# Patient Record
Sex: FEMALE | Race: WHITE | NOT HISPANIC OR LATINO | URBAN - METROPOLITAN AREA
[De-identification: names, ages, dates, MRNs, and addresses within clinical notes are randomized per-mention and may not be internally consistent; named-entity substitution may affect disease eponyms.]

---

## 2017-03-27 ENCOUNTER — OUTPATIENT (OUTPATIENT)
Dept: OUTPATIENT SERVICES | Facility: HOSPITAL | Age: 77
LOS: 1 days | End: 2017-03-27
Payer: MEDICARE

## 2017-03-27 VITALS
OXYGEN SATURATION: 98 % | HEART RATE: 76 BPM | RESPIRATION RATE: 16 BRPM | DIASTOLIC BLOOD PRESSURE: 90 MMHG | TEMPERATURE: 99 F | HEIGHT: 62 IN | SYSTOLIC BLOOD PRESSURE: 150 MMHG | WEIGHT: 113.98 LBS

## 2017-03-27 DIAGNOSIS — N13.30 UNSPECIFIED HYDRONEPHROSIS: ICD-10-CM

## 2017-03-27 DIAGNOSIS — R19.00 INTRA-ABDOMINAL AND PELVIC SWELLING, MASS AND LUMP, UNSPECIFIED SITE: Chronic | ICD-10-CM

## 2017-03-27 DIAGNOSIS — Z92.21 PERSONAL HISTORY OF ANTINEOPLASTIC CHEMOTHERAPY: Chronic | ICD-10-CM

## 2017-03-27 DIAGNOSIS — Z98.890 OTHER SPECIFIED POSTPROCEDURAL STATES: Chronic | ICD-10-CM

## 2017-03-27 LAB
ALBUMIN SERPL ELPH-MCNC: 4.1 G/DL — SIGNIFICANT CHANGE UP (ref 3.3–5)
ALP SERPL-CCNC: 94 U/L — SIGNIFICANT CHANGE UP (ref 40–120)
ALT FLD-CCNC: 18 U/L — SIGNIFICANT CHANGE UP (ref 4–33)
APPEARANCE UR: SIGNIFICANT CHANGE UP
AST SERPL-CCNC: 21 U/L — SIGNIFICANT CHANGE UP (ref 4–32)
BILIRUB SERPL-MCNC: 0.2 MG/DL — SIGNIFICANT CHANGE UP (ref 0.2–1.2)
BILIRUB UR-MCNC: SIGNIFICANT CHANGE UP
BLOOD UR QL VISUAL: NEGATIVE — SIGNIFICANT CHANGE UP
BUN SERPL-MCNC: 29 MG/DL — HIGH (ref 7–23)
CALCIUM SERPL-MCNC: 10.4 MG/DL — SIGNIFICANT CHANGE UP (ref 8.4–10.5)
CHLORIDE SERPL-SCNC: 102 MMOL/L — SIGNIFICANT CHANGE UP (ref 98–107)
CO2 SERPL-SCNC: 24 MMOL/L — SIGNIFICANT CHANGE UP (ref 22–31)
COLOR SPEC: YELLOW — SIGNIFICANT CHANGE UP
CREAT SERPL-MCNC: 1.36 MG/DL — HIGH (ref 0.5–1.3)
GLUCOSE SERPL-MCNC: 121 MG/DL — HIGH (ref 70–99)
GLUCOSE UR-MCNC: NEGATIVE — SIGNIFICANT CHANGE UP
GRAN CASTS # UR COMP ASSIST: SIGNIFICANT CHANGE UP
HCT VFR BLD CALC: 36.8 % — SIGNIFICANT CHANGE UP (ref 34.5–45)
HGB BLD-MCNC: 12.2 G/DL — SIGNIFICANT CHANGE UP (ref 11.5–15.5)
HYALINE CASTS # UR AUTO: SIGNIFICANT CHANGE UP (ref 0–?)
KETONES UR-MCNC: NEGATIVE — SIGNIFICANT CHANGE UP
LEUKOCYTE ESTERASE UR-ACNC: HIGH
MCHC RBC-ENTMCNC: 30.8 PG — SIGNIFICANT CHANGE UP (ref 27–34)
MCHC RBC-ENTMCNC: 33.2 % — SIGNIFICANT CHANGE UP (ref 32–36)
MCV RBC AUTO: 92.9 FL — SIGNIFICANT CHANGE UP (ref 80–100)
MUCOUS THREADS # UR AUTO: SIGNIFICANT CHANGE UP
NITRITE UR-MCNC: NEGATIVE — SIGNIFICANT CHANGE UP
NON-SQ EPI CELLS # UR AUTO: 1 — SIGNIFICANT CHANGE UP
PH UR: 6 — SIGNIFICANT CHANGE UP (ref 4.6–8)
PLATELET # BLD AUTO: 225 K/UL — SIGNIFICANT CHANGE UP (ref 150–400)
PMV BLD: 10.3 FL — SIGNIFICANT CHANGE UP (ref 7–13)
POTASSIUM SERPL-MCNC: 4.5 MMOL/L — SIGNIFICANT CHANGE UP (ref 3.5–5.3)
POTASSIUM SERPL-SCNC: 4.5 MMOL/L — SIGNIFICANT CHANGE UP (ref 3.5–5.3)
PROT SERPL-MCNC: 7.4 G/DL — SIGNIFICANT CHANGE UP (ref 6–8.3)
PROT UR-MCNC: 150 — HIGH
RBC # BLD: 3.96 M/UL — SIGNIFICANT CHANGE UP (ref 3.8–5.2)
RBC # FLD: 12.3 % — SIGNIFICANT CHANGE UP (ref 10.3–14.5)
RBC CASTS # UR COMP ASSIST: SIGNIFICANT CHANGE UP (ref 0–?)
SODIUM SERPL-SCNC: 142 MMOL/L — SIGNIFICANT CHANGE UP (ref 135–145)
SP GR SPEC: 1.03 — HIGH (ref 1–1.03)
SQUAMOUS # UR AUTO: SIGNIFICANT CHANGE UP
UROBILINOGEN FLD QL: NORMAL E.U. — SIGNIFICANT CHANGE UP (ref 0.1–0.2)
WBC # BLD: 9.25 K/UL — SIGNIFICANT CHANGE UP (ref 3.8–10.5)
WBC # FLD AUTO: 9.25 K/UL — SIGNIFICANT CHANGE UP (ref 3.8–10.5)
WBC UR QL: SIGNIFICANT CHANGE UP (ref 0–?)

## 2017-03-27 PROCEDURE — 93010 ELECTROCARDIOGRAM REPORT: CPT

## 2017-03-27 RX ORDER — SODIUM CHLORIDE 9 MG/ML
1000 INJECTION, SOLUTION INTRAVENOUS
Qty: 0 | Refills: 0 | Status: DISCONTINUED | OUTPATIENT
Start: 2017-03-31 | End: 2017-04-15

## 2017-03-27 RX ORDER — SODIUM CHLORIDE 9 MG/ML
3 INJECTION INTRAMUSCULAR; INTRAVENOUS; SUBCUTANEOUS EVERY 8 HOURS
Qty: 0 | Refills: 0 | Status: DISCONTINUED | OUTPATIENT
Start: 2017-03-31 | End: 2017-04-15

## 2017-03-27 NOTE — H&P PST ADULT - NEGATIVE ENMT SYMPTOMS
no sinus symptoms/no dry mouth/no vertigo/no ear pain/no nasal obstruction/no tinnitus/no hearing difficulty/no throat pain/no dysphagia/no abnormal taste sensation/no gum bleeding/no nose bleeds

## 2017-03-27 NOTE — H&P PST ADULT - NEGATIVE CARDIOVASCULAR SYMPTOMS
no peripheral edema/no chest pain/no orthopnea/no palpitations/no paroxysmal nocturnal dyspnea/no claudication/no dyspnea on exertion

## 2017-03-27 NOTE — H&P PST ADULT - RS GEN PE MLT RESP DETAILS PC
respirations non-labored/breath sounds equal/no wheezes/no intercostal retractions/no rales/no rhonchi/airway patent/clear to auscultation bilaterally/good air movement/no chest wall tenderness

## 2017-03-27 NOTE — H&P PST ADULT - NSANTHOSAYNRD_GEN_A_CORE
No. JOHNNY screening performed.  STOP BANG Legend: 0-2 = LOW Risk; 3-4 = INTERMEDIATE Risk; 5-8 = HIGH Risk

## 2017-03-27 NOTE — H&P PST ADULT - NEGATIVE OPHTHALMOLOGIC SYMPTOMS
no pain L/no discharge R/no scleral injection R/no photophobia/no discharge L/no irritation R/no loss of vision L/no lacrimation R/no pain R/no blurred vision L/no irritation L/no loss of vision R/no blurred vision R/no diplopia/no lacrimation L/no scleral injection L

## 2017-03-27 NOTE — H&P PST ADULT - PROBLEM SELECTOR PLAN 1
Scheduled for cystoscopy, right JJ stent change on 03/31/17. Pre op instructions, famotidine given and explained. Pt verbalized understanding.

## 2017-03-27 NOTE — H&P PST ADULT - PMH
Abdominal mass  2013 metastatic endometrial cancer, s/p surgery on chemotherapy at [facility]-infusa port right chest  Anxiety    Atrial fibrillation  one episode >30 years ago  Benign Hypertension    Endometrial carcinoma  2001 - s/p KATH - tx with radiation  metastatic finished treatment 3/2014  History of chemotherapy  ([id number]; 2015 - 09/2016)  Hydronephrosis determined by ultrasound  obstrucive leison right kidney  Hydronephrosis of right kidney    Hyperlipidemia    Lymphedema of leg  RLE  Mitral valve prolapse  partial  Post-Herpetic Trigeminal Neuralgia  left  Ulcerative colitis    Uterine cancer

## 2017-03-27 NOTE — H&P PST ADULT - NEGATIVE NEUROLOGICAL SYMPTOMS
no difficulty walking/no headache/no generalized seizures/no vertigo/no confusion/no tremors/no focal seizures/no syncope/no hemiparesis

## 2017-03-27 NOTE — H&P PST ADULT - HISTORY OF PRESENT ILLNESS
77 y/o  female with PMH: HTN, HLD, Anxiety, UC, endometrial CA - S/P KATH (2001) with radiation therapy. Pt developed tumor of the abdominal wall 2013, S/P abdominal surgery/tumor debulking, mesh 2013, with mets to lymph nodes with lymphedema of the right leg. go, hydronephrosis, S/P right ureteral stent placement a year and a half ago 75 y/o  female with PMH: HTN, HLD, Anxiety, UC, endometrial CA - S/P KATH (2001) with radiation therapy. Pt developed tumor of the abdominal wall 2013, S/P abdominal surgery/tumor debulking, mesh 2013, with mets to lymph nodes with lymphedema of the right leg. Pre op diagnosis unspecified hydronephrosis. H/o multiple right ureteral stent exchange. Now scheduled for cystoscopy, right JJ stent change on 03/31/17

## 2017-03-27 NOTE — H&P PST ADULT - PSH
Abdominal mass  s/p mass removed 2013,on chemo-right chest infusa port  Encounter for biopsy  2/14/13 - right iliac lymph node biopsy  H/O total hysterectomy  2001  History of chemotherapy  Infusaport insertion ; 2013  History of D&C  age 22  History of tonsillectomy Abdominal mass  s/p mass removed 2013,on chemo-right chest infusa port  Encounter for biopsy  2/14/13 - right iliac lymph node biopsy  H/O total hysterectomy  2001  History of chemotherapy  Infusaport insertion ; 2013  History of D&C  age 22  History of tonsillectomy    History of urethral stent  right

## 2017-03-28 LAB
BACTERIA UR CULT: SIGNIFICANT CHANGE UP
SPECIMEN SOURCE: SIGNIFICANT CHANGE UP

## 2017-03-30 NOTE — ASU PATIENT PROFILE, ADULT - PMH
Abdominal mass  2013 metastatic endometrial cancer, s/p surgery on chemotherapy at [facility]-infusa port right chest  Anxiety    Atrial fibrillation  one episode >30 years ago  Benign Hypertension    Endometrial carcinoma  2001 - s/p KATH - tx with radiation  metastatic finished treatment 3/2014  History of chemotherapy  ([id number]; 2015 - 09/2016)  Hydronephrosis determined by ultrasound  obstrucive leison right kidney  Hydronephrosis of right kidney    Hyperlipidemia    Lymphedema of leg  RLE  Mitral valve prolapse  partial  Post-Herpetic Trigeminal Neuralgia  left  Ulcerative colitis    Uterine cancer

## 2017-03-30 NOTE — ASU PATIENT PROFILE, ADULT - PSH
Abdominal mass  s/p mass removed 2013,on chemo-right chest infusa port  Encounter for biopsy  2/14/13 - right iliac lymph node biopsy  H/O total hysterectomy  2001  History of chemotherapy  Infusaport insertion ; 2013  History of D&C  age 22  History of tonsillectomy    History of urethral stent  right

## 2017-03-31 ENCOUNTER — OUTPATIENT (OUTPATIENT)
Dept: OUTPATIENT SERVICES | Facility: HOSPITAL | Age: 77
LOS: 1 days | Discharge: HOME | End: 2017-03-31
Payer: MEDICARE

## 2017-03-31 ENCOUNTER — APPOINTMENT (OUTPATIENT)
Dept: UROLOGY | Facility: AMBULATORY SURGERY CENTER | Age: 77
End: 2017-03-31

## 2017-03-31 VITALS
OXYGEN SATURATION: 100 % | DIASTOLIC BLOOD PRESSURE: 83 MMHG | RESPIRATION RATE: 16 BRPM | HEIGHT: 62 IN | TEMPERATURE: 98 F | SYSTOLIC BLOOD PRESSURE: 169 MMHG | HEART RATE: 80 BPM | WEIGHT: 113.98 LBS

## 2017-03-31 VITALS
RESPIRATION RATE: 16 BRPM | SYSTOLIC BLOOD PRESSURE: 127 MMHG | HEART RATE: 79 BPM | OXYGEN SATURATION: 97 % | DIASTOLIC BLOOD PRESSURE: 56 MMHG

## 2017-03-31 DIAGNOSIS — N13.30 UNSPECIFIED HYDRONEPHROSIS: ICD-10-CM

## 2017-03-31 DIAGNOSIS — R19.00 INTRA-ABDOMINAL AND PELVIC SWELLING, MASS AND LUMP, UNSPECIFIED SITE: Chronic | ICD-10-CM

## 2017-03-31 DIAGNOSIS — Z92.21 PERSONAL HISTORY OF ANTINEOPLASTIC CHEMOTHERAPY: Chronic | ICD-10-CM

## 2017-03-31 DIAGNOSIS — Z98.890 OTHER SPECIFIED POSTPROCEDURAL STATES: Chronic | ICD-10-CM

## 2017-03-31 PROCEDURE — 52332 CYSTOSCOPY AND TREATMENT: CPT | Mod: RT

## 2017-03-31 RX ORDER — CEPHALEXIN 500 MG
1 CAPSULE ORAL
Qty: 9 | Refills: 0 | OUTPATIENT
Start: 2017-03-31 | End: 2017-04-03

## 2017-03-31 NOTE — ASU PREOP CHECKLIST - COMMENTS
Took Pepcid with sip of water at this am. Took Pepcid , Amlodipine, Benicar and Oxcarbazepine with sip of water at this am.

## 2017-03-31 NOTE — ASU DISCHARGE PLAN (ADULT/PEDIATRIC). - NOTIFY
Bleeding that does not stop/Fever greater than 101 Fever greater than 101/Persistent Nausea and Vomiting/Unable to Urinate/Bleeding that does not stop/Pain not relieved by Medications

## 2017-03-31 NOTE — ASU DISCHARGE PLAN (ADULT/PEDIATRIC). - MEDICATION SUMMARY - MEDICATIONS TO TAKE
I will START or STAY ON the medications listed below when I get home from the hospital:    balsalazide 750 mg oral capsule  -- 3 cap(s) by mouth 3 times a day  -- Indication: For Home med    spironolactone  -- 75 milligram(s) by mouth once a day in am  -- Indication: For Home med    Tylenol 500 mg oral tablet  -- 2 tab(s) by mouth every 6 hours, As Needed  -- Indication: For Home med    Benicar 40 mg oral tablet  -- 1 tab(s) by mouth once a day in pm  -- Indication: For Home med    OXcarbazepine 150 mg oral tablet  -- 1 tab(s) by mouth once a day in am  -- Indication: For Home med    Zoloft 25 mg oral tablet  -- 1 tab(s) by mouth once a day in pm  -- Indication: For Home med    Claritin 10 mg oral tablet  -- 1 tab(s) by mouth once a day  -- Indication: For Home med    atorvastatin 10 mg oral tablet  --  by mouth once on Tuesday and Saturday  -- Indication: For Home med    amLODIPine 5 mg oral tablet  -- 1 tab(s) by mouth 2 times a day  -- Indication: For Home med    CoQ10  -- 100 milligram(s) by mouth once a day last day 3/27  -- Indication: For Home med    Systane ophthalmic solution  -- 1 -2 drop(s) to each affected eye once a day  -- Indication: For Home med    omeprazole 20 mg oral delayed release capsule  -- 1 cap(s) by mouth once a day in am  -- Indication: For Home med    Vitamin B6 100 mg oral tablet  -- 1 tab(s) by mouth once a day in pm  -- Indication: For Home med    Vitamin B-12 1000 mcg oral tablet  -- 1 tab(s) by mouth once a day in pm  -- Indication: For Home med

## 2017-04-01 ENCOUNTER — TRANSCRIPTION ENCOUNTER (OUTPATIENT)
Age: 77
End: 2017-04-01

## 2017-04-01 LAB
BACTERIA UR CULT: SIGNIFICANT CHANGE UP
SPECIMEN SOURCE: SIGNIFICANT CHANGE UP

## 2017-04-01 RX ORDER — AMOXICILLIN 250 MG/5ML
1 SUSPENSION, RECONSTITUTED, ORAL (ML) ORAL
Qty: 9 | Refills: 0 | OUTPATIENT
Start: 2017-04-01 | End: 2017-04-04

## 2017-06-02 ENCOUNTER — RESULT REVIEW (OUTPATIENT)
Age: 77
End: 2017-06-02

## 2017-06-15 ENCOUNTER — OUTPATIENT (OUTPATIENT)
Dept: OUTPATIENT SERVICES | Facility: HOSPITAL | Age: 77
LOS: 1 days | Discharge: HOME | End: 2017-06-15

## 2017-06-15 DIAGNOSIS — C54.1 MALIGNANT NEOPLASM OF ENDOMETRIUM: ICD-10-CM

## 2017-06-15 DIAGNOSIS — R19.00 INTRA-ABDOMINAL AND PELVIC SWELLING, MASS AND LUMP, UNSPECIFIED SITE: Chronic | ICD-10-CM

## 2017-06-15 DIAGNOSIS — Z92.21 PERSONAL HISTORY OF ANTINEOPLASTIC CHEMOTHERAPY: Chronic | ICD-10-CM

## 2017-06-15 DIAGNOSIS — Z98.890 OTHER SPECIFIED POSTPROCEDURAL STATES: Chronic | ICD-10-CM

## 2017-06-21 ENCOUNTER — APPOINTMENT (OUTPATIENT)
Dept: HEMATOLOGY ONCOLOGY | Facility: CLINIC | Age: 77
End: 2017-06-21

## 2017-06-21 VITALS — BODY MASS INDEX: 20.71 KG/M2 | HEIGHT: 62.2 IN

## 2017-06-21 VITALS
WEIGHT: 125.88 LBS | HEART RATE: 70 BPM | SYSTOLIC BLOOD PRESSURE: 181 MMHG | BODY MASS INDEX: 23.02 KG/M2 | DIASTOLIC BLOOD PRESSURE: 82 MMHG | RESPIRATION RATE: 14 BRPM | TEMPERATURE: 98.5 F | OXYGEN SATURATION: 98 %

## 2017-06-21 DIAGNOSIS — C55 MALIGNANT NEOPLASM OF UTERUS, PART UNSPECIFIED: ICD-10-CM

## 2017-06-21 DIAGNOSIS — K51.90 ULCERATIVE COLITIS, UNSPECIFIED, W/OUT COMPLICATIONS: ICD-10-CM

## 2017-06-23 PROBLEM — K51.90 ULCERATIVE COLITIS, CHRONIC: Status: RESOLVED | Noted: 2017-06-23 | Resolved: 2017-06-23

## 2017-06-26 DIAGNOSIS — C55 MALIGNANT NEOPLASM OF UTERUS, PART UNSPECIFIED: ICD-10-CM

## 2017-08-13 NOTE — ASU PATIENT PROFILE, ADULT - FALL HARM RISK CONCLUSION
home medication from [facility]: torsemide 10mg BID, pantoprazole 40mg BID, alfaclcidol 0.25 mcg every other day, domstal 10mg TID, Itoprode HCl 50mg TID. Universal Safety Interventions

## 2017-09-22 ENCOUNTER — OUTPATIENT (OUTPATIENT)
Dept: OUTPATIENT SERVICES | Facility: HOSPITAL | Age: 77
LOS: 1 days | End: 2017-09-22
Payer: MEDICARE

## 2017-09-22 VITALS
RESPIRATION RATE: 16 BRPM | DIASTOLIC BLOOD PRESSURE: 88 MMHG | TEMPERATURE: 99 F | SYSTOLIC BLOOD PRESSURE: 168 MMHG | WEIGHT: 115.08 LBS | HEIGHT: 62 IN | HEART RATE: 72 BPM

## 2017-09-22 DIAGNOSIS — N13.30 UNSPECIFIED HYDRONEPHROSIS: ICD-10-CM

## 2017-09-22 DIAGNOSIS — R19.00 INTRA-ABDOMINAL AND PELVIC SWELLING, MASS AND LUMP, UNSPECIFIED SITE: Chronic | ICD-10-CM

## 2017-09-22 DIAGNOSIS — I10 ESSENTIAL (PRIMARY) HYPERTENSION: ICD-10-CM

## 2017-09-22 DIAGNOSIS — Z87.448 PERSONAL HISTORY OF OTHER DISEASES OF URINARY SYSTEM: Chronic | ICD-10-CM

## 2017-09-22 DIAGNOSIS — Z92.21 PERSONAL HISTORY OF ANTINEOPLASTIC CHEMOTHERAPY: Chronic | ICD-10-CM

## 2017-09-22 DIAGNOSIS — Z98.890 OTHER SPECIFIED POSTPROCEDURAL STATES: Chronic | ICD-10-CM

## 2017-09-22 LAB
ALBUMIN SERPL ELPH-MCNC: 3.9 G/DL — SIGNIFICANT CHANGE UP (ref 3.3–5)
ALP SERPL-CCNC: 82 U/L — SIGNIFICANT CHANGE UP (ref 40–120)
ALT FLD-CCNC: 12 U/L — SIGNIFICANT CHANGE UP (ref 4–33)
APPEARANCE UR: CLEAR — SIGNIFICANT CHANGE UP
AST SERPL-CCNC: 19 U/L — SIGNIFICANT CHANGE UP (ref 4–32)
BILIRUB SERPL-MCNC: 0.2 MG/DL — SIGNIFICANT CHANGE UP (ref 0.2–1.2)
BILIRUB UR-MCNC: NEGATIVE — SIGNIFICANT CHANGE UP
BLOOD UR QL VISUAL: HIGH
BUN SERPL-MCNC: 24 MG/DL — HIGH (ref 7–23)
CALCIUM SERPL-MCNC: 9.2 MG/DL — SIGNIFICANT CHANGE UP (ref 8.4–10.5)
CHLORIDE SERPL-SCNC: 100 MMOL/L — SIGNIFICANT CHANGE UP (ref 98–107)
CO2 SERPL-SCNC: 27 MMOL/L — SIGNIFICANT CHANGE UP (ref 22–31)
COLOR SPEC: YELLOW — SIGNIFICANT CHANGE UP
CREAT SERPL-MCNC: 1.03 MG/DL — SIGNIFICANT CHANGE UP (ref 0.5–1.3)
GLUCOSE SERPL-MCNC: 173 MG/DL — HIGH (ref 70–99)
GLUCOSE UR-MCNC: 300 — SIGNIFICANT CHANGE UP
HCT VFR BLD CALC: 34.4 % — LOW (ref 34.5–45)
HGB BLD-MCNC: 11.1 G/DL — LOW (ref 11.5–15.5)
KETONES UR-MCNC: NEGATIVE — SIGNIFICANT CHANGE UP
LEUKOCYTE ESTERASE UR-ACNC: NEGATIVE — SIGNIFICANT CHANGE UP
MCHC RBC-ENTMCNC: 27.8 PG — SIGNIFICANT CHANGE UP (ref 27–34)
MCHC RBC-ENTMCNC: 32.3 % — SIGNIFICANT CHANGE UP (ref 32–36)
MCV RBC AUTO: 86 FL — SIGNIFICANT CHANGE UP (ref 80–100)
MUCOUS THREADS # UR AUTO: SIGNIFICANT CHANGE UP
NITRITE UR-MCNC: NEGATIVE — SIGNIFICANT CHANGE UP
NON-SQ EPI CELLS # UR AUTO: <1 — SIGNIFICANT CHANGE UP
NRBC # FLD: 0 — SIGNIFICANT CHANGE UP
PH UR: 6 — SIGNIFICANT CHANGE UP (ref 4.6–8)
PLATELET # BLD AUTO: 202 K/UL — SIGNIFICANT CHANGE UP (ref 150–400)
PMV BLD: 10.4 FL — SIGNIFICANT CHANGE UP (ref 7–13)
POTASSIUM SERPL-MCNC: 3.6 MMOL/L — SIGNIFICANT CHANGE UP (ref 3.5–5.3)
POTASSIUM SERPL-SCNC: 3.6 MMOL/L — SIGNIFICANT CHANGE UP (ref 3.5–5.3)
PROT SERPL-MCNC: 7 G/DL — SIGNIFICANT CHANGE UP (ref 6–8.3)
PROT UR-MCNC: 100 — HIGH
RBC # BLD: 4 M/UL — SIGNIFICANT CHANGE UP (ref 3.8–5.2)
RBC # FLD: 12.3 % — SIGNIFICANT CHANGE UP (ref 10.3–14.5)
RBC CASTS # UR COMP ASSIST: SIGNIFICANT CHANGE UP (ref 0–?)
SODIUM SERPL-SCNC: 143 MMOL/L — SIGNIFICANT CHANGE UP (ref 135–145)
SP GR SPEC: 1.03 — SIGNIFICANT CHANGE UP (ref 1–1.03)
SQUAMOUS # UR AUTO: SIGNIFICANT CHANGE UP
UROBILINOGEN FLD QL: NORMAL E.U. — SIGNIFICANT CHANGE UP (ref 0.1–0.2)
WBC # BLD: 5.71 K/UL — SIGNIFICANT CHANGE UP (ref 3.8–10.5)
WBC # FLD AUTO: 5.71 K/UL — SIGNIFICANT CHANGE UP (ref 3.8–10.5)
WBC UR QL: SIGNIFICANT CHANGE UP (ref 0–?)

## 2017-09-22 PROCEDURE — 93010 ELECTROCARDIOGRAM REPORT: CPT

## 2017-09-22 RX ORDER — ACETAMINOPHEN 500 MG
2 TABLET ORAL
Qty: 0 | Refills: 0 | COMMUNITY

## 2017-09-22 RX ORDER — OXCARBAZEPINE 300 MG/1
1 TABLET, FILM COATED ORAL
Qty: 0 | Refills: 0 | COMMUNITY

## 2017-09-22 RX ORDER — SPIRONOLACTONE 25 MG/1
75 TABLET, FILM COATED ORAL
Qty: 0 | Refills: 0 | COMMUNITY

## 2017-09-22 RX ORDER — LORATADINE 10 MG/1
1 TABLET ORAL
Qty: 0 | Refills: 0 | COMMUNITY

## 2017-09-22 RX ORDER — SODIUM CHLORIDE 9 MG/ML
3 INJECTION INTRAMUSCULAR; INTRAVENOUS; SUBCUTANEOUS EVERY 8 HOURS
Qty: 0 | Refills: 0 | Status: DISCONTINUED | OUTPATIENT
Start: 2017-10-06 | End: 2017-10-21

## 2017-09-22 RX ORDER — UBIDECARENONE 100 MG
100 CAPSULE ORAL
Qty: 0 | Refills: 0 | COMMUNITY

## 2017-09-22 RX ORDER — PYRIDOXINE HCL (VITAMIN B6) 100 MG
1 TABLET ORAL
Qty: 0 | Refills: 0 | COMMUNITY

## 2017-09-22 NOTE — H&P PST ADULT - PROBLEM SELECTOR PLAN 1
Cystoscopy, Right JJ Stent Change scheduled on 10/6/17.  Pre-op instructions provided. Pt verbalized understanding.   Pt to take own medication for GI ppx.

## 2017-09-22 NOTE — H&P PST ADULT - PMH
Abdominal mass  2013 metastatic endometrial cancer, s/p surgery, on chemotherapy at present-infusa port right chest  Anxiety    Atrial fibrillation  one episode >30 years ago  Benign Hypertension    Endometrial carcinoma  2001 - s/p KATH - tx with radiation  metastatic finished treatment 3/2014  History of chemotherapy  ([id number]; 2015 - 09/2016)  Hydronephrosis determined by ultrasound  obstrucive leison right kidney  Hyperlipidemia    Lymphedema of leg  RLE  Mitral valve prolapse  partial  Post-Herpetic Trigeminal Neuralgia  left  Ulcerative colitis Abdominal mass  2013 metastatic endometrial cancer, s/p surgery, on chemotherapy at present-infusa port right chest  Anxiety    Atrial fibrillation  one episode >30 years ago  Benign Hypertension    Endometrial carcinoma  2001 - s/p KATH - tx with radiation  metastatic finished treatment 3/2014  History of chemotherapy    Hydronephrosis determined by ultrasound  right kidney  Hyperlipidemia    Lymphedema of leg  right  Mitral valve prolapse  partial  Post-Herpetic Trigeminal Neuralgia  left  Ulcerative colitis

## 2017-09-22 NOTE — H&P PST ADULT - MUSCULOSKELETAL
details… detailed exam ROM intact/no calf tenderness/normal strength/no joint warmth/no joint erythema

## 2017-09-22 NOTE — H&P PST ADULT - PSH
Abdominal mass  s/p mass removed 2013,on chemo-right chest infusa port  Encounter for biopsy  2/14/13 - right iliac lymph node biopsy  H/O hydronephrosis  ureteral stent, multiple stent exchanges  H/O total hysterectomy  2001  History of chemotherapy  Infusaport insertion ; 2013  History of D&C  age 22  History of tonsillectomy

## 2017-09-22 NOTE — H&P PST ADULT - HISTORY OF PRESENT ILLNESS
75 y/o  female with PMH endometrial CA - S/P KATH (2001) with radiation therapy. Pt developed tumor of the abdominal wall 2013, S/P abdominal surgery/tumor debulking, mesh 2013, with mets to lymph nodes with lymphedema of the right leg. Pt currently on treatement with chemo.  Pt also with hydronephrosis with right ureteral stent placed and multiple stent exchanges, last in 3/31/17. Pt presents today for presurgical evaluation for ... 77 y/o  female with hx of endometrial CA - S/P KATH (2001) with radiation therapy. Pt developed tumor of the abdominal wall 2013, S/P tumor debulking, mesh 2013, with mets to lymph nodes causing lymphedema of the right leg and hydronephrosis of right kidney.  Pt currently on treatment with chemotherapy.  Pt has had multiple stent exchanges, last in 3/31/17. Pt presents today for presurgical evaluation for Cystoscopy, Right JJ Stent Change scheduled on 10/6/17.

## 2017-09-22 NOTE — H&P PST ADULT - PROBLEM SELECTOR PLAN 2
Pt instructed to take her Amlodipine the morning of surgery.  BP mildly elevated - going for medical clearance.

## 2017-09-22 NOTE — H&P PST ADULT - FAMILY HISTORY
Father  Still living? Unknown  Family history of early CAD, Age at diagnosis: Age Unknown  Hypertension, Age at diagnosis: Age Unknown     Mother  Still living? Unknown  Hypertension, Age at diagnosis: Age Unknown

## 2017-09-24 LAB
BACTERIA UR CULT: SIGNIFICANT CHANGE UP
SPECIMEN SOURCE: SIGNIFICANT CHANGE UP

## 2017-10-03 RX ORDER — CIPROFLOXACIN HYDROCHLORIDE 500 MG/1
500 TABLET, FILM COATED ORAL
Qty: 10 | Refills: 0 | Status: ACTIVE | COMMUNITY
Start: 2017-10-03 | End: 1900-01-01

## 2017-10-05 NOTE — ASU PATIENT PROFILE, ADULT - PMH
Abdominal mass  2013 metastatic endometrial cancer, s/p surgery, on chemotherapy at present-infusa port right chest  Anxiety    Atrial fibrillation  one episode >30 years ago  Benign Hypertension    Endometrial carcinoma  2001 - s/p KATH - tx with radiation  metastatic finished treatment 3/2014  History of chemotherapy    Hydronephrosis determined by ultrasound  right kidney  Hyperlipidemia    Lymphedema of leg  right  Mitral valve prolapse  partial  Post-Herpetic Trigeminal Neuralgia  left  Ulcerative colitis

## 2017-10-05 NOTE — ASU PATIENT PROFILE, ADULT - VISION (WITH CORRECTIVE LENSES IF THE PATIENT USUALLY WEARS THEM):
Pt uses reading glasses/Partially impaired: cannot see medication labels or newsprint, but can see obstacles in path, and the surrounding layout; can count fingers at arm's length

## 2017-10-06 ENCOUNTER — APPOINTMENT (OUTPATIENT)
Dept: UROLOGY | Facility: HOSPITAL | Age: 77
End: 2017-10-06

## 2017-10-06 ENCOUNTER — TRANSCRIPTION ENCOUNTER (OUTPATIENT)
Age: 77
End: 2017-10-06

## 2017-10-06 ENCOUNTER — OUTPATIENT (OUTPATIENT)
Dept: OUTPATIENT SERVICES | Facility: HOSPITAL | Age: 77
LOS: 1 days | Discharge: HOME | End: 2017-10-06
Payer: MEDICARE

## 2017-10-06 VITALS
WEIGHT: 115.08 LBS | RESPIRATION RATE: 17 BRPM | HEART RATE: 86 BPM | SYSTOLIC BLOOD PRESSURE: 163 MMHG | HEIGHT: 62 IN | TEMPERATURE: 99 F | OXYGEN SATURATION: 96 % | DIASTOLIC BLOOD PRESSURE: 77 MMHG

## 2017-10-06 VITALS
SYSTOLIC BLOOD PRESSURE: 133 MMHG | HEART RATE: 80 BPM | DIASTOLIC BLOOD PRESSURE: 69 MMHG | RESPIRATION RATE: 17 BRPM | OXYGEN SATURATION: 95 % | TEMPERATURE: 98 F

## 2017-10-06 DIAGNOSIS — Z87.448 PERSONAL HISTORY OF OTHER DISEASES OF URINARY SYSTEM: Chronic | ICD-10-CM

## 2017-10-06 DIAGNOSIS — R19.00 INTRA-ABDOMINAL AND PELVIC SWELLING, MASS AND LUMP, UNSPECIFIED SITE: Chronic | ICD-10-CM

## 2017-10-06 DIAGNOSIS — Z92.21 PERSONAL HISTORY OF ANTINEOPLASTIC CHEMOTHERAPY: Chronic | ICD-10-CM

## 2017-10-06 DIAGNOSIS — N13.30 UNSPECIFIED HYDRONEPHROSIS: ICD-10-CM

## 2017-10-06 PROCEDURE — 52332 CYSTOSCOPY AND TREATMENT: CPT | Mod: RT

## 2017-10-06 RX ORDER — CEPHALEXIN 500 MG
1 CAPSULE ORAL
Qty: 15 | Refills: 0 | OUTPATIENT
Start: 2017-10-06 | End: 2017-10-11

## 2017-10-06 RX ORDER — SODIUM CHLORIDE 9 MG/ML
1000 INJECTION, SOLUTION INTRAVENOUS
Qty: 0 | Refills: 0 | Status: DISCONTINUED | OUTPATIENT
Start: 2017-10-06 | End: 2017-10-21

## 2017-10-06 RX ORDER — SODIUM CHLORIDE 9 MG/ML
1000 INJECTION, SOLUTION INTRAVENOUS
Qty: 0 | Refills: 0 | Status: DISCONTINUED | OUTPATIENT
Start: 2017-10-06 | End: 2017-10-06

## 2017-10-06 RX ADMIN — SODIUM CHLORIDE 50 MILLILITER(S): 9 INJECTION, SOLUTION INTRAVENOUS at 09:00

## 2017-10-06 NOTE — ASU DISCHARGE PLAN (ADULT/PEDIATRIC). - NOTIFY
Unable to Urinate/Inability to Tolerate Liquids or Foods/Fever greater than 101/Bleeding that does not stop/Pain not relieved by Medications/Persistent Nausea and Vomiting Increased Irritability or Sluggishness/Pain not relieved by Medications/Fever greater than 101/Unable to Urinate/Bleeding that does not stop/Persistent Nausea and Vomiting/Inability to Tolerate Liquids or Foods/Swelling that continues

## 2017-10-06 NOTE — BRIEF OPERATIVE NOTE - PROCEDURE
<<-----Click on this checkbox to enter Procedure Cystoscopy with replacement of right ureteral stent  10/06/2017    Active  NAYANON

## 2017-10-06 NOTE — ASU DISCHARGE PLAN (ADULT/PEDIATRIC). - MEDICATION SUMMARY - MEDICATIONS TO TAKE
I will START or STAY ON the medications listed below when I get home from the hospital:    balsalazide 750 mg oral capsule  -- 3 cap(s) by mouth 3 times a day  -- Indication: For GI agent    Tylenol 500 mg oral tablet  -- 1 tab(s) by mouth , As Needed  -- Indication: For pain     Benicar 40 mg oral tablet  -- 1 tab(s) by mouth once a day in pm  -- Indication: For HTN    Zoloft 25 mg oral tablet  -- 1 tab(s) by mouth once a day in pm  -- Indication: For antidepressant    atorvastatin 10 mg oral tablet  --  by mouth once on Tuesday and Saturday  -- Indication: For Hyperlipidemia    amLODIPine 5 mg oral tablet  -- 1 tab(s) by mouth once a day in pm  -- Indication: For HTN    amLODIPine 2.5 mg oral tablet  -- 1 tab(s) by mouth once a day in am  -- Indication: For HTN    cephalexin 500 mg oral tablet  -- 1 tab(s) by mouth 3 times a day   -- Finish all this medication unless otherwise directed by prescriber.    -- Indication: For UTI prevention    Systane ophthalmic solution  -- 1 -2 drop(s) to each affected eye once a day  -- Indication: For topical agent    omeprazole 20 mg oral delayed release capsule  -- 1 cap(s) by mouth once a day in am  -- Indication: For GERD    Vitamin B-12 1000 mcg oral tablet  -- 1 tab(s) by mouth once a day in pm  -- Indication: For vitamin    Vitamin D3 5000 intl units oral tablet  -- 1 tab(s) by mouth once a day in pm  -- Indication: For vitamin

## 2017-10-06 NOTE — BRIEF OPERATIVE NOTE - DISPOSITION
PACU I have personally seen and examined this patient.  I have fully participated in the care of this patient. I have reviewed all pertinent clinical information, including history, physical exam, plan and the Resident’s note and agree except as noted.

## 2017-10-24 ENCOUNTER — APPOINTMENT (OUTPATIENT)
Dept: UROLOGY | Facility: CLINIC | Age: 77
End: 2017-10-24

## 2018-02-16 ENCOUNTER — INPATIENT (INPATIENT)
Facility: HOSPITAL | Age: 78
LOS: 4 days | Discharge: HOME | DRG: 872 | End: 2018-02-21
Attending: INTERNAL MEDICINE | Admitting: HOSPITALIST
Payer: MEDICARE

## 2018-02-16 VITALS
DIASTOLIC BLOOD PRESSURE: 58 MMHG | TEMPERATURE: 99 F | RESPIRATION RATE: 20 BRPM | OXYGEN SATURATION: 94 % | SYSTOLIC BLOOD PRESSURE: 128 MMHG | WEIGHT: 123.9 LBS | HEART RATE: 86 BPM | HEIGHT: 62 IN

## 2018-02-16 DIAGNOSIS — I10 ESSENTIAL (PRIMARY) HYPERTENSION: ICD-10-CM

## 2018-02-16 DIAGNOSIS — F41.9 ANXIETY DISORDER, UNSPECIFIED: ICD-10-CM

## 2018-02-16 DIAGNOSIS — I48.91 UNSPECIFIED ATRIAL FIBRILLATION: ICD-10-CM

## 2018-02-16 DIAGNOSIS — E78.5 HYPERLIPIDEMIA, UNSPECIFIED: ICD-10-CM

## 2018-02-16 DIAGNOSIS — C54.1 MALIGNANT NEOPLASM OF ENDOMETRIUM: ICD-10-CM

## 2018-02-16 DIAGNOSIS — K51.90 ULCERATIVE COLITIS, UNSPECIFIED, WITHOUT COMPLICATIONS: ICD-10-CM

## 2018-02-16 DIAGNOSIS — Z87.448 PERSONAL HISTORY OF OTHER DISEASES OF URINARY SYSTEM: Chronic | ICD-10-CM

## 2018-02-16 DIAGNOSIS — R50.9 FEVER, UNSPECIFIED: ICD-10-CM

## 2018-02-16 DIAGNOSIS — R19.00 INTRA-ABDOMINAL AND PELVIC SWELLING, MASS AND LUMP, UNSPECIFIED SITE: Chronic | ICD-10-CM

## 2018-02-16 DIAGNOSIS — L03.90 CELLULITIS, UNSPECIFIED: ICD-10-CM

## 2018-02-16 DIAGNOSIS — Z29.9 ENCOUNTER FOR PROPHYLACTIC MEASURES, UNSPECIFIED: ICD-10-CM

## 2018-02-16 DIAGNOSIS — Z92.21 PERSONAL HISTORY OF ANTINEOPLASTIC CHEMOTHERAPY: Chronic | ICD-10-CM

## 2018-02-16 LAB
ALBUMIN SERPL ELPH-MCNC: 2.6 G/DL — LOW (ref 3.3–5)
ALP SERPL-CCNC: 124 U/L — HIGH (ref 40–120)
ALT FLD-CCNC: 12 U/L — SIGNIFICANT CHANGE UP (ref 12–78)
ANION GAP SERPL CALC-SCNC: 8 MMOL/L — SIGNIFICANT CHANGE UP (ref 5–17)
ANION GAP SERPL CALC-SCNC: 8 MMOL/L — SIGNIFICANT CHANGE UP (ref 5–17)
APPEARANCE UR: CLEAR — SIGNIFICANT CHANGE UP
AST SERPL-CCNC: 28 U/L — SIGNIFICANT CHANGE UP (ref 15–37)
BACTERIA # UR AUTO: ABNORMAL
BASOPHILS # BLD AUTO: 0.1 K/UL — SIGNIFICANT CHANGE UP (ref 0–0.2)
BASOPHILS NFR BLD AUTO: 0.6 % — SIGNIFICANT CHANGE UP (ref 0–2)
BILIRUB SERPL-MCNC: 0.4 MG/DL — SIGNIFICANT CHANGE UP (ref 0.2–1.2)
BILIRUB UR-MCNC: NEGATIVE — SIGNIFICANT CHANGE UP
BUN SERPL-MCNC: 20 MG/DL — SIGNIFICANT CHANGE UP (ref 7–23)
BUN SERPL-MCNC: 24 MG/DL — HIGH (ref 7–23)
CALCIUM SERPL-MCNC: 8.5 MG/DL — SIGNIFICANT CHANGE UP (ref 8.5–10.1)
CALCIUM SERPL-MCNC: 8.7 MG/DL — SIGNIFICANT CHANGE UP (ref 8.5–10.1)
CHLORIDE SERPL-SCNC: 102 MMOL/L — SIGNIFICANT CHANGE UP (ref 96–108)
CHLORIDE SERPL-SCNC: 102 MMOL/L — SIGNIFICANT CHANGE UP (ref 96–108)
CO2 SERPL-SCNC: 26 MMOL/L — SIGNIFICANT CHANGE UP (ref 22–31)
CO2 SERPL-SCNC: 27 MMOL/L — SIGNIFICANT CHANGE UP (ref 22–31)
COLOR SPEC: YELLOW — SIGNIFICANT CHANGE UP
CREAT SERPL-MCNC: 1.1 MG/DL — SIGNIFICANT CHANGE UP (ref 0.5–1.3)
CREAT SERPL-MCNC: 1.2 MG/DL — SIGNIFICANT CHANGE UP (ref 0.5–1.3)
DIFF PNL FLD: ABNORMAL
EOSINOPHIL # BLD AUTO: 0 K/UL — SIGNIFICANT CHANGE UP (ref 0–0.5)
EOSINOPHIL NFR BLD AUTO: 0.2 % — SIGNIFICANT CHANGE UP (ref 0–6)
EPI CELLS # UR: SIGNIFICANT CHANGE UP
GLUCOSE SERPL-MCNC: 112 MG/DL — HIGH (ref 70–99)
GLUCOSE SERPL-MCNC: 114 MG/DL — HIGH (ref 70–99)
GLUCOSE UR QL: NEGATIVE — SIGNIFICANT CHANGE UP
HCT VFR BLD CALC: 26.8 % — LOW (ref 34.5–45)
HCT VFR BLD CALC: 29 % — LOW (ref 34.5–45)
HGB BLD-MCNC: 8.7 G/DL — LOW (ref 11.5–15.5)
HGB BLD-MCNC: 9.1 G/DL — LOW (ref 11.5–15.5)
KETONES UR-MCNC: ABNORMAL
LACTATE SERPL-SCNC: 0.5 MMOL/L — LOW (ref 0.7–2)
LEUKOCYTE ESTERASE UR-ACNC: NEGATIVE — SIGNIFICANT CHANGE UP
LYMPHOCYTES # BLD AUTO: 0.9 K/UL — LOW (ref 1–3.3)
LYMPHOCYTES # BLD AUTO: 6.9 % — LOW (ref 13–44)
MCHC RBC-ENTMCNC: 23.5 PG — LOW (ref 27–34)
MCHC RBC-ENTMCNC: 24.1 PG — LOW (ref 27–34)
MCHC RBC-ENTMCNC: 31.5 GM/DL — LOW (ref 32–36)
MCHC RBC-ENTMCNC: 32.6 GM/DL — SIGNIFICANT CHANGE UP (ref 32–36)
MCV RBC AUTO: 74 FL — LOW (ref 80–100)
MCV RBC AUTO: 74.4 FL — LOW (ref 80–100)
MONOCYTES # BLD AUTO: 0.9 K/UL — SIGNIFICANT CHANGE UP (ref 0–0.9)
MONOCYTES NFR BLD AUTO: 6.6 % — SIGNIFICANT CHANGE UP (ref 1–9)
NEUTROPHILS # BLD AUTO: 11.2 K/UL — HIGH (ref 1.8–7.4)
NEUTROPHILS NFR BLD AUTO: 85.7 % — HIGH (ref 43–77)
NITRITE UR-MCNC: NEGATIVE — SIGNIFICANT CHANGE UP
PH UR: 7 — SIGNIFICANT CHANGE UP (ref 5–8)
PLATELET # BLD AUTO: 180 K/UL — SIGNIFICANT CHANGE UP (ref 150–400)
PLATELET # BLD AUTO: 182 K/UL — SIGNIFICANT CHANGE UP (ref 150–400)
POTASSIUM SERPL-MCNC: 3.5 MMOL/L — SIGNIFICANT CHANGE UP (ref 3.5–5.3)
POTASSIUM SERPL-MCNC: 3.6 MMOL/L — SIGNIFICANT CHANGE UP (ref 3.5–5.3)
POTASSIUM SERPL-SCNC: 3.5 MMOL/L — SIGNIFICANT CHANGE UP (ref 3.5–5.3)
POTASSIUM SERPL-SCNC: 3.6 MMOL/L — SIGNIFICANT CHANGE UP (ref 3.5–5.3)
PROT SERPL-MCNC: 6.4 G/DL — SIGNIFICANT CHANGE UP (ref 6–8.3)
PROT UR-MCNC: NEGATIVE — SIGNIFICANT CHANGE UP
RAPID RVP RESULT: SIGNIFICANT CHANGE UP
RBC # BLD: 3.62 M/UL — LOW (ref 3.8–5.2)
RBC # BLD: 3.9 M/UL — SIGNIFICANT CHANGE UP (ref 3.8–5.2)
RBC # FLD: 15.4 % — HIGH (ref 10.3–14.5)
RBC # FLD: 15.8 % — HIGH (ref 10.3–14.5)
RBC CASTS # UR COMP ASSIST: ABNORMAL /HPF (ref 0–4)
SODIUM SERPL-SCNC: 136 MMOL/L — SIGNIFICANT CHANGE UP (ref 135–145)
SODIUM SERPL-SCNC: 137 MMOL/L — SIGNIFICANT CHANGE UP (ref 135–145)
SP GR SPEC: 1 — LOW (ref 1.01–1.02)
UROBILINOGEN FLD QL: NEGATIVE — SIGNIFICANT CHANGE UP
VANCOMYCIN FLD-MCNC: 18.4 UG/ML — SIGNIFICANT CHANGE UP
WBC # BLD: 10.7 K/UL — HIGH (ref 3.8–10.5)
WBC # BLD: 13 K/UL — HIGH (ref 3.8–10.5)
WBC # FLD AUTO: 10.7 K/UL — HIGH (ref 3.8–10.5)
WBC # FLD AUTO: 13 K/UL — HIGH (ref 3.8–10.5)
WBC UR QL: SIGNIFICANT CHANGE UP

## 2018-02-16 PROCEDURE — 99285 EMERGENCY DEPT VISIT HI MDM: CPT

## 2018-02-16 PROCEDURE — 93010 ELECTROCARDIOGRAM REPORT: CPT

## 2018-02-16 PROCEDURE — 99223 1ST HOSP IP/OBS HIGH 75: CPT | Mod: AI,GC

## 2018-02-16 PROCEDURE — 71045 X-RAY EXAM CHEST 1 VIEW: CPT | Mod: 26

## 2018-02-16 PROCEDURE — 12345: CPT | Mod: NC

## 2018-02-16 PROCEDURE — 93971 EXTREMITY STUDY: CPT | Mod: 26,RT

## 2018-02-16 RX ORDER — OLMESARTAN MEDOXOMIL 5 MG/1
1 TABLET, FILM COATED ORAL
Qty: 0 | Refills: 0 | COMMUNITY

## 2018-02-16 RX ORDER — ENOXAPARIN SODIUM 100 MG/ML
40 INJECTION SUBCUTANEOUS EVERY 24 HOURS
Qty: 0 | Refills: 0 | Status: DISCONTINUED | OUTPATIENT
Start: 2018-02-16 | End: 2018-02-21

## 2018-02-16 RX ORDER — ATORVASTATIN CALCIUM 80 MG/1
10 TABLET, FILM COATED ORAL AT BEDTIME
Qty: 0 | Refills: 0 | Status: DISCONTINUED | OUTPATIENT
Start: 2018-02-16 | End: 2018-02-21

## 2018-02-16 RX ORDER — AMLODIPINE BESYLATE 2.5 MG/1
1 TABLET ORAL
Qty: 0 | Refills: 0 | COMMUNITY

## 2018-02-16 RX ORDER — LOSARTAN POTASSIUM 100 MG/1
100 TABLET, FILM COATED ORAL DAILY
Qty: 0 | Refills: 0 | Status: DISCONTINUED | OUTPATIENT
Start: 2018-02-16 | End: 2018-02-21

## 2018-02-16 RX ORDER — SERTRALINE 25 MG/1
25 TABLET, FILM COATED ORAL
Qty: 0 | Refills: 0 | Status: DISCONTINUED | OUTPATIENT
Start: 2018-02-16 | End: 2018-02-21

## 2018-02-16 RX ORDER — GABAPENTIN 400 MG/1
1 CAPSULE ORAL
Qty: 0 | Refills: 0 | COMMUNITY

## 2018-02-16 RX ORDER — ACETAMINOPHEN 500 MG
650 TABLET ORAL ONCE
Qty: 0 | Refills: 0 | Status: COMPLETED | OUTPATIENT
Start: 2018-02-16 | End: 2018-02-16

## 2018-02-16 RX ORDER — SPIRONOLACTONE 25 MG/1
1 TABLET, FILM COATED ORAL
Qty: 0 | Refills: 0 | COMMUNITY

## 2018-02-16 RX ORDER — VANCOMYCIN HCL 1 G
1000 VIAL (EA) INTRAVENOUS ONCE
Qty: 0 | Refills: 0 | Status: COMPLETED | OUTPATIENT
Start: 2018-02-16 | End: 2018-02-16

## 2018-02-16 RX ORDER — SODIUM CHLORIDE 9 MG/ML
1000 INJECTION INTRAMUSCULAR; INTRAVENOUS; SUBCUTANEOUS ONCE
Qty: 0 | Refills: 0 | Status: COMPLETED | OUTPATIENT
Start: 2018-02-16 | End: 2018-02-16

## 2018-02-16 RX ORDER — ACETAMINOPHEN 500 MG
1 TABLET ORAL
Qty: 0 | Refills: 0 | COMMUNITY

## 2018-02-16 RX ORDER — VANCOMYCIN HCL 1 G
750 VIAL (EA) INTRAVENOUS EVERY 24 HOURS
Qty: 0 | Refills: 0 | Status: DISCONTINUED | OUTPATIENT
Start: 2018-02-17 | End: 2018-02-18

## 2018-02-16 RX ORDER — PIPERACILLIN AND TAZOBACTAM 4; .5 G/20ML; G/20ML
3.38 INJECTION, POWDER, LYOPHILIZED, FOR SOLUTION INTRAVENOUS EVERY 8 HOURS
Qty: 0 | Refills: 0 | Status: DISCONTINUED | OUTPATIENT
Start: 2018-02-16 | End: 2018-02-20

## 2018-02-16 RX ORDER — PIPERACILLIN AND TAZOBACTAM 4; .5 G/20ML; G/20ML
3.38 INJECTION, POWDER, LYOPHILIZED, FOR SOLUTION INTRAVENOUS ONCE
Qty: 0 | Refills: 0 | Status: COMPLETED | OUTPATIENT
Start: 2018-02-16 | End: 2018-02-16

## 2018-02-16 RX ORDER — TRAMADOL HYDROCHLORIDE 50 MG/1
50 TABLET ORAL
Qty: 0 | Refills: 0 | Status: DISCONTINUED | OUTPATIENT
Start: 2018-02-16 | End: 2018-02-17

## 2018-02-16 RX ORDER — ACETAMINOPHEN 500 MG
650 TABLET ORAL EVERY 6 HOURS
Qty: 0 | Refills: 0 | Status: DISCONTINUED | OUTPATIENT
Start: 2018-02-16 | End: 2018-02-21

## 2018-02-16 RX ORDER — IBUPROFEN 200 MG
400 TABLET ORAL ONCE
Qty: 0 | Refills: 0 | Status: COMPLETED | OUTPATIENT
Start: 2018-02-16 | End: 2018-02-16

## 2018-02-16 RX ORDER — AMLODIPINE BESYLATE 2.5 MG/1
5 TABLET ORAL
Qty: 0 | Refills: 0 | Status: DISCONTINUED | OUTPATIENT
Start: 2018-02-16 | End: 2018-02-21

## 2018-02-16 RX ORDER — MESALAMINE 400 MG
500 TABLET, DELAYED RELEASE (ENTERIC COATED) ORAL
Qty: 0 | Refills: 0 | Status: DISCONTINUED | OUTPATIENT
Start: 2018-02-16 | End: 2018-02-17

## 2018-02-16 RX ORDER — SODIUM CHLORIDE 9 MG/ML
1000 INJECTION INTRAMUSCULAR; INTRAVENOUS; SUBCUTANEOUS
Qty: 0 | Refills: 0 | Status: DISCONTINUED | OUTPATIENT
Start: 2018-02-16 | End: 2018-02-18

## 2018-02-16 RX ORDER — PREGABALIN 225 MG/1
1000 CAPSULE ORAL DAILY
Qty: 0 | Refills: 0 | Status: DISCONTINUED | OUTPATIENT
Start: 2018-02-16 | End: 2018-02-21

## 2018-02-16 RX ORDER — SERTRALINE 25 MG/1
1 TABLET, FILM COATED ORAL
Qty: 0 | Refills: 0 | COMMUNITY

## 2018-02-16 RX ORDER — ATORVASTATIN CALCIUM 80 MG/1
0 TABLET, FILM COATED ORAL
Qty: 0 | Refills: 0 | COMMUNITY

## 2018-02-16 RX ORDER — PANTOPRAZOLE SODIUM 20 MG/1
40 TABLET, DELAYED RELEASE ORAL
Qty: 0 | Refills: 0 | Status: DISCONTINUED | OUTPATIENT
Start: 2018-02-16 | End: 2018-02-21

## 2018-02-16 RX ADMIN — TRAMADOL HYDROCHLORIDE 50 MILLIGRAM(S): 50 TABLET ORAL at 08:00

## 2018-02-16 RX ADMIN — ENOXAPARIN SODIUM 40 MILLIGRAM(S): 100 INJECTION SUBCUTANEOUS at 13:19

## 2018-02-16 RX ADMIN — PIPERACILLIN AND TAZOBACTAM 25 GRAM(S): 4; .5 INJECTION, POWDER, LYOPHILIZED, FOR SOLUTION INTRAVENOUS at 13:20

## 2018-02-16 RX ADMIN — LOSARTAN POTASSIUM 100 MILLIGRAM(S): 100 TABLET, FILM COATED ORAL at 07:06

## 2018-02-16 RX ADMIN — AMLODIPINE BESYLATE 5 MILLIGRAM(S): 2.5 TABLET ORAL at 07:06

## 2018-02-16 RX ADMIN — TRAMADOL HYDROCHLORIDE 50 MILLIGRAM(S): 50 TABLET ORAL at 07:06

## 2018-02-16 RX ADMIN — SODIUM CHLORIDE 1000 MILLILITER(S): 9 INJECTION INTRAMUSCULAR; INTRAVENOUS; SUBCUTANEOUS at 02:39

## 2018-02-16 RX ADMIN — SERTRALINE 25 MILLIGRAM(S): 25 TABLET, FILM COATED ORAL at 18:03

## 2018-02-16 RX ADMIN — Medication 250 MILLIGRAM(S): at 05:29

## 2018-02-16 RX ADMIN — Medication 400 MILLIGRAM(S): at 08:30

## 2018-02-16 RX ADMIN — PIPERACILLIN AND TAZOBACTAM 25 GRAM(S): 4; .5 INJECTION, POWDER, LYOPHILIZED, FOR SOLUTION INTRAVENOUS at 20:09

## 2018-02-16 RX ADMIN — PREGABALIN 1000 MICROGRAM(S): 225 CAPSULE ORAL at 13:19

## 2018-02-16 RX ADMIN — PIPERACILLIN AND TAZOBACTAM 200 GRAM(S): 4; .5 INJECTION, POWDER, LYOPHILIZED, FOR SOLUTION INTRAVENOUS at 03:47

## 2018-02-16 RX ADMIN — Medication 400 MILLIGRAM(S): at 09:30

## 2018-02-16 RX ADMIN — SERTRALINE 25 MILLIGRAM(S): 25 TABLET, FILM COATED ORAL at 07:06

## 2018-02-16 RX ADMIN — TRAMADOL HYDROCHLORIDE 50 MILLIGRAM(S): 50 TABLET ORAL at 18:03

## 2018-02-16 RX ADMIN — SODIUM CHLORIDE 75 MILLILITER(S): 9 INJECTION INTRAMUSCULAR; INTRAVENOUS; SUBCUTANEOUS at 19:35

## 2018-02-16 RX ADMIN — ATORVASTATIN CALCIUM 10 MILLIGRAM(S): 80 TABLET, FILM COATED ORAL at 21:14

## 2018-02-16 RX ADMIN — Medication 650 MILLIGRAM(S): at 07:05

## 2018-02-16 NOTE — H&P ADULT - PROBLEM SELECTOR PLAN 4
-Chronic, stable  -continue home statin -Chronic, stable  -continue home -Chronic, stable  -continue home amlodipine, losartan with hold parameters -remote history  -EKG NSR at 69,  -not on AC -remote history  -EKG NSR at 69,  -not on AC?  Lovenox, prophlyaxis while in hosp

## 2018-02-16 NOTE — CONSULT NOTE ADULT - ASSESSMENT
Acute febrile illness  No concern of primary CNS priocess, possibly some delirium at home  No concern of pneumonia  Addominal exam benign  RLE with mild erythema and increased calor, with mild tenderness, in the setting of underlying lymphedema, mild tenderness in groin.  If there was cellulitis here appears to be improving, albeit on very broad spectrcom coverage  Minimal increase WBC compared with admission  Cx pending.    Suggestions--  Cont Abx  Serial exams  F/U Cx  F/U CBC  F/U vanco trough    Thank you for the courtesy of this referral.    Call if ID input needed over the weekend, Dr. Cyndee Gonzalez is on call.    Bryan Baires MD  [phone]

## 2018-02-16 NOTE — H&P ADULT - NSHPOUTPATIENTPROVIDERS_GEN_ALL_CORE
PMD: Stefan Cadena   Heme Onc: Will Dumont ([facility]) PMD: Stefan Cadena   [facility]

## 2018-02-16 NOTE — H&P ADULT - NSHPPHYSICALEXAM_GEN_ALL_CORE
General: Well developed, well nourished, NAD  HEENT: NCAT, PERRL, EOMI bl, moist mucous membranes   Neck: Supple, nontender, no mass  Neurology: A&Ox3, nonfocal, CN II-XII grossly intact, sensation intact, no gait abnormalities   Respiratory: CTA B/L, No W/R/R  CV: RRR, +S1/S2, no murmurs, rubs or gallops  Abdominal: Soft, NT, ND +BS  Extremities: RLE swollen, mild tenderness, No C/C/E, + peripheral pulses  MSK: Normal ROM,   Skin: warm, dry, General: Well developed, well nourished, NAD  HEENT: NCAT, PERRL, EOMI bl, moist mucous membranes   Neck: Supple, nontender, no mass  Neurology: A&Ox3, nonfocal, CN II-XII grossly intact, sensation intact, no gait abnormalities   Respiratory: CTA B/L, No W/R/R  CV: RRR, +S1/S2, no murmurs, rubs or gallops  Abdominal: Soft, tender at lower quadrants bilat and suprapubic area, ND +BS  Extremities: RLE swollen, mild tenderness, + peripheral pulses, 1+ pitting edema LE bilat L>R Clau neg  MSK: Normal ROM,   Skin: warm, dry, RLE with mild erethema to ant tib region

## 2018-02-16 NOTE — H&P ADULT - ATTENDING COMMENTS
Seen and examined by attending MD, agree with above and edited to reflect my findings. Case discussed with patient ( and son at bedside) who is alert and oriented , and voiced understanding and agreement to aforementioned plan.

## 2018-02-16 NOTE — H&P ADULT - PROBLEM SELECTOR PLAN 5
-Chronic, stable  -continue home statin -remote history  -EKG NSR at 69,  -not on AC -Chronic, stable  -continue home atorvastatin

## 2018-02-16 NOTE — H&P ADULT - PROBLEM SELECTOR PLAN 2
-chronic lymphoedema in RLE, swollen, tender,  -continue   - -chronic lymphoedema in RLE, swollen, tender,  -continue antibiotics -endometrial ca in 2001, with re-occurance 2015,   -currently on chemotherapy weekly (Cisplatin, Doxorubin, Adriamycin)   -Oncologist Dr. SCOT Dumont at [facility]  -Heme-Onc

## 2018-02-16 NOTE — H&P ADULT - PROBLEM SELECTOR PLAN 9
DVT prophylaxis:    IMPROVE VTE Individual Risk Assessment          RISK                                                          Points    [  ] Previous VTE                                                3  [  ] Thrombophilia                                             2  [  ] Lower limb paralysis                                   2        (unable to hold up >15 seconds)    [  x] Current Cancer                                            2         (within 6 months)  [  ] Immobilization > 24 hrs                              1  [  ] ICU/CCU stay > 24 hours                            1  [  x] Age > 60                                                    1    IMPROVE VTE Score ____3____

## 2018-02-16 NOTE — H&P ADULT - ASSESSMENT
76 yo F with PMH of recurrent Uterine CA (2001) s/p KATH and radiation, abdominal mass 2013 metastatic endometrial cancer, recurrence 2015?, now on IV chemo weekly, anxiety, atrial fibrillation (one episode >30 years ago, no AC), hypertension, hyperlipidemia, Chronic Lymphedema of RLE, Hydronephrosis due to enlarged lymph nodes s/p right uretal stent (10/6/17), Mitral valve prolapse, Trigeminal Neuralgia, and Ulcerative colitis who presents to the ER from home with fever 102 for one day 76 yo F with PMH of recurrent Uterine CA (2001) s/p KATH and radiation, abdominal mass 2013 metastatic endometrial cancer, recurrence 2015?, now on IV chemo weekly, anxiety, atrial fibrillation (one episode >30 years ago, no AC), hypertension, hyperlipidemia, Chronic Lymphedema of RLE, Hydronephrosis due to enlarged lymph nodes s/p right uretal stent (10/6/17), Mitral valve prolapse, Trigeminal Neuralgia, and Ulcerative colitis who presents to the ER from home with fever 102 for one day admitted with cellulitis

## 2018-02-16 NOTE — PROGRESS NOTE ADULT - SUBJECTIVE AND OBJECTIVE BOX
HPI:  76 yo F with PMH of recurrent Uterine CA (2001) s/p KATH and radiation, abdominal mass 2013 metastatic endometrial cancer, recurrence 2015?, now on IV chemo weekly, anxiety, atrial fibrillation (one episode >30 years ago, no AC), hypertension, hyperlipidemia, Chronic Lymphedema of RLE, Hydronephrosis due to enlarged lymph nodes s/p right uretal stent (10/6/17), Mitral valve prolapse, Trigeminal Neuralgia, and Ulcerative colitis who presents to the ER from home with fever 102.     INTERVAL EVENTS:   No overnight events. Patient reports feeling tired, but no other complaints. Denies cough, SOB, abdominal pain, nausea, diarrhea.   REVIEW OF SYSTEMS:    CONSTITUTIONAL: +weakness, +fevers  EYES/ENT: No visual changes, no throat pain   RESPIRATORY: No cough, wheezing, hemoptysis; No shortness of breath  CARDIOVASCULAR: No chest pain or palpitations  GASTROINTESTINAL: No abdominal pain, nausea, vomiting; No diarrhea or constipation. No melena or hematochezia.  GENITOURINARY: No dysuria, frequency or hematuria  NEUROLOGICAL: No dizziness, numbness, or weakness  SKIN: No itching, burning, rashes, or lesions   MKS: +chronic back pain.  All other review of systems is negative unless indicated above.    VITAL SIGNS:  Vital Signs Last 24 Hrs  T(C): 37.4 (02-16-18 @ 09:31), Max: 38.8 (02-16-18 @ 08:15)  T(F): 99.4 (02-16-18 @ 09:31), Max: 101.8 (02-16-18 @ 08:15)  HR: 85 (02-16-18 @ 05:27) (85 - 87)  BP: 145/79 (02-16-18 @ 05:27) (128/58 - 145/79)  BP(mean): --  RR: 15 (02-16-18 @ 05:27) (15 - 20)  SpO2: 95% (02-16-18 @ 05:27) (94% - 95%)      PHYSICAL EXAM:     GENERAL: no acute distress  HEENT: NC/AT, EOMI, neck supple, MMM  RESPIRATORY: LCTAB/L, no rhonchi, rales, or wheezing  CARDIOVASCULAR: RRR, no murmurs, gallops, rubs  ABDOMINAL: soft, non-tender, non-distended, positive bowel sounds   EXTREMITIES: RLE chronic lymphedema. No erythema noted and non-tender.  NEUROLOGICAL: alert and oriented x 3, non-focal  SKIN: no rashes or lesions   MUSCULOSKELETAL: no gross joint deformity                          9.1    13.0  )-----------( 182      ( 16 Feb 2018 08:15 )             29.0     02-16    136  |  102  |  20  ----------------------------<  112<H>  3.6   |  26  |  1.10    Ca    8.7      16 Feb 2018 08:15    TPro  6.4  /  Alb  2.6<L>  /  TBili  0.4  /  DBili  x   /  AST  28  /  ALT  12  /  AlkPhos  124<H>  02-16      MEDICATIONS  (STANDING):  amLODIPine   Tablet 5 milliGRAM(s) Oral two times a day  atorvastatin 10 milliGRAM(s) Oral at bedtime  cyanocobalamin 1000 MICROGram(s) Oral daily  enoxaparin Injectable 40 milliGRAM(s) SubCutaneous every 24 hours  losartan 100 milliGRAM(s) Oral daily  mesalamine ER Capsule 500 milliGRAM(s) Oral four times a day  pantoprazole    Tablet 40 milliGRAM(s) Oral before breakfast  piperacillin/tazobactam IVPB. 3.375 Gram(s) IV Intermittent every 8 hours  sertraline 25 milliGRAM(s) Oral two times a day  traMADol 50 milliGRAM(s) Oral two times a day  vancomycin  IVPB 750 milliGRAM(s) IV Intermittent every 24 hours    MEDICATIONS  (PRN):  acetaminophen   Tablet 650 milliGRAM(s) Oral every 6 hours PRN For Temp greater than 38 C (100.4 F)

## 2018-02-16 NOTE — ED ADULT NURSE NOTE - OBJECTIVE STATEMENT
77 year old female presents to the ED complaining of fever. As per patient and son in law who is bedside, patient developed a fever Tmax 102 this evening. As per patient, yesterday she was feeling her normal self. As per son in law, yesterday patient seemed slightly confused for a very short time during the day, but returned to baseline right away. Patient took 2x extra strength tylenol with fever. On arrival to ED temperature 99.2. Patient with history of endometrial cancer, actively receiving chemo weekly. Last chemo treatment on Monday. Denies sick contacts. Denies body aches or chills. Denies nausea/vomiting diarrhea. Patients only complaint is left lower leg swelling, acute on chronic. Patient states she typically has lymphedmea and wears compression stockings. Today she notices that the swelling is more than normal. Complains of soreness but denies pain. Denies numbness/tingling.

## 2018-02-16 NOTE — ED ADULT NURSE REASSESSMENT NOTE - NS ED NURSE REASSESS COMMENT FT1
Plan for medicine admission. Patient made aware. Awaiting admission assessment and orders. Awaiting bed assignment. Safety maintained.
Pt's son is requesting for MD to contact pt's oncologist, Dr. Darrin Anderson, [phone]. Call being placed to Dr. Saucedo to make MD aware
Ready to Move timer clicked at this time. Awaiting bed assignment. Admission orders in place. Safety maintained.
Report received from Marie ONEILL. Pt A&Ox4, son at bedside. Pt reports chills persist, denies pain or any additional complaints at this time. Repeat temp 1 hour after Tylenol was 101.8F. Dr. Saucedo aware, stat order for ibuprofen obtained. Labs drawn by RN from \[facility]\"". Will medicate pt for fever. Will CTM.

## 2018-02-16 NOTE — ED ADULT NURSE NOTE - CHPI ED SYMPTOMS NEG
no nausea/no numbness/no vomiting/no weakness/no chills/no tingling/no decreased eating/drinking/no dizziness/no pain

## 2018-02-16 NOTE — ED PROVIDER NOTE - OBJECTIVE STATEMENT
76 y/o w/f h/o Endometrial carcinoma  2001 - s/p KATH - tx with radiation reoccurrence 2015 IV Chemo rx last treatment was Monday, h/o  Hyperlipidemia ,  Lymphedema of leg  right, Ulcerative colitis. C/C fever and right leg swelling. No CP, SOB, abdominal pain, No N/V/D, no  symptoms, no focal neurologic changes. She had recent PET scan two weeks ago and is refusing CT SCAN 76 y/o w/f h/o Endometrial carcinoma  2001 - s/p KATH - tx with radiation reoccurrence 2015 IV Chemo rx last treatment was Monday, h/o  Hyperlipidemia ,  Lymphedema of leg  right, Ulcerative colitis. C/C fever to 102F  and right leg swelling. No CP, SOB, abdominal pain, No N/V/D, no  symptoms, no focal neurologic changes. She had recent PET scan two weeks ago and is refusing CT SCAN

## 2018-02-16 NOTE — H&P ADULT - PROBLEM SELECTOR PLAN 8
DVT prophylaxis:    IMPROVE VTE Individual Risk Assessment          RISK                                                          Points    [  ] Previous VTE                                                3  [  ] Thrombophilia                                             2  [  ] Lower limb paralysis                                   2        (unable to hold up >15 seconds)    [  x] Current Cancer                                            2         (within 6 months)  [  ] Immobilization > 24 hrs                              1  [  ] ICU/CCU stay > 24 hours                            1  [  x] Age > 60                                                    1    IMPROVE VTE Score ____3____ -Chronic, stable  -continue DVT prophylaxis: lovenox  GI Ppx: protonix    IMPROVE VTE Individual Risk Assessment          RISK                                                          Points    [  ] Previous VTE                                                3  [  ] Thrombophilia                                             2  [  ] Lower limb paralysis                                   2        (unable to hold up >15 seconds)    [  x] Current Cancer                                            2         (within 6 months)  [  ] Immobilization > 24 hrs                              1  [  ] ICU/CCU stay > 24 hours                            1  [  x] Age > 60                                                    1    IMPROVE VTE Score ____3____

## 2018-02-16 NOTE — H&P ADULT - PROBLEM SELECTOR PLAN 7
-Chronic, stable  -continue home -Chronic, stable  -continue home statin -Chronic, stable  -continue mesalamine for therapeutic interchange with balsalazide -Chronic, stable  -continue sertraline home med

## 2018-02-16 NOTE — H&P ADULT - PROBLEM SELECTOR PLAN 1
-unknown etiology, may due to cellulitis or UTI  -F/U UA, Ucx, blood cultures  -Tylenol PRN, -unknown etiology, may due to cellulitis or UTI  -F/U UA, Ucx, blood cultures  -Tylenol PRN,  -continue vanco, zosyn -unknown etiology, may due to cellulitis  -chronic lymphoedema in RLE, now warm, swollen, tender,  -continue vanco, zosyn  -F/U UA, Ucx, blood cultures, tramadol home med for pain, -unknown etiology; Neutropenic fever??  DDx to include  cellulitis RLE, UTI (although UA not strongly positive)  -chronic lymphoedema in RLE, now warm, swollen, tender,  -continue vanco, zosyn  -F/U UA, Ucx, blood cultures, tramadol home med for pain, -unknown etiology; Neutropenic fever?? No differential on CBC (added this morning)  DDx to include  cellulitis RLE, UTI (although UA not strongly positive)  -chronic lymphoedema in RLE, now warm, swollen, tender,  -continue vanco, zosyn  -F/U UA, Ucx, blood cultures, tramadol home med for pain,

## 2018-02-16 NOTE — ED ADULT TRIAGE NOTE - CHIEF COMPLAINT QUOTE
"I have a fever this afternoon of 102.0 and took 2 Tylenols and went down to 100.8" I'm on Iv chemo"

## 2018-02-16 NOTE — H&P ADULT - HISTORY OF PRESENT ILLNESS
78 yo F with PMH of recurrent Uterine CA (2001) s/p KATH and radiation, abdominal mass 2013 metastatic endometrial cancer, recurrence 2015?, now on IV chemo weekly, anxiety, atrial fibrillation (one episode >30 years ago, no AC), hypertension, hyperlipidemia, Chronic Lymphedema of RLE, Hydronephrosis due to enlarged lymph nodes s/p right uretal stent (10/6/17), Mitral valve prolapse, Trigeminal Neuralgia, and Ulcerative colitis who presents to the ER from home with fever 102 since last night 11 PM. Pt family reports that pt was speaking incoherently around 7 PM yesterday and back to baseline soon after. History was obtained from son-in-law at bedside. Pt currently on chemotherapy regimen once a week on Monday's.   ROS: Pt endorses increase urinary frequency, denies chills, chest pain, palpitations, SOB, cough, abdominal pain, nausea, vomiting, diarrhea, constipation, urgency, or dysuria, headaches, changes in vision, dizziness, numbness, tingling, recent travel, recent antibiotic use, or sick contacts.    In ED: 128 /58, HR 86 RR 20 T99.2 Sat 94 %RA  CBC: Leukocytosis 10.7 Hgb/Hct 8.7 / 26.8, Albumin 2.6, GFR 44  RVP negative,   US Doppler RLE No evidence of right common femoral, popliteal or calf vein thrombosis. Moderate calf edema.  EKG: NSR at 69 (prelim)

## 2018-02-16 NOTE — H&P ADULT - PROBLEM SELECTOR PLAN 6
-Chronic, stable  -continue -Chronic, stable  -continue home statin -Chronic, stable  -continue home atorvastatin -Chronic, stable  -continue mesalamine for therapeutic exchange with balsalazide

## 2018-02-16 NOTE — H&P ADULT - PMH
Abdominal mass  2013 metastatic endometrial cancer, s/p surgery, on chemotherapy at present-infusa port right chest  Anxiety    Atrial fibrillation  one episode >30 years ago  Benign Hypertension    Endometrial carcinoma  2001 - s/p KATH - tx with radiation  metastatic finished treatment 3/2014  History of chemotherapy    Hydronephrosis determined by ultrasound  right kidney  Hyperlipidemia    Lymphedema of leg  right  Mitral valve prolapse  partial  Post-Herpetic Trigeminal Neuralgia  left  Ulcerative colitis Abdominal mass  2013 metastatic endometrial cancer, s/p surgery, on chemotherapy at presenting port right chest  Anxiety    Atrial fibrillation  one episode >30 years ago  Benign Hypertension    Endometrial carcinoma  2001 - s/p KATH - tx with radiation  metastatic finished treatment 3/2014  History of chemotherapy    Hydronephrosis determined by ultrasound  right kidney  Hyperlipidemia    Lymphedema of leg  right  Mitral valve prolapse  partial  Post-Herpetic Trigeminal Neuralgia  left  Ulcerative colitis

## 2018-02-16 NOTE — H&P ADULT - NSHPREVIEWOFSYSTEMS_GEN_ALL_CORE
Constitutional: +fever denies  chills, general malaise, weight loss, weight gain, diaphoresis   HEENT: denies dry mouth, sore throat, runny nose, photophobia, blurry vision, double vision, discharge, eye pain, difficulty hearing,  Respiratory: denies SOB, MENJIVAR, cough, sputum production, wheezing, hemoptysis  Cardiovascular: denies CP, palpitations, edema  Gastrointestinal: denies nausea, vomiting, diarrhea, constipation, abdominal pain, melena, hematochezia   Genitourinary: +frequency denies dysuria, urgency, incontinence, hematuria   Musculoskeletal: +RLE swelling, denies myalgias, arthritis, muscle weakness  Neurologic: denies syncope, LOC, headache, weakness, dizziness, paresthesias, numbness, seizures, confusion, dementia   Endocrine: denies increased fingerstick glucoses, cold or heat intolerance, polydipsia, polyuria, polyphagia   Hematology/Oncology: denies bruising, tender or enlarged lymph nodes   ROS negative except as noted above

## 2018-02-17 LAB
ANION GAP SERPL CALC-SCNC: 12 MMOL/L — SIGNIFICANT CHANGE UP (ref 5–17)
BUN SERPL-MCNC: 19 MG/DL — SIGNIFICANT CHANGE UP (ref 7–23)
CALCIUM SERPL-MCNC: 8.4 MG/DL — LOW (ref 8.5–10.1)
CHLORIDE SERPL-SCNC: 105 MMOL/L — SIGNIFICANT CHANGE UP (ref 96–108)
CO2 SERPL-SCNC: 24 MMOL/L — SIGNIFICANT CHANGE UP (ref 22–31)
CREAT SERPL-MCNC: 1.1 MG/DL — SIGNIFICANT CHANGE UP (ref 0.5–1.3)
CULTURE RESULTS: SIGNIFICANT CHANGE UP
GLUCOSE SERPL-MCNC: 92 MG/DL — SIGNIFICANT CHANGE UP (ref 70–99)
HCT VFR BLD CALC: 29.2 % — LOW (ref 34.5–45)
HGB BLD-MCNC: 9.4 G/DL — LOW (ref 11.5–15.5)
MCHC RBC-ENTMCNC: 24.1 PG — LOW (ref 27–34)
MCHC RBC-ENTMCNC: 32 GM/DL — SIGNIFICANT CHANGE UP (ref 32–36)
MCV RBC AUTO: 75.3 FL — LOW (ref 80–100)
PLATELET # BLD AUTO: 205 K/UL — SIGNIFICANT CHANGE UP (ref 150–400)
POTASSIUM SERPL-MCNC: 3.7 MMOL/L — SIGNIFICANT CHANGE UP (ref 3.5–5.3)
POTASSIUM SERPL-SCNC: 3.7 MMOL/L — SIGNIFICANT CHANGE UP (ref 3.5–5.3)
RBC # BLD: 3.88 M/UL — SIGNIFICANT CHANGE UP (ref 3.8–5.2)
RBC # FLD: 15.8 % — HIGH (ref 10.3–14.5)
SODIUM SERPL-SCNC: 141 MMOL/L — SIGNIFICANT CHANGE UP (ref 135–145)
SPECIMEN SOURCE: SIGNIFICANT CHANGE UP
WBC # BLD: 12 K/UL — HIGH (ref 3.8–10.5)
WBC # FLD AUTO: 12 K/UL — HIGH (ref 3.8–10.5)

## 2018-02-17 PROCEDURE — 99233 SBSQ HOSP IP/OBS HIGH 50: CPT

## 2018-02-17 PROCEDURE — 74018 RADEX ABDOMEN 1 VIEW: CPT | Mod: 26

## 2018-02-17 RX ORDER — TRAMADOL HYDROCHLORIDE 50 MG/1
50 TABLET ORAL
Qty: 0 | Refills: 0 | Status: DISCONTINUED | OUTPATIENT
Start: 2018-02-17 | End: 2018-02-21

## 2018-02-17 RX ORDER — BALSALAZIDE DISODIUM 750 MG/1
2250 CAPSULE ORAL THREE TIMES A DAY
Qty: 0 | Refills: 0 | Status: DISCONTINUED | OUTPATIENT
Start: 2018-02-17 | End: 2018-02-21

## 2018-02-17 RX ADMIN — Medication 150 MILLIGRAM(S): at 07:42

## 2018-02-17 RX ADMIN — TRAMADOL HYDROCHLORIDE 50 MILLIGRAM(S): 50 TABLET ORAL at 05:02

## 2018-02-17 RX ADMIN — PIPERACILLIN AND TAZOBACTAM 25 GRAM(S): 4; .5 INJECTION, POWDER, LYOPHILIZED, FOR SOLUTION INTRAVENOUS at 11:04

## 2018-02-17 RX ADMIN — BALSALAZIDE DISODIUM 2250 MILLIGRAM(S): 750 CAPSULE ORAL at 13:02

## 2018-02-17 RX ADMIN — PIPERACILLIN AND TAZOBACTAM 25 GRAM(S): 4; .5 INJECTION, POWDER, LYOPHILIZED, FOR SOLUTION INTRAVENOUS at 04:15

## 2018-02-17 RX ADMIN — PIPERACILLIN AND TAZOBACTAM 25 GRAM(S): 4; .5 INJECTION, POWDER, LYOPHILIZED, FOR SOLUTION INTRAVENOUS at 21:22

## 2018-02-17 RX ADMIN — AMLODIPINE BESYLATE 5 MILLIGRAM(S): 2.5 TABLET ORAL at 05:03

## 2018-02-17 RX ADMIN — TRAMADOL HYDROCHLORIDE 50 MILLIGRAM(S): 50 TABLET ORAL at 22:57

## 2018-02-17 RX ADMIN — ENOXAPARIN SODIUM 40 MILLIGRAM(S): 100 INJECTION SUBCUTANEOUS at 12:49

## 2018-02-17 RX ADMIN — SERTRALINE 25 MILLIGRAM(S): 25 TABLET, FILM COATED ORAL at 17:02

## 2018-02-17 RX ADMIN — PANTOPRAZOLE SODIUM 40 MILLIGRAM(S): 20 TABLET, DELAYED RELEASE ORAL at 05:02

## 2018-02-17 RX ADMIN — AMLODIPINE BESYLATE 5 MILLIGRAM(S): 2.5 TABLET ORAL at 17:01

## 2018-02-17 RX ADMIN — LOSARTAN POTASSIUM 100 MILLIGRAM(S): 100 TABLET, FILM COATED ORAL at 05:02

## 2018-02-17 RX ADMIN — PREGABALIN 1000 MICROGRAM(S): 225 CAPSULE ORAL at 11:04

## 2018-02-17 RX ADMIN — BALSALAZIDE DISODIUM 2250 MILLIGRAM(S): 750 CAPSULE ORAL at 21:22

## 2018-02-17 RX ADMIN — SERTRALINE 25 MILLIGRAM(S): 25 TABLET, FILM COATED ORAL at 05:02

## 2018-02-17 RX ADMIN — ATORVASTATIN CALCIUM 10 MILLIGRAM(S): 80 TABLET, FILM COATED ORAL at 21:22

## 2018-02-17 NOTE — PROGRESS NOTE ADULT - SUBJECTIVE AND OBJECTIVE BOX
Patient is a 77y old  Female who presents with a chief complaint of lymphedema, fever (16 Feb 2018 17:54)      INTERVAL HPI/OVERNIGHT EVENTS:     MEDICATIONS  (STANDING):  amLODIPine   Tablet 5 milliGRAM(s) Oral two times a day  atorvastatin 10 milliGRAM(s) Oral at bedtime  balsalazide 2250 milliGRAM(s) Oral three times a day  cyanocobalamin 1000 MICROGram(s) Oral daily  enoxaparin Injectable 40 milliGRAM(s) SubCutaneous every 24 hours  losartan 100 milliGRAM(s) Oral daily  pantoprazole    Tablet 40 milliGRAM(s) Oral before breakfast  piperacillin/tazobactam IVPB. 3.375 Gram(s) IV Intermittent every 8 hours  sertraline 25 milliGRAM(s) Oral two times a day  sodium chloride 0.9%. 1000 milliLiter(s) (75 mL/Hr) IV Continuous <Continuous>  vancomycin  IVPB 750 milliGRAM(s) IV Intermittent every 24 hours    MEDICATIONS  (PRN):  acetaminophen   Tablet 650 milliGRAM(s) Oral every 6 hours PRN For Temp greater than 38 C (100.4 F)  traMADol 50 milliGRAM(s) Oral two times a day PRN Severe Pain (7 - 10)      Allergies    erythromycin (Unknown)  macrolide antibiotics (Unknown)  morphine (Diarrhea)  sulfa drugs (Unknown)    Intolerances        REVIEW OF SYSTEMS:  CONSTITUTIONAL: No fever or chills  HEENT:  No headache, no sore throat  RESPIRATORY: No cough, wheezing, or shortness of breath  CARDIOVASCULAR: No chest pain, palpitations, or leg swelling  GASTROINTESTINAL: No abd pain, nausea, vomiting, or diarrhea  GENITOURINARY: No dysuria, frequency, or hematuria  NEUROLOGICAL: no focal weakness or dizziness  MUSCULOSKELETAL: no myalgias     Vital Signs Last 24 Hrs  T(C): 37.6 Max: 37.8 (17 Feb 2018 04:55)  T(F): 99.6  Max: 100.0 (17 Feb 2018 04:55)  HR: 82 (79 - 83)  BP: 109/77 (109/77 - 118/64)  BP(mean): --  RR: 16 (16 - 18)  SpO2: 95% (93% - 95%)    PHYSICAL EXAM:  GENERAL: NAD  HEENT:  EOMI, moist mucous membranes  CHEST/LUNG:  CTA b/l, no rales, wheezes, or rhonchi  HEART:  RRR, S1, S2  ABDOMEN:  BS+, soft, nontender, nondistended  EXTREMITIES: no edema, cyanosis, or calf tenderness  NERVOUS SYSTEM: AA&Ox3    LABS:                        9.4    12.0  )-----------( 205      ( 17 Feb 2018 06:56 )             29.2     CBC Full  -  ( 17 Feb 2018 06:56 )  WBC Count : 12.0 K/uL  Hemoglobin : 9.4 g/dL  Hematocrit : 29.2 %  Platelet Count - Automated : 205 K/uL  Mean Cell Volume : 75.3 fl  Mean Cell Hemoglobin : 24.1 pg  Mean Cell Hemoglobin Concentration : 32.0 gm/dL  Auto Neutrophil # : x  Auto Lymphocyte # : x  Auto Monocyte # : x  Auto Eosinophil # : x  Auto Basophil # : x  Auto Neutrophil % : x  Auto Lymphocyte % : x  Auto Monocyte % : x  Auto Eosinophil % : x  Auto Basophil % : x    17 Feb 2018 06:56    141    |  105    |  19     ----------------------------<  92     3.7     |  24     |  1.10     Ca    8.4        17 Feb 2018 06:56          CAPILLARY BLOOD GLUCOSE            Culture - Blood (collected 02-16-18 @ 08:53)  Source: .Blood Blood  Preliminary Report (02-17-18 @ 09:01):    No growth to date.    Culture - Blood (collected 02-16-18 @ 08:49)  Source: .Blood Blood  Preliminary Report (02-17-18 @ 09:01):    No growth to date.    Culture - Urine (collected 02-16-18 @ 08:18)  Source: .Urine Clean Catch (Midstream)  Final Report (02-17-18 @ 15:04):    <10,000 CFU/ml Normal Urogenital long present        RADIOLOGY & ADDITIONAL TESTS:    Personally reviewed.     Consultant(s) Notes Reviewed:  [x] YES  [ ] NO    Care Discussed with [x] Consultants  [x] Patient  [ ] Family  [ ]      [ ] Other; RN  DVT ppx Patient is a 77y old  Female who presents with a chief complaint of lymphedema, fever (16 Feb 2018 17:54)    INTERVAL HPI/OVERNIGHT EVENTS:     MEDICATIONS  (STANDING):  amLODIPine   Tablet 5 milliGRAM(s) Oral two times a day  atorvastatin 10 milliGRAM(s) Oral at bedtime  balsalazide 2250 milliGRAM(s) Oral three times a day  cyanocobalamin 1000 MICROGram(s) Oral daily  enoxaparin Injectable 40 milliGRAM(s) SubCutaneous every 24 hours  losartan 100 milliGRAM(s) Oral daily  pantoprazole    Tablet 40 milliGRAM(s) Oral before breakfast  piperacillin/tazobactam IVPB. 3.375 Gram(s) IV Intermittent every 8 hours  sertraline 25 milliGRAM(s) Oral two times a day  sodium chloride 0.9%. 1000 milliLiter(s) (75 mL/Hr) IV Continuous <Continuous>  vancomycin  IVPB 750 milliGRAM(s) IV Intermittent every 24 hours    MEDICATIONS  (PRN):  acetaminophen   Tablet 650 milliGRAM(s) Oral every 6 hours PRN For Temp greater than 38 C (100.4 F)  traMADol 50 milliGRAM(s) Oral two times a day PRN Severe Pain (7 - 10)      Allergies    erythromycin (Unknown)  macrolide antibiotics (Unknown)  morphine (Diarrhea)  sulfa drugs (Unknown)    Intolerances        REVIEW OF SYSTEMS:  CONSTITUTIONAL: No fever or chills  HEENT:  No headache, no sore throat  RESPIRATORY: No cough, wheezing, or shortness of breath  CARDIOVASCULAR: No chest pain, palpitations, or leg swelling  GASTROINTESTINAL: No abd pain, nausea, vomiting, or diarrhea  GENITOURINARY: No dysuria, frequency, or hematuria  NEUROLOGICAL: no focal weakness or dizziness  MUSCULOSKELETAL: no myalgias     Vital Signs Last 24 Hrs  T(C): 37.6 Max: 37.8 (17 Feb 2018 04:55)  T(F): 99.6  Max: 100.0 (17 Feb 2018 04:55)  HR: 82 (79 - 83)  BP: 109/77 (109/77 - 118/64)  BP(mean): --  RR: 16 (16 - 18)  SpO2: 95% (93% - 95%)    PHYSICAL EXAM:  GENERAL: NAD  HEENT:  EOMI, moist mucous membranes  CHEST/LUNG:  CTA b/l, no rales, wheezes, or rhonchi  HEART:  RRR, S1, S2  ABDOMEN:  BS+, soft, nontender, nondistended  EXTREMITIES: no edema, cyanosis, or calf tenderness  NERVOUS SYSTEM: AA&Ox3    LABS:                        9.4    12.0  )-----------( 205      ( 17 Feb 2018 06:56 )             29.2     CBC Full  -  ( 17 Feb 2018 06:56 )  WBC Count : 12.0 K/uL  Hemoglobin : 9.4 g/dL  Hematocrit : 29.2 %  Platelet Count - Automated : 205 K/uL  Mean Cell Volume : 75.3 fl  Mean Cell Hemoglobin : 24.1 pg  Mean Cell Hemoglobin Concentration : 32.0 gm/dL  Auto Neutrophil # : x  Auto Lymphocyte # : x  Auto Monocyte # : x  Auto Eosinophil # : x  Auto Basophil # : x  Auto Neutrophil % : x  Auto Lymphocyte % : x  Auto Monocyte % : x  Auto Eosinophil % : x  Auto Basophil % : x    17 Feb 2018 06:56    141    |  105    |  19     ----------------------------<  92     3.7     |  24     |  1.10     Ca    8.4        17 Feb 2018 06:56          CAPILLARY BLOOD GLUCOSE            Culture - Blood (collected 02-16-18 @ 08:53)  Source: .Blood Blood  Preliminary Report (02-17-18 @ 09:01):    No growth to date.    Culture - Blood (collected 02-16-18 @ 08:49)  Source: .Blood Blood  Preliminary Report (02-17-18 @ 09:01):    No growth to date.    Culture - Urine (collected 02-16-18 @ 08:18)  Source: .Urine Clean Catch (Midstream)  Final Report (02-17-18 @ 15:04):    <10,000 CFU/ml Normal Urogenital long present        RADIOLOGY & ADDITIONAL TESTS:    Personally reviewed.     Consultant(s) Notes Reviewed:  [x] YES  [ ] NO    Care Discussed with [x] Consultants  [x] Patient  [ ] Family  [ ]      [ ] Other; RN  DVT ppx Patient is a 77y old  Female who presents with a chief complaint of lymphedema, fever (16 Feb 2018 17:54)    INTERVAL HPI/OVERNIGHT EVENTS: Pt states she feels overall better. Fevers improved. R leg erythema improved.     MEDICATIONS  (STANDING):  amLODIPine   Tablet 5 milliGRAM(s) Oral two times a day  atorvastatin 10 milliGRAM(s) Oral at bedtime  balsalazide 2250 milliGRAM(s) Oral three times a day  cyanocobalamin 1000 MICROGram(s) Oral daily  enoxaparin Injectable 40 milliGRAM(s) SubCutaneous every 24 hours  losartan 100 milliGRAM(s) Oral daily  pantoprazole    Tablet 40 milliGRAM(s) Oral before breakfast  piperacillin/tazobactam IVPB. 3.375 Gram(s) IV Intermittent every 8 hours  sertraline 25 milliGRAM(s) Oral two times a day  sodium chloride 0.9%. 1000 milliLiter(s) (75 mL/Hr) IV Continuous <Continuous>  vancomycin  IVPB 750 milliGRAM(s) IV Intermittent every 24 hours    MEDICATIONS  (PRN):  acetaminophen   Tablet 650 milliGRAM(s) Oral every 6 hours PRN For Temp greater than 38 C (100.4 F)  traMADol 50 milliGRAM(s) Oral two times a day PRN Severe Pain (7 - 10)      Allergies    erythromycin (Unknown)  macrolide antibiotics (Unknown)  morphine (Diarrhea)  sulfa drugs (Unknown)    Intolerances        REVIEW OF SYSTEMS:  CONSTITUTIONAL: No fever or chills  HEENT:  No headache, no sore throat  RESPIRATORY: No cough, wheezing, or shortness of breath  CARDIOVASCULAR: No chest pain, palpitations  GASTROINTESTINAL: No abd pain, nausea, vomiting, or diarrhea  GENITOURINARY: No dysuria, frequency, or hematuria  NEUROLOGICAL: no focal weakness or dizziness  MUSCULOSKELETAL: right leg redness improved; +chronic lymphedema in legs     Vital Signs Last 24 Hrs  T(C): 37.6 Max: 37.8 (17 Feb 2018 04:55)  T(F): 99.6  Max: 100.0 (17 Feb 2018 04:55)  HR: 82 (79 - 83)  BP: 109/77 (109/77 - 118/64)  BP(mean): --  RR: 16 (16 - 18)  SpO2: 95% (93% - 95%)    PHYSICAL EXAM:  GENERAL: NAD  HEENT:  EOMI, moist mucous membranes  CHEST/LUNG:  CTA b/l, no rales, wheezes, or rhonchi  HEART:  RRR, S1, S2  ABDOMEN:  BS+, soft, nontender, nondistended  EXTREMITIES: +lymphedema in LEs, in compression stockings up to upper thighs; No, cyanosis, or calf tenderness  SKIN: no erythema or warmth on the right thigh  NERVOUS SYSTEM: AA&Ox3    LABS:                        9.4    12.0  )-----------( 205      ( 17 Feb 2018 06:56 )             29.2     CBC Full  -  ( 17 Feb 2018 06:56 )  WBC Count : 12.0 K/uL  Hemoglobin : 9.4 g/dL  Hematocrit : 29.2 %  Platelet Count - Automated : 205 K/uL  Mean Cell Volume : 75.3 fl  Mean Cell Hemoglobin : 24.1 pg  Mean Cell Hemoglobin Concentration : 32.0 gm/dL  Auto Neutrophil # : x  Auto Lymphocyte # : x  Auto Monocyte # : x  Auto Eosinophil # : x  Auto Basophil # : x  Auto Neutrophil % : x  Auto Lymphocyte % : x  Auto Monocyte % : x  Auto Eosinophil % : x  Auto Basophil % : x    17 Feb 2018 06:56    141    |  105    |  19     ----------------------------<  92     3.7     |  24     |  1.10     Ca    8.4        17 Feb 2018 06:56          CAPILLARY BLOOD GLUCOSE            Culture - Blood (collected 02-16-18 @ 08:53)  Source: .Blood Blood  Preliminary Report (02-17-18 @ 09:01):    No growth to date.    Culture - Blood (collected 02-16-18 @ 08:49)  Source: .Blood Blood  Preliminary Report (02-17-18 @ 09:01):    No growth to date.    Culture - Urine (collected 02-16-18 @ 08:18)  Source: .Urine Clean Catch (Midstream)  Final Report (02-17-18 @ 15:04):    <10,000 CFU/ml Normal Urogenital long present        RADIOLOGY & ADDITIONAL TESTS:  CXR: Clear  lungs.  No acute airspace disease suggested.    Personally reviewed.     Consultant(s) Notes Reviewed:  [x] YES  [ ] NO    Care Discussed with [x] Consultants  [x] Patient  [x] Family  [ ]      [ ] Other; RN  DVT ppx: lovenox

## 2018-02-18 DIAGNOSIS — N13.30 UNSPECIFIED HYDRONEPHROSIS: ICD-10-CM

## 2018-02-18 DIAGNOSIS — R78.81 BACTEREMIA: ICD-10-CM

## 2018-02-18 DIAGNOSIS — A41.9 SEPSIS, UNSPECIFIED ORGANISM: ICD-10-CM

## 2018-02-18 LAB
-  CANDIDA ALBICANS: SIGNIFICANT CHANGE UP
-  CANDIDA GLABRATA: SIGNIFICANT CHANGE UP
-  CANDIDA KRUSEI: SIGNIFICANT CHANGE UP
-  CANDIDA PARAPSILOSIS: SIGNIFICANT CHANGE UP
-  CANDIDA TROPICALIS: SIGNIFICANT CHANGE UP
-  COAGULASE NEGATIVE STAPHYLOCOCCUS: SIGNIFICANT CHANGE UP
-  K. PNEUMONIAE GROUP: SIGNIFICANT CHANGE UP
-  KPC RESISTANCE GENE: SIGNIFICANT CHANGE UP
-  STREPTOCOCCUS SP. (NOT GRP A, B OR S PNEUMONIAE): SIGNIFICANT CHANGE UP
A BAUMANNII DNA SPEC QL NAA+PROBE: SIGNIFICANT CHANGE UP
ANION GAP SERPL CALC-SCNC: 11 MMOL/L — SIGNIFICANT CHANGE UP (ref 5–17)
BUN SERPL-MCNC: 17 MG/DL — SIGNIFICANT CHANGE UP (ref 7–23)
CALCIUM SERPL-MCNC: 8.8 MG/DL — SIGNIFICANT CHANGE UP (ref 8.5–10.1)
CHLORIDE SERPL-SCNC: 103 MMOL/L — SIGNIFICANT CHANGE UP (ref 96–108)
CO2 SERPL-SCNC: 25 MMOL/L — SIGNIFICANT CHANGE UP (ref 22–31)
CREAT SERPL-MCNC: 1 MG/DL — SIGNIFICANT CHANGE UP (ref 0.5–1.3)
E CLOAC COMP DNA BLD POS QL NAA+PROBE: SIGNIFICANT CHANGE UP
E COLI DNA BLD POS QL NAA+NON-PROBE: SIGNIFICANT CHANGE UP
ENTEROCOC DNA BLD POS QL NAA+NON-PROBE: SIGNIFICANT CHANGE UP
ENTEROCOC DNA BLD POS QL NAA+NON-PROBE: SIGNIFICANT CHANGE UP
FERRITIN SERPL-MCNC: 165 NG/ML — HIGH (ref 15–150)
FOLATE SERPL-MCNC: 15.2 NG/ML — SIGNIFICANT CHANGE UP (ref 4.8–24.2)
GLUCOSE SERPL-MCNC: 93 MG/DL — SIGNIFICANT CHANGE UP (ref 70–99)
GP B STREP DNA BLD POS QL NAA+NON-PROBE: SIGNIFICANT CHANGE UP
GRAM STN FLD: SIGNIFICANT CHANGE UP
HAEM INFLU DNA BLD POS QL NAA+NON-PROBE: SIGNIFICANT CHANGE UP
HCT VFR BLD CALC: 27.5 % — LOW (ref 34.5–45)
HGB BLD-MCNC: 8.9 G/DL — LOW (ref 11.5–15.5)
IRON SATN MFR SERPL: 5 % — LOW (ref 14–50)
IRON SATN MFR SERPL: 9 UG/DL — LOW (ref 30–160)
K OXYTOCA DNA BLD POS QL NAA+NON-PROBE: SIGNIFICANT CHANGE UP
L MONOCYTOG DNA BLD POS QL NAA+NON-PROBE: SIGNIFICANT CHANGE UP
MCHC RBC-ENTMCNC: 24 PG — LOW (ref 27–34)
MCHC RBC-ENTMCNC: 32.2 GM/DL — SIGNIFICANT CHANGE UP (ref 32–36)
MCV RBC AUTO: 74.3 FL — LOW (ref 80–100)
METHOD TYPE: SIGNIFICANT CHANGE UP
MRSA SPEC QL CULT: SIGNIFICANT CHANGE UP
MSSA DNA SPEC QL NAA+PROBE: SIGNIFICANT CHANGE UP
N MEN ISLT CULT: SIGNIFICANT CHANGE UP
P AERUGINOSA DNA BLD POS NAA+NON-PROBE: SIGNIFICANT CHANGE UP
PLATELET # BLD AUTO: 166 K/UL — SIGNIFICANT CHANGE UP (ref 150–400)
POTASSIUM SERPL-MCNC: 3.4 MMOL/L — LOW (ref 3.5–5.3)
POTASSIUM SERPL-SCNC: 3.4 MMOL/L — LOW (ref 3.5–5.3)
RBC # BLD: 3.7 M/UL — LOW (ref 3.8–5.2)
RBC # FLD: 15.7 % — HIGH (ref 10.3–14.5)
S MARCESCENS DNA BLD POS NAA+NON-PROBE: SIGNIFICANT CHANGE UP
S PNEUM DNA BLD POS QL NAA+NON-PROBE: SIGNIFICANT CHANGE UP
S PYO DNA BLD POS QL NAA+NON-PROBE: SIGNIFICANT CHANGE UP
SODIUM SERPL-SCNC: 139 MMOL/L — SIGNIFICANT CHANGE UP (ref 135–145)
TIBC SERPL-MCNC: 181 UG/DL — LOW (ref 220–430)
UIBC SERPL-MCNC: 172 UG/DL — SIGNIFICANT CHANGE UP (ref 110–370)
VIT B12 SERPL-MCNC: >2000 PG/ML — HIGH (ref 232–1245)
WBC # BLD: 8.2 K/UL — SIGNIFICANT CHANGE UP (ref 3.8–10.5)
WBC # FLD AUTO: 8.2 K/UL — SIGNIFICANT CHANGE UP (ref 3.8–10.5)

## 2018-02-18 PROCEDURE — 99233 SBSQ HOSP IP/OBS HIGH 50: CPT

## 2018-02-18 PROCEDURE — 74177 CT ABD & PELVIS W/CONTRAST: CPT | Mod: 26

## 2018-02-18 PROCEDURE — 76775 US EXAM ABDO BACK WALL LIM: CPT | Mod: 26

## 2018-02-18 RX ORDER — IOHEXOL 300 MG/ML
500 INJECTION, SOLUTION INTRAVENOUS
Qty: 0 | Refills: 0 | Status: COMPLETED | OUTPATIENT
Start: 2018-02-18 | End: 2018-02-18

## 2018-02-18 RX ORDER — ACETAMINOPHEN 500 MG
650 TABLET ORAL EVERY 6 HOURS
Qty: 0 | Refills: 0 | Status: DISCONTINUED | OUTPATIENT
Start: 2018-02-18 | End: 2018-02-21

## 2018-02-18 RX ORDER — POTASSIUM CHLORIDE 20 MEQ
40 PACKET (EA) ORAL ONCE
Qty: 0 | Refills: 0 | Status: COMPLETED | OUTPATIENT
Start: 2018-02-18 | End: 2018-02-18

## 2018-02-18 RX ADMIN — BALSALAZIDE DISODIUM 2250 MILLIGRAM(S): 750 CAPSULE ORAL at 21:26

## 2018-02-18 RX ADMIN — Medication 40 MILLIEQUIVALENT(S): at 13:36

## 2018-02-18 RX ADMIN — TRAMADOL HYDROCHLORIDE 50 MILLIGRAM(S): 50 TABLET ORAL at 00:00

## 2018-02-18 RX ADMIN — PANTOPRAZOLE SODIUM 40 MILLIGRAM(S): 20 TABLET, DELAYED RELEASE ORAL at 05:25

## 2018-02-18 RX ADMIN — BALSALAZIDE DISODIUM 2250 MILLIGRAM(S): 750 CAPSULE ORAL at 13:37

## 2018-02-18 RX ADMIN — BALSALAZIDE DISODIUM 2250 MILLIGRAM(S): 750 CAPSULE ORAL at 05:25

## 2018-02-18 RX ADMIN — Medication 650 MILLIGRAM(S): at 18:38

## 2018-02-18 RX ADMIN — LOSARTAN POTASSIUM 100 MILLIGRAM(S): 100 TABLET, FILM COATED ORAL at 05:26

## 2018-02-18 RX ADMIN — IOHEXOL 500 MILLILITER(S): 300 INJECTION, SOLUTION INTRAVENOUS at 15:53

## 2018-02-18 RX ADMIN — PIPERACILLIN AND TAZOBACTAM 25 GRAM(S): 4; .5 INJECTION, POWDER, LYOPHILIZED, FOR SOLUTION INTRAVENOUS at 21:26

## 2018-02-18 RX ADMIN — SERTRALINE 25 MILLIGRAM(S): 25 TABLET, FILM COATED ORAL at 05:26

## 2018-02-18 RX ADMIN — AMLODIPINE BESYLATE 5 MILLIGRAM(S): 2.5 TABLET ORAL at 17:47

## 2018-02-18 RX ADMIN — SERTRALINE 25 MILLIGRAM(S): 25 TABLET, FILM COATED ORAL at 17:47

## 2018-02-18 RX ADMIN — IOHEXOL 500 MILLILITER(S): 300 INJECTION, SOLUTION INTRAVENOUS at 16:30

## 2018-02-18 RX ADMIN — AMLODIPINE BESYLATE 5 MILLIGRAM(S): 2.5 TABLET ORAL at 05:26

## 2018-02-18 RX ADMIN — ATORVASTATIN CALCIUM 10 MILLIGRAM(S): 80 TABLET, FILM COATED ORAL at 21:25

## 2018-02-18 RX ADMIN — PIPERACILLIN AND TAZOBACTAM 25 GRAM(S): 4; .5 INJECTION, POWDER, LYOPHILIZED, FOR SOLUTION INTRAVENOUS at 04:17

## 2018-02-18 RX ADMIN — Medication 650 MILLIGRAM(S): at 18:08

## 2018-02-18 RX ADMIN — PIPERACILLIN AND TAZOBACTAM 25 GRAM(S): 4; .5 INJECTION, POWDER, LYOPHILIZED, FOR SOLUTION INTRAVENOUS at 13:36

## 2018-02-18 NOTE — CONSULT NOTE ADULT - PROBLEM SELECTOR RECOMMENDATION 3
With nml u/a and C&s, its unlikely that there is a urinary etiology of the bactermia. Will f/u. Pt is clearly improving on zosyn

## 2018-02-18 NOTE — PROGRESS NOTE ADULT - SUBJECTIVE AND OBJECTIVE BOX
[facility], Division of Infectious Diseases  RAYRAY Kelly A. Lee  [phone]  Name: FRIDA ROMANO  Age: 77y  Gender: Female  MRN: [id number]    Interval History--  Notes reviewed  feels well   no events and no complaints    Past Medical History--  History of chemotherapy  Hydronephrosis of right kidney  Lymphedema of leg  Uterine cancer  Hydronephrosis determined by ultrasound  Abdominal mass  Atrial fibrillation  Mitral valve prolapse  Endometrial carcinoma  Hyperlipidemia  Ulcerative colitis  Anxiety  Post-Herpetic Trigeminal Neuralgia  Benign Neoplasm of Breast  Benign Hypertension  H/O hydronephrosis  History of urethral stent  History of chemotherapy  Abdominal mass  Encounter for biopsy  History of tonsillectomy  H/O total hysterectomy  History of D&C      For details regarding the patient's social history, family history, and other miscellaneous elements, please refer the initial infectious diseases consultation and/or the admitting history and physical examination for this admission.    Allergies    erythromycin (Unknown)  macrolide antibiotics (Unknown)  morphine (Diarrhea)  sulfa drugs (Unknown)    Intolerances        Medications--  Antibiotics:  piperacillin/tazobactam IVPB. 3.375 Gram(s) IV Intermittent every 8 hours  vancomycin  IVPB 750 milliGRAM(s) IV Intermittent every 24 hours    Immunologic:    Other:  acetaminophen   Tablet PRN  amLODIPine   Tablet  atorvastatin  balsalazide  cyanocobalamin  enoxaparin Injectable  iohexol 300 mG (iodine)/mL Oral Solution  losartan  pantoprazole    Tablet  potassium chloride    Tablet ER  sertraline  traMADol PRN      Review of Systems--  A 10-point review of systems was obtained.     Pertinent positives and negatives--  Constitutional: No fevers. No Chills. No Rigors.   Cardiovascular: No chest pain. No palpitations.  Respiratory: No shortness of breath. No cough.  Gastrointestinal: No nausea or vomiting. No diarrhea or constipation.   Psychiatric:no anxiety no depression    Review of systems otherwise negative except as previously noted.    Physical Examination--  Vital Signs: T(F): 99.8 (02-18-18 @ 04:27), Max: 99.8 (02-18-18 @ 04:27)  HR: 83 (02-18-18 @ 04:27)  BP: 118/64 (02-18-18 @ 04:27)  RR: 16 (02-18-18 @ 04:27)  SpO2: 93% (02-18-18 @ 04:27)  Wt(kg): --  General: Nontoxic-appearing Female in no acute distress.  HEENT: AT/NC.. Anicteric. Conjunctiva pink and moist. Oropharynx clear. Dentition fair.  Neck: Not rigid. No sense of mass.  Nodes: None palpable.  Lungs: Clear bilaterally without rales, wheezing or rhonchi  Heart: Regular rate and rhythm. No Murmur. No rub. No gallop. No palpable thrill. +port site clean, non tender, no erythema  Abdomen: Bowel sounds present and normoactive. Soft. Nondistended. Nontender.  Back: No spinal tenderness. No costovertebral angle tenderness.   Extremities: No cyanosis or clubbing. +++ edema RLE  Skin: Warm. Dry. Good turgor. No rash. No vasculitic stigmata.  Psychiatric: Appropriate affect and mood for situation.         Laboratory Studies--  CBC                        8.9    8.2   )-----------( 166      ( 18 Feb 2018 08:36 )             27.5       Chemistries  02-18    139  |  103  |  17  ----------------------------<  93  3.4<L>   |  25  |  1.00    Ca    8.8      18 Feb 2018 08:36  Phos  2.4     02-18  Mg     1.8     02-18    TPro  6.4  /  Alb  2.2<L>  /  TBili  0.7  /  DBili  .30<H>  /  AST  35  /  ALT  15  /  AlkPhos  193<H>  02-18      Culture Data    Culture - Blood (collected 16 Feb 2018 08:53)  Source: .Blood Blood  Preliminary Report (17 Feb 2018 09:01):    No growth to date.    Culture - Blood (collected 16 Feb 2018 08:49)  Source: .Blood Blood  Gram Stain (18 Feb 2018 08:16):    Growth in anaerobic bottle: Gram Negative Rods  Preliminary Report (18 Feb 2018 08:15):    Growth in anaerobic bottle: Gram Negative Rods    "Due to technical problems, Proteus sp. will Not be reported as part of    the BCID panel until further notice"    ***Blood Panel PCR results on this specimen are available    approximately 3 hours after the Gram stain result.***    Gram stain, PCR, and/or culture results may not always    correspond due to difference in methodologies.    ************************************************************    This PCR assay was performed using [employer].    The following targets are tested for: Enterococcus,    vancomycin resistant enterococci, Listeria monocytogenes,    coagulase negative staphylococci, S. aureus,    methicillin resistant S. aureus, Streptococcus agalactiae    (Group B), S. pneumoniae, S. pyogenes (Group A),    Acinetobacter baumannii, Enterobacter cloacae, E. coli,    Klebsiella oxytoca, K. pneumoniae, Proteus sp.,    Serratia marcescens, Haemophilus influenzae,    Neisseria meningitidis, Pseudomonas aeruginosa, Candida    albicans, C. glabrata, C krusei, C parapsilosis,    C. tropicalis and the KPC resistance gene.  Organism: Blood Culture PCR (18 Feb 2018 10:18)  Organism: Blood Culture PCR (18 Feb 2018 10:18)    Culture - Urine (collected 16 Feb 2018 08:18)  Source: .Urine Clean Catch (Midstream)  Final Report (17 Feb 2018 15:04):    <10,000 CFU/ml Normal Urogenital long present        < from: Xray Abdomen 1 View PORTABLE -Routine (02.17.18 @ 21:36) >    EXAM:  XR ABDOMEN PORTABLE ROUTINE 1V                            PROCEDURE DATE:  02/17/2018          INTERPRETATION:      Abdomen radiograph         CLINICAL INFORMATION:       Fever, history of endometrial cancer,   constipated    TECHNIQUE:  Supine view of the abdomen was obtained.    FINDINGS:   No prior exams are available for review.    The bowel gas pattern is unremarkable. Moderate stool retention noted. No   pathologic calcifications are seen.  The osseous structures demonstrate   degenerative changes. Multiple surgical clips are seen within the pelvis.   Double-J RIGHT sided stent is noted..           IMPRESSION:  Moderate stool retention. Multiple surgical clips are seen   within the pelvis. Double-J RIGHT sided stent is noted.           < end of copied text >      < from: Xray Chest 1 View-PORTABLE IMMEDIATE (02.16.18 @ 01:59) >  M:  XR CHEST PORTABLE IMMED 1V                            PROCEDURE DATE:  02/16/2018          INTERPRETATION:  cough    A frontal chest film  demonstrates grossly clear lungs.  No acute   infiltrate or consolidation is  noted. The costophrenic angles are   grossly clear.    The heart size and vascular markings are within normal limits for   technique.      [id number] in situ .     The osseous structures appear intact intact.     IMPRESSION:  Clear  lungs.  No acute airspace disease suggested.    < end of copied text >      Culture - Urine (02.16.18 @ 08:18)    Specimen Source: .Urine Clean Catch (Midstream)    Culture Results:   <10,000 CFU/ml Normal Urogenital long present

## 2018-02-18 NOTE — CONSULT NOTE ADULT - SUBJECTIVE AND OBJECTIVE BOX
[facility], Division of Infectious Diseases  RAYRAY Kelly A. Lee    JUAN ANTONIO, FRIDA  77y, Female  [phone]    HPI--  77F with history of recurrent metastatic uterine cancer, s/p chemotherapy Monday states came to the hospital with a 1-day history of fever and being emotionally upset. Patient denies any HA or visual changes.  No neck pain. + back pain but had recently injured herself reaching for something in her closer (& placed on neurontin?). Denies CP or SOB. No cough. Did have abdominal pain, diffuse, now resolved. No N/V. Patient has a history of ulcerative colitis for most of her adult life but has had no diarrhea. Denies constipation. No dysuria, urinary frequency, or urinary urgency. Patient with chronic lymphedema for years, but had been better of late as patient had been going to a specialized center and been using wraps followed by compression stockings. The patient did note some increased redness and pain. Swelling about the same. Patient did have chills with her fever, no rigors.    Patient brought to hospital, where in ER she initially afebrile but had temperature to 101.8. She has been given broad spectrum antibiotics. Culture data pending.      PMH/PSH--  History of chemotherapy  Hydronephrosis of right kidney  Lymphedema of leg  Uterine cancer  Hydronephrosis determined by ultrasound  Abdominal mass  Atrial fibrillation  Mitral valve prolapse  Endometrial carcinoma  Hyperlipidemia  Ulcerative colitis  Anxiety  Post-Herpetic Trigeminal Neuralgia  Benign Neoplasm of Breast  Benign Hypertension  H/O hydronephrosis  History of urethral stent  History of chemotherapy  Abdominal mass  Encounter for biopsy  History of tonsillectomy  H/O total hysterectomy  History of D&C      Allergies--  Possible sulfa allergy -> rash  Intolerant of Erythomycin and Morphine -> N/V      Medications--  Antibiotics: piperacillin/tazobactam IVPB. 3.375 Gram(s) IV Intermittent every 8 hours  vancomycin  IVPB 750 milliGRAM(s) IV Intermittent every 24 hours    Immunologic:   Other: acetaminophen   Tablet PRN  amLODIPine   Tablet  atorvastatin  cyanocobalamin  enoxaparin Injectable  losartan  mesalamine ER Capsule  pantoprazole    Tablet  sertraline  traMADol      Social History--  EtOH: denies  Tobacco: denies   Drug Use: denies    Family/Marital History--  Hypertension  Family history of early CAD  Remainder not relevant to clinical concern.    Travel/Environmental/Occupational History:  Lives with her boyfriend    Review of Systems:  A >=10-point review of systems was obtained.     Pertinent positives and negatives--  Constitutional: +fevers. +Chills. No Rigors.   Eyes: denies.   ENMT: denies.   Cardiovascular: No chest pain. No palpitations.  Respiratory: No shortness of breath. No cough.  Gastrointestinal: No nausea or vomiting. No diarrhea or constipation.   Genitourinary: as above  Musculoskeletal: as above  Neurologic: as above. Sounds as if was confused earlier.   Psychiatric: Pleasant. Appropriate affect.  Endocrine: denies.   Heme/Lymphatic: as above  Allergy/Immunologic: denies.     Review of systems otherwise negative except as previously noted.    Physical Exam--  Vital Signs: T(F): 98.8 (02-16-18 @ 11:52), Max: 101.8 (02-16-18 @ 08:15)  HR: 67 (02-16-18 @ 11:52)  BP: 110/66 (02-16-18 @ 11:52)  RR: 16 (02-16-18 @ 11:52)  SpO2: 96% (02-16-18 @ 11:52)  Wt(kg): --  General: Nontoxic-appearing Female in no acute distress.  HEENT: AT/NC. PERRL. EOMI. Anicteric. Conjunctiva pink and moist. Oropharynx clear.  Neck: Not rigid. No sense of mass.  Nodes: None palpable.  Lungs: Clear bilaterally without rales, wheezing or rhonchi  Chest: Port R ACW accessed C/D/I no evidence of a tunnel infection  Heart: Regular rate and rhythm. No Murmur. No rub. No gallop. No palpable thrill.  Abdomen: Bowel sounds present and normoactive. Soft. Nondistended. Nontender. No sense of mass. No organomegaly.  Back: No spinal tenderness. No costovertebral angle tenderness.   Extremities: No cyanosis or clubbing. No edema. +Lymphedema RLE proximal > distal, due to compression stocking use (wears full length at night, below knee in day).  Mild increased calor and erythema compared to left. Tender R inguinal area without discrete palpable LAD. Some fissures/dry flaky skin on foot. Mild onychomycosis. Some purpuric changes.   Skin: Warm. Dry. Good turgor. No rash. No vasculitic stigmata.  Psychiatric: Appropriate affect and mood for situation.      Laboratory & Imaging Data--  CBC                        9.1    13.0  )-----------( 182      ( 16 Feb 2018 08:15 )             29.0       Chemistries  02-16    136  |  102  |  20  ----------------------------<  112<H>  3.6   |  26  |  1.10    Ca    8.7      16 Feb 2018 08:15    TPro  6.4  /  Alb  2.6<L>  /  TBili  0.4  /  DBili  x   /  AST  28  /  ALT  12  /  AlkPhos  124<H>  02-16    Urinalysis (02.16.18 @ 04:46)    pH Urine: 7.0    Glucose Qualitative, Urine: Negative    Blood, Urine: Small    Color: Yellow    Urine Appearance: Clear    Bilirubin: Negative    Ketone - Urine: Trace    Specific Gravity: 1.005    Protein, Urine: Negative    Urobilinogen: Negative    Nitrite: Negative    Leukocyte Esterase Concentration: Negative  Urine Microscopic-Add On (NC) (02.16.18 @ 04:46)    Bacteria: Occasional    Epithelial Cells: Few    Red Blood Cell - Urine: 3-5 /HPF    White Blood Cell - Urine: 0-2    Rapid Respiratory Viral Panel (02.16.18 @ 01:15)    Rapid RVP Result: NotDetec: The [employer] RVP Rapid uses polymerase chain reaction (PCR) and melt  curve analysis to screen for adenovirus; coronavirus HKU1, NL63, 229E,  OC43; human metapneumovirus (hMPV); human enterovirus/rhinovirus  (Entero/RV); influenza A; influenza A/H1;influenza A/H3; influenza  A/H1-2009; influenza B; parainfluenza viruses 1, 2, 3, 4; respiratory  syncytial virus; Bordetella pertussis; Mycoplasma pneumoniae; and  Chlamydophila pneumoniae.      < from: Xray Chest 1 View-PORTABLE IMMEDIATE (02.16.18 @ 01:59) >  EXAM:  XR CHEST PORTABLE IMMED 1V                        PROCEDURE DATE:  02/16/2018    INTERPRETATION:  cough  A frontal chest film  demonstrates grossly clear lungs.  No acute   infiltrate or consolidation is  noted. The costophrenic angles are   grossly clear.    The heart size and vascular markings are within normal limits for   technique.      [id number] in situ .     The osseous structures appear intact intact.     IMPRESSION:  Clear  lungs.  No acute airspace disease suggested.  ANA MARÍA HANDY M.D., ATTENDING RADIOLOGIST  This document has been electronically signed. Feb 16 2018 10:44AM  < end of copied text >    < from: US Duplex Venous Lower Ext Ltd, Right (02.16.18 @ 03:19) >  EXAM:  US DPLX LWR EXT VEINS LTD RT                        PROCEDURE DATE:  02/16/2018    INTERPRETATION:  CLINICAL INFORMATION: Right leg swelling.  COMPARISON: None available.    TECHNIQUE: Duplex sonography of the RIGHT LOWER extremity with color and   spectral Doppler, with and without compression.      FINDINGS:  There is normal compressibility of the right common femoral, and   popliteal veins. No calf vein thrombosis is detected.    There is limited assessment of the femoral vein, particularly on the   compression views.  Doppler examination shows normal spontaneous and   phasic flow.    There is nonspecific moderate calf edema.    IMPRESSION: Limited study, particularly of the proximal to distal femoral   vein.    No evidence of right common femoral, popliteal or calf vein thrombosis.   Moderate calf edema.  CHRIS NOE M.D., RADIOLOGIST  This document has been electronically signed. Feb 16 2018  3:43AM  < end of copied text >      Culture Data  None as of yet
CHIEF COMPLAINT: rt leg pain    HISTORY OF PRESENT ILLNESS: 78 yo F with PMH of recurrent Uterine CA (2001) s/p KATH and radiation, abdominal mass 2013 metastatic endometrial cancer, recurrence 2015?, now on IV chemo weekly, anxiety, atrial fibrillation (one episode >30 years ago, no AC), hypertension, hyperlipidemia, Chronic Lymphedema of RLE, Hydronephrosis due to enlarged lymph nodes s/p right uretal stent last changed 10/17, Mitral valve prolapse, Trigeminal Neuralgia, and Ulcerative colitis who presented to the ER from home with fever 102 on 2/16/18.  She reports some increased urinary frequency, but denies dysuria or hematuria. A renal sono shows mil-moderate rt hydro, KUB show stent in good position. Stent is changed Q 6-9 months usually. Urine c&s and u/a are essentially nml. Bld C&s shows GNR. Creat is 1.0, stable. Yesterday pt was confused and disoriented, she is markedly improved today.     PAST MEDICAL & SURGICAL HISTORY:  History of chemotherapy  Lymphedema of leg: right  Hydronephrosis determined by ultrasound: right kidney  Abdominal mass: 2013 metastatic endometrial cancer, s/p surgery, on chemotherapy at present-infusa port right chest  Atrial fibrillation: one episode &gt;30 years ago  Mitral valve prolapse: partial  Endometrial carcinoma: 2001 - s/p KATH - tx with radiation  metastatic finished treatment 3/2014  Hyperlipidemia  Ulcerative colitis  Anxiety  Post-Herpetic Trigeminal Neuralgia: left  Benign Hypertension  H/O hydronephrosis: ureteral stent, multiple stent exchanges  History of chemotherapy: Infusaport insertion ; 2013  Abdominal mass: s/p mass removed 2013,on chemo-right chest infusa port  Encounter for biopsy: 2/14/13 - right iliac lymph node biopsy  History of tonsillectomy  H/O total hysterectomy: 2001  History of D&C: age 22      REVIEW OF SYSTEMS:    CONSTITUTIONAL: + weakness, fevers  EYES/ENT: No visual changes;  No vertigo or throat pain   NECK: No pain or stiffness  RESPIRATORY: No cough, wheezing, hemoptysis; No shortness of breath  CARDIOVASCULAR: No chest pain or palpitations  GASTROINTESTINAL: No abdominal or epigastric pain. No nausea, vomiting, or hematemesis; No diarrhea or constipation. No melena or hematochezia.  GENITOURINARY: No dysuria,  +frequency no hematuria  NEUROLOGICAL: No numbness or weakness  SKIN: No itching, burning, rashes, or lesions   All other review of systems is negative unless indicated above.    MEDICATIONS  (STANDING):  amLODIPine   Tablet 5 milliGRAM(s) Oral two times a day  atorvastatin 10 milliGRAM(s) Oral at bedtime  balsalazide 2250 milliGRAM(s) Oral three times a day  cyanocobalamin 1000 MICROGram(s) Oral daily  enoxaparin Injectable 40 milliGRAM(s) SubCutaneous every 24 hours  iohexol 300 mG (iodine)/mL Oral Solution 500 milliLiter(s) Oral every 1 hour  losartan 100 milliGRAM(s) Oral daily  pantoprazole    Tablet 40 milliGRAM(s) Oral before breakfast  piperacillin/tazobactam IVPB. 3.375 Gram(s) IV Intermittent every 8 hours  sertraline 25 milliGRAM(s) Oral two times a day    MEDICATIONS  (PRN):  acetaminophen   Tablet 650 milliGRAM(s) Oral every 6 hours PRN For Temp greater than 38 C (100.4 F)  traMADol 50 milliGRAM(s) Oral two times a day PRN Severe Pain (7 - 10)      Allergies    erythromycin (Unknown)  macrolide antibiotics (Unknown)  morphine (Diarrhea)  sulfa drugs (Unknown)    Intolerances        SOCIAL HISTORY:    FAMILY HISTORY:  Hypertension  Family history of early CAD      Vital Signs Last 24 Hrs  T(C): 37.7 (18 Feb 2018 14:27), Max: 37.7 (18 Feb 2018 04:27)  T(F): 99.9 (18 Feb 2018 14:27), Max: 99.9 (18 Feb 2018 14:27)  HR: 97 (18 Feb 2018 14:27) (82 - 97)  BP: 132/76 (18 Feb 2018 14:27) (109/77 - 132/76)  BP(mean): --  RR: 16 (18 Feb 2018 14:27) (16 - 18)  SpO2: 92% (18 Feb 2018 14:27) (92% - 95%)    PHYSICAL EXAM:    Constitutional: NAD, well-developed  HEENT: FARZANA, EOMI, Normal Hearing, MMM  Neck: No LAD, No JVD  Back: Normal spine flexure, No CVA tenderness  Respiratory: not using accessory muscles  Cardiovascular: RRR  Abd: BS+, soft, NT/ND, No CVAT  Extremities: + bilat LE edema  Neurological: A/O x 3, no focal deficits  Psychiatric: Normal mood, normal affect  Skin: No rashes    LABS:                        8.9    8.2   )-----------( 166      ( 18 Feb 2018 08:36 )             27.5     02-18    139  |  103  |  17  ----------------------------<  93  3.4<L>   |  25  |  1.00    Ca    8.8      18 Feb 2018 08:36  Phos  2.4     02-18  Mg     1.8     02-18    TPro  6.4  /  Alb  2.2<L>  /  TBili  0.7  /  DBili  .30<H>  /  AST  35  /  ALT  15  /  AlkPhos  193<H>  02-18        Urine Culture: Culture - Urine (02.16.18 @ 08:18)    Specimen Source: .Urine Clean Catch (Midstream)    Culture Results:   <10,000 CFU/ml Normal Urogenital long present    Urine Microscopic-Add On (NC) (02.16.18 @ 04:46)    Bacteria: Occasional    Epithelial Cells: Few    Red Blood Cell - Urine: 3-5 /HPF    White Blood Cell - Urine: 0-2        RADIOLOGY & ADDITIONAL STUDIES:  EXAM:  US KIDNEY(S)                            PROCEDURE DATE:  02/18/2018          INTERPRETATION:  CLINICAL INFORMATION: 77F w/ endometrial ca on chemo, hx   of R hydronephr s/p stent now GNR bacteremia    COMPARISON: PET/CT 2016    TECHNIQUE: Sonography of the kidneys and bladder.     FINDINGS:    Right kidney:  9.7 cm. Moderate hydronephrosis. Stent is visualized in   the renal pelvis.    Left kidney:  9.7 cm. No hydronephrosis. 3.2 cm cyst.    Urinary bladder: Nondistended, limited visualization.    IMPRESSION:     Moderate right hydronephrosis as described. Obstructive uropathy should   be considered.                    TIMOTHY BECKMAN M.D., ATTENDING RADIOLOGIST  This document has been electronically signed. Feb 18 2018  1:01PM    EXAM:  XR ABDOMEN PORTABLE ROUTINE 1V                            PROCEDURE DATE:  02/17/2018          INTERPRETATION:      Abdomen radiograph         CLINICAL INFORMATION:       Fever, history of endometrial cancer,   constipated    TECHNIQUE:  Supine view of the abdomen was obtained.    FINDINGS:   No prior exams are available for review.    The bowel gas pattern is unremarkable. Moderate stool retention noted. No   pathologic calcifications are seen.  The osseous structures demonstrate   degenerative changes. Multiple surgical clips are seen within the pelvis.   Double-J RIGHT sided stent is noted..           IMPRESSION:  Moderate stool retention. Multiple surgical clips are seen   within the pelvis. Double-J RIGHT sided stent is noted.                     ISIDORO ROE M.D., ATTENDING RADIOLOGIST  This document has been electronically signed. Feb 18 2018  9:29AM

## 2018-02-18 NOTE — PROGRESS NOTE ADULT - SUBJECTIVE AND OBJECTIVE BOX
Patient is a 77y old  Female who presents with a chief complaint of lymphedema, fever (16 Feb 2018 17:54)      INTERVAL HPI/OVERNIGHT EVENTS: Pt denies abd pain, flank pain, dysuria, fever, chills, cough, headache. Family reports yesterday pt was having periods of confusion.     MEDICATIONS  (STANDING):  amLODIPine   Tablet 5 milliGRAM(s) Oral two times a day  atorvastatin 10 milliGRAM(s) Oral at bedtime  balsalazide 2250 milliGRAM(s) Oral three times a day  cyanocobalamin 1000 MICROGram(s) Oral daily  enoxaparin Injectable 40 milliGRAM(s) SubCutaneous every 24 hours  losartan 100 milliGRAM(s) Oral daily  pantoprazole    Tablet 40 milliGRAM(s) Oral before breakfast  piperacillin/tazobactam IVPB. 3.375 Gram(s) IV Intermittent every 8 hours  sertraline 25 milliGRAM(s) Oral two times a day    MEDICATIONS  (PRN):  acetaminophen   Tablet 650 milliGRAM(s) Oral every 6 hours PRN For Temp greater than 38 C (100.4 F)  acetaminophen   Tablet. 650 milliGRAM(s) Oral every 6 hours PRN Mild Pain (1 - 3)  traMADol 50 milliGRAM(s) Oral two times a day PRN Severe Pain (7 - 10)      Allergies    erythromycin (Unknown)  macrolide antibiotics (Unknown)  morphine (Diarrhea)  sulfa drugs (Unknown)    Intolerances        REVIEW OF SYSTEMS:  CONSTITUTIONAL: No fever or chills  HEENT:  No headache, no sore throat  RESPIRATORY: No cough, wheezing, or shortness of breath  CARDIOVASCULAR: No chest pain, palpitations, or leg swelling  GASTROINTESTINAL: No abd pain, nausea, vomiting, or diarrhea  GENITOURINARY: No dysuria, frequency, or hematuria  NEUROLOGICAL: no focal weakness or dizziness  MUSCULOSKELETAL: no myalgias, +chronic lymphedema in legs    Vital Signs Last 24 Hrs  T(C): 37.7 (18 Feb 2018 14:27), Max: 37.7 (18 Feb 2018 04:27)  T(F): 99.9 (18 Feb 2018 14:27), Max: 99.9 (18 Feb 2018 14:27)  HR: 97 (18 Feb 2018 14:27) (82 - 97)  BP: 132/76 (18 Feb 2018 14:27) (109/77 - 132/76)  BP(mean): --  RR: 16 (18 Feb 2018 14:27) (16 - 18)  SpO2: 92% (18 Feb 2018 14:27) (92% - 95%)    PHYSICAL EXAM:  GENERAL: NAD  HEENT:  EOMI, moist mucous membranes  CHEST/LUNG:  CTA b/l, no rales, wheezes, or rhonchi  HEART:  RRR, S1, S2  ABDOMEN:  BS+, soft, nontender, nondistended  EXTREMITIES: +lymphedema in LEs, in compression stockings up to upper thighs; No, cyanosis, or calf tenderness  SKIN: no erythema or warmth on the right thighNERVOUS SYSTEM: answering questions and following commands appropriately    LABS:                        8.9    8.2   )-----------( 166      ( 18 Feb 2018 08:36 )             27.5     CBC Full  -  ( 18 Feb 2018 08:36 )  WBC Count : 8.2 K/uL  Hemoglobin : 8.9 g/dL  Hematocrit : 27.5 %  Platelet Count - Automated : 166 K/uL  Mean Cell Volume : 74.3 fl  Mean Cell Hemoglobin : 24.0 pg  Mean Cell Hemoglobin Concentration : 32.2 gm/dL  Auto Neutrophil # : 6.2 K/uL  Auto Lymphocyte # : 1.1 K/uL  Auto Monocyte # : 0.6 K/uL  Auto Eosinophil # : 0.2 K/uL  Auto Basophil # : 0.1 K/uL  Auto Neutrophil % : 76.5 %  Auto Lymphocyte % : 13.5 %  Auto Monocyte % : 6.9 %  Auto Eosinophil % : 2.5 %  Auto Basophil % : 0.7 %    18 Feb 2018 08:36    139    |  103    |  17     ----------------------------<  93     3.4     |  25     |  1.00     Ca    8.8        18 Feb 2018 08:36  Phos  2.4       18 Feb 2018 08:36  Mg     1.8       18 Feb 2018 08:36    TPro  6.4    /  Alb  2.2    /  TBili  0.7    /  DBili  .30    /  AST  35     /  ALT  15     /  AlkPhos  193    18 Feb 2018 08:36        CAPILLARY BLOOD GLUCOSE            Culture - Blood (collected 02-16-18 @ 08:53)  Source: .Blood Blood  Preliminary Report (02-17-18 @ 09:01):    No growth to date.    Culture - Blood (collected 02-16-18 @ 08:49)  Source: .Blood Blood  Gram Stain (02-18-18 @ 08:16):    Growth in anaerobic bottle: Gram Negative Rods  Preliminary Report (02-18-18 @ 08:15):    Growth in anaerobic bottle: Gram Negative Rods    "Due to technical problems, Proteus sp. will Not be reported as part of    the BCID panel until further notice"    ***Blood Panel PCR results on this specimen are available    approximately 3 hours after the Gram stain result.***    Gram stain, PCR, and/or culture results may not always    correspond due to difference in methodologies.    ************************************************************    This PCR assay was performed using [employer].    The following targets are tested for: Enterococcus,    vancomycin resistant enterococci, Listeria monocytogenes,    coagulase negative staphylococci, S. aureus,    methicillin resistant S. aureus, Streptococcus agalactiae    (Group B), S. pneumoniae, S. pyogenes (Group A),    Acinetobacter baumannii, Enterobacter cloacae, E. coli,    Klebsiella oxytoca, K. pneumoniae, Proteus sp.,    Serratia marcescens, Haemophilus influenzae,    Neisseria meningitidis, Pseudomonas aeruginosa, Candida    albicans, C. glabrata, C krusei, C parapsilosis,    C. tropicalis and the KPC resistance gene.  Organism: Blood Culture PCR (02-18-18 @ 10:18)  Organism: Blood Culture PCR (02-18-18 @ 10:18)      -  Acinetobacter baumanii: Nondet      -  Candida albicans: Nondet      -  Candida glabrata: Nondet      -  Candida krusei: Nondet      -  Candida parapsilosis: Nondet      -  Candida tropicalis: Nondet      -  Coagulase negative Staphylococcus: Nondet      -  Enterobacter cloacae complex: Nondet      -  Enterococcus species: Nondet      -  Escherichia coli: Nondet      -  Haemophilus influenzae: Nondet      -  Klebsiella oxytoca: Nondet      -  Klebsiella pneumoniae: Nondet      -  Listeria monocytogenes: Nondet      -  Methicillin resistant Staphylococcus aureus (MRSA): Nondet      -  Multidrug (KPC pos) resistant organism: Nondet      -  Neisseria meningitidis: Nondet      -  Pseudomonas aeruginosa: Nondet      -  Serratia marcescens: Nondet      -  Staphylococcus aureus: Nondet      -  Streptococcus agalactiae (Group B): Nondet      -  Streptococcus pneumoniae: Nondet      -  Streptococcus pyogenes (Group A): Nondet      -  Streptococcus sp. (Not Grp A, B or S pneumoniae): Nondet      -  Vancomycin resistant Enterococcus sp.: Nondet      Method Type: PCR    Culture - Urine (collected 02-16-18 @ 08:18)  Source: .Urine Clean Catch (Midstream)  Final Report (02-17-18 @ 15:04):    <10,000 CFU/ml Normal Urogenital long present        RADIOLOGY & ADDITIONAL TESTS:  Renal US: Moderate right hydronephrosis    Personally reviewed.     Consultant(s) Notes Reviewed:  [x] YES  [ ] NO Patient is a 77y old  Female who presents with a chief complaint of lymphedema, fever (16 Feb 2018 17:54)    INTERVAL HPI/OVERNIGHT EVENTS: Pt denies abd pain, flank pain, dysuria, fever, chills, cough, headache. Family reports yesterday pt was having periods of confusion.     MEDICATIONS  (STANDING):  amLODIPine   Tablet 5 milliGRAM(s) Oral two times a day  atorvastatin 10 milliGRAM(s) Oral at bedtime  balsalazide 2250 milliGRAM(s) Oral three times a day  cyanocobalamin 1000 MICROGram(s) Oral daily  enoxaparin Injectable 40 milliGRAM(s) SubCutaneous every 24 hours  losartan 100 milliGRAM(s) Oral daily  pantoprazole    Tablet 40 milliGRAM(s) Oral before breakfast  piperacillin/tazobactam IVPB. 3.375 Gram(s) IV Intermittent every 8 hours  sertraline 25 milliGRAM(s) Oral two times a day    MEDICATIONS  (PRN):  acetaminophen   Tablet 650 milliGRAM(s) Oral every 6 hours PRN For Temp greater than 38 C (100.4 F)  acetaminophen   Tablet. 650 milliGRAM(s) Oral every 6 hours PRN Mild Pain (1 - 3)  traMADol 50 milliGRAM(s) Oral two times a day PRN Severe Pain (7 - 10)      Allergies    erythromycin (Unknown)  macrolide antibiotics (Unknown)  morphine (Diarrhea)  sulfa drugs (Unknown)    Intolerances        REVIEW OF SYSTEMS:  CONSTITUTIONAL: No fever or chills  HEENT:  No headache, no sore throat  RESPIRATORY: No cough, wheezing, or shortness of breath  CARDIOVASCULAR: No chest pain, palpitations, or leg swelling  GASTROINTESTINAL: No abd pain, nausea, vomiting, or diarrhea  GENITOURINARY: No dysuria, frequency, or hematuria  NEUROLOGICAL: no focal weakness or dizziness  MUSCULOSKELETAL: no myalgias, +chronic lymphedema in legs    Vital Signs Last 24 Hrs  T(C): 37.7 (18 Feb 2018 14:27), Max: 37.7 (18 Feb 2018 04:27)  T(F): 99.9 (18 Feb 2018 14:27), Max: 99.9 (18 Feb 2018 14:27)  HR: 97 (18 Feb 2018 14:27) (82 - 97)  BP: 132/76 (18 Feb 2018 14:27) (109/77 - 132/76)  BP(mean): --  RR: 16 (18 Feb 2018 14:27) (16 - 18)  SpO2: 92% (18 Feb 2018 14:27) (92% - 95%)    PHYSICAL EXAM:  GENERAL: NAD  HEENT:  EOMI, moist mucous membranes  CHEST/LUNG:  CTA b/l, no rales, wheezes, or rhonchi  HEART:  RRR, S1, S2  ABDOMEN:  BS+, soft, nontender, nondistended  EXTREMITIES: +lymphedema in LEs, in compression stockings up to upper thighs; No, cyanosis, or calf tenderness  SKIN: no erythema or warmth on the right thighNERVOUS SYSTEM: answering questions and following commands appropriately    LABS:                        8.9    8.2   )-----------( 166      ( 18 Feb 2018 08:36 )             27.5     CBC Full  -  ( 18 Feb 2018 08:36 )  WBC Count : 8.2 K/uL  Hemoglobin : 8.9 g/dL  Hematocrit : 27.5 %  Platelet Count - Automated : 166 K/uL  Mean Cell Volume : 74.3 fl  Mean Cell Hemoglobin : 24.0 pg  Mean Cell Hemoglobin Concentration : 32.2 gm/dL  Auto Neutrophil # : 6.2 K/uL  Auto Lymphocyte # : 1.1 K/uL  Auto Monocyte # : 0.6 K/uL  Auto Eosinophil # : 0.2 K/uL  Auto Basophil # : 0.1 K/uL  Auto Neutrophil % : 76.5 %  Auto Lymphocyte % : 13.5 %  Auto Monocyte % : 6.9 %  Auto Eosinophil % : 2.5 %  Auto Basophil % : 0.7 %    18 Feb 2018 08:36    139    |  103    |  17     ----------------------------<  93     3.4     |  25     |  1.00     Ca    8.8        18 Feb 2018 08:36  Phos  2.4       18 Feb 2018 08:36  Mg     1.8       18 Feb 2018 08:36    TPro  6.4    /  Alb  2.2    /  TBili  0.7    /  DBili  .30    /  AST  35     /  ALT  15     /  AlkPhos  193    18 Feb 2018 08:36        CAPILLARY BLOOD GLUCOSE            Culture - Blood (collected 02-16-18 @ 08:53)  Source: .Blood Blood  Preliminary Report (02-17-18 @ 09:01):    No growth to date.    Culture - Blood (collected 02-16-18 @ 08:49)  Source: .Blood Blood  Gram Stain (02-18-18 @ 08:16):    Growth in anaerobic bottle: Gram Negative Rods  Preliminary Report (02-18-18 @ 08:15):    Growth in anaerobic bottle: Gram Negative Rods    "Due to technical problems, Proteus sp. will Not be reported as part of    the BCID panel until further notice"    ***Blood Panel PCR results on this specimen are available    approximately 3 hours after the Gram stain result.***    Gram stain, PCR, and/or culture results may not always    correspond due to difference in methodologies.    ************************************************************    This PCR assay was performed using [employer].    The following targets are tested for: Enterococcus,    vancomycin resistant enterococci, Listeria monocytogenes,    coagulase negative staphylococci, S. aureus,    methicillin resistant S. aureus, Streptococcus agalactiae    (Group B), S. pneumoniae, S. pyogenes (Group A),    Acinetobacter baumannii, Enterobacter cloacae, E. coli,    Klebsiella oxytoca, K. pneumoniae, Proteus sp.,    Serratia marcescens, Haemophilus influenzae,    Neisseria meningitidis, Pseudomonas aeruginosa, Candida    albicans, C. glabrata, C krusei, C parapsilosis,    C. tropicalis and the KPC resistance gene.  Organism: Blood Culture PCR (02-18-18 @ 10:18)  Organism: Blood Culture PCR (02-18-18 @ 10:18)      -  Acinetobacter baumanii: Nondet      -  Candida albicans: Nondet      -  Candida glabrata: Nondet      -  Candida krusei: Nondet      -  Candida parapsilosis: Nondet      -  Candida tropicalis: Nondet      -  Coagulase negative Staphylococcus: Nondet      -  Enterobacter cloacae complex: Nondet      -  Enterococcus species: Nondet      -  Escherichia coli: Nondet      -  Haemophilus influenzae: Nondet      -  Klebsiella oxytoca: Nondet      -  Klebsiella pneumoniae: Nondet      -  Listeria monocytogenes: Nondet      -  Methicillin resistant Staphylococcus aureus (MRSA): Nondet      -  Multidrug (KPC pos) resistant organism: Nondet      -  Neisseria meningitidis: Nondet      -  Pseudomonas aeruginosa: Nondet      -  Serratia marcescens: Nondet      -  Staphylococcus aureus: Nondet      -  Streptococcus agalactiae (Group B): Nondet      -  Streptococcus pneumoniae: Nondet      -  Streptococcus pyogenes (Group A): Nondet      -  Streptococcus sp. (Not Grp A, B or S pneumoniae): Nondet      -  Vancomycin resistant Enterococcus sp.: Nondet      Method Type: PCR    Culture - Urine (collected 02-16-18 @ 08:18)  Source: .Urine Clean Catch (Midstream)  Final Report (02-17-18 @ 15:04):    <10,000 CFU/ml Normal Urogenital long present        RADIOLOGY & ADDITIONAL TESTS:  Renal US: Moderate right hydronephrosis    Personally reviewed.     Consultant(s) Notes Reviewed:  [x] YES  [ ] NO

## 2018-02-19 LAB
ALBUMIN SERPL ELPH-MCNC: 2.2 G/DL — LOW (ref 3.3–5)
ALP SERPL-CCNC: 243 U/L — HIGH (ref 40–120)
ALT FLD-CCNC: 14 U/L — SIGNIFICANT CHANGE UP (ref 12–78)
ANION GAP SERPL CALC-SCNC: 6 MMOL/L — SIGNIFICANT CHANGE UP (ref 5–17)
AST SERPL-CCNC: 36 U/L — SIGNIFICANT CHANGE UP (ref 15–37)
BILIRUB SERPL-MCNC: 0.7 MG/DL — SIGNIFICANT CHANGE UP (ref 0.2–1.2)
BUN SERPL-MCNC: 11 MG/DL — SIGNIFICANT CHANGE UP (ref 7–23)
CALCIUM SERPL-MCNC: 9.4 MG/DL — SIGNIFICANT CHANGE UP (ref 8.5–10.1)
CHLORIDE SERPL-SCNC: 105 MMOL/L — SIGNIFICANT CHANGE UP (ref 96–108)
CO2 SERPL-SCNC: 29 MMOL/L — SIGNIFICANT CHANGE UP (ref 22–31)
CREAT SERPL-MCNC: 1.1 MG/DL — SIGNIFICANT CHANGE UP (ref 0.5–1.3)
CULTURE RESULTS: SIGNIFICANT CHANGE UP
GLUCOSE SERPL-MCNC: 104 MG/DL — HIGH (ref 70–99)
HCT VFR BLD CALC: 28.6 % — LOW (ref 34.5–45)
HGB BLD-MCNC: 9.1 G/DL — LOW (ref 11.5–15.5)
MAGNESIUM SERPL-MCNC: 1.8 MG/DL — SIGNIFICANT CHANGE UP (ref 1.6–2.6)
MCHC RBC-ENTMCNC: 23.6 PG — LOW (ref 27–34)
MCHC RBC-ENTMCNC: 31.7 GM/DL — LOW (ref 32–36)
MCV RBC AUTO: 74.4 FL — LOW (ref 80–100)
ORGANISM # SPEC MICROSCOPIC CNT: SIGNIFICANT CHANGE UP
ORGANISM # SPEC MICROSCOPIC CNT: SIGNIFICANT CHANGE UP
PHOSPHATE SERPL-MCNC: 2.8 MG/DL — SIGNIFICANT CHANGE UP (ref 2.5–4.5)
PLATELET # BLD AUTO: 185 K/UL — SIGNIFICANT CHANGE UP (ref 150–400)
POTASSIUM SERPL-MCNC: 3.9 MMOL/L — SIGNIFICANT CHANGE UP (ref 3.5–5.3)
POTASSIUM SERPL-SCNC: 3.9 MMOL/L — SIGNIFICANT CHANGE UP (ref 3.5–5.3)
PROT SERPL-MCNC: 6.6 G/DL — SIGNIFICANT CHANGE UP (ref 6–8.3)
RBC # BLD: 3.84 M/UL — SIGNIFICANT CHANGE UP (ref 3.8–5.2)
RBC # FLD: 15.4 % — HIGH (ref 10.3–14.5)
SODIUM SERPL-SCNC: 140 MMOL/L — SIGNIFICANT CHANGE UP (ref 135–145)
SPECIMEN SOURCE: SIGNIFICANT CHANGE UP
WBC # BLD: 5.9 K/UL — SIGNIFICANT CHANGE UP (ref 3.8–10.5)
WBC # FLD AUTO: 5.9 K/UL — SIGNIFICANT CHANGE UP (ref 3.8–10.5)

## 2018-02-19 PROCEDURE — 99233 SBSQ HOSP IP/OBS HIGH 50: CPT

## 2018-02-19 RX ADMIN — Medication 650 MILLIGRAM(S): at 17:37

## 2018-02-19 RX ADMIN — BALSALAZIDE DISODIUM 2250 MILLIGRAM(S): 750 CAPSULE ORAL at 13:02

## 2018-02-19 RX ADMIN — AMLODIPINE BESYLATE 5 MILLIGRAM(S): 2.5 TABLET ORAL at 05:20

## 2018-02-19 RX ADMIN — SERTRALINE 25 MILLIGRAM(S): 25 TABLET, FILM COATED ORAL at 17:35

## 2018-02-19 RX ADMIN — AMLODIPINE BESYLATE 5 MILLIGRAM(S): 2.5 TABLET ORAL at 17:35

## 2018-02-19 RX ADMIN — PIPERACILLIN AND TAZOBACTAM 25 GRAM(S): 4; .5 INJECTION, POWDER, LYOPHILIZED, FOR SOLUTION INTRAVENOUS at 11:49

## 2018-02-19 RX ADMIN — TRAMADOL HYDROCHLORIDE 50 MILLIGRAM(S): 50 TABLET ORAL at 05:20

## 2018-02-19 RX ADMIN — ATORVASTATIN CALCIUM 10 MILLIGRAM(S): 80 TABLET, FILM COATED ORAL at 22:00

## 2018-02-19 RX ADMIN — PANTOPRAZOLE SODIUM 40 MILLIGRAM(S): 20 TABLET, DELAYED RELEASE ORAL at 05:21

## 2018-02-19 RX ADMIN — SERTRALINE 25 MILLIGRAM(S): 25 TABLET, FILM COATED ORAL at 05:20

## 2018-02-19 RX ADMIN — LOSARTAN POTASSIUM 100 MILLIGRAM(S): 100 TABLET, FILM COATED ORAL at 05:21

## 2018-02-19 RX ADMIN — PREGABALIN 1000 MICROGRAM(S): 225 CAPSULE ORAL at 11:49

## 2018-02-19 RX ADMIN — PIPERACILLIN AND TAZOBACTAM 25 GRAM(S): 4; .5 INJECTION, POWDER, LYOPHILIZED, FOR SOLUTION INTRAVENOUS at 22:14

## 2018-02-19 RX ADMIN — PIPERACILLIN AND TAZOBACTAM 25 GRAM(S): 4; .5 INJECTION, POWDER, LYOPHILIZED, FOR SOLUTION INTRAVENOUS at 04:22

## 2018-02-19 RX ADMIN — BALSALAZIDE DISODIUM 2250 MILLIGRAM(S): 750 CAPSULE ORAL at 05:20

## 2018-02-19 RX ADMIN — ENOXAPARIN SODIUM 40 MILLIGRAM(S): 100 INJECTION SUBCUTANEOUS at 11:49

## 2018-02-19 RX ADMIN — BALSALAZIDE DISODIUM 2250 MILLIGRAM(S): 750 CAPSULE ORAL at 22:00

## 2018-02-19 RX ADMIN — TRAMADOL HYDROCHLORIDE 50 MILLIGRAM(S): 50 TABLET ORAL at 06:48

## 2018-02-19 RX ADMIN — Medication 650 MILLIGRAM(S): at 16:25

## 2018-02-19 NOTE — PROGRESS NOTE ADULT - SUBJECTIVE AND OBJECTIVE BOX
[facility], Division of Infectious Diseases  RAYRAY Kelly A. Lee  [phone]  Name: FRIDA ROMANO  Age: 77y  Gender: Female  MRN: [id number]    Interval History--  Notes reviewed  pleasant no complaints  no events  sitting in chair    Past Medical History--  History of chemotherapy  Hydronephrosis of right kidney  Lymphedema of leg  Uterine cancer  Hydronephrosis determined by ultrasound  Abdominal mass  Atrial fibrillation  Mitral valve prolapse  Endometrial carcinoma  Hyperlipidemia  Ulcerative colitis  Anxiety  Post-Herpetic Trigeminal Neuralgia  Benign Neoplasm of Breast  Benign Hypertension  H/O hydronephrosis  History of urethral stent  History of chemotherapy  Abdominal mass  Encounter for biopsy  History of tonsillectomy  H/O total hysterectomy  History of D&C      For details regarding the patient's social history, family history, and other miscellaneous elements, please refer the initial infectious diseases consultation and/or the admitting history and physical examination for this admission.    Allergies    erythromycin (Unknown)  macrolide antibiotics (Unknown)  morphine (Diarrhea)  sulfa drugs (Unknown)    Intolerances        Medications--  Antibiotics:  piperacillin/tazobactam IVPB. 3.375 Gram(s) IV Intermittent every 8 hours    Immunologic:    Other:  acetaminophen   Tablet PRN  acetaminophen   Tablet. PRN  amLODIPine   Tablet  atorvastatin  balsalazide  cyanocobalamin  enoxaparin Injectable  losartan  pantoprazole    Tablet  sertraline  traMADol PRN      Review of Systems--  A 10-point review of systems was obtained.     Pertinent positives and negatives--  Constitutional: No fevers. No Chills. No Rigors.   Cardiovascular: No chest pain. No palpitations.  Respiratory: No shortness of breath. No cough.  Gastrointestinal: No nausea or vomiting. No diarrhea or constipation.   Psychiatric: Pleasant. Appropriate affect.    Review of systems otherwise negative except as previously noted.    Physical Examination--  Vital Signs: T(F): 99.3 (02-19-18 @ 04:43), Max: 99.9 (02-18-18 @ 14:27)  HR: 74 (02-19-18 @ 04:43)  BP: 122/68 (02-19-18 @ 04:43)  RR: 17 (02-19-18 @ 04:43)  SpO2: 95% (02-19-18 @ 04:43)  Wt(kg): --  General: Nontoxic-appearing Female in no acute distress.  HEENT: AT/NC. PAnicteric. Conjunctiva pink and moist. Oropharynx clear. Dentition fair.  Neck: Not rigid. No sense of mass.  Nodes: None palpable.  Lungs: Clear bilaterally without rales, wheezing or rhonchi  Heart: Regular rate and rhythm. No Murmur. No rub. No gallop. No palpable thrill.  Abdomen: Bowel sounds present and normoactive. Soft. Nondistended. Nontender.   Extremities: No cyanosis or clubbing. No edema.   Skin: Warm. Dry. Good turgor. No rash. No vasculitic stigmata.  Psychiatric: Appropriate affect and mood for situation.         Laboratory Studies--  CBC                        9.1    5.9   )-----------( 185      ( 19 Feb 2018 07:56 )             28.6       Chemistries  02-19    140  |  105  |  11  ----------------------------<  104<H>  3.9   |  29  |  1.10    Ca    9.4      19 Feb 2018 07:56  Phos  2.8     02-19  Mg     1.8     02-19    TPro  6.6  /  Alb  2.2<L>  /  TBili  0.7  /  DBili  x   /  AST  36  /  ALT  14  /  AlkPhos  243<H>  02-19      Culture Data    Culture - Blood (collected 16 Feb 2018 08:53)  Source: .Blood Blood  Preliminary Report (17 Feb 2018 09:01):    No growth to date.    Culture - Blood (collected 16 Feb 2018 08:49)  Source: .Blood Blood  Gram Stain (18 Feb 2018 08:16):    Growth in anaerobic bottle: Gram Negative Rods  Final Report (19 Feb 2018 11:35):    Growth in anaerobic bottle: Bacteroides fragilis    "Susceptibilities not performed"    "Due to technical problems, Proteus sp. will Not be reported as part of    the BCID panel until further notice"    ***Blood Panel PCR results on this specimen are available    approximately 3 hours after the Gram stain result.***    Gram stain, PCR, and/or culture results may not always    correspond due to difference in methodologies.    ************************************************************    This PCR assay was performed using [employer].    The following targets are tested for: Enterococcus,    vancomycin resistant enterococci, Listeria monocytogenes,    coagulase negative staphylococci, S. aureus,    methicillin resistant S. aureus, Streptococcus agalactiae    (Group B), S. pneumoniae, S. pyogenes (Group A),    Acinetobacter baumannii, Enterobacter cloacae, E. coli,    Klebsiella oxytoca, K. pneumoniae, Proteus sp.,    Serratia marcescens, Haemophilus influenzae,    Neisseria meningitidis, Pseudomonas aeruginosa, Candida    albicans, C. glabrata, C krusei, C parapsilosis,    C. tropicalis and the KPC resistance gene.  Organism: Blood Culture PCR (19 Feb 2018 11:35)  Organism: Blood Culture PCR (19 Feb 2018 11:35)    Culture - Urine (collected 16 Feb 2018 08:18)  Source: .Urine Clean Catch (Midstream)  Final Report (17 Feb 2018 15:04):    <10,000 CFU/ml Normal Urogenital logn present          < from: CT Abdomen and Pelvis w/ Oral Cont and w/ IV Cont (02.18.18 @ 19:59) >    EXAM:  CT ABDOMEN AND PELVIS OC IC                            PROCEDURE DATE:  02/18/2018          INTERPRETATION:  CLINICAL HISTORY: History of endometrial cancer on   chemotherapy. History of right hydronephrosis status post stent. Moderate   hydronephrosis on ultrasound. Gram-negative noble bacteremia. Evaluate   source of infection.    TECHNIQUE:  CT scan of the abdomen and pelvis with IV contrast.  Transaxial images were acquired from the domes of the diaphragm to the   symphysis pubis with intravenous contrast. Oral contrast was   administered. Coronal and sagittal images were also provided from the   transaxial source data. 95mLs of Omnipaque 350 was administered   intravenously without complication and 5 mLs was discarded.    COMPARISON: Renal sonogram from 2/18/2018 PET scan from 11/28/2016. CT of   the abdomen and pelvis from 7/8/2015    FINDINGS:   There are small bilateral pleural effusions with associated compressive   atelectasis. The heart is not enlarged. There is no pericardial effusion.    There is an irregular enhancing right pelvic sidewall mass again seen   which is slightly larger from the study of 7/8/2015, appear to markedly   decreased in size on the study from 11/28/2016, and now appears to have   enlarged again. It encompasses the distal right ureter which has a stent   within it. There is no distinct fat plane seen between this irregular   mass and the adjacent sigmoid colon. The adjacent sigmoid colon appears   thickened. There is no air within the mass. The right external iliac   artery courses through the mass without significant narrowing. The right   iliac vein appears to be obliterated by the mass but reconstituted via   collaterals in the pelvis to form the femoral vein.    There is moderate to severe right hydronephrosis despite the presence of   a renal stent. There is diffuse right-sided urothelial enhancement. There   is symmetric parenchymal enhancement.    There is a moderate-sized hiatal hernia. The large and small bowel are   normal in caliber without obstruction. There is no free intraperitoneal   air or abdominal abscess.    The liver, gallbladder, pancreas, spleen and right adrenal gland are   unremarkable. Stable 1.2 cm left adrenal nodule.    The abdominal aorta is normalin caliber. There is no retroperitoneal,   pelvic or inguinal adenopathy. There is no ascites.    There is circumferential urinary bladder wall thickening and   perivesicular fat inflammatory change. There is presacral edema. Status   post hysterectomy and nephrectomy.    The visualized osseous structures demonstrate no acute abnormality. There   is generalized anasarca, more moderate on the right. There are   degenerative changes in the spine without suspicious osteoblastic or   lytic lesion.    IMPRESSION:   Right pelvic sidewall malignant implant, increased in size, which   encompasses the distal right ureter and is inseparable from the adjacent   thickened sigmoid colon. Moderate to severe right hydronephrosis despite   ureteral stent. Diffuse urothelial enhancement, bladder wall thickening   and perivesicular inflammatory change are likely on an infectious basis.   Correlation with urinalysis is recommended.    < end of copied text >

## 2018-02-19 NOTE — PROGRESS NOTE ADULT - SUBJECTIVE AND OBJECTIVE BOX
Patient is a 77y old  Female who presents with a chief complaint of lymphedema, fever (16 Feb 2018 17:54)      INTERVAL HPI/OVERNIGHT EVENTS: Pt denies abd pain, flank pain, dysuria, fever, chills, cough, headache. Family reports no further episodes of confusion.     MEDICATIONS  (STANDING):  amLODIPine   Tablet 5 milliGRAM(s) Oral two times a day  atorvastatin 10 milliGRAM(s) Oral at bedtime  balsalazide 2250 milliGRAM(s) Oral three times a day  cyanocobalamin 1000 MICROGram(s) Oral daily  enoxaparin Injectable 40 milliGRAM(s) SubCutaneous every 24 hours  losartan 100 milliGRAM(s) Oral daily  pantoprazole    Tablet 40 milliGRAM(s) Oral before breakfast  piperacillin/tazobactam IVPB. 3.375 Gram(s) IV Intermittent every 8 hours  sertraline 25 milliGRAM(s) Oral two times a day    MEDICATIONS  (PRN):  acetaminophen   Tablet 650 milliGRAM(s) Oral every 6 hours PRN For Temp greater than 38 C (100.4 F)  acetaminophen   Tablet. 650 milliGRAM(s) Oral every 6 hours PRN Mild Pain (1 - 3)  traMADol 50 milliGRAM(s) Oral two times a day PRN Severe Pain (7 - 10)      Allergies    erythromycin (Unknown)  macrolide antibiotics (Unknown)  morphine (Diarrhea)  sulfa drugs (Unknown)    Intolerances        REVIEW OF SYSTEMS:  CONSTITUTIONAL: No fever or chills  HEENT:  No headache, no sore throat  RESPIRATORY: No cough, wheezing, or shortness of breath  CARDIOVASCULAR: No chest pain, palpitations, or leg swelling  GASTROINTESTINAL: No abd pain, nausea, vomiting, or diarrhea  GENITOURINARY: No dysuria, frequency, or hematuria  NEUROLOGICAL: no focal weakness or dizziness  MUSCULOSKELETAL: no myalgias, +chronic lymphedema in legs R>>L    Vital Signs Last 24 Hrs  T(C): 36.6 (19 Feb 2018 21:24), Max: 37.4 (19 Feb 2018 04:43)  T(F): 97.9 (19 Feb 2018 21:24), Max: 99.3 (19 Feb 2018 04:43)  HR: 99 (19 Feb 2018 21:24) (66 - 99)  BP: 109/73 (19 Feb 2018 21:24) (102/63 - 122/68)  BP(mean): --  RR: 18 (19 Feb 2018 21:24) (15 - 18)  SpO2: 96% (19 Feb 2018 21:24) (95% - 96%)    PHYSICAL EXAM:  GENERAL: NAD  HEENT:  EOMI, moist mucous membranes  CHEST/LUNG:  CTA b/l, no rales, wheezes, or rhonchi  HEART:  RRR, S1, S2  ABDOMEN:  BS+, soft, nontender, nondistended  BACK: no CVA tenderness b/l  EXTREMITIES: +lymphedema in R LE, both LEs in compression stockings up to upper thighs; No, cyanosis, or calf tenderness  SKIN: no erythema or warmth on the right thigh  NERVOUS SYSTEM: answering questions and following commands appropriately    LABS:                        9.1    5.9   )-----------( 185      ( 19 Feb 2018 07:56 )             28.6     CBC Full  -  ( 19 Feb 2018 07:56 )  WBC Count : 5.9 K/uL  Hemoglobin : 9.1 g/dL  Hematocrit : 28.6 %  Platelet Count - Automated : 185 K/uL  Mean Cell Volume : 74.4 fl  Mean Cell Hemoglobin : 23.6 pg  Mean Cell Hemoglobin Concentration : 31.7 gm/dL  Auto Neutrophil # : x  Auto Lymphocyte # : x  Auto Monocyte # : x  Auto Eosinophil # : x  Auto Basophil # : x  Auto Neutrophil % : x  Auto Lymphocyte % : x  Auto Monocyte % : x  Auto Eosinophil % : x  Auto Basophil % : x    19 Feb 2018 07:56    140    |  105    |  11     ----------------------------<  104    3.9     |  29     |  1.10     Ca    9.4        19 Feb 2018 07:56  Phos  2.8       19 Feb 2018 07:56  Mg     1.8       19 Feb 2018 07:56    TPro  6.6    /  Alb  2.2    /  TBili  0.7    /  DBili  x      /  AST  36     /  ALT  14     /  AlkPhos  243    19 Feb 2018 07:56        CAPILLARY BLOOD GLUCOSE            Culture - Blood (collected 02-18-18 @ 19:04)  Source: .Blood Blood  Preliminary Report (02-19-18 @ 20:01):    No growth to date.    Culture - Blood (collected 02-18-18 @ 19:04)  Source: .Blood Blood-Peripheral  Preliminary Report (02-19-18 @ 20:01):    No growth to date.    Culture - Blood (collected 02-16-18 @ 08:53)  Source: .Blood Blood  Preliminary Report (02-17-18 @ 09:01):    No growth to date.    Culture - Blood (collected 02-16-18 @ 08:49)  Source: .Blood Blood  Gram Stain (02-18-18 @ 08:16):    Growth in anaerobic bottle: Gram Negative Rods  Final Report (02-19-18 @ 11:35):    Growth in anaerobic bottle: Bacteroides fragilis    "Susceptibilities not performed"    "Due to technical problems, Proteus sp. will Not be reported as part of    the BCID panel until further notice"    ***Blood Panel PCR results on this specimen are available    approximately 3 hours after the Gram stain result.***    Gram stain, PCR, and/or culture results may not always    correspond due to difference in methodologies.    ************************************************************    This PCR assay was performed using [employer].    The following targets are tested for: Enterococcus,    vancomycin resistant enterococci, Listeria monocytogenes,    coagulase negative staphylococci, S. aureus,    methicillin resistant S. aureus, Streptococcus agalactiae    (Group B), S. pneumoniae, S. pyogenes (Group A),    Acinetobacter baumannii, Enterobacter cloacae, E. coli,    Klebsiella oxytoca, K. pneumoniae, Proteus sp.,    Serratia marcescens, Haemophilus influenzae,    Neisseria meningitidis, Pseudomonas aeruginosa, Candida    albicans, C. glabrata, C krusei, C parapsilosis,    C. tropicalis and the KPC resistance gene.  Organism: Blood Culture PCR (02-19-18 @ 11:35)  Organism: Blood Culture PCR (02-19-18 @ 11:35)      -  Acinetobacter baumanii: Nondet      -  Candida albicans: Nondet      -  Candida glabrata: Nondet      -  Candida krusei: Nondet      -  Candida parapsilosis: Nondet      -  Candida tropicalis: Nondet      -  Coagulase negative Staphylococcus: Nondet      -  Enterobacter cloacae complex: Nondet      -  Enterococcus species: Nondet      -  Escherichia coli: Nondet      -  Haemophilus influenzae: Nondet      -  Klebsiella oxytoca: Nondet      -  Klebsiella pneumoniae: Nondet      -  Listeria monocytogenes: Nondet      -  Methicillin resistant Staphylococcus aureus (MRSA): Nondet      -  Multidrug (KPC pos) resistant organism: Nondet      -  Neisseria meningitidis: Nondet      -  Pseudomonas aeruginosa: Nondet      -  Serratia marcescens: Nondet      -  Staphylococcus aureus: Nondet      -  Streptococcus agalactiae (Group B): Nondet      -  Streptococcus pneumoniae: Nondet      -  Streptococcus pyogenes (Group A): Nondet      -  Streptococcus sp. (Not Grp A, B or S pneumoniae): Nondet      -  Vancomycin resistant Enterococcus sp.: Nondet      Method Type: PCR    Culture - Urine (collected 02-16-18 @ 08:18)  Source: .Urine Clean Catch (Midstream)  Final Report (02-17-18 @ 15:04):    <10,000 CFU/ml Normal Urogenital long present        RADIOLOGY & ADDITIONAL TESTS:  CT Abd/P w/ po and IV contrast: Right pelvic sidewall malignant implant, increased in size, which encompasses the distal right ureter and is inseparable from the adjacent thickened sigmoid colon. Moderate to severe right hydronephrosis despite ureteral stent. Diffuse urothelial enhancement, bladder wall thickening and perivesicular inflammatory change are likely on an infectious basis. Sigmoid colon thickening and abutting a pelvic mass.    Personally reviewed.     Consultant(s) Notes Reviewed:  [x] YES  [ ] NO Patient is a 77y old  Female who presents with a chief complaint of lymphedema, fever (16 Feb 2018 17:54)    INTERVAL HPI/OVERNIGHT EVENTS: Pt denies abd pain, flank pain, dysuria, fever, chills, cough, headache. Family reports no further episodes of confusion.     MEDICATIONS  (STANDING):  amLODIPine   Tablet 5 milliGRAM(s) Oral two times a day  atorvastatin 10 milliGRAM(s) Oral at bedtime  balsalazide 2250 milliGRAM(s) Oral three times a day  cyanocobalamin 1000 MICROGram(s) Oral daily  enoxaparin Injectable 40 milliGRAM(s) SubCutaneous every 24 hours  losartan 100 milliGRAM(s) Oral daily  pantoprazole    Tablet 40 milliGRAM(s) Oral before breakfast  piperacillin/tazobactam IVPB. 3.375 Gram(s) IV Intermittent every 8 hours  sertraline 25 milliGRAM(s) Oral two times a day    MEDICATIONS  (PRN):  acetaminophen   Tablet 650 milliGRAM(s) Oral every 6 hours PRN For Temp greater than 38 C (100.4 F)  acetaminophen   Tablet. 650 milliGRAM(s) Oral every 6 hours PRN Mild Pain (1 - 3)  traMADol 50 milliGRAM(s) Oral two times a day PRN Severe Pain (7 - 10)      Allergies    erythromycin (Unknown)  macrolide antibiotics (Unknown)  morphine (Diarrhea)  sulfa drugs (Unknown)    Intolerances        REVIEW OF SYSTEMS:  CONSTITUTIONAL: No fever or chills  HEENT:  No headache, no sore throat  RESPIRATORY: No cough, wheezing, or shortness of breath  CARDIOVASCULAR: No chest pain, palpitations, or leg swelling  GASTROINTESTINAL: No abd pain, nausea, vomiting, or diarrhea  GENITOURINARY: No dysuria, frequency, or hematuria  NEUROLOGICAL: no focal weakness or dizziness  MUSCULOSKELETAL: no myalgias, +chronic lymphedema in legs R>>L    Vital Signs Last 24 Hrs  T(C): 36.6 (19 Feb 2018 21:24), Max: 37.4 (19 Feb 2018 04:43)  T(F): 97.9 (19 Feb 2018 21:24), Max: 99.3 (19 Feb 2018 04:43)  HR: 99 (19 Feb 2018 21:24) (66 - 99)  BP: 109/73 (19 Feb 2018 21:24) (102/63 - 122/68)  BP(mean): --  RR: 18 (19 Feb 2018 21:24) (15 - 18)  SpO2: 96% (19 Feb 2018 21:24) (95% - 96%)    PHYSICAL EXAM:  GENERAL: NAD  HEENT:  EOMI, moist mucous membranes  CHEST/LUNG:  CTA b/l, no rales, wheezes, or rhonchi  HEART:  RRR, S1, S2  ABDOMEN:  BS+, soft, nontender, nondistended  BACK: no CVA tenderness b/l  EXTREMITIES: +lymphedema in R LE, both LEs in compression stockings up to upper thighs; No, cyanosis, or calf tenderness  SKIN: no erythema or warmth on the right thigh  NERVOUS SYSTEM: answering questions and following commands appropriately    LABS:                        9.1    5.9   )-----------( 185      ( 19 Feb 2018 07:56 )             28.6     CBC Full  -  ( 19 Feb 2018 07:56 )  WBC Count : 5.9 K/uL  Hemoglobin : 9.1 g/dL  Hematocrit : 28.6 %  Platelet Count - Automated : 185 K/uL  Mean Cell Volume : 74.4 fl  Mean Cell Hemoglobin : 23.6 pg  Mean Cell Hemoglobin Concentration : 31.7 gm/dL  Auto Neutrophil # : x  Auto Lymphocyte # : x  Auto Monocyte # : x  Auto Eosinophil # : x  Auto Basophil # : x  Auto Neutrophil % : x  Auto Lymphocyte % : x  Auto Monocyte % : x  Auto Eosinophil % : x  Auto Basophil % : x    19 Feb 2018 07:56    140    |  105    |  11     ----------------------------<  104    3.9     |  29     |  1.10     Ca    9.4        19 Feb 2018 07:56  Phos  2.8       19 Feb 2018 07:56  Mg     1.8       19 Feb 2018 07:56    TPro  6.6    /  Alb  2.2    /  TBili  0.7    /  DBili  x      /  AST  36     /  ALT  14     /  AlkPhos  243    19 Feb 2018 07:56        CAPILLARY BLOOD GLUCOSE            Culture - Blood (collected 02-18-18 @ 19:04)  Source: .Blood Blood  Preliminary Report (02-19-18 @ 20:01):    No growth to date.    Culture - Blood (collected 02-18-18 @ 19:04)  Source: .Blood Blood-Peripheral  Preliminary Report (02-19-18 @ 20:01):    No growth to date.    Culture - Blood (collected 02-16-18 @ 08:53)  Source: .Blood Blood  Preliminary Report (02-17-18 @ 09:01):    No growth to date.    Culture - Blood (collected 02-16-18 @ 08:49)  Source: .Blood Blood  Gram Stain (02-18-18 @ 08:16):    Growth in anaerobic bottle: Gram Negative Rods  Final Report (02-19-18 @ 11:35):    Growth in anaerobic bottle: Bacteroides fragilis    "Susceptibilities not performed"    "Due to technical problems, Proteus sp. will Not be reported as part of    the BCID panel until further notice"    ***Blood Panel PCR results on this specimen are available    approximately 3 hours after the Gram stain result.***    Gram stain, PCR, and/or culture results may not always    correspond due to difference in methodologies.    ************************************************************    This PCR assay was performed using [employer].    The following targets are tested for: Enterococcus,    vancomycin resistant enterococci, Listeria monocytogenes,    coagulase negative staphylococci, S. aureus,    methicillin resistant S. aureus, Streptococcus agalactiae    (Group B), S. pneumoniae, S. pyogenes (Group A),    Acinetobacter baumannii, Enterobacter cloacae, E. coli,    Klebsiella oxytoca, K. pneumoniae, Proteus sp.,    Serratia marcescens, Haemophilus influenzae,    Neisseria meningitidis, Pseudomonas aeruginosa, Candida    albicans, C. glabrata, C krusei, C parapsilosis,    C. tropicalis and the KPC resistance gene.  Organism: Blood Culture PCR (02-19-18 @ 11:35)  Organism: Blood Culture PCR (02-19-18 @ 11:35)      -  Acinetobacter baumanii: Nondet      -  Candida albicans: Nondet      -  Candida glabrata: Nondet      -  Candida krusei: Nondet      -  Candida parapsilosis: Nondet      -  Candida tropicalis: Nondet      -  Coagulase negative Staphylococcus: Nondet      -  Enterobacter cloacae complex: Nondet      -  Enterococcus species: Nondet      -  Escherichia coli: Nondet      -  Haemophilus influenzae: Nondet      -  Klebsiella oxytoca: Nondet      -  Klebsiella pneumoniae: Nondet      -  Listeria monocytogenes: Nondet      -  Methicillin resistant Staphylococcus aureus (MRSA): Nondet      -  Multidrug (KPC pos) resistant organism: Nondet      -  Neisseria meningitidis: Nondet      -  Pseudomonas aeruginosa: Nondet      -  Serratia marcescens: Nondet      -  Staphylococcus aureus: Nondet      -  Streptococcus agalactiae (Group B): Nondet      -  Streptococcus pneumoniae: Nondet      -  Streptococcus pyogenes (Group A): Nondet      -  Streptococcus sp. (Not Grp A, B or S pneumoniae): Nondet      -  Vancomycin resistant Enterococcus sp.: Nondet      Method Type: PCR    Culture - Urine (collected 02-16-18 @ 08:18)  Source: .Urine Clean Catch (Midstream)  Final Report (02-17-18 @ 15:04):    <10,000 CFU/ml Normal Urogenital long present        RADIOLOGY & ADDITIONAL TESTS:  CT Abd/P w/ po and IV contrast: Right pelvic sidewall malignant implant, increased in size, which encompasses the distal right ureter and is inseparable from the adjacent thickened sigmoid colon. Moderate to severe right hydronephrosis despite ureteral stent. Diffuse urothelial enhancement, bladder wall thickening and perivesicular inflammatory change are likely on an infectious basis. Sigmoid colon thickening and abutting a pelvic mass.    Personally reviewed.     Consultant(s) Notes Reviewed:  [x] YES  [ ] NO

## 2018-02-19 NOTE — PROGRESS NOTE ADULT - SUBJECTIVE AND OBJECTIVE BOX
PAST MEDICAL & SURGICAL HISTORY:  History of chemotherapy  Lymphedema of leg: right  Hydronephrosis determined by ultrasound: right kidney  Abdominal mass: 2013 metastatic endometrial cancer, s/p surgery, on chemotherapy at present-infusa port right chest  Atrial fibrillation: one episode &gt;30 years ago  Mitral valve prolapse: partial  Endometrial carcinoma: 2001 - s/p KATH - tx with radiation  metastatic finished treatment 3/2014  Hyperlipidemia  Ulcerative colitis  Anxiety  Post-Herpetic Trigeminal Neuralgia: left  Benign Hypertension  H/O hydronephrosis: ureteral stent, multiple stent exchanges  History of chemotherapy: Infusaport insertion ; 2013  Abdominal mass: s/p mass removed 2013,on chemo-right chest infusa port  Encounter for biopsy: 2/14/13 - right iliac lymph node biopsy  History of tonsillectomy  H/O total hysterectomy: 2001  History of D&C: age 22      REVIEW OF SYSTEMS:    MEDICATIONS  (STANDING):  amLODIPine   Tablet 5 milliGRAM(s) Oral two times a day  atorvastatin 10 milliGRAM(s) Oral at bedtime  balsalazide 2250 milliGRAM(s) Oral three times a day  cyanocobalamin 1000 MICROGram(s) Oral daily  enoxaparin Injectable 40 milliGRAM(s) SubCutaneous every 24 hours  losartan 100 milliGRAM(s) Oral daily  pantoprazole    Tablet 40 milliGRAM(s) Oral before breakfast  piperacillin/tazobactam IVPB. 3.375 Gram(s) IV Intermittent every 8 hours  sertraline 25 milliGRAM(s) Oral two times a day    MEDICATIONS  (PRN):  acetaminophen   Tablet 650 milliGRAM(s) Oral every 6 hours PRN For Temp greater than 38 C (100.4 F)  acetaminophen   Tablet. 650 milliGRAM(s) Oral every 6 hours PRN Mild Pain (1 - 3)  traMADol 50 milliGRAM(s) Oral two times a day PRN Severe Pain (7 - 10)        Allergies    erythromycin (Unknown)  macrolide antibiotics (Unknown)  morphine (Diarrhea)  sulfa drugs (Unknown)    Intolerances        FAMILY HISTORY:  Hypertension  Family history of early CAD      Vital Signs Last 24 Hrs  T(C): 37.4 (19 Feb 2018 04:43), Max: 37.7 (18 Feb 2018 14:27)  T(F): 99.3 (19 Feb 2018 04:43), Max: 99.9 (18 Feb 2018 14:27)  HR: 74 (19 Feb 2018 04:43) (72 - 99)  BP: 122/68 (19 Feb 2018 04:43) (121/71 - 159/75)  BP(mean): --  RR: 17 (19 Feb 2018 04:43) (16 - 17)  SpO2: 95% (19 Feb 2018 04:43) (92% - 95%)    PHYSICAL EXAM:    LABS:                        9.1    5.9   )-----------( 185      ( 19 Feb 2018 07:56 )             28.6     02-19    140  |  105  |  11  ----------------------------<  104<H>  3.9   |  29  |  1.10    Ca    9.4      19 Feb 2018 07:56  Phos  2.8     02-19  Mg     1.8     02-19    TPro  6.6  /  Alb  2.2<L>  /  TBili  0.7  /  DBili  x   /  AST  36  /  ALT  14  /  AlkPhos  243<H>  02-19        Urine Culture: neg    RADIOLOGY & ADDITIONAL STUDIES:

## 2018-02-20 DIAGNOSIS — B96.6 BACTEROIDES FRAGILIS [B. FRAGILIS] AS THE CAUSE OF DISEASES CLASSIFIED ELSEWHERE: ICD-10-CM

## 2018-02-20 LAB
ALBUMIN SERPL ELPH-MCNC: 2 G/DL — LOW (ref 3.3–5)
ALP SERPL-CCNC: 216 U/L — HIGH (ref 40–120)
ALT FLD-CCNC: 14 U/L — SIGNIFICANT CHANGE UP (ref 12–78)
ANION GAP SERPL CALC-SCNC: 8 MMOL/L — SIGNIFICANT CHANGE UP (ref 5–17)
AST SERPL-CCNC: 32 U/L — SIGNIFICANT CHANGE UP (ref 15–37)
BILIRUB SERPL-MCNC: 0.4 MG/DL — SIGNIFICANT CHANGE UP (ref 0.2–1.2)
BUN SERPL-MCNC: 13 MG/DL — SIGNIFICANT CHANGE UP (ref 7–23)
CALCIUM SERPL-MCNC: 8.6 MG/DL — SIGNIFICANT CHANGE UP (ref 8.5–10.1)
CHLORIDE SERPL-SCNC: 104 MMOL/L — SIGNIFICANT CHANGE UP (ref 96–108)
CO2 SERPL-SCNC: 27 MMOL/L — SIGNIFICANT CHANGE UP (ref 22–31)
CREAT SERPL-MCNC: 1.1 MG/DL — SIGNIFICANT CHANGE UP (ref 0.5–1.3)
GGT SERPL-CCNC: 81 U/L — HIGH (ref 8–40)
GLUCOSE SERPL-MCNC: 109 MG/DL — HIGH (ref 70–99)
HCT VFR BLD CALC: 25.4 % — LOW (ref 34.5–45)
HGB BLD-MCNC: 8 G/DL — LOW (ref 11.5–15.5)
MCHC RBC-ENTMCNC: 23.5 PG — LOW (ref 27–34)
MCHC RBC-ENTMCNC: 31.6 GM/DL — LOW (ref 32–36)
MCV RBC AUTO: 74.4 FL — LOW (ref 80–100)
PLATELET # BLD AUTO: 173 K/UL — SIGNIFICANT CHANGE UP (ref 150–400)
POTASSIUM SERPL-MCNC: 3.1 MMOL/L — LOW (ref 3.5–5.3)
POTASSIUM SERPL-SCNC: 3.1 MMOL/L — LOW (ref 3.5–5.3)
PROT SERPL-MCNC: 6.1 G/DL — SIGNIFICANT CHANGE UP (ref 6–8.3)
RBC # BLD: 3.42 M/UL — LOW (ref 3.8–5.2)
RBC # FLD: 15.9 % — HIGH (ref 10.3–14.5)
SODIUM SERPL-SCNC: 139 MMOL/L — SIGNIFICANT CHANGE UP (ref 135–145)
WBC # BLD: 4.9 K/UL — SIGNIFICANT CHANGE UP (ref 3.8–10.5)
WBC # FLD AUTO: 4.9 K/UL — SIGNIFICANT CHANGE UP (ref 3.8–10.5)

## 2018-02-20 PROCEDURE — 99233 SBSQ HOSP IP/OBS HIGH 50: CPT

## 2018-02-20 RX ORDER — MAGNESIUM SULFATE 500 MG/ML
2 VIAL (ML) INJECTION ONCE
Qty: 0 | Refills: 0 | Status: COMPLETED | OUTPATIENT
Start: 2018-02-20 | End: 2018-02-20

## 2018-02-20 RX ORDER — POTASSIUM CHLORIDE 20 MEQ
40 PACKET (EA) ORAL EVERY 4 HOURS
Qty: 0 | Refills: 0 | Status: COMPLETED | OUTPATIENT
Start: 2018-02-20 | End: 2018-02-20

## 2018-02-20 RX ORDER — METRONIDAZOLE 500 MG
500 TABLET ORAL EVERY 8 HOURS
Qty: 0 | Refills: 0 | Status: DISCONTINUED | OUTPATIENT
Start: 2018-02-20 | End: 2018-02-21

## 2018-02-20 RX ADMIN — Medication 650 MILLIGRAM(S): at 05:29

## 2018-02-20 RX ADMIN — TRAMADOL HYDROCHLORIDE 50 MILLIGRAM(S): 50 TABLET ORAL at 04:09

## 2018-02-20 RX ADMIN — BALSALAZIDE DISODIUM 2250 MILLIGRAM(S): 750 CAPSULE ORAL at 22:05

## 2018-02-20 RX ADMIN — BALSALAZIDE DISODIUM 2250 MILLIGRAM(S): 750 CAPSULE ORAL at 06:45

## 2018-02-20 RX ADMIN — TRAMADOL HYDROCHLORIDE 50 MILLIGRAM(S): 50 TABLET ORAL at 05:02

## 2018-02-20 RX ADMIN — AMLODIPINE BESYLATE 5 MILLIGRAM(S): 2.5 TABLET ORAL at 17:27

## 2018-02-20 RX ADMIN — Medication 40 MILLIEQUIVALENT(S): at 17:27

## 2018-02-20 RX ADMIN — PREGABALIN 1000 MICROGRAM(S): 225 CAPSULE ORAL at 12:05

## 2018-02-20 RX ADMIN — PANTOPRAZOLE SODIUM 40 MILLIGRAM(S): 20 TABLET, DELAYED RELEASE ORAL at 06:46

## 2018-02-20 RX ADMIN — Medication 40 MILLIEQUIVALENT(S): at 12:05

## 2018-02-20 RX ADMIN — Medication 650 MILLIGRAM(S): at 15:30

## 2018-02-20 RX ADMIN — Medication 500 MILLIGRAM(S): at 14:32

## 2018-02-20 RX ADMIN — SERTRALINE 25 MILLIGRAM(S): 25 TABLET, FILM COATED ORAL at 06:46

## 2018-02-20 RX ADMIN — Medication 500 MILLIGRAM(S): at 22:05

## 2018-02-20 RX ADMIN — Medication 50 GRAM(S): at 12:32

## 2018-02-20 RX ADMIN — ATORVASTATIN CALCIUM 10 MILLIGRAM(S): 80 TABLET, FILM COATED ORAL at 22:05

## 2018-02-20 RX ADMIN — Medication 650 MILLIGRAM(S): at 14:39

## 2018-02-20 RX ADMIN — AMLODIPINE BESYLATE 5 MILLIGRAM(S): 2.5 TABLET ORAL at 06:46

## 2018-02-20 RX ADMIN — Medication 650 MILLIGRAM(S): at 03:22

## 2018-02-20 RX ADMIN — BALSALAZIDE DISODIUM 2250 MILLIGRAM(S): 750 CAPSULE ORAL at 14:33

## 2018-02-20 RX ADMIN — PIPERACILLIN AND TAZOBACTAM 25 GRAM(S): 4; .5 INJECTION, POWDER, LYOPHILIZED, FOR SOLUTION INTRAVENOUS at 06:45

## 2018-02-20 RX ADMIN — LOSARTAN POTASSIUM 100 MILLIGRAM(S): 100 TABLET, FILM COATED ORAL at 06:45

## 2018-02-20 RX ADMIN — ENOXAPARIN SODIUM 40 MILLIGRAM(S): 100 INJECTION SUBCUTANEOUS at 14:33

## 2018-02-20 RX ADMIN — SERTRALINE 25 MILLIGRAM(S): 25 TABLET, FILM COATED ORAL at 17:27

## 2018-02-20 NOTE — PROGRESS NOTE ADULT - PROBLEM SELECTOR PLAN 6
-pt brought in her own balsalazide, so using home med
-pt brought in her own balsalazide, so using home med
-Chronic, stable  -continue mesalamine for therapeutic exchange with balsalazide
-pt brought in her own balsalazide, so switched to home med
-pt brought in her own balsalazide, so switched to home med

## 2018-02-20 NOTE — PROGRESS NOTE ADULT - PROBLEM SELECTOR PLAN 5
-continue home atorvastatin
-continue home atorvastatin
-Chronic, stable  -continue home atorvastatin
-continue home atorvastatin
-continue home atorvastatin

## 2018-02-20 NOTE — PROGRESS NOTE ADULT - PROBLEM SELECTOR PLAN 7
-continue sertraline home med
-Chronic, stable  -continue sertraline home med
-continue sertraline home med

## 2018-02-20 NOTE — PROGRESS NOTE ADULT - SUBJECTIVE AND OBJECTIVE BOX
Patient is a 77y old  Female who presents with a chief complaint of lymphedema, fever (16 Feb 2018 17:54)      INTERVAL HPI/OVERNIGHT EVENTS: Pt states she feels well. Pt denies abd pain, flank pain, dysuria, fever, chills, cough, headache. Family reports no further episodes of confusion.     MEDICATIONS  (STANDING):  amLODIPine   Tablet 5 milliGRAM(s) Oral two times a day  atorvastatin 10 milliGRAM(s) Oral at bedtime  balsalazide 2250 milliGRAM(s) Oral three times a day  cyanocobalamin 1000 MICROGram(s) Oral daily  enoxaparin Injectable 40 milliGRAM(s) SubCutaneous every 24 hours  losartan 100 milliGRAM(s) Oral daily  metroNIDAZOLE    Tablet 500 milliGRAM(s) Oral every 8 hours  pantoprazole    Tablet 40 milliGRAM(s) Oral before breakfast  sertraline 25 milliGRAM(s) Oral two times a day    MEDICATIONS  (PRN):  acetaminophen   Tablet 650 milliGRAM(s) Oral every 6 hours PRN For Temp greater than 38 C (100.4 F)  acetaminophen   Tablet. 650 milliGRAM(s) Oral every 6 hours PRN Mild Pain (1 - 3)  traMADol 50 milliGRAM(s) Oral two times a day PRN Severe Pain (7 - 10)      Allergies    erythromycin (Unknown)  macrolide antibiotics (Unknown)  morphine (Diarrhea)  sulfa drugs (Unknown)    Intolerances        REVIEW OF SYSTEMS:  CONSTITUTIONAL: No fever or chills  HEENT:  No headache, no sore throat  RESPIRATORY: No cough, wheezing, or shortness of breath  CARDIOVASCULAR: No chest pain, palpitations, or leg swelling  GASTROINTESTINAL: No abd pain, nausea, vomiting, or diarrhea  GENITOURINARY: No dysuria, frequency, or hematuria  NEUROLOGICAL: no focal weakness or dizziness  MUSCULOSKELETAL: no myalgias, +chronic lymphedema in legs R>>L    Vital Signs Last 24 Hrs  T(C): 36.8 (20 Feb 2018 04:10), Max: 37.3 (19 Feb 2018 13:38)  T(F): 98.3 (20 Feb 2018 04:10), Max: 99.2 (19 Feb 2018 13:38)  HR: 94 (20 Feb 2018 04:10) (66 - 99)  BP: 129/78 (20 Feb 2018 04:10) (102/63 - 129/78)  BP(mean): --  RR: 17 (20 Feb 2018 04:10) (15 - 18)  SpO2: 94% (20 Feb 2018 04:10) (94% - 96%)    PHYSICAL EXAM:  GENERAL: NAD  HEENT:  EOMI, moist mucous membranes  CHEST/LUNG:  CTA b/l, no rales, wheezes, or rhonchi  HEART:  RRR, S1, S2  ABDOMEN:  BS+, soft, nontender, nondistended  BACK: no CVA tenderness b/l  EXTREMITIES: +lymphedema in R LE, both LEs in compression stockings up to upper thighs; No, cyanosis, or calf tenderness  SKIN: no erythema or warmth on the right thigh  NERVOUS SYSTEM: answering questions and following commands appropriately    LABS:                        8.0    4.9   )-----------( 173      ( 20 Feb 2018 07:10 )             25.4     CBC Full  -  ( 20 Feb 2018 07:10 )  WBC Count : 4.9 K/uL  Hemoglobin : 8.0 g/dL  Hematocrit : 25.4 %  Platelet Count - Automated : 173 K/uL  Mean Cell Volume : 74.4 fl  Mean Cell Hemoglobin : 23.5 pg  Mean Cell Hemoglobin Concentration : 31.6 gm/dL  Auto Neutrophil # : x  Auto Lymphocyte # : x  Auto Monocyte # : x  Auto Eosinophil # : x  Auto Basophil # : x  Auto Neutrophil % : x  Auto Lymphocyte % : x  Auto Monocyte % : x  Auto Eosinophil % : x  Auto Basophil % : x    20 Feb 2018 07:10    139    |  104    |  13     ----------------------------<  109    3.1     |  27     |  1.10     Ca    8.6        20 Feb 2018 07:10  Phos  2.9       20 Feb 2018 07:10  Mg     1.7       20 Feb 2018 07:10    TPro  6.1    /  Alb  2.0    /  TBili  0.4    /  DBili  x      /  AST  32     /  ALT  14     /  AlkPhos  216    20 Feb 2018 07:10        CAPILLARY BLOOD GLUCOSE            Culture - Blood (collected 02-18-18 @ 19:04)  Source: .Blood Blood  Preliminary Report (02-19-18 @ 20:01):    No growth to date.    Culture - Blood (collected 02-18-18 @ 19:04)  Source: .Blood Blood-Peripheral  Preliminary Report (02-19-18 @ 20:01):    No growth to date.    Culture - Blood (collected 02-16-18 @ 08:53)  Source: .Blood Blood  Preliminary Report (02-17-18 @ 09:01):    No growth to date.    Culture - Blood (collected 02-16-18 @ 08:49)  Source: .Blood Blood  Gram Stain (02-18-18 @ 08:16):    Growth in anaerobic bottle: Gram Negative Rods  Final Report (02-19-18 @ 11:35):    Growth in anaerobic bottle: Bacteroides fragilis    "Susceptibilities not performed"    "Due to technical problems, Proteus sp. will Not be reported as part of    the BCID panel until further notice"    ***Blood Panel PCR results on this specimen are available    approximately 3 hours after the Gram stain result.***    Gram stain, PCR, and/or culture results may not always    correspond due to difference in methodologies.    ************************************************************    This PCR assay was performed using [employer].    The following targets are tested for: Enterococcus,    vancomycin resistant enterococci, Listeria monocytogenes,    coagulase negative staphylococci, S. aureus,    methicillin resistant S. aureus, Streptococcus agalactiae    (Group B), S. pneumoniae, S. pyogenes (Group A),    Acinetobacter baumannii, Enterobacter cloacae, E. coli,    Klebsiella oxytoca, K. pneumoniae, Proteus sp.,    Serratia marcescens, Haemophilus influenzae,    Neisseria meningitidis, Pseudomonas aeruginosa, Candida    albicans, C. glabrata, C krusei, C parapsilosis,    C. tropicalis and the KPC resistance gene.  Organism: Blood Culture PCR (02-19-18 @ 11:35)  Organism: Blood Culture PCR (02-19-18 @ 11:35)      -  Acinetobacter baumanii: Nondet      -  Candida albicans: Nondet      -  Candida glabrata: Nondet      -  Candida krusei: Nondet      -  Candida parapsilosis: Nondet      -  Candida tropicalis: Nondet      -  Coagulase negative Staphylococcus: Nondet      -  Enterobacter cloacae complex: Nondet      -  Enterococcus species: Nondet      -  Escherichia coli: Nondet      -  Haemophilus influenzae: Nondet      -  Klebsiella oxytoca: Nondet      -  Klebsiella pneumoniae: Nondet      -  Listeria monocytogenes: Nondet      -  Methicillin resistant Staphylococcus aureus (MRSA): Nondet      -  Multidrug (KPC pos) resistant organism: Nondet      -  Neisseria meningitidis: Nondet      -  Pseudomonas aeruginosa: Nondet      -  Serratia marcescens: Nondet      -  Staphylococcus aureus: Nondet      -  Streptococcus agalactiae (Group B): Nondet      -  Streptococcus pneumoniae: Nondet      -  Streptococcus pyogenes (Group A): Nondet      -  Streptococcus sp. (Not Grp A, B or S pneumoniae): Nondet      -  Vancomycin resistant Enterococcus sp.: Nondet      Method Type: PCR    Culture - Urine (collected 02-16-18 @ 08:18)  Source: .Urine Clean Catch (Midstream)  Final Report (02-17-18 @ 15:04):    <10,000 CFU/ml Normal Urogenital long present        RADIOLOGY & ADDITIONAL TESTS:    Personally reviewed.     Consultant(s) Notes Reviewed:  [x] YES  [ ] NO Patient is a 77y old  Female who presents with a chief complaint of lymphedema, fever (16 Feb 2018 17:54)    INTERVAL HPI/OVERNIGHT EVENTS: Pt states she feels well. Pt denies abd pain, flank pain, dysuria, fever, chills, cough, headache. Family reports no further episodes of confusion.     MEDICATIONS  (STANDING):  amLODIPine   Tablet 5 milliGRAM(s) Oral two times a day  atorvastatin 10 milliGRAM(s) Oral at bedtime  balsalazide 2250 milliGRAM(s) Oral three times a day  cyanocobalamin 1000 MICROGram(s) Oral daily  enoxaparin Injectable 40 milliGRAM(s) SubCutaneous every 24 hours  losartan 100 milliGRAM(s) Oral daily  metroNIDAZOLE    Tablet 500 milliGRAM(s) Oral every 8 hours  pantoprazole    Tablet 40 milliGRAM(s) Oral before breakfast  sertraline 25 milliGRAM(s) Oral two times a day    MEDICATIONS  (PRN):  acetaminophen   Tablet 650 milliGRAM(s) Oral every 6 hours PRN For Temp greater than 38 C (100.4 F)  acetaminophen   Tablet. 650 milliGRAM(s) Oral every 6 hours PRN Mild Pain (1 - 3)  traMADol 50 milliGRAM(s) Oral two times a day PRN Severe Pain (7 - 10)      Allergies    erythromycin (Unknown)  macrolide antibiotics (Unknown)  morphine (Diarrhea)  sulfa drugs (Unknown)    Intolerances        REVIEW OF SYSTEMS:  CONSTITUTIONAL: No fever or chills  HEENT:  No headache, no sore throat  RESPIRATORY: No cough, wheezing, or shortness of breath  CARDIOVASCULAR: No chest pain, palpitations, or leg swelling  GASTROINTESTINAL: No abd pain, nausea, vomiting, or diarrhea  GENITOURINARY: No dysuria, frequency, or hematuria  NEUROLOGICAL: no focal weakness or dizziness  MUSCULOSKELETAL: no myalgias, +chronic lymphedema in legs R>>L    Vital Signs Last 24 Hrs  T(C): 36.8 (20 Feb 2018 04:10), Max: 37.3 (19 Feb 2018 13:38)  T(F): 98.3 (20 Feb 2018 04:10), Max: 99.2 (19 Feb 2018 13:38)  HR: 94 (20 Feb 2018 04:10) (66 - 99)  BP: 129/78 (20 Feb 2018 04:10) (102/63 - 129/78)  BP(mean): --  RR: 17 (20 Feb 2018 04:10) (15 - 18)  SpO2: 94% (20 Feb 2018 04:10) (94% - 96%)    PHYSICAL EXAM:  GENERAL: NAD  HEENT:  EOMI, moist mucous membranes  CHEST/LUNG:  CTA b/l, no rales, wheezes, or rhonchi  HEART:  RRR, S1, S2  ABDOMEN:  BS+, soft, nontender, nondistended  BACK: no CVA tenderness b/l  EXTREMITIES: +lymphedema in R LE, both LEs in compression stockings up to upper thighs; No, cyanosis, or calf tenderness  SKIN: no erythema or warmth on the right thigh  NERVOUS SYSTEM: answering questions and following commands appropriately    LABS:                        8.0    4.9   )-----------( 173      ( 20 Feb 2018 07:10 )             25.4     CBC Full  -  ( 20 Feb 2018 07:10 )  WBC Count : 4.9 K/uL  Hemoglobin : 8.0 g/dL  Hematocrit : 25.4 %  Platelet Count - Automated : 173 K/uL  Mean Cell Volume : 74.4 fl  Mean Cell Hemoglobin : 23.5 pg  Mean Cell Hemoglobin Concentration : 31.6 gm/dL  Auto Neutrophil # : x  Auto Lymphocyte # : x  Auto Monocyte # : x  Auto Eosinophil # : x  Auto Basophil # : x  Auto Neutrophil % : x  Auto Lymphocyte % : x  Auto Monocyte % : x  Auto Eosinophil % : x  Auto Basophil % : x    20 Feb 2018 07:10    139    |  104    |  13     ----------------------------<  109    3.1     |  27     |  1.10     Ca    8.6        20 Feb 2018 07:10  Phos  2.9       20 Feb 2018 07:10  Mg     1.7       20 Feb 2018 07:10    TPro  6.1    /  Alb  2.0    /  TBili  0.4    /  DBili  x      /  AST  32     /  ALT  14     /  AlkPhos  216    20 Feb 2018 07:10        CAPILLARY BLOOD GLUCOSE            Culture - Blood (collected 02-18-18 @ 19:04)  Source: .Blood Blood  Preliminary Report (02-19-18 @ 20:01):    No growth to date.    Culture - Blood (collected 02-18-18 @ 19:04)  Source: .Blood Blood-Peripheral  Preliminary Report (02-19-18 @ 20:01):    No growth to date.    Culture - Blood (collected 02-16-18 @ 08:53)  Source: .Blood Blood  Preliminary Report (02-17-18 @ 09:01):    No growth to date.    Culture - Blood (collected 02-16-18 @ 08:49)  Source: .Blood Blood  Gram Stain (02-18-18 @ 08:16):    Growth in anaerobic bottle: Gram Negative Rods  Final Report (02-19-18 @ 11:35):    Growth in anaerobic bottle: Bacteroides fragilis    "Susceptibilities not performed"    "Due to technical problems, Proteus sp. will Not be reported as part of    the BCID panel until further notice"    ***Blood Panel PCR results on this specimen are available    approximately 3 hours after the Gram stain result.***    Gram stain, PCR, and/or culture results may not always    correspond due to difference in methodologies.    ************************************************************    This PCR assay was performed using [employer].    The following targets are tested for: Enterococcus,    vancomycin resistant enterococci, Listeria monocytogenes,    coagulase negative staphylococci, S. aureus,    methicillin resistant S. aureus, Streptococcus agalactiae    (Group B), S. pneumoniae, S. pyogenes (Group A),    Acinetobacter baumannii, Enterobacter cloacae, E. coli,    Klebsiella oxytoca, K. pneumoniae, Proteus sp.,    Serratia marcescens, Haemophilus influenzae,    Neisseria meningitidis, Pseudomonas aeruginosa, Candida    albicans, C. glabrata, C krusei, C parapsilosis,    C. tropicalis and the KPC resistance gene.  Organism: Blood Culture PCR (02-19-18 @ 11:35)  Organism: Blood Culture PCR (02-19-18 @ 11:35)      -  Acinetobacter baumanii: Nondet      -  Candida albicans: Nondet      -  Candida glabrata: Nondet      -  Candida krusei: Nondet      -  Candida parapsilosis: Nondet      -  Candida tropicalis: Nondet      -  Coagulase negative Staphylococcus: Nondet      -  Enterobacter cloacae complex: Nondet      -  Enterococcus species: Nondet      -  Escherichia coli: Nondet      -  Haemophilus influenzae: Nondet      -  Klebsiella oxytoca: Nondet      -  Klebsiella pneumoniae: Nondet      -  Listeria monocytogenes: Nondet      -  Methicillin resistant Staphylococcus aureus (MRSA): Nondet      -  Multidrug (KPC pos) resistant organism: Nondet      -  Neisseria meningitidis: Nondet      -  Pseudomonas aeruginosa: Nondet      -  Serratia marcescens: Nondet      -  Staphylococcus aureus: Nondet      -  Streptococcus agalactiae (Group B): Nondet      -  Streptococcus pneumoniae: Nondet      -  Streptococcus pyogenes (Group A): Nondet      -  Streptococcus sp. (Not Grp A, B or S pneumoniae): Nondet      -  Vancomycin resistant Enterococcus sp.: Nondet      Method Type: PCR    Culture - Urine (collected 02-16-18 @ 08:18)  Source: .Urine Clean Catch (Midstream)  Final Report (02-17-18 @ 15:04):    <10,000 CFU/ml Normal Urogenital long present        RADIOLOGY & ADDITIONAL TESTS:    Personally reviewed.     Consultant(s) Notes Reviewed:  [x] YES  [ ] NO

## 2018-02-20 NOTE — PROGRESS NOTE ADULT - SUBJECTIVE AND OBJECTIVE BOX
infectious diseases progress note:    FRIDA ROMANO is a 77y y. o. Female patient    Patient reports: feeling better    ROS:    EYES:  Negative  blurry vision or double vision  GASTROINTESTINAL:  Negative for nausea, vomiting, diarrhea  -otherwise negative except for subjective    Allergies    erythromycin (Unknown)  macrolide antibiotics (Unknown)  morphine (Diarrhea)  sulfa drugs (Unknown)    Intolerances        ANTIBIOTICS/RELEVANT:  antimicrobials  piperacillin/tazobactam IVPB. 3.375 Gram(s) IV Intermittent every 8 hours    immunologic:    OTHER:  acetaminophen   Tablet 650 milliGRAM(s) Oral every 6 hours PRN  acetaminophen   Tablet. 650 milliGRAM(s) Oral every 6 hours PRN  amLODIPine   Tablet 5 milliGRAM(s) Oral two times a day  atorvastatin 10 milliGRAM(s) Oral at bedtime  balsalazide 2250 milliGRAM(s) Oral three times a day  cyanocobalamin 1000 MICROGram(s) Oral daily  enoxaparin Injectable 40 milliGRAM(s) SubCutaneous every 24 hours  losartan 100 milliGRAM(s) Oral daily  pantoprazole    Tablet 40 milliGRAM(s) Oral before breakfast  sertraline 25 milliGRAM(s) Oral two times a day  traMADol 50 milliGRAM(s) Oral two times a day PRN      Objective:  Vital Signs Last 24 Hrs  T(C): 36.8 (20 Feb 2018 04:10), Max: 37.3 (19 Feb 2018 13:38)  T(F): 98.3 (20 Feb 2018 04:10), Max: 99.2 (19 Feb 2018 13:38)  HR: 94 (20 Feb 2018 04:10) (66 - 99)  BP: 129/78 (20 Feb 2018 04:10) (102/63 - 129/78)  BP(mean): --  RR: 17 (20 Feb 2018 04:10) (15 - 18)  SpO2: 94% (20 Feb 2018 04:10) (94% - 96%)    T(C): 36.8 (02-20-18 @ 04:10), Max: 37.7 (02-18-18 @ 14:27)  T(C): 36.8 (02-20-18 @ 04:10), Max: 37.7 (02-18-18 @ 04:27)  T(C): 36.8 (02-20-18 @ 04:10), Max: 37.8 (02-17-18 @ 04:55)    PHYSICAL EXAM:  Constitutional: Well-developed, well nourished  Eyes: PERRLA, EOMI  Ear/Nose/Throat: oropharynx normal	  Neck: no JVD, no lymphadenopathy, supple  Respiratory: no accessory muscle use  Cardiovascular: RRR,   Gastrointestinal: soft, NT  Extremities: no clubbing, no cyanosis, edema absent      LABS:                        8.0    4.9   )-----------( 173      ( 20 Feb 2018 07:10 )             25.4       4.9 02-20 @ 07:10  5.9 02-19 @ 07:56  8.2 02-18 @ 08:36  12.0 02-17 @ 06:56  13.0 02-16 @ 08:15  10.7 02-16 @ 01:54      02-20    139  |  104  |  13  ----------------------------<  109<H>  3.1<L>   |  27  |  1.10    Ca    8.6      20 Feb 2018 07:10  Phos  2.8     02-19  Mg     1.8     02-19    TPro  6.1  /  Alb  2.0<L>  /  TBili  0.4  /  DBili  x   /  AST  32  /  ALT  14  /  AlkPhos  216<H>  02-20      Creatinine, Serum: 1.10 mg/dL (02-20-18 @ 07:10)  Creatinine, Serum: 1.10 mg/dL (02-19-18 @ 07:56)  Creatinine, Serum: 1.00 mg/dL (02-18-18 @ 08:36)  Creatinine, Serum: 1.10 mg/dL (02-17-18 @ 06:56)  Creatinine, Serum: 1.10 mg/dL (02-16-18 @ 08:15)  Creatinine, Serum: 1.20 mg/dL (02-16-18 @ 01:54)        MICROBIOLOGY:    Culture - Blood (02.18.18 @ 19:04)    Specimen Source: .Blood Blood-Peripheral    Culture Results:   No growth to date.        RADIOLOGY & ADDITIONAL STUDIES:

## 2018-02-20 NOTE — PROGRESS NOTE ADULT - NSHPATTENDINGPLANDISCUSS_GEN_ALL_CORE
pt, pt's family, consultants
pt, pt's family, consultants
patient, son at bedside.
pt, pt's family, consultants
pt, pt's family, consultants

## 2018-02-20 NOTE — PROGRESS NOTE ADULT - PROBLEM SELECTOR PLAN 4
-remote history  -EKG NSR at 69,  -not on AC

## 2018-02-20 NOTE — PROGRESS NOTE ADULT - ATTENDING COMMENTS
Note written by attending, see above.

## 2018-02-20 NOTE — PROGRESS NOTE ADULT - PROBLEM SELECTOR PLAN 8
DVT prophylaxis: lovenox    IMPROVE VTE Individual Risk Assessment          RISK                                                          Points    [  ] Previous VTE                                                3  [  ] Thrombophilia                                             2  [  ] Lower limb paralysis                                   2        (unable to hold up >15 seconds)    [  x] Current Cancer                                            2         (within 6 months)  [  ] Immobilization > 24 hrs                              1  [  ] ICU/CCU stay > 24 hours                            1  [  x] Age > 60                                                    1    IMPROVE VTE Score ____3____
DVT prophylaxis: lovenox  GI Ppx: protonix    IMPROVE VTE Individual Risk Assessment          RISK                                                          Points    [  ] Previous VTE                                                3  [  ] Thrombophilia                                             2  [  ] Lower limb paralysis                                   2        (unable to hold up >15 seconds)    [  x] Current Cancer                                            2         (within 6 months)  [  ] Immobilization > 24 hrs                              1  [  ] ICU/CCU stay > 24 hours                            1  [  x] Age > 60                                                    1    IMPROVE VTE Score ____3____

## 2018-02-20 NOTE — PROGRESS NOTE ADULT - PROBLEM SELECTOR PLAN 3
-continue home amlodipine, losartan with hold parameters
-continue home amlodipine, losartan with hold parameters
-Chronic, stable  -continue home amlodipine, losartan with hold parameters
-continue home amlodipine, losartan with hold parameters
-continue home amlodipine, losartan with hold parameters

## 2018-02-21 ENCOUNTER — TRANSCRIPTION ENCOUNTER (OUTPATIENT)
Age: 78
End: 2018-02-21

## 2018-02-21 VITALS
TEMPERATURE: 98 F | RESPIRATION RATE: 16 BRPM | WEIGHT: 123.9 LBS | DIASTOLIC BLOOD PRESSURE: 67 MMHG | HEART RATE: 81 BPM | SYSTOLIC BLOOD PRESSURE: 130 MMHG | OXYGEN SATURATION: 93 %

## 2018-02-21 LAB
ANION GAP SERPL CALC-SCNC: 10 MMOL/L — SIGNIFICANT CHANGE UP (ref 5–17)
BUN SERPL-MCNC: 11 MG/DL — SIGNIFICANT CHANGE UP (ref 7–23)
CALCIUM SERPL-MCNC: 9.2 MG/DL — SIGNIFICANT CHANGE UP (ref 8.5–10.1)
CHLORIDE SERPL-SCNC: 104 MMOL/L — SIGNIFICANT CHANGE UP (ref 96–108)
CO2 SERPL-SCNC: 26 MMOL/L — SIGNIFICANT CHANGE UP (ref 22–31)
CREAT SERPL-MCNC: 1 MG/DL — SIGNIFICANT CHANGE UP (ref 0.5–1.3)
CULTURE RESULTS: SIGNIFICANT CHANGE UP
GLUCOSE SERPL-MCNC: 104 MG/DL — HIGH (ref 70–99)
HCT VFR BLD CALC: 29.2 % — LOW (ref 34.5–45)
HGB BLD-MCNC: 8.9 G/DL — LOW (ref 11.5–15.5)
MAGNESIUM SERPL-MCNC: 2 MG/DL — SIGNIFICANT CHANGE UP (ref 1.6–2.6)
MCHC RBC-ENTMCNC: 23.1 PG — LOW (ref 27–34)
MCHC RBC-ENTMCNC: 30.6 GM/DL — LOW (ref 32–36)
MCV RBC AUTO: 75.3 FL — LOW (ref 80–100)
PLATELET # BLD AUTO: 243 K/UL — SIGNIFICANT CHANGE UP (ref 150–400)
POTASSIUM SERPL-MCNC: 3.8 MMOL/L — SIGNIFICANT CHANGE UP (ref 3.5–5.3)
POTASSIUM SERPL-SCNC: 3.8 MMOL/L — SIGNIFICANT CHANGE UP (ref 3.5–5.3)
RBC # BLD: 3.88 M/UL — SIGNIFICANT CHANGE UP (ref 3.8–5.2)
RBC # FLD: 16.2 % — HIGH (ref 10.3–14.5)
SODIUM SERPL-SCNC: 140 MMOL/L — SIGNIFICANT CHANGE UP (ref 135–145)
SPECIMEN SOURCE: SIGNIFICANT CHANGE UP
WBC # BLD: 6.1 K/UL — SIGNIFICANT CHANGE UP (ref 3.8–10.5)
WBC # FLD AUTO: 6.1 K/UL — SIGNIFICANT CHANGE UP (ref 3.8–10.5)

## 2018-02-21 PROCEDURE — 87040 BLOOD CULTURE FOR BACTERIA: CPT

## 2018-02-21 PROCEDURE — 87633 RESP VIRUS 12-25 TARGETS: CPT

## 2018-02-21 PROCEDURE — 99239 HOSP IP/OBS DSCHRG MGMT >30: CPT

## 2018-02-21 PROCEDURE — 83605 ASSAY OF LACTIC ACID: CPT

## 2018-02-21 PROCEDURE — 83735 ASSAY OF MAGNESIUM: CPT

## 2018-02-21 PROCEDURE — 87581 M.PNEUMON DNA AMP PROBE: CPT

## 2018-02-21 PROCEDURE — 96365 THER/PROPH/DIAG IV INF INIT: CPT

## 2018-02-21 PROCEDURE — 36415 COLL VENOUS BLD VENIPUNCTURE: CPT

## 2018-02-21 PROCEDURE — 74177 CT ABD & PELVIS W/CONTRAST: CPT

## 2018-02-21 PROCEDURE — 74018 RADEX ABDOMEN 1 VIEW: CPT

## 2018-02-21 PROCEDURE — 82728 ASSAY OF FERRITIN: CPT

## 2018-02-21 PROCEDURE — 76775 US EXAM ABDO BACK WALL LIM: CPT

## 2018-02-21 PROCEDURE — 71045 X-RAY EXAM CHEST 1 VIEW: CPT

## 2018-02-21 PROCEDURE — 87150 DNA/RNA AMPLIFIED PROBE: CPT

## 2018-02-21 PROCEDURE — 80076 HEPATIC FUNCTION PANEL: CPT

## 2018-02-21 PROCEDURE — 85027 COMPLETE CBC AUTOMATED: CPT

## 2018-02-21 PROCEDURE — 99285 EMERGENCY DEPT VISIT HI MDM: CPT | Mod: 25

## 2018-02-21 PROCEDURE — 82746 ASSAY OF FOLIC ACID SERUM: CPT

## 2018-02-21 PROCEDURE — 82977 ASSAY OF GGT: CPT

## 2018-02-21 PROCEDURE — 80048 BASIC METABOLIC PNL TOTAL CA: CPT

## 2018-02-21 PROCEDURE — 93971 EXTREMITY STUDY: CPT

## 2018-02-21 PROCEDURE — 87486 CHLMYD PNEUM DNA AMP PROBE: CPT

## 2018-02-21 PROCEDURE — 84100 ASSAY OF PHOSPHORUS: CPT

## 2018-02-21 PROCEDURE — 81001 URINALYSIS AUTO W/SCOPE: CPT

## 2018-02-21 PROCEDURE — 80053 COMPREHEN METABOLIC PANEL: CPT

## 2018-02-21 PROCEDURE — 93005 ELECTROCARDIOGRAM TRACING: CPT

## 2018-02-21 PROCEDURE — 96367 TX/PROPH/DG ADDL SEQ IV INF: CPT

## 2018-02-21 PROCEDURE — 87086 URINE CULTURE/COLONY COUNT: CPT

## 2018-02-21 PROCEDURE — 83550 IRON BINDING TEST: CPT

## 2018-02-21 PROCEDURE — 82607 VITAMIN B-12: CPT

## 2018-02-21 PROCEDURE — 87798 DETECT AGENT NOS DNA AMP: CPT

## 2018-02-21 RX ORDER — ACETAMINOPHEN 500 MG
2 TABLET ORAL
Qty: 0 | Refills: 0 | COMMUNITY
Start: 2018-02-21

## 2018-02-21 RX ORDER — TRAMADOL HYDROCHLORIDE 50 MG/1
1 TABLET ORAL
Qty: 0 | Refills: 0 | COMMUNITY

## 2018-02-21 RX ORDER — METRONIDAZOLE 500 MG
1 TABLET ORAL
Qty: 35 | Refills: 0 | OUTPATIENT
Start: 2018-02-21

## 2018-02-21 RX ADMIN — BALSALAZIDE DISODIUM 2250 MILLIGRAM(S): 750 CAPSULE ORAL at 06:21

## 2018-02-21 RX ADMIN — ENOXAPARIN SODIUM 40 MILLIGRAM(S): 100 INJECTION SUBCUTANEOUS at 12:43

## 2018-02-21 RX ADMIN — PANTOPRAZOLE SODIUM 40 MILLIGRAM(S): 20 TABLET, DELAYED RELEASE ORAL at 06:20

## 2018-02-21 RX ADMIN — TRAMADOL HYDROCHLORIDE 50 MILLIGRAM(S): 50 TABLET ORAL at 02:42

## 2018-02-21 RX ADMIN — Medication 3 UNIT(S): at 13:38

## 2018-02-21 RX ADMIN — PREGABALIN 1000 MICROGRAM(S): 225 CAPSULE ORAL at 12:43

## 2018-02-21 RX ADMIN — SERTRALINE 25 MILLIGRAM(S): 25 TABLET, FILM COATED ORAL at 06:21

## 2018-02-21 RX ADMIN — TRAMADOL HYDROCHLORIDE 50 MILLIGRAM(S): 50 TABLET ORAL at 02:12

## 2018-02-21 RX ADMIN — Medication 500 MILLIGRAM(S): at 06:20

## 2018-02-21 RX ADMIN — LOSARTAN POTASSIUM 100 MILLIGRAM(S): 100 TABLET, FILM COATED ORAL at 06:20

## 2018-02-21 RX ADMIN — AMLODIPINE BESYLATE 5 MILLIGRAM(S): 2.5 TABLET ORAL at 06:20

## 2018-02-21 RX ADMIN — Medication 650 MILLIGRAM(S): at 12:47

## 2018-02-21 NOTE — DISCHARGE NOTE ADULT - PATIENT PORTAL LINK FT
You can access the [employer] Patient Portal, offered by [facility], by registering with the following website: [email or web address]

## 2018-02-21 NOTE — PROGRESS NOTE ADULT - PROBLEM SELECTOR PLAN 2
-endometrial ca in 2001, with reoccurrence 2015  -currently on chemotherapy weekly (Cisplatin, Doxorubicin, Adriamycin)   -Oncologist Dr. SCOT Dumont at [facility]  -f/up Heme-Onc as outpt
-endometrial ca in 2001, with reoccurrence 2015  -currently on chemotherapy weekly (Cisplatin, Doxorubicin, Adriamycin)   -Oncologist Dr. SCOT Dumont at [facility]  -f/up Heme-Onc as outpt
-endometrial ca in 2001, with re-occurance 2015,   -currently on chemotherapy weekly (Cisplatin, Doxorubin, Adriamycin)   -Oncologist Dr. SCOT Dumont at [facility]  -Heme-Onc
-endometrial ca in 2001, with reoccurrence 2015,   -currently on chemotherapy weekly (Cisplatin, Doxorubicin, Adriamycin)   -Oncologist Dr. SCOT Dumont at [facility]  -f/up Heme-Onc as outpt
With clearing as of 2-18 doing well with change to flagyl and with good bioavailability will use PO with no nausea or vomiting.  Recommend duration of 2 weeks or until 3-4,  Recommend outpt follow up next week, can call our office to arrange at [phone].    Thank you for consulting us and involving us in the management of this most interesting and challenging case.     Please Call with any further questions
With clearing as of 2-18 with change to flagyl today and with good bioavailability will use PO with no nausea or vomiting.  Recommend duration of 2 weeks or until 3-4, with potential for polymicrobial process if there is any clinical decline on PO flagyl can add PO Augmentin.  Recommend outpt follow up next week, can call our office to arrange at [phone].
-endometrial ca in 2001, with reoccurrence 2015,   -currently on chemotherapy weekly (Cisplatin, Doxorubicin, Adriamycin)   -Oncologist Dr. SCOT Dumont at [facility]  -f/up Heme-Onc as outpt

## 2018-02-21 NOTE — DISCHARGE NOTE ADULT - CARE PROVIDER_API CALL
Harvinder Esquivel; PhD), Infectious Disease; Internal Medicine  [address]  [place] [address]  Phone: [phone]  Fax: [phone]    Stefan Cadena), Internal Medicine  [address] [address]  Phone: [phone]  Fax: [phone]

## 2018-02-21 NOTE — DISCHARGE NOTE ADULT - CARE PLAN
Principal Discharge DX:	Bacteroides fragilis infection  Goal:	resolution of infection  Assessment and plan of treatment:	You had a bacterial infection in your blood that may have come from your sigmoid colon. You need to complete an antibiotics course -- take the prescribed metronidazole three times a day through the doses on 3/4/18. Follow up with infectious disease doctor, Dr. Esquivel (call [phone] for appointment) in the office in a week. Do not drink alcohol while taking metronidazole.  Secondary Diagnosis:	Endometrial carcinoma  Assessment and plan of treatment:	Follow up with your oncologist regarding treatment. Inform them that you had bacteria in the blood and will be on antibiotics until 3/4/18.  Secondary Diagnosis:	Ulcerative colitis  Assessment and plan of treatment:	Follow up with your gastroenterologist.  Secondary Diagnosis:	Hypertension  Assessment and plan of treatment:	Take your home regimen and follow up with your PMD.  Secondary Diagnosis:	Anxiety  Assessment and plan of treatment:	Continue your zoloft for anxiety/depression and follow up with your PMD.  Secondary Diagnosis:	Lymphedema of leg  Assessment and plan of treatment:	Continue to use your stockings to decrease edema in the right leg. Elevate the leg whenever you are not walking.  Secondary Diagnosis:	Hydronephrosis of right kidney  Assessment and plan of treatment:	You have moderate hydronephrosis in your right kidney despite your chronic stent. Follow up with your urologist in a week to discuss your next exchange.

## 2018-02-21 NOTE — DISCHARGE NOTE ADULT - HOSPITAL COURSE
HPI: HPI:   76 yo F with PMH of recurrent Uterine CA (2001) s/p KATH and radiation, abdominal mass 2013 metastatic endometrial cancer, recurrence 2015?, now on IV chemo weekly, anxiety, atrial fibrillation (one episode >30 years ago, no AC), hypertension, hyperlipidemia, Chronic Lymphedema of RLE, Hydronephrosis due to enlarged lymph nodes s/p right uretal stent (10/6/17), Mitral valve prolapse, Trigeminal Neuralgia, and Ulcerative colitis who presents to the ER from home with fever 102 since last night 11 PM. Pt family reports that pt was speaking incoherently around 7 PM yesterday and back to baseline soon after. History was obtained from son-in-law at bedside. Pt currently on chemotherapy regimen once a week on Monday's.   ROS: Pt endorses increase urinary frequency, denies chills, chest pain, palpitations, SOB, cough, abdominal pain, nausea, vomiting, diarrhea, constipation, urgency, or dysuria, headaches, changes in vision, dizziness, numbness, tingling, recent travel, recent antibiotic use, or sick contacts.    Hospital Course:  RVP negative. US Doppler R LE: No evidence of right common femoral, popliteal or calf vein thrombosis. Moderate calf edema.  Pt admitted with sepsis of unclear etiology. Pt had some erythema in upper right LE as the only positive on ROS aside from fever and was a question if cellulitis was the source of sepsis. However, appearance of leg was not very impressive as cellulitis and pt's BCx grew Bacteroides fragilis. 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 CT also showed sigmoid thickening which was abutting a pelvic mass, which is likely the source of the bacteremia. Urology (Manisha and Noah) deemed the right hydronephrosis likely chronic and not responsible for infection. ID (Dr. Gonzalez / Dr. Esquivel) consulted on the case and recommended de-escalation to flagyl. Pt's leukocytosis and fever resolved. BCx cleared. Pt was recommended to complete 2-week course of Abx post clearance of BCx. Pt discharged to home and will f/up with ID and PMD. Also, to follow up with her urologist and oncologist regarding the moderate to severe R hydronephrosis.     On day of discharge:   ROS: Pt denies abd pain, flank pain, fever, chills, SOB, CP, dysuria, diarrhea, cough.   VS: BP: 130/67; T: 97.9; HR: 81; RR: 16; 93% on RA  PHYSICAL EXAM:  GENERAL: NAD  HEENT:  EOMI, moist mucous membranes  CHEST/LUNG:  CTA b/l, no rales, wheezes, or rhonchi  HEART:  RRR, S1, S2  ABDOMEN:  BS+, soft, nontender, nondistended  BACK: no CVA tenderness b/l  EXTREMITIES: +lymphedema in R LE, both LEs in compression stockings up to upper thighs; No, cyanosis, or calf tenderness  SKIN: no erythema or warmth on the right thigh  NERVOUS SYSTEM: answering questions and following commands appropriately    Called pt’s PMD’s (Dr. Cadena) office and notified of discharge.    Time spent: 45min

## 2018-02-21 NOTE — PROGRESS NOTE ADULT - ASSESSMENT
76yo F with PMH of recurrent Uterine CA (2001) s/p KATH and radiation, abdominal mass 2013 metastatic endometrial cancer, recurrence 2015?, now on IV chemo weekly, anxiety, atrial fibrillation (one episode >30 years ago, no AC), hypertension, hyperlipidemia, Chronic Lymphedema of RLE, Hydronephrosis due to enlarged lymph nodes s/p right uretal stent (10/6/17), Mitral valve prolapse, Trigeminal Neuralgia, and Ulcerative colitis who presents to the ER from home with fever 102 for one day admitted for sepsis of unclear etiology, possibly due to right LE cellulitis.
77F with multiple medical problems admitted with fever  found to have GNR bactermia-- no ID on biofire
76 yo F with PMH of recurrent Uterine CA (2001) s/p KATH and radiation, abdominal mass 2013 metastatic endometrial cancer, recurrence 2015?, now on IV chemo weekly, anxiety, atrial fibrillation (one episode >30 years ago, no AC), hypertension, hyperlipidemia, Chronic Lymphedema of RLE, Hydronephrosis due to enlarged lymph nodes s/p right uretal stent (10/6/17), Mitral valve prolapse, Trigeminal Neuralgia, and Ulcerative colitis who presents to the ER from home with fever 102 for one day admitted for fever of unclear etiology.
76 yo F with PMH of recurrent Uterine CA (2001) s/p KATH and radiation, abdominal mass 2013 metastatic endometrial cancer, recurrence 2015?, now on IV chemo weekly, anxiety, atrial fibrillation (one episode >30 years ago, no AC), hypertension, hyperlipidemia, Chronic Lymphedema of RLE, Hydronephrosis due to enlarged lymph nodes s/p right uretal stent (10/6/17), Mitral valve prolapse, Trigeminal Neuralgia, and Ulcerative colitis who presents to the ER from home with fever 102 for one day admitted for sepsis of unclear etiology, possibly due to right LE cellulitis.
77F with multiple medical problems admitted with fever  found to have GNR bactermia-- bacteroides fragilis  thickened sigmoid on Ct scan could be etiology but abdomen is benign on exam
77F with multiple medical problems admitted with fever  found to have GNR bactermia-- bacteroides fragilis negative on repeat 2-18  thickened sigmoid on Ct scan and history of UC
77F with multiple medical problems admitted with fever  found to have GNR bactermia-- bacteroides fragilis negative on repeat 2-18  thickened sigmoid on Ct scan and history of UC
Pt asleep, discussed case with pt's son.    Urine culture is neg, blood culture growing GNR's.  Labs including creat are stable and normal.  No evidence of  source of infection.  Please re consult if needed, pt to return to the care of Dr. Gonzalez at [facility] after discharge.  Thank you.
76 yo F with PMH of recurrent Uterine CA (2001) s/p KATH and radiation, abdominal mass 2013 metastatic endometrial cancer, recurrence 2015?, now on IV chemo weekly, anxiety, atrial fibrillation (one episode >30 years ago, no AC), hypertension, hyperlipidemia, Chronic Lymphedema of RLE, Hydronephrosis due to enlarged lymph nodes s/p right uretal stent (10/6/17), Mitral valve prolapse, Trigeminal Neuralgia, and Ulcerative colitis who presents to the ER from home with fever 102 for one day admitted for sepsis of unclear etiology, possibly due to right LE cellulitis.
78yo F with PMH of recurrent Uterine CA (2001) s/p KATH and radiation, abdominal mass 2013 metastatic endometrial cancer, recurrence 2015?, now on IV chemo weekly, anxiety, atrial fibrillation (one episode >30 years ago, no AC), hypertension, hyperlipidemia, Chronic Lymphedema of RLE, Hydronephrosis due to enlarged lymph nodes s/p right uretal stent (10/6/17), Mitral valve prolapse, Trigeminal Neuralgia, and Ulcerative colitis who presents to the ER from home with fever 102 for one day admitted for sepsis of unclear etiology, found to have Bacteroides bacteremia, likely from gut translocation in pt with UC and sigmoid thickening visualized on CT.

## 2018-02-21 NOTE — DISCHARGE NOTE ADULT - CARE PROVIDERS DIRECT ADDRESSES
[email or web address]

## 2018-02-21 NOTE — DISCHARGE NOTE ADULT - SECONDARY DIAGNOSIS.
Endometrial carcinoma Ulcerative colitis Hypertension Anxiety Lymphedema of leg Hydronephrosis of right kidney

## 2018-02-21 NOTE — DISCHARGE NOTE ADULT - PLAN OF CARE
resolution of infection You had a bacterial infection in your blood that may have come from your sigmoid colon. You need to complete an antibiotics course -- take the prescribed metronidazole three times a day through the doses on 3/4/18. Follow up with infectious disease doctor, Dr. Esquivel (call [phone] for appointment) in the office in a week. Do not drink alcohol while taking metronidazole. Follow up with your oncologist regarding treatment. Inform them that you had bacteria in the blood and will be on antibiotics until 3/4/18. Follow up with your gastroenterologist. Take your home regimen and follow up with your PMD. Continue your zoloft for anxiety/depression and follow up with your PMD. Continue to use your stockings to decrease edema in the right leg. Elevate the leg whenever you are not walking. You have moderate hydronephrosis in your right kidney despite your chronic stent. Follow up with your urologist in a week to discuss your next exchange.

## 2018-02-21 NOTE — DISCHARGE NOTE ADULT - MEDICATION SUMMARY - MEDICATIONS TO TAKE
I will START or STAY ON the medications listed below when I get home from the hospital:    balsalazide 750 mg oral capsule  -- 3 cap(s) by mouth 3 times a day  -- Indication: For Ulcerative colitis    metroNIDAZOLE 500 mg oral tablet  -- 1 tab(s) by mouth every 8 hours through doses on 03/4/18 starting today at ~2PM.   -- Indication: For Bacteroides fragilis infection    traMADol 50 mg oral tablet  -- 1 tab(s) by mouth 2 times a day as needed for severe pain  -- Indication: For For severe pain if needed    acetaminophen 325 mg oral tablet  -- 2 tab(s) by mouth every 6 hours, As needed, for moderate pain  -- Indication: For For mild to moderate pain if needed    Benicar 40 mg oral tablet  -- 1 tab(s) by mouth once a day in pm  -- Indication: For Hypertension    Zoloft 25 mg oral tablet  -- 1 tab(s) by mouth 2 times a day  -- Indication: For Anxiety/Depression    atorvastatin 10 mg oral tablet  -- 10  by mouth once a day  -- Indication: For Hyperlipidemia    amLODIPine 5 mg oral tablet  -- 1 tab(s) by mouth 2 times a day  -- Indication: For Hypertension    Systane ophthalmic solution  -- 1 -2 drop(s) to each affected eye once a day  -- Indication: For Home eye drops    omeprazole 20 mg oral delayed release capsule  -- 1 cap(s) by mouth once a day in am  -- Indication: For Acid reflux    Vitamin B-12 1000 mcg oral tablet  -- 1 tab(s) by mouth once a day in pm  -- Indication: For Home vitamin    Vitamin D3 5000 intl units oral tablet  -- 1 tab(s) by mouth once a day in pm  -- Indication: For Home vitamin

## 2018-02-21 NOTE — PROGRESS NOTE ADULT - PROBLEM SELECTOR PROBLEM 2
Endometrial carcinoma
Endometrial carcinoma
Bacteroides fragilis infection
Bacteroides fragilis infection
Endometrial carcinoma

## 2018-02-21 NOTE — PROGRESS NOTE ADULT - SUBJECTIVE AND OBJECTIVE BOX
infectious diseases progress note:    FRIDA ROMANO is a 77y y. o. Female patient    Patient reports: feeling good and ready to go home    ROS:    EYES:  Negative  blurry vision or double vision  GASTROINTESTINAL:  Negative for nausea, vomiting, diarrhea  -otherwise negative except for subjective    Allergies    erythromycin (Unknown)  macrolide antibiotics (Unknown)  morphine (Diarrhea)  sulfa drugs (Unknown)    Intolerances        ANTIBIOTICS/RELEVANT:  antimicrobials  metroNIDAZOLE    Tablet 500 milliGRAM(s) Oral every 8 hours    immunologic:    OTHER:  acetaminophen   Tablet 650 milliGRAM(s) Oral every 6 hours PRN  acetaminophen   Tablet. 650 milliGRAM(s) Oral every 6 hours PRN  amLODIPine   Tablet 5 milliGRAM(s) Oral two times a day  atorvastatin 10 milliGRAM(s) Oral at bedtime  balsalazide 2250 milliGRAM(s) Oral three times a day  cyanocobalamin 1000 MICROGram(s) Oral daily  enoxaparin Injectable 40 milliGRAM(s) SubCutaneous every 24 hours  losartan 100 milliGRAM(s) Oral daily  pantoprazole    Tablet 40 milliGRAM(s) Oral before breakfast  sertraline 25 milliGRAM(s) Oral two times a day  traMADol 50 milliGRAM(s) Oral two times a day PRN      Objective:  Vital Signs Last 24 Hrs  T(C): 36.6 (21 Feb 2018 04:59), Max: 36.7 (20 Feb 2018 14:16)  T(F): 97.9 (21 Feb 2018 04:59), Max: 98 (20 Feb 2018 14:16)  HR: 81 (21 Feb 2018 04:59) (65 - 95)  BP: 130/67 (21 Feb 2018 04:59) (108/68 - 130/67)  BP(mean): --  RR: 16 (21 Feb 2018 04:59) (16 - 17)  SpO2: 93% (21 Feb 2018 04:59) (68% - 97%)    T(C): 36.6 (02-21-18 @ 04:59), Max: 37.3 (02-19-18 @ 13:38)  T(C): 36.6 (02-21-18 @ 04:59), Max: 37.7 (02-18-18 @ 14:27)  T(C): 36.6 (02-21-18 @ 04:59), Max: 37.7 (02-18-18 @ 04:27)    PHYSICAL EXAM:  Constitutional: Well-developed, well nourished  Eyes: PERRLA, EOMI  Ear/Nose/Throat: oropharynx normal	  Neck: no JVD, no lymphadenopathy, supple  Respiratory: no accessory muscle use  Cardiovascular: RRR,   Gastrointestinal: soft, NT  Extremities: no clubbing, no cyanosis, edema absent      LABS:                        8.0    4.9   )-----------( 173      ( 20 Feb 2018 07:10 )             25.4       4.9 02-20 @ 07:10  5.9 02-19 @ 07:56  8.2 02-18 @ 08:36  12.0 02-17 @ 06:56  13.0 02-16 @ 08:15  10.7 02-16 @ 01:54      02-20    139  |  104  |  13  ----------------------------<  109<H>  3.1<L>   |  27  |  1.10    Ca    8.6      20 Feb 2018 07:10  Phos  2.9     02-20  Mg     1.7     02-20    TPro  6.1  /  Alb  2.0<L>  /  TBili  0.4  /  DBili  x   /  AST  32  /  ALT  14  /  AlkPhos  216<H>  02-20      Creatinine, Serum: 1.10 mg/dL (02-20-18 @ 07:10)  Creatinine, Serum: 1.10 mg/dL (02-19-18 @ 07:56)  Creatinine, Serum: 1.00 mg/dL (02-18-18 @ 08:36)  Creatinine, Serum: 1.10 mg/dL (02-17-18 @ 06:56)  Creatinine, Serum: 1.10 mg/dL (02-16-18 @ 08:15)  Creatinine, Serum: 1.20 mg/dL (02-16-18 @ 01:54)        MICROBIOLOGY:        RADIOLOGY & ADDITIONAL STUDIES:

## 2018-02-21 NOTE — PROGRESS NOTE ADULT - PROBLEM SELECTOR PLAN 1
-now BCx grew Bacteroides fragilis; thus the questionable cellulitis likely was just lymphedema and pt has another source for her sepsis.   -Complained of urinary frequency when had IVF  -UA/UCx were clean, but given hx of right hydronephrosis with chronic stent, called urology to assess for the potential of complete obstruction/retention that thus caused a falsely negative UA -- Dr. Dyer and Dr. Zamora evaluated the pt and given that hydro appeared moderate, pt has no flank pain/tenderness, and has been improving with resolution of fever/leukocytosis, expected chronic hydro with a chronic stent; on that basis, urologists recommend against urologic intervention currently on right hydro and they feel it is not source of infection.   -switched Abx to flagyl today as per ID recs; discussed with Dr. Esquivel, if stable for 24 hours on the regimen, will d/c on flagyl.  -pt's bacteremia is possibly from gut translocation given pt has UC and also given the sigmoid thickening seen on CT especially in area abutting malignant mass  -no RUQ tenderness, no overt signs of biliary etiology; normal biliary system on CT  -ID recs appreciated  -overall improving; fever resolved, leukocytosis resolved, no longer delirious.
-now BCx grew Bacteroides fragilis; thus the questionable cellulitis likely was just lymphedema and pt has another source for her sepsis.   -Complained of urinary frequency when had IVF  -UA/UCx were clean, but given hx of right hydronephrosis with chronic stent, called urology to assess for the potential of complete obstruction/retention that thus caused a falsely negative UA -- Dr. Dyer and now Dr. Zamora evaluated the pt and given that hydro appeared moderate, pt has no flank pain/tenderness, and has been improving with resolution of fever/leukocytosis, expected chronic hydro with a chronic stent; on that basis, urologists recommend against urologic intervention currently on right hydro and they feel it is not source of infection.   -continue zosyn -- switch to flagyl tomorrow  -f/up sensitivities of Bacteroides  -another possibility for bacteremia is a gut translocation given pt has UC? and also given the sigmoid thickening seen on CT especially in area abutting malignant mass  -no RUQ tenderness, no overt signs of biliary etiology; normal biliary system on CT  -ID recs appreciated  -overall improving; fever resolved, leukocytosis resolved, no longer delirious.
wbc trending down  no further fevers  would repeat blood cx  d/c vancomycin  await blood cx ID   abd benign.
-unclear etiology possibly cellulitis in R LE that has much improved as per pt's  since starting Abx. Patient has chronic lymphedema, but currently, there is no overt sign of cellulitis.   -Complained of urinary frequency, but UA is unremarkable  -RVP negative.  -continue vanco, zosyn -- if cultures negative for 48hrs consider de-escalating to empiric po Abx -- discuss with ID  -F/up UCx, blood cultures negative for 24 hrs
-unknown etiology ?cellulitis in RLE that has improved. Patient has chronic lymphedema, but at present, there is no sign of cellulitis.   -Complained of frequency, but UA is unremarkable  -RVP negative.  -continue vanco, zosyn  -F/U Ucx, blood cultures, tramadol home med for pain  -ID eval (Alvin) pending.
cleared as of 2-18
cleared as of 2-18
wbc trending down  bacteroides bacteremia  no further fevers  would repeat blood cx for clearance  abd benign.  can switch to flagyl
-now BCx grew GNR; thus the questionable cellulitis likely was just lymphedema and pt has another unclear source for her sepsis.   -Complained of urinary frequency when had IVF  -UA/UCx were clean, but given hx of right hydronephrosis with chronic stent, called urology to assess for the potential of complete obstruction/retention that thus caused a falsely negative UA -- Dr. Dyer evaluated and given that hydro appeared moderate, pt has no flank pain/tenderness, and has been improving with resolution of fever/leukocytosis, and he expects chronic hydro with a chronic stent; on that basis, he did not feel pt needed to go to OR for stent replacement emergently.  -continue zosyn (d/c vanco)  -f/up speciation of the GNR  -another possibility is a gut translocation given pt has UC? -- f/up CT Abd/P  -no RUQ tenderness, no overt signs of biliary etiology; f/up CT  -ID recs appreciated

## 2018-02-23 LAB
CULTURE RESULTS: SIGNIFICANT CHANGE UP
CULTURE RESULTS: SIGNIFICANT CHANGE UP
SPECIMEN SOURCE: SIGNIFICANT CHANGE UP
SPECIMEN SOURCE: SIGNIFICANT CHANGE UP

## 2018-04-06 NOTE — INPATIENT CERTIFICATION FOR MEDICARE PATIENTS - PHYSICIAN CONCUR
I concur with the Admission Order and I certify that services are provided in accordance with Section 42 CFR § 412.3
Statement Selected

## 2018-05-22 NOTE — H&P ADULT - NSHPLANGLIMITEDENGLISH_GEN_A_CORE
What Is The Reason For Today's Visit?: Full Body Skin Examination What Is The Reason For Today's Visit? (Being Monitored For X): concerning skin lesions on an annual basis How Severe Are Your Spot(S)?: moderate No

## 2018-06-06 ENCOUNTER — OUTPATIENT (OUTPATIENT)
Dept: OUTPATIENT SERVICES | Facility: HOSPITAL | Age: 78
LOS: 1 days | End: 2018-06-06
Payer: MEDICARE

## 2018-06-06 VITALS
OXYGEN SATURATION: 97 % | WEIGHT: 106.92 LBS | HEART RATE: 64 BPM | RESPIRATION RATE: 14 BRPM | HEIGHT: 62 IN | TEMPERATURE: 97 F | SYSTOLIC BLOOD PRESSURE: 170 MMHG | DIASTOLIC BLOOD PRESSURE: 60 MMHG

## 2018-06-06 DIAGNOSIS — Z92.21 PERSONAL HISTORY OF ANTINEOPLASTIC CHEMOTHERAPY: Chronic | ICD-10-CM

## 2018-06-06 DIAGNOSIS — T78.40XA ALLERGY, UNSPECIFIED, INITIAL ENCOUNTER: ICD-10-CM

## 2018-06-06 DIAGNOSIS — I10 ESSENTIAL (PRIMARY) HYPERTENSION: ICD-10-CM

## 2018-06-06 DIAGNOSIS — Z87.448 PERSONAL HISTORY OF OTHER DISEASES OF URINARY SYSTEM: Chronic | ICD-10-CM

## 2018-06-06 DIAGNOSIS — N13.30 UNSPECIFIED HYDRONEPHROSIS: ICD-10-CM

## 2018-06-06 DIAGNOSIS — R19.00 INTRA-ABDOMINAL AND PELVIC SWELLING, MASS AND LUMP, UNSPECIFIED SITE: Chronic | ICD-10-CM

## 2018-06-06 DIAGNOSIS — F41.9 ANXIETY DISORDER, UNSPECIFIED: ICD-10-CM

## 2018-06-06 LAB
ALBUMIN SERPL ELPH-MCNC: 4 G/DL — SIGNIFICANT CHANGE UP (ref 3.3–5)
ALP SERPL-CCNC: 96 U/L — SIGNIFICANT CHANGE UP (ref 40–120)
ALT FLD-CCNC: < 4 U/L — LOW (ref 4–33)
APPEARANCE UR: SIGNIFICANT CHANGE UP
AST SERPL-CCNC: 15 U/L — SIGNIFICANT CHANGE UP (ref 4–32)
BACTERIA # UR AUTO: HIGH
BILIRUB SERPL-MCNC: 0.2 MG/DL — SIGNIFICANT CHANGE UP (ref 0.2–1.2)
BILIRUB UR-MCNC: NEGATIVE — SIGNIFICANT CHANGE UP
BLOOD UR QL VISUAL: HIGH
BUN SERPL-MCNC: 20 MG/DL — SIGNIFICANT CHANGE UP (ref 7–23)
CALCIUM SERPL-MCNC: 9.7 MG/DL — SIGNIFICANT CHANGE UP (ref 8.4–10.5)
CHLORIDE SERPL-SCNC: 101 MMOL/L — SIGNIFICANT CHANGE UP (ref 98–107)
CO2 SERPL-SCNC: 29 MMOL/L — SIGNIFICANT CHANGE UP (ref 22–31)
COLOR SPEC: YELLOW — SIGNIFICANT CHANGE UP
CREAT SERPL-MCNC: 1.02 MG/DL — SIGNIFICANT CHANGE UP (ref 0.5–1.3)
GLUCOSE SERPL-MCNC: 75 MG/DL — SIGNIFICANT CHANGE UP (ref 70–99)
GLUCOSE UR-MCNC: NEGATIVE — SIGNIFICANT CHANGE UP
HCT VFR BLD CALC: 35.1 % — SIGNIFICANT CHANGE UP (ref 34.5–45)
HGB BLD-MCNC: 10.2 G/DL — LOW (ref 11.5–15.5)
KETONES UR-MCNC: NEGATIVE — SIGNIFICANT CHANGE UP
LEUKOCYTE ESTERASE UR-ACNC: HIGH
MCHC RBC-ENTMCNC: 24.1 PG — LOW (ref 27–34)
MCHC RBC-ENTMCNC: 29.1 % — LOW (ref 32–36)
MCV RBC AUTO: 82.8 FL — SIGNIFICANT CHANGE UP (ref 80–100)
NITRITE UR-MCNC: NEGATIVE — SIGNIFICANT CHANGE UP
NRBC # FLD: 0 — SIGNIFICANT CHANGE UP
PH UR: 7 — SIGNIFICANT CHANGE UP (ref 4.6–8)
PLATELET # BLD AUTO: 380 K/UL — SIGNIFICANT CHANGE UP (ref 150–400)
PMV BLD: 10.6 FL — SIGNIFICANT CHANGE UP (ref 7–13)
POTASSIUM SERPL-MCNC: 4.9 MMOL/L — SIGNIFICANT CHANGE UP (ref 3.5–5.3)
POTASSIUM SERPL-SCNC: 4.9 MMOL/L — SIGNIFICANT CHANGE UP (ref 3.5–5.3)
PROT SERPL-MCNC: 7 G/DL — SIGNIFICANT CHANGE UP (ref 6–8.3)
PROT UR-MCNC: 300 MG/DL — HIGH
RBC # BLD: 4.24 M/UL — SIGNIFICANT CHANGE UP (ref 3.8–5.2)
RBC # FLD: 20.7 % — HIGH (ref 10.3–14.5)
RBC CASTS # UR COMP ASSIST: >50 — HIGH (ref 0–?)
SODIUM SERPL-SCNC: 143 MMOL/L — SIGNIFICANT CHANGE UP (ref 135–145)
SP GR SPEC: 1.02 — SIGNIFICANT CHANGE UP (ref 1–1.04)
SQUAMOUS # UR AUTO: SIGNIFICANT CHANGE UP
UROBILINOGEN FLD QL: NORMAL MG/DL — SIGNIFICANT CHANGE UP
WBC # BLD: 9.49 K/UL — SIGNIFICANT CHANGE UP (ref 3.8–10.5)
WBC # FLD AUTO: 9.49 K/UL — SIGNIFICANT CHANGE UP (ref 3.8–10.5)
WBC UR QL: >50 — HIGH (ref 0–?)

## 2018-06-06 PROCEDURE — 93010 ELECTROCARDIOGRAM REPORT: CPT

## 2018-06-06 RX ORDER — TRAMADOL HYDROCHLORIDE 50 MG/1
1 TABLET ORAL
Qty: 0 | Refills: 0 | COMMUNITY

## 2018-06-06 RX ORDER — AMLODIPINE BESYLATE 2.5 MG/1
1 TABLET ORAL
Qty: 0 | Refills: 0 | COMMUNITY

## 2018-06-06 RX ORDER — CHOLECALCIFEROL (VITAMIN D3) 125 MCG
1 CAPSULE ORAL
Qty: 0 | Refills: 0 | COMMUNITY

## 2018-06-06 RX ORDER — PREGABALIN 225 MG/1
1 CAPSULE ORAL
Qty: 0 | Refills: 0 | COMMUNITY

## 2018-06-06 RX ORDER — POTASSIUM CHLORIDE 20 MEQ
1 PACKET (EA) ORAL
Qty: 0 | Refills: 0 | COMMUNITY

## 2018-06-06 RX ORDER — CIPROFLOXACIN LACTATE 400MG/40ML
1 VIAL (ML) INTRAVENOUS
Qty: 0 | Refills: 0 | COMMUNITY

## 2018-06-06 RX ORDER — OLMESARTAN MEDOXOMIL 5 MG/1
1 TABLET, FILM COATED ORAL
Qty: 0 | Refills: 0 | COMMUNITY

## 2018-06-06 RX ORDER — OMEPRAZOLE 10 MG/1
1 CAPSULE, DELAYED RELEASE ORAL
Qty: 0 | Refills: 0 | COMMUNITY

## 2018-06-06 RX ORDER — LOPERAMIDE HCL 2 MG
2 TABLET ORAL
Qty: 0 | Refills: 0 | COMMUNITY

## 2018-06-06 RX ORDER — AMLODIPINE BESYLATE 2.5 MG/1
0.5 TABLET ORAL
Qty: 0 | Refills: 0 | COMMUNITY

## 2018-06-06 RX ORDER — SERTRALINE 25 MG/1
1 TABLET, FILM COATED ORAL
Qty: 0 | Refills: 0 | COMMUNITY

## 2018-06-06 RX ORDER — ATORVASTATIN CALCIUM 80 MG/1
10 TABLET, FILM COATED ORAL
Qty: 0 | Refills: 0 | COMMUNITY

## 2018-06-06 NOTE — H&P PST ADULT - PMH
Abdominal mass  2013 metastatic endometrial cancer, s/p surgery, on chemotherapy at present-infusa port right chest  Anemia    Anxiety    Atrial fibrillation  one episode >30 years ago  Benign Hypertension    Cataracts, bilateral  monitored  Endometrial carcinoma  2001 - s/p KATH - tx with radiation, and currently receiving chemotherapy  Fatty liver    GERD (gastroesophageal reflux disease)    History of chemotherapy    Hydronephrosis determined by ultrasound  right kidney  Hyperlipidemia    Lymphedema of leg  right  Mitral valve prolapse    Multiple thyroid nodules    Post-Herpetic Trigeminal Neuralgia  left  Ulcerative colitis

## 2018-06-06 NOTE — H&P PST ADULT - NEGATIVE CARDIOVASCULAR SYMPTOMS
no palpitations/no paroxysmal nocturnal dyspnea/no dyspnea on exertion/no chest pain/no claudication

## 2018-06-06 NOTE — H&P PST ADULT - NEGATIVE OPHTHALMOLOGIC SYMPTOMS
no discharge L/no pain L/no pain R/no loss of vision L/no loss of vision R/no diplopia/no photophobia/no blurred vision R

## 2018-06-06 NOTE — H&P PST ADULT - PSH
Abdominal mass  s/p mass removed 2013,on chemo-right chest infusa port  Encounter for biopsy  2/14/13 - right iliac lymph node biopsy  H/O hydronephrosis  ureteral stent, multiple stent exchanges, last in 9/2017  H/O total hysterectomy  2001  History of chemotherapy  Infusaport insertion ; 2013  History of D&C  age 22  History of tonsillectomy

## 2018-06-06 NOTE — H&P PST ADULT - NEGATIVE ENMT SYMPTOMS
no dysphagia/no ear pain/no throat pain/no hearing difficulty/no vertigo/no sinus symptoms/no tinnitus/no nose bleeds

## 2018-06-06 NOTE — H&P PST ADULT - LAB RESULTS AND INTERPRETATION
cbc, cmp, ua, urine culture pending (as per PMD, recent potassium was low, requested repeat potassium)

## 2018-06-06 NOTE — H&P PST ADULT - BP NONINVASIVE SYSTOLIC (MM HG)
What Is The Reason For Today's Visit?: History of Non-Melanoma Skin Cancer How Many Skin Cancers Have You Had?: more than one denies pain/discomfort 170

## 2018-06-06 NOTE — H&P PST ADULT - PROBLEM SELECTOR PLAN 2
170/60, Pt is anxious. Pt instructed to take amlodipine benicar on the morning of procedure. Pt has appt with cardiologist on 6/14/18, with scheduled echo, pending echo report and cardiac note.

## 2018-06-06 NOTE — H&P PST ADULT - RS GEN PE MLT RESP DETAILS PC
airway patent/good air movement/clear to auscultation bilaterally/no wheezes/no chest wall tenderness/no rhonchi/no rales/breath sounds equal/respirations non-labored

## 2018-06-06 NOTE — H&P PST ADULT - HISTORY OF PRESENT ILLNESS
78 year old female presents to presurgical testing with diagnosis of other and unspecified hydronephrosis scheduled for cystoscopy right stent exchange for 6/18/18. Pt with hx of endometrial cancer in 2001 s/p KATH BSO, metastatic, currently on Taxol, treated at [facility], complicated by right lower extremity lymphedema, and hydronephrosis, with routine right renal stent exchanges, last one in 9/2017. Currently being treated with antibiotics. Denies dysuria, or hematuria.

## 2018-06-06 NOTE — H&P PST ADULT - MUSCULOSKELETAL
details… detailed exam no calf tenderness/no joint warmth/no joint erythema/normal strength/ROM intact

## 2018-06-06 NOTE — H&P PST ADULT - PROBLEM SELECTOR PLAN 1
Pt is scheduled for cystoscopy right stent exchange for 6/18/18. Preop instructions provided. Pt will take own omeprazole for GI prophylaxis. Pt stated understanding. Completed medical clearance appt, note in chart, PMD requesting repeat Potassium level, due to hypokalemia. Pending updated clearance. Pending last heme-onc note as patient is receiving chemotherapy.

## 2018-06-06 NOTE — H&P PST ADULT - FAMILY HISTORY
Hypertension     Mother  Still living? No  Family history of early CAD, Age at diagnosis: Age Unknown

## 2018-06-08 LAB
BACTERIA UR CULT: SIGNIFICANT CHANGE UP
SPECIMEN SOURCE: SIGNIFICANT CHANGE UP

## 2018-06-17 ENCOUNTER — TRANSCRIPTION ENCOUNTER (OUTPATIENT)
Age: 78
End: 2018-06-17

## 2018-06-18 ENCOUNTER — APPOINTMENT (OUTPATIENT)
Dept: UROLOGY | Facility: AMBULATORY SURGERY CENTER | Age: 78
End: 2018-06-18

## 2018-06-18 ENCOUNTER — OUTPATIENT (OUTPATIENT)
Dept: OUTPATIENT SERVICES | Facility: HOSPITAL | Age: 78
LOS: 1 days | Discharge: HOME | End: 2018-06-18
Payer: MEDICARE

## 2018-06-18 VITALS
OXYGEN SATURATION: 99 % | RESPIRATION RATE: 18 BRPM | HEART RATE: 88 BPM | SYSTOLIC BLOOD PRESSURE: 132 MMHG | DIASTOLIC BLOOD PRESSURE: 74 MMHG

## 2018-06-18 VITALS
TEMPERATURE: 98 F | SYSTOLIC BLOOD PRESSURE: 159 MMHG | DIASTOLIC BLOOD PRESSURE: 60 MMHG | HEIGHT: 61.81 IN | HEART RATE: 69 BPM | OXYGEN SATURATION: 100 % | WEIGHT: 108.03 LBS | RESPIRATION RATE: 16 BRPM

## 2018-06-18 DIAGNOSIS — Z92.21 PERSONAL HISTORY OF ANTINEOPLASTIC CHEMOTHERAPY: Chronic | ICD-10-CM

## 2018-06-18 DIAGNOSIS — R19.00 INTRA-ABDOMINAL AND PELVIC SWELLING, MASS AND LUMP, UNSPECIFIED SITE: Chronic | ICD-10-CM

## 2018-06-18 DIAGNOSIS — N13.39 OTHER HYDRONEPHROSIS: ICD-10-CM

## 2018-06-18 DIAGNOSIS — Z87.448 PERSONAL HISTORY OF OTHER DISEASES OF URINARY SYSTEM: Chronic | ICD-10-CM

## 2018-06-18 PROCEDURE — 52332 CYSTOSCOPY AND TREATMENT: CPT | Mod: RT

## 2018-06-18 NOTE — BRIEF OPERATIVE NOTE - PROCEDURE
<<-----Click on this checkbox to enter Procedure Cystoscopy  06/18/2018  stent placement, right  Active  [id number]

## 2018-06-18 NOTE — ASU DISCHARGE PLAN (ADULT/PEDIATRIC). - NOTIFY
Unable to Urinate/Pain not relieved by Medications/Persistent Nausea and Vomiting/Fever greater than 101

## 2018-06-19 LAB — SPECIMEN SOURCE: SIGNIFICANT CHANGE UP

## 2018-06-21 LAB — BACTERIA UR CULT: SIGNIFICANT CHANGE UP

## 2018-07-16 PROBLEM — Z92.21 PERSONAL HISTORY OF ANTINEOPLASTIC CHEMOTHERAPY: Chronic | Status: ACTIVE | Noted: 2017-03-27

## 2018-08-26 ENCOUNTER — EMERGENCY (EMERGENCY)
Facility: HOSPITAL | Age: 78
LOS: 1 days | Discharge: HOME | End: 2018-08-26
Attending: EMERGENCY MEDICINE
Payer: MEDICARE

## 2018-08-26 VITALS
TEMPERATURE: 99 F | SYSTOLIC BLOOD PRESSURE: 105 MMHG | DIASTOLIC BLOOD PRESSURE: 54 MMHG | WEIGHT: 110.01 LBS | HEART RATE: 70 BPM | RESPIRATION RATE: 16 BRPM | OXYGEN SATURATION: 99 % | HEIGHT: 62 IN

## 2018-08-26 VITALS
SYSTOLIC BLOOD PRESSURE: 148 MMHG | HEART RATE: 68 BPM | TEMPERATURE: 98 F | RESPIRATION RATE: 16 BRPM | DIASTOLIC BLOOD PRESSURE: 61 MMHG | OXYGEN SATURATION: 97 %

## 2018-08-26 DIAGNOSIS — R19.00 INTRA-ABDOMINAL AND PELVIC SWELLING, MASS AND LUMP, UNSPECIFIED SITE: Chronic | ICD-10-CM

## 2018-08-26 DIAGNOSIS — Z87.448 PERSONAL HISTORY OF OTHER DISEASES OF URINARY SYSTEM: Chronic | ICD-10-CM

## 2018-08-26 DIAGNOSIS — Z92.21 PERSONAL HISTORY OF ANTINEOPLASTIC CHEMOTHERAPY: Chronic | ICD-10-CM

## 2018-08-26 LAB
ALBUMIN SERPL ELPH-MCNC: 2.3 G/DL — LOW (ref 3.3–5)
ALP SERPL-CCNC: 164 U/L — HIGH (ref 40–120)
ALT FLD-CCNC: 25 U/L — SIGNIFICANT CHANGE UP (ref 12–78)
ANION GAP SERPL CALC-SCNC: 10 MMOL/L — SIGNIFICANT CHANGE UP (ref 5–17)
APPEARANCE UR: ABNORMAL
AST SERPL-CCNC: 154 U/L — HIGH (ref 15–37)
BASOPHILS # BLD AUTO: 0.02 K/UL — SIGNIFICANT CHANGE UP (ref 0–0.2)
BASOPHILS NFR BLD AUTO: 0.2 % — SIGNIFICANT CHANGE UP (ref 0–2)
BILIRUB SERPL-MCNC: 0.3 MG/DL — SIGNIFICANT CHANGE UP (ref 0.2–1.2)
BILIRUB UR-MCNC: ABNORMAL
BUN SERPL-MCNC: 16 MG/DL — SIGNIFICANT CHANGE UP (ref 7–23)
CALCIUM SERPL-MCNC: 8.3 MG/DL — LOW (ref 8.5–10.1)
CHLORIDE SERPL-SCNC: 100 MMOL/L — SIGNIFICANT CHANGE UP (ref 96–108)
CO2 SERPL-SCNC: 27 MMOL/L — SIGNIFICANT CHANGE UP (ref 22–31)
COLOR SPEC: ABNORMAL
CREAT SERPL-MCNC: 0.99 MG/DL — SIGNIFICANT CHANGE UP (ref 0.5–1.3)
DIFF PNL FLD: ABNORMAL
EOSINOPHIL # BLD AUTO: 0.01 K/UL — SIGNIFICANT CHANGE UP (ref 0–0.5)
EOSINOPHIL NFR BLD AUTO: 0.1 % — SIGNIFICANT CHANGE UP (ref 0–6)
GLUCOSE SERPL-MCNC: 139 MG/DL — HIGH (ref 70–99)
GLUCOSE UR QL: NEGATIVE — SIGNIFICANT CHANGE UP
HCT VFR BLD CALC: 24.9 % — LOW (ref 34.5–45)
HGB BLD-MCNC: 8 G/DL — LOW (ref 11.5–15.5)
IMM GRANULOCYTES NFR BLD AUTO: 1.1 % — SIGNIFICANT CHANGE UP (ref 0–1.5)
KETONES UR-MCNC: NEGATIVE — SIGNIFICANT CHANGE UP
LACTATE SERPL-SCNC: 0.6 MMOL/L — LOW (ref 0.7–2)
LEUKOCYTE ESTERASE UR-ACNC: ABNORMAL
LYMPHOCYTES # BLD AUTO: 0.78 K/UL — LOW (ref 1–3.3)
LYMPHOCYTES # BLD AUTO: 9.1 % — LOW (ref 13–44)
MCHC RBC-ENTMCNC: 26.7 PG — LOW (ref 27–34)
MCHC RBC-ENTMCNC: 32.1 GM/DL — SIGNIFICANT CHANGE UP (ref 32–36)
MCV RBC AUTO: 83 FL — SIGNIFICANT CHANGE UP (ref 80–100)
MONOCYTES # BLD AUTO: 0.65 K/UL — SIGNIFICANT CHANGE UP (ref 0–0.9)
MONOCYTES NFR BLD AUTO: 7.6 % — SIGNIFICANT CHANGE UP (ref 2–14)
NEUTROPHILS # BLD AUTO: 6.98 K/UL — SIGNIFICANT CHANGE UP (ref 1.8–7.4)
NEUTROPHILS NFR BLD AUTO: 81.9 % — HIGH (ref 43–77)
NITRITE UR-MCNC: NEGATIVE — SIGNIFICANT CHANGE UP
PH UR: 5 — SIGNIFICANT CHANGE UP (ref 5–8)
PLATELET # BLD AUTO: 234 K/UL — SIGNIFICANT CHANGE UP (ref 150–400)
POTASSIUM SERPL-MCNC: 2.9 MMOL/L — CRITICAL LOW (ref 3.5–5.3)
POTASSIUM SERPL-SCNC: 2.9 MMOL/L — CRITICAL LOW (ref 3.5–5.3)
PROCALCITONIN SERPL-MCNC: 0.18 NG/ML — HIGH (ref 0–0.04)
PROT SERPL-MCNC: 5.8 G/DL — LOW (ref 6–8.3)
PROT UR-MCNC: 75 MG/DL
RBC # BLD: 3 M/UL — LOW (ref 3.8–5.2)
RBC # FLD: 17.8 % — HIGH (ref 10.3–14.5)
SODIUM SERPL-SCNC: 137 MMOL/L — SIGNIFICANT CHANGE UP (ref 135–145)
SP GR SPEC: 1.01 — SIGNIFICANT CHANGE UP (ref 1.01–1.02)
UROBILINOGEN FLD QL: NEGATIVE — SIGNIFICANT CHANGE UP
WBC # BLD: 8.53 K/UL — SIGNIFICANT CHANGE UP (ref 3.8–10.5)
WBC # FLD AUTO: 8.53 K/UL — SIGNIFICANT CHANGE UP (ref 3.8–10.5)

## 2018-08-26 PROCEDURE — 87086 URINE CULTURE/COLONY COUNT: CPT

## 2018-08-26 PROCEDURE — 81001 URINALYSIS AUTO W/SCOPE: CPT

## 2018-08-26 PROCEDURE — 84145 PROCALCITONIN (PCT): CPT

## 2018-08-26 PROCEDURE — 83605 ASSAY OF LACTIC ACID: CPT

## 2018-08-26 PROCEDURE — 80053 COMPREHEN METABOLIC PANEL: CPT

## 2018-08-26 PROCEDURE — 96360 HYDRATION IV INFUSION INIT: CPT

## 2018-08-26 PROCEDURE — 87040 BLOOD CULTURE FOR BACTERIA: CPT

## 2018-08-26 PROCEDURE — 99285 EMERGENCY DEPT VISIT HI MDM: CPT

## 2018-08-26 PROCEDURE — 85027 COMPLETE CBC AUTOMATED: CPT

## 2018-08-26 PROCEDURE — 99283 EMERGENCY DEPT VISIT LOW MDM: CPT | Mod: 25

## 2018-08-26 RX ORDER — SODIUM CHLORIDE 9 MG/ML
1500 INJECTION INTRAMUSCULAR; INTRAVENOUS; SUBCUTANEOUS ONCE
Qty: 0 | Refills: 0 | Status: COMPLETED | OUTPATIENT
Start: 2018-08-26 | End: 2018-08-26

## 2018-08-26 RX ORDER — POTASSIUM CHLORIDE 20 MEQ
40 PACKET (EA) ORAL ONCE
Qty: 0 | Refills: 0 | Status: COMPLETED | OUTPATIENT
Start: 2018-08-26 | End: 2018-08-26

## 2018-08-26 RX ORDER — CEFUROXIME AXETIL 250 MG
1 TABLET ORAL
Qty: 20 | Refills: 0 | OUTPATIENT
Start: 2018-08-26 | End: 2018-09-04

## 2018-08-26 RX ORDER — CEFTRIAXONE 500 MG/1
1 INJECTION, POWDER, FOR SOLUTION INTRAMUSCULAR; INTRAVENOUS ONCE
Qty: 0 | Refills: 0 | Status: DISCONTINUED | OUTPATIENT
Start: 2018-08-26 | End: 2018-08-30

## 2018-08-26 RX ADMIN — SODIUM CHLORIDE 1500 MILLILITER(S): 9 INJECTION INTRAMUSCULAR; INTRAVENOUS; SUBCUTANEOUS at 19:49

## 2018-08-26 RX ADMIN — Medication 40 MILLIEQUIVALENT(S): at 20:17

## 2018-08-26 RX ADMIN — SODIUM CHLORIDE 1500 MILLILITER(S): 9 INJECTION INTRAMUSCULAR; INTRAVENOUS; SUBCUTANEOUS at 20:49

## 2018-08-26 NOTE — ED ADULT NURSE NOTE - NSIMPLEMENTINTERV_GEN_ALL_ED
Implemented All Universal Safety Interventions:  [place] to call system. Call bell, personal items and telephone within reach. Instruct patient to call for assistance. Room bathroom lighting operational. Non-slip footwear when patient is off stretcher. Physically safe environment: no spills, clutter or unnecessary equipment. Stretcher in lowest position, wheels locked, appropriate side rails in place.

## 2018-08-26 NOTE — ED ADULT NURSE REASSESSMENT NOTE - COMFORT CARE
meal provided/wait time explained/warm blanket provided/po fluids offered/side rails down/ambulated to bathroom/plan of care explained/repositioned

## 2018-08-26 NOTE — ED PROVIDER NOTE - OBJECTIVE STATEMENT
77 yo white female with H/O Endometrial Ca x 17-years s/p recent CTx 1-week ago at which time WBC was 7k and now presents to ED today with complaints of feeling cold yesterday and today was found to have temperature of 102 at home. Patient took Motrin and shortly thereafter temp came down. Denies any cough, chest pain, SOB, abdominal pains, nausea, vomiting, diarrhea, dysuria or hematuria. 79 yo white female with H/O Endometrial Ca x 17-years s/p recent CTx 1-week ago at which time WBC was 7k and now presents to ED today with complaints of feeling cold yesterday and today was found to have temperature of 102 at home. Patient took Motrin and shortly thereafter temp came down. Denies any cough, chest pain, SOB, abdominal pains, nausea, vomiting, diarrhea, dysuria or hematuria. No recent travel.

## 2018-08-26 NOTE — ED PROVIDER NOTE - CONSTITUTIONAL, MLM
normal... Well appearing, thin, elderly white female, well nourished, awake, alert, oriented to person, place, time/situation and in no apparent distress.

## 2018-08-26 NOTE — ED ADULT TRIAGE NOTE - CHIEF COMPLAINT QUOTE
Pt is c/o fever today, has endometrial cancer, last chemo treatment was last week Monday, Mediport to the Right chest wall, today she took Tylenol today for fever.

## 2018-08-26 NOTE — ED ADULT NURSE NOTE - OBJECTIVE STATEMENT
Patient presents to room 2, history of endometrial cancer currently on IV chemotherapy, Port on right chest wall.  patient reporting weakness, lethargy worsening for the last several days.

## 2018-08-27 PROBLEM — K21.9 GASTRO-ESOPHAGEAL REFLUX DISEASE WITHOUT ESOPHAGITIS: Chronic | Status: ACTIVE | Noted: 2018-06-06

## 2018-08-27 PROBLEM — D64.9 ANEMIA, UNSPECIFIED: Chronic | Status: ACTIVE | Noted: 2018-06-06

## 2018-08-27 PROBLEM — H26.9 UNSPECIFIED CATARACT: Chronic | Status: ACTIVE | Noted: 2018-06-06

## 2018-08-27 PROBLEM — E04.2 NONTOXIC MULTINODULAR GOITER: Chronic | Status: ACTIVE | Noted: 2018-06-06

## 2018-08-27 PROBLEM — K76.0 FATTY (CHANGE OF) LIVER, NOT ELSEWHERE CLASSIFIED: Chronic | Status: ACTIVE | Noted: 2018-06-06

## 2018-08-27 LAB
CULTURE RESULTS: SIGNIFICANT CHANGE UP
SPECIMEN SOURCE: SIGNIFICANT CHANGE UP

## 2018-09-11 ENCOUNTER — INPATIENT (INPATIENT)
Facility: HOSPITAL | Age: 78
LOS: 15 days | Discharge: NURSING, REHAB OR LONG-TERM CARE | DRG: 372 | End: 2018-09-27
Attending: HOSPITALIST | Admitting: STUDENT IN AN ORGANIZED HEALTH CARE EDUCATION/TRAINING PROGRAM
Payer: MEDICARE

## 2018-09-11 VITALS
HEIGHT: 62 IN | HEART RATE: 75 BPM | DIASTOLIC BLOOD PRESSURE: 70 MMHG | OXYGEN SATURATION: 99 % | RESPIRATION RATE: 16 BRPM | WEIGHT: 108.03 LBS | SYSTOLIC BLOOD PRESSURE: 166 MMHG | TEMPERATURE: 100 F

## 2018-09-11 DIAGNOSIS — R31.9 HEMATURIA, UNSPECIFIED: ICD-10-CM

## 2018-09-11 DIAGNOSIS — E87.6 HYPOKALEMIA: ICD-10-CM

## 2018-09-11 DIAGNOSIS — Z87.448 PERSONAL HISTORY OF OTHER DISEASES OF URINARY SYSTEM: Chronic | ICD-10-CM

## 2018-09-11 DIAGNOSIS — R19.00 INTRA-ABDOMINAL AND PELVIC SWELLING, MASS AND LUMP, UNSPECIFIED SITE: Chronic | ICD-10-CM

## 2018-09-11 DIAGNOSIS — R09.89 OTHER SPECIFIED SYMPTOMS AND SIGNS INVOLVING THE CIRCULATORY AND RESPIRATORY SYSTEMS: ICD-10-CM

## 2018-09-11 DIAGNOSIS — K51.919 ULCERATIVE COLITIS, UNSPECIFIED WITH UNSPECIFIED COMPLICATIONS: ICD-10-CM

## 2018-09-11 DIAGNOSIS — Z92.21 PERSONAL HISTORY OF ANTINEOPLASTIC CHEMOTHERAPY: Chronic | ICD-10-CM

## 2018-09-11 DIAGNOSIS — Z29.9 ENCOUNTER FOR PROPHYLACTIC MEASURES, UNSPECIFIED: ICD-10-CM

## 2018-09-11 DIAGNOSIS — R19.7 DIARRHEA, UNSPECIFIED: ICD-10-CM

## 2018-09-11 DIAGNOSIS — K52.9 NONINFECTIVE GASTROENTERITIS AND COLITIS, UNSPECIFIED: ICD-10-CM

## 2018-09-11 DIAGNOSIS — C54.1 MALIGNANT NEOPLASM OF ENDOMETRIUM: ICD-10-CM

## 2018-09-11 DIAGNOSIS — I10 ESSENTIAL (PRIMARY) HYPERTENSION: ICD-10-CM

## 2018-09-11 DIAGNOSIS — N30.90 CYSTITIS, UNSPECIFIED WITHOUT HEMATURIA: ICD-10-CM

## 2018-09-11 LAB
ALBUMIN SERPL ELPH-MCNC: 2.8 G/DL — LOW (ref 3.3–5)
ALP SERPL-CCNC: 118 U/L — SIGNIFICANT CHANGE UP (ref 40–120)
ALT FLD-CCNC: 5 U/L — LOW (ref 10–45)
ANION GAP SERPL CALC-SCNC: 13 MMOL/L — SIGNIFICANT CHANGE UP (ref 5–17)
APPEARANCE UR: CLEAR — SIGNIFICANT CHANGE UP
AST SERPL-CCNC: 12 U/L — SIGNIFICANT CHANGE UP (ref 10–40)
BACTERIA # UR AUTO: NEGATIVE — SIGNIFICANT CHANGE UP
BASE EXCESS BLDV CALC-SCNC: 4.9 MMOL/L — HIGH (ref -2–2)
BASOPHILS # BLD AUTO: 0 K/UL — SIGNIFICANT CHANGE UP (ref 0–0.2)
BASOPHILS NFR BLD AUTO: 0 % — SIGNIFICANT CHANGE UP (ref 0–2)
BILIRUB SERPL-MCNC: 0.5 MG/DL — SIGNIFICANT CHANGE UP (ref 0.2–1.2)
BILIRUB UR-MCNC: NEGATIVE — SIGNIFICANT CHANGE UP
BUN SERPL-MCNC: 12 MG/DL — SIGNIFICANT CHANGE UP (ref 7–23)
CA-I SERPL-SCNC: 1.19 MMOL/L — SIGNIFICANT CHANGE UP (ref 1.12–1.3)
CALCIUM SERPL-MCNC: 8.7 MG/DL — SIGNIFICANT CHANGE UP (ref 8.4–10.5)
CHLORIDE BLDV-SCNC: 93 MMOL/L — LOW (ref 96–108)
CHLORIDE SERPL-SCNC: 93 MMOL/L — LOW (ref 96–108)
CO2 BLDV-SCNC: 31 MMOL/L — HIGH (ref 22–30)
CO2 SERPL-SCNC: 26 MMOL/L — SIGNIFICANT CHANGE UP (ref 22–31)
COLOR SPEC: YELLOW — SIGNIFICANT CHANGE UP
CREAT SERPL-MCNC: 0.84 MG/DL — SIGNIFICANT CHANGE UP (ref 0.5–1.3)
DIFF PNL FLD: ABNORMAL
EOSINOPHIL # BLD AUTO: 0.1 K/UL — SIGNIFICANT CHANGE UP (ref 0–0.5)
EOSINOPHIL NFR BLD AUTO: 0.5 % — SIGNIFICANT CHANGE UP (ref 0–6)
EPI CELLS # UR: 2 /HPF — SIGNIFICANT CHANGE UP
GAS PNL BLDV: 131 MMOL/L — LOW (ref 136–145)
GAS PNL BLDV: SIGNIFICANT CHANGE UP
GAS PNL BLDV: SIGNIFICANT CHANGE UP
GLUCOSE BLDV-MCNC: 93 MG/DL — SIGNIFICANT CHANGE UP (ref 70–99)
GLUCOSE SERPL-MCNC: 94 MG/DL — SIGNIFICANT CHANGE UP (ref 70–99)
GLUCOSE UR QL: NEGATIVE — SIGNIFICANT CHANGE UP
HCO3 BLDV-SCNC: 29 MMOL/L — SIGNIFICANT CHANGE UP (ref 21–29)
HCT VFR BLD CALC: 24.8 % — LOW (ref 34.5–45)
HCT VFR BLDA CALC: 25 % — LOW (ref 39–50)
HGB BLD CALC-MCNC: 8 G/DL — LOW (ref 11.5–15.5)
HGB BLD-MCNC: 8 G/DL — LOW (ref 11.5–15.5)
HYALINE CASTS # UR AUTO: 0 /LPF — SIGNIFICANT CHANGE UP (ref 0–2)
KETONES UR-MCNC: NEGATIVE — SIGNIFICANT CHANGE UP
LACTATE BLDV-MCNC: 0.6 MMOL/L — LOW (ref 0.7–2)
LEUKOCYTE ESTERASE UR-ACNC: NEGATIVE — SIGNIFICANT CHANGE UP
LIDOCAIN IGE QN: 13 U/L — SIGNIFICANT CHANGE UP (ref 7–60)
LYMPHOCYTES # BLD AUTO: 0.9 K/UL — LOW (ref 1–3.3)
LYMPHOCYTES # BLD AUTO: 5 % — LOW (ref 13–44)
MCHC RBC-ENTMCNC: 26.9 PG — LOW (ref 27–34)
MCHC RBC-ENTMCNC: 32.3 GM/DL — SIGNIFICANT CHANGE UP (ref 32–36)
MCV RBC AUTO: 83.2 FL — SIGNIFICANT CHANGE UP (ref 80–100)
MONOCYTES # BLD AUTO: 0.7 K/UL — SIGNIFICANT CHANGE UP (ref 0–0.9)
MONOCYTES NFR BLD AUTO: 4.1 % — SIGNIFICANT CHANGE UP (ref 2–14)
NEUTROPHILS # BLD AUTO: 16.1 K/UL — HIGH (ref 1.8–7.4)
NEUTROPHILS NFR BLD AUTO: 90.5 % — HIGH (ref 43–77)
NITRITE UR-MCNC: NEGATIVE — SIGNIFICANT CHANGE UP
PCO2 BLDV: 44 MMHG — SIGNIFICANT CHANGE UP (ref 35–50)
PH BLDV: 7.43 — SIGNIFICANT CHANGE UP (ref 7.35–7.45)
PH UR: 7 — SIGNIFICANT CHANGE UP (ref 5–8)
PLATELET # BLD AUTO: 335 K/UL — SIGNIFICANT CHANGE UP (ref 150–400)
PO2 BLDV: 35 MMHG — SIGNIFICANT CHANGE UP (ref 25–45)
POTASSIUM BLDV-SCNC: 2.8 MMOL/L — CRITICAL LOW (ref 3.5–5.3)
POTASSIUM SERPL-MCNC: 3 MMOL/L — LOW (ref 3.5–5.3)
POTASSIUM SERPL-SCNC: 3 MMOL/L — LOW (ref 3.5–5.3)
PROCALCITONIN SERPL-MCNC: 0.14 NG/ML — HIGH (ref 0.02–0.1)
PROT SERPL-MCNC: 5.6 G/DL — LOW (ref 6–8.3)
PROT UR-MCNC: ABNORMAL
RBC # BLD: 2.98 M/UL — LOW (ref 3.8–5.2)
RBC # FLD: 17.8 % — HIGH (ref 10.3–14.5)
RBC CASTS # UR COMP ASSIST: 287 /HPF — HIGH (ref 0–4)
SAO2 % BLDV: 61 % — LOW (ref 67–88)
SODIUM SERPL-SCNC: 132 MMOL/L — LOW (ref 135–145)
SP GR SPEC: >1.03 (ref 1.01–1.02)
UROBILINOGEN FLD QL: NEGATIVE — SIGNIFICANT CHANGE UP
WBC # BLD: 17.8 K/UL — HIGH (ref 3.8–10.5)
WBC # FLD AUTO: 17.8 K/UL — HIGH (ref 3.8–10.5)
WBC UR QL: 13 /HPF — HIGH (ref 0–5)

## 2018-09-11 PROCEDURE — 99285 EMERGENCY DEPT VISIT HI MDM: CPT | Mod: GC

## 2018-09-11 PROCEDURE — 99223 1ST HOSP IP/OBS HIGH 75: CPT

## 2018-09-11 PROCEDURE — 99497 ADVNCD CARE PLAN 30 MIN: CPT | Mod: 25

## 2018-09-11 PROCEDURE — 71046 X-RAY EXAM CHEST 2 VIEWS: CPT | Mod: 26

## 2018-09-11 PROCEDURE — 93010 ELECTROCARDIOGRAM REPORT: CPT

## 2018-09-11 RX ORDER — LOPERAMIDE HCL 2 MG
2 TABLET ORAL EVERY 4 HOURS
Qty: 0 | Refills: 0 | Status: DISCONTINUED | OUTPATIENT
Start: 2018-09-11 | End: 2018-09-12

## 2018-09-11 RX ORDER — BALSALAZIDE DISODIUM 750 MG/1
2250 CAPSULE ORAL THREE TIMES A DAY
Qty: 0 | Refills: 0 | Status: DISCONTINUED | OUTPATIENT
Start: 2018-09-11 | End: 2018-09-27

## 2018-09-11 RX ORDER — SODIUM CHLORIDE 9 MG/ML
1000 INJECTION, SOLUTION INTRAVENOUS ONCE
Qty: 0 | Refills: 0 | Status: COMPLETED | OUTPATIENT
Start: 2018-09-11 | End: 2018-09-11

## 2018-09-11 RX ORDER — MAGNESIUM SULFATE 500 MG/ML
1 VIAL (ML) INJECTION ONCE
Qty: 0 | Refills: 0 | Status: COMPLETED | OUTPATIENT
Start: 2018-09-11 | End: 2018-09-11

## 2018-09-11 RX ORDER — LOSARTAN POTASSIUM 100 MG/1
100 TABLET, FILM COATED ORAL DAILY
Qty: 0 | Refills: 0 | Status: DISCONTINUED | OUTPATIENT
Start: 2018-09-11 | End: 2018-09-13

## 2018-09-11 RX ORDER — LOPERAMIDE HCL 2 MG
0 TABLET ORAL
Qty: 0 | Refills: 0 | COMMUNITY

## 2018-09-11 RX ORDER — AMLODIPINE BESYLATE 2.5 MG/1
0 TABLET ORAL
Qty: 0 | Refills: 0 | COMMUNITY

## 2018-09-11 RX ORDER — POTASSIUM CHLORIDE 20 MEQ
40 PACKET (EA) ORAL ONCE
Qty: 0 | Refills: 0 | Status: COMPLETED | OUTPATIENT
Start: 2018-09-11 | End: 2018-09-11

## 2018-09-11 RX ORDER — CIPROFLOXACIN LACTATE 400MG/40ML
400 VIAL (ML) INTRAVENOUS EVERY 12 HOURS
Qty: 0 | Refills: 0 | Status: DISCONTINUED | OUTPATIENT
Start: 2018-09-11 | End: 2018-09-12

## 2018-09-11 RX ORDER — ATORVASTATIN CALCIUM 80 MG/1
10 TABLET, FILM COATED ORAL AT BEDTIME
Qty: 0 | Refills: 0 | Status: DISCONTINUED | OUTPATIENT
Start: 2018-09-11 | End: 2018-09-27

## 2018-09-11 RX ORDER — AMLODIPINE BESYLATE 2.5 MG/1
2.5 TABLET ORAL DAILY
Qty: 0 | Refills: 0 | Status: DISCONTINUED | OUTPATIENT
Start: 2018-09-11 | End: 2018-09-13

## 2018-09-11 RX ORDER — METRONIDAZOLE 500 MG
500 TABLET ORAL ONCE
Qty: 0 | Refills: 0 | Status: COMPLETED | OUTPATIENT
Start: 2018-09-11 | End: 2018-09-11

## 2018-09-11 RX ORDER — CIPROFLOXACIN LACTATE 400MG/40ML
400 VIAL (ML) INTRAVENOUS ONCE
Qty: 0 | Refills: 0 | Status: COMPLETED | OUTPATIENT
Start: 2018-09-11 | End: 2018-09-11

## 2018-09-11 RX ORDER — ENOXAPARIN SODIUM 100 MG/ML
40 INJECTION SUBCUTANEOUS EVERY 24 HOURS
Qty: 0 | Refills: 0 | Status: DISCONTINUED | OUTPATIENT
Start: 2018-09-11 | End: 2018-09-27

## 2018-09-11 RX ORDER — SERTRALINE 25 MG/1
0 TABLET, FILM COATED ORAL
Qty: 0 | Refills: 0 | COMMUNITY

## 2018-09-11 RX ORDER — PANTOPRAZOLE SODIUM 20 MG/1
40 TABLET, DELAYED RELEASE ORAL
Qty: 0 | Refills: 0 | Status: DISCONTINUED | OUTPATIENT
Start: 2018-09-11 | End: 2018-09-27

## 2018-09-11 RX ORDER — BALSALAZIDE DISODIUM 750 MG/1
0 CAPSULE ORAL
Qty: 0 | Refills: 0 | COMMUNITY

## 2018-09-11 RX ORDER — METRONIDAZOLE 500 MG
500 TABLET ORAL EVERY 8 HOURS
Qty: 0 | Refills: 0 | Status: DISCONTINUED | OUTPATIENT
Start: 2018-09-11 | End: 2018-09-12

## 2018-09-11 RX ORDER — SODIUM CHLORIDE 9 MG/ML
1000 INJECTION, SOLUTION INTRAVENOUS
Qty: 0 | Refills: 0 | Status: DISCONTINUED | OUTPATIENT
Start: 2018-09-11 | End: 2018-09-13

## 2018-09-11 RX ORDER — ACETAMINOPHEN 500 MG
0 TABLET ORAL
Qty: 0 | Refills: 0 | COMMUNITY

## 2018-09-11 RX ORDER — DIPHENOXYLATE HCL/ATROPINE 2.5-.025MG
0 TABLET ORAL
Qty: 0 | Refills: 0 | COMMUNITY

## 2018-09-11 RX ORDER — SERTRALINE 25 MG/1
25 TABLET, FILM COATED ORAL
Qty: 0 | Refills: 0 | Status: DISCONTINUED | OUTPATIENT
Start: 2018-09-11 | End: 2018-09-27

## 2018-09-11 RX ORDER — POTASSIUM CHLORIDE 20 MEQ
10 PACKET (EA) ORAL ONCE
Qty: 0 | Refills: 0 | Status: COMPLETED | OUTPATIENT
Start: 2018-09-11 | End: 2018-09-11

## 2018-09-11 RX ADMIN — Medication 500 MILLIGRAM(S): at 20:41

## 2018-09-11 RX ADMIN — Medication 100 MILLIEQUIVALENT(S): at 18:56

## 2018-09-11 RX ADMIN — Medication 10 MILLIEQUIVALENT(S): at 20:41

## 2018-09-11 RX ADMIN — Medication 100 MILLIGRAM(S): at 17:52

## 2018-09-11 RX ADMIN — Medication 40 MILLIEQUIVALENT(S): at 18:56

## 2018-09-11 RX ADMIN — Medication 400 MILLIGRAM(S): at 20:41

## 2018-09-11 RX ADMIN — SODIUM CHLORIDE 1000 MILLILITER(S): 9 INJECTION, SOLUTION INTRAVENOUS at 17:50

## 2018-09-11 RX ADMIN — Medication 200 MILLIGRAM(S): at 19:01

## 2018-09-11 NOTE — ED ADULT TRIAGE NOTE - CHIEF COMPLAINT QUOTE
body aches and increased weakness x 2 days abd pain   seen by md and concern for colitis   has port patient states she receives transfusions for "elevated White count" unsure of what they transfuse through it. last transfusion earlier today  has endometrium ca, on chemo last transfusion 1 week ago

## 2018-09-11 NOTE — H&P ADULT - PROBLEM SELECTOR PLAN 1
CT abd/pelvis with sigmoid colitis. 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 Pt denies woodrow diarrhea currently although is takes imodium regularly  -Will cont. IV cipro and flagyl  -Gentle IVF overnight  -EKG for QTc  -Zofran PRN  -GI/Bunny to see in AM?

## 2018-09-11 NOTE — ED PROVIDER NOTE - ATTENDING CONTRIBUTION TO CARE
Pt with worsening malaise and fatigue with poor intake and poor urine output since dc from [facility] with fever and dysuria, had ct outpt showing colitis.  Pt appears slightly pale, dry mm, abdomen soft.

## 2018-09-11 NOTE — H&P ADULT - NSHPOUTPATIENTPROVIDERS_GEN_ALL_CORE
Dr. Will Dumont oncology formerly [facility], George Berkowitz for 2nd opinion Altaf [facility] oncology. Anand GI

## 2018-09-11 NOTE — ED PROVIDER NOTE - MEDICAL DECISION MAKING DETAILS
78F hx endometiral CA, htn, hld, uc w/ current flare per CTAP done yesterday, p/w lower abdominal pain and generalized weakness. Ill appearing, dry mucous membranes, RRR s1s2, poor inspiratory effort on exam, will check labs, cultures, ua, uc, cxr, give fluids/tylenol/motrin, prednisone + flagyl for UC flare. Likely admit for colitis and further w/u.

## 2018-09-11 NOTE — H&P ADULT - FAMILY HISTORY
Hypertension     Father  Still living? Unknown  Family history of early CAD, Age at diagnosis: Age Unknown

## 2018-09-11 NOTE — H&P ADULT - ASSESSMENT
78F mediocre historian w/ hx of endometrial cancer w/ mets currently receiving weekly chemotherapy with paclitaxel which she seems to be tolerating, Ulcerative colitis?, chronic diarrhea on immodium, HTN, p/w not feeling well and likely sigmoid colitis.

## 2018-09-11 NOTE — H&P ADULT - PMH
Abdominal mass  2013 metastatic endometrial cancer, s/p surgery, on chemotherapy at present-infusa port right chest  Anemia    Anxiety    Atrial fibrillation  one episode >30 years ago  Benign Hypertension    Cataracts, bilateral  monitored  Endometrial carcinoma  2001 - s/p KATH - tx with radiation, and currently receiving chemotherapy  Fatty liver    GERD (gastroesophageal reflux disease)    History of chemotherapy    Hydronephrosis determined by ultrasound  right kidney  Hyperlipidemia    Lymphedema of leg  right  Malignant neoplasm of ovary, unspecified laterality    Mitral valve prolapse    Multiple thyroid nodules    Post-Herpetic Trigeminal Neuralgia  left  Ulcerative colitis

## 2018-09-11 NOTE — ED PROVIDER NOTE - PROGRESS NOTE DETAILS
Spoke to Dr. Michel's team, informed us that colitis is likely 2/2 radiation colitis given pt receives radiation therapy for her endometrial CA. They also remarked that Dr. uHertas will be in the hospital rounding tomorrow and will see her here if she is admitted. Dr. Lima Note: pt considered for CDU, however, pt very unsteady on feet, very weak, dehydrated, not a good candidate, will need 2-3 days on inpt care, will admit inpt.

## 2018-09-11 NOTE — H&P ADULT - PROBLEM SELECTOR PLAN 3
Possibly related to diarrhea? Pt on ARB at home. Currently hypokalemic and magnesemic. S/p repletion in ER  -Trend BMP/Mg tonight and in AM  -EKG  -Replete K and Mg as needed

## 2018-09-11 NOTE — ED CDU PROVIDER INITIAL DAY NOTE - MEDICAL DECISION MAKING DETAILS
Dr. Lima Note: malaise with low grade fever one week s/p chemo, antibx, fluids, replete lytes and determine need for inpt after trial in CDU

## 2018-09-11 NOTE — H&P ADULT - PROBLEM SELECTOR PLAN 5
Pt states her UC has been relatively controlled? See above regarding current colitis.  -Cont. balsalazide  -F/u CRP, consider fecal calprotectin  -F/u GI?

## 2018-09-11 NOTE — ED PROVIDER NOTE - OBJECTIVE STATEMENT
78F pmhx endometrial CA, ulcerative colitis, htn, hld, p/w generalized weakness x a few weeks in setting of colitis flare found yesterday on CTAP. Pt was admitted to [facility] 2 weeks ago for fever/chills, found to have a UTI and started on abx. EVentualky sent home. Pt now back in the hospital s/p feeling generalized weakness. Denies f/c, cp, sob, abp/n/v. Pt does endorse nonbloody diarrhea and weight loss over last few weeks.  GI: Anand

## 2018-09-11 NOTE — ED ADULT NURSE NOTE - PMH
Abdominal mass  2013 metastatic endometrial cancer, s/p surgery, on chemotherapy at present-infusa port right chest  Anemia    Anxiety    Atrial fibrillation  one episode >30 years ago  Benign Hypertension    Cataracts, bilateral  monitored  Endometrial carcinoma  2001 - s/p AKTH - tx with radiation, and currently receiving chemotherapy  Fatty liver    GERD (gastroesophageal reflux disease)    History of chemotherapy    Hydronephrosis determined by ultrasound  right kidney  Hyperlipidemia    Lymphedema of leg  right  Mitral valve prolapse    Multiple thyroid nodules    Post-Herpetic Trigeminal Neuralgia  left  Ulcerative colitis

## 2018-09-11 NOTE — PATIENT PROFILE ADULT. - HEALTHCARE QUESTIONS, PROFILE
Health Maintenance Summary     Topic Due On Due Status Completed On    Colorectal Cancer Screening - Colonoscopy Oct 5, 2020 Not Due Oct 5, 2015    IMMUNIZATION - DTaP/Tdap/Td Mar 11, 2018 Overdue Mar 11, 2008    Immunization-Influenza Sep 1, 2018 Not Due Oct 16, 2015    Depression Screening Jul 9, 1977 Overdue     Hepatitis C Screening Jul 9, 2016 Overdue           Patient is due for topics as listed above, he wishes to discuss with provider .                None

## 2018-09-11 NOTE — H&P ADULT - PROBLEM SELECTOR PLAN 6
-Trend vital signs. Record suggest pt takes amlodipine twice a day? Pt cannot recall clearly  -Cont. amlodipine 2.5mg daily and cont. to monitor  -Cont. losartan for benicar in patient

## 2018-09-11 NOTE — H&P ADULT - NSHPSOCIALHISTORY_GEN_ALL_CORE
Pt states she hasn't smoked in roughly 40 years, Denies recent ETOH. Lives alone but has partner named Wolf and daughter named Vidya Tripp

## 2018-09-11 NOTE — ED ADULT NURSE NOTE - OBJECTIVE STATEMENT
78 y.o F with hx of endometrial cancer presents to the ED from the [facility] for weakness. Patient is awake, alert and speaking coherently. As per patient she has not been feeling herself for the past day; states "I have been feeling sick to my stomach and have a lot of abdominal pain." Patient states she has a pmhx of ulcerative colitis. Patient presents A&ox3, afebrile and ambulatory; denies chest pain and SOB; abdomen soft, nondistended but tender on palpation over all quadrants; denies nausea but endorses diarrhea 3x today prior to ED arrival, denies blood in stool- denies hematuria but states she has been urinating less than usual- endorses decreased PO intake.

## 2018-09-11 NOTE — H&P ADULT - PROBLEM SELECTOR PLAN 2
Endometrial cancer w/ mets. Gets chemo through port weekly. Recently had 2nd opinion with Dr. Altaf Arteaga at [facility] as pt states she did not like the bad news she was given at [facility]. Jenni recommended to consider hormone therapy. Pt states she has a significant other named Wolf who she does not live with. She lives with herself but as per records pt has with her she has good functional status. Pt states she would want her HCP to be her daughter Vidya Tripp who lives in [place]. Discussed possible life sustaining measures given her current condition and pt states she would want all possible resuscitative efforts.  -Consider oncology consult with Altaf Arteaga or rosalio Dumont at [facility]  -Full Code  - I spent 20 minutes in face to face time with patient.

## 2018-09-11 NOTE — H&P ADULT - NSHPPHYSICALEXAM_GEN_ALL_CORE
Vital Signs Last 24 Hrs  T(C): 37.7 (09-11-18 @ 21:50), Max: 37.7 (09-11-18 @ 21:50)  T(F): 99.8 (09-11-18 @ 21:50), Max: 99.8 (09-11-18 @ 21:50)  HR: 74 (09-11-18 @ 21:50) (74 - 75)  BP: 168/75 (09-11-18 @ 21:50) (166/70 - 168/75)  BP(mean): --  RR: 18 (09-11-18 @ 21:50) (16 - 18)  SpO2: 100% (09-11-18 @ 21:50) (99% - 100%)

## 2018-09-11 NOTE — H&P ADULT - HISTORY OF PRESENT ILLNESS
78F mediocre historian w/ hx of endometrial cancer w/ mets currently receiving weekly chemotherapy with paclitaxel which she seems to be tolerating, Ulcerative colitis?, chronic diarrhea on immodium, HTN, p/w not feeling well. Reportedly patient was in [place] 2 weeks ago for unclear reason. Pt states she was feeling better when starting roughly 2 days ago she developed weakness and nausea. Symptoms continued today without improvement so went for further evaluation. She had an outpatient CT abd/pelvis performed which indicated sigmoid colitis and came to the ER. Of note, pt's outpatient images have been uploaded into EMR. As per ER signout, pt's GI Dr. Hollis thinks radiation colitis? Endorses some LLQ pain and pain is worse with palpation. She endorses constipation over the last 2 days but had large solid BM today. Denies hematochezia or melena. No obvious source of bleeding as per patient. Pt has extensive paperwork with her including results of her CT abd/pelvis earlier today.     In ER: Given Cipro, flagyl IV, LR 1L, KCl 40mg PO, KCl 10mg IV

## 2018-09-12 DIAGNOSIS — A04.72 ENTEROCOLITIS DUE TO CLOSTRIDIUM DIFFICILE, NOT SPECIFIED AS RECURRENT: ICD-10-CM

## 2018-09-12 LAB
ANION GAP SERPL CALC-SCNC: 14 MMOL/L — SIGNIFICANT CHANGE UP (ref 5–17)
ANION GAP SERPL CALC-SCNC: 16 MMOL/L — SIGNIFICANT CHANGE UP (ref 5–17)
BASOPHILS # BLD AUTO: 0 K/UL — SIGNIFICANT CHANGE UP (ref 0–0.2)
BASOPHILS NFR BLD AUTO: 0.1 % — SIGNIFICANT CHANGE UP (ref 0–2)
BUN SERPL-MCNC: 10 MG/DL — SIGNIFICANT CHANGE UP (ref 7–23)
BUN SERPL-MCNC: 8 MG/DL — SIGNIFICANT CHANGE UP (ref 7–23)
C DIFF GDH STL QL: POSITIVE — SIGNIFICANT CHANGE UP
C DIFF GDH STL QL: SIGNIFICANT CHANGE UP
CALCIUM SERPL-MCNC: 8.3 MG/DL — LOW (ref 8.4–10.5)
CALCIUM SERPL-MCNC: 8.5 MG/DL — SIGNIFICANT CHANGE UP (ref 8.4–10.5)
CHLORIDE SERPL-SCNC: 97 MMOL/L — SIGNIFICANT CHANGE UP (ref 96–108)
CHLORIDE SERPL-SCNC: 97 MMOL/L — SIGNIFICANT CHANGE UP (ref 96–108)
CO2 SERPL-SCNC: 22 MMOL/L — SIGNIFICANT CHANGE UP (ref 22–31)
CO2 SERPL-SCNC: 24 MMOL/L — SIGNIFICANT CHANGE UP (ref 22–31)
CREAT SERPL-MCNC: 0.73 MG/DL — SIGNIFICANT CHANGE UP (ref 0.5–1.3)
CREAT SERPL-MCNC: 0.86 MG/DL — SIGNIFICANT CHANGE UP (ref 0.5–1.3)
CRP SERPL-MCNC: 18.26 MG/DL — HIGH (ref 0–0.4)
CULTURE RESULTS: SIGNIFICANT CHANGE UP
EOSINOPHIL # BLD AUTO: 0.1 K/UL — SIGNIFICANT CHANGE UP (ref 0–0.5)
EOSINOPHIL NFR BLD AUTO: 0.3 % — SIGNIFICANT CHANGE UP (ref 0–6)
ERYTHROCYTE [SEDIMENTATION RATE] IN BLOOD: 54 MM/HR — HIGH (ref 0–20)
GLUCOSE SERPL-MCNC: 77 MG/DL — SIGNIFICANT CHANGE UP (ref 70–99)
GLUCOSE SERPL-MCNC: 93 MG/DL — SIGNIFICANT CHANGE UP (ref 70–99)
HCT VFR BLD CALC: 25.8 % — LOW (ref 34.5–45)
HGB BLD-MCNC: 7.6 G/DL — LOW (ref 11.5–15.5)
LYMPHOCYTES # BLD AUTO: 0.5 K/UL — LOW (ref 1–3.3)
LYMPHOCYTES # BLD AUTO: 2.8 % — LOW (ref 13–44)
MAGNESIUM SERPL-MCNC: 1.2 MG/DL — LOW (ref 1.6–2.6)
MAGNESIUM SERPL-MCNC: 1.4 MG/DL — LOW (ref 1.6–2.6)
MCHC RBC-ENTMCNC: 24.4 PG — LOW (ref 27–34)
MCHC RBC-ENTMCNC: 29.2 GM/DL — LOW (ref 32–36)
MCV RBC AUTO: 83.7 FL — SIGNIFICANT CHANGE UP (ref 80–100)
MONOCYTES # BLD AUTO: 0.8 K/UL — SIGNIFICANT CHANGE UP (ref 0–0.9)
MONOCYTES NFR BLD AUTO: 4.2 % — SIGNIFICANT CHANGE UP (ref 2–14)
NEUTROPHILS # BLD AUTO: 17.3 K/UL — HIGH (ref 1.8–7.4)
NEUTROPHILS NFR BLD AUTO: 92.6 % — HIGH (ref 43–77)
PLATELET # BLD AUTO: 360 K/UL — SIGNIFICANT CHANGE UP (ref 150–400)
POTASSIUM SERPL-MCNC: 3.5 MMOL/L — SIGNIFICANT CHANGE UP (ref 3.5–5.3)
POTASSIUM SERPL-MCNC: 3.6 MMOL/L — SIGNIFICANT CHANGE UP (ref 3.5–5.3)
POTASSIUM SERPL-SCNC: 3.5 MMOL/L — SIGNIFICANT CHANGE UP (ref 3.5–5.3)
POTASSIUM SERPL-SCNC: 3.6 MMOL/L — SIGNIFICANT CHANGE UP (ref 3.5–5.3)
RBC # BLD: 3.09 M/UL — LOW (ref 3.8–5.2)
RBC # FLD: 17.6 % — HIGH (ref 10.3–14.5)
SODIUM SERPL-SCNC: 135 MMOL/L — SIGNIFICANT CHANGE UP (ref 135–145)
SODIUM SERPL-SCNC: 135 MMOL/L — SIGNIFICANT CHANGE UP (ref 135–145)
SPECIMEN SOURCE: SIGNIFICANT CHANGE UP
WBC # BLD: 18.7 K/UL — HIGH (ref 3.8–10.5)
WBC # FLD AUTO: 18.7 K/UL — HIGH (ref 3.8–10.5)

## 2018-09-12 PROCEDURE — 99233 SBSQ HOSP IP/OBS HIGH 50: CPT | Mod: GC

## 2018-09-12 PROCEDURE — 74019 RADEX ABDOMEN 2 VIEWS: CPT | Mod: 26

## 2018-09-12 RX ORDER — MAGNESIUM SULFATE 500 MG/ML
1 VIAL (ML) INJECTION ONCE
Qty: 0 | Refills: 0 | Status: COMPLETED | OUTPATIENT
Start: 2018-09-12 | End: 2018-09-12

## 2018-09-12 RX ORDER — VANCOMYCIN HCL 1 G
125 VIAL (EA) INTRAVENOUS EVERY 6 HOURS
Qty: 0 | Refills: 0 | Status: DISCONTINUED | OUTPATIENT
Start: 2018-09-12 | End: 2018-09-14

## 2018-09-12 RX ORDER — ACETAMINOPHEN 500 MG
650 TABLET ORAL EVERY 6 HOURS
Qty: 0 | Refills: 0 | Status: DISCONTINUED | OUTPATIENT
Start: 2018-09-12 | End: 2018-09-26

## 2018-09-12 RX ADMIN — Medication 125 MILLIGRAM(S): at 16:04

## 2018-09-12 RX ADMIN — Medication 200 MILLIGRAM(S): at 06:23

## 2018-09-12 RX ADMIN — ATORVASTATIN CALCIUM 10 MILLIGRAM(S): 80 TABLET, FILM COATED ORAL at 22:30

## 2018-09-12 RX ADMIN — Medication 100 GRAM(S): at 11:34

## 2018-09-12 RX ADMIN — AMLODIPINE BESYLATE 2.5 MILLIGRAM(S): 2.5 TABLET ORAL at 06:22

## 2018-09-12 RX ADMIN — Medication 100 MILLIGRAM(S): at 06:23

## 2018-09-12 RX ADMIN — Medication 650 MILLIGRAM(S): at 01:51

## 2018-09-12 RX ADMIN — BALSALAZIDE DISODIUM 2250 MILLIGRAM(S): 750 CAPSULE ORAL at 06:23

## 2018-09-12 RX ADMIN — Medication 125 MILLIGRAM(S): at 22:31

## 2018-09-12 RX ADMIN — BALSALAZIDE DISODIUM 2250 MILLIGRAM(S): 750 CAPSULE ORAL at 13:06

## 2018-09-12 RX ADMIN — PANTOPRAZOLE SODIUM 40 MILLIGRAM(S): 20 TABLET, DELAYED RELEASE ORAL at 06:22

## 2018-09-12 RX ADMIN — BALSALAZIDE DISODIUM 2250 MILLIGRAM(S): 750 CAPSULE ORAL at 22:31

## 2018-09-12 RX ADMIN — SODIUM CHLORIDE 100 MILLILITER(S): 9 INJECTION, SOLUTION INTRAVENOUS at 00:27

## 2018-09-12 RX ADMIN — LOSARTAN POTASSIUM 100 MILLIGRAM(S): 100 TABLET, FILM COATED ORAL at 06:22

## 2018-09-12 RX ADMIN — Medication 100 GRAM(S): at 00:27

## 2018-09-12 RX ADMIN — Medication 100 MILLIGRAM(S): at 13:04

## 2018-09-12 RX ADMIN — ENOXAPARIN SODIUM 40 MILLIGRAM(S): 100 INJECTION SUBCUTANEOUS at 06:22

## 2018-09-12 RX ADMIN — SERTRALINE 25 MILLIGRAM(S): 25 TABLET, FILM COATED ORAL at 06:22

## 2018-09-12 RX ADMIN — Medication 650 MILLIGRAM(S): at 22:30

## 2018-09-12 RX ADMIN — SERTRALINE 25 MILLIGRAM(S): 25 TABLET, FILM COATED ORAL at 16:03

## 2018-09-12 NOTE — PROGRESS NOTE ADULT - PROBLEM SELECTOR PLAN 2
Endometrial cancer w/ mets to abdominal wall & iliac nodes. Gets taxol through port weekly. Has had abdominal wall resection in past. Recently had 2nd opinion with Dr. Altaf Arteaga at [facility] as pt states she did not like the bad news she was given at [facility]. Jenni recommended to consider hormone therapy. Pt states she has a significant other named Wolf who she does not live with. She lives with herself but as per records pt has with her she has good functional status. Pt states she would want her HCP to be her daughter Vidya Tripp who lives in [place]. Discussed possible life sustaining measures given her current condition and pt states she would want all possible resuscitative efforts.  -Full Code

## 2018-09-12 NOTE — PROGRESS NOTE ADULT - SUBJECTIVE AND OBJECTIVE BOX
***************************************************************  Anup Cruz MD Pgy-1  Contact/Pager [phone] / [phone]  ***************************************************************    FRIDA ROMANO  78y  MRN: [id number]      Patient is a 78y old  Female who presents with a chief complaint of Not feeling well (12 Sep 2018 08:21)      Subjective/ON Events: No acute events overnight. Pt continues to have abdominal pain, more diffuse today as opposed to concentrated in LLQ. Otherwise, pt currently afebrile, no chills, cough, SOB, CP, nausea, vomiting.      MEDICATIONS  (STANDING):  amLODIPine   Tablet 2.5 milliGRAM(s) Oral daily  atorvastatin 10 milliGRAM(s) Oral at bedtime  balsalazide 2250 milliGRAM(s) Oral three times a day  ciprofloxacin   IVPB 400 milliGRAM(s) IV Intermittent every 12 hours  enoxaparin Injectable 40 milliGRAM(s) SubCutaneous every 24 hours  lactated ringers. 1000 milliLiter(s) (100 mL/Hr) IV Continuous <Continuous>  losartan 100 milliGRAM(s) Oral daily  metroNIDAZOLE  IVPB 500 milliGRAM(s) IV Intermittent every 8 hours  pantoprazole    Tablet 40 milliGRAM(s) Oral before breakfast  sertraline 25 milliGRAM(s) Oral two times a day    MEDICATIONS  (PRN):  acetaminophen   Tablet .. 650 milliGRAM(s) Oral every 6 hours PRN Temp greater or equal to 38C (100.4F)  acetaminophen   Tablet .. 650 milliGRAM(s) Oral every 6 hours PRN Moderate Pain (4 - 6)        Objective:    Vitals: Vital Signs Last 24 Hrs  T(C): 37.8 (09-12-18 @ 13:21), Max: 38.5 (09-12-18 @ 01:47)  T(F): 100 (09-12-18 @ 13:21), Max: 101.3 (09-12-18 @ 01:47)  HR: 77 (09-12-18 @ 13:21) (74 - 80)  BP: 121/64 (09-12-18 @ 13:21) (121/64 - 168/75)  BP(mean): --  RR: 18 (09-12-18 @ 13:21) (16 - 18)  SpO2: 95% (09-12-18 @ 13:21) (93% - 100%)                I&O's Summary    11 Sep 2018 07:01  -  12 Sep 2018 07:00  --------------------------------------------------------  IN: 0 mL / OUT: 100 mL / NET: -100 mL        PHYSICAL EXAM:  GENERAL: NAD  HEAD:  Atraumatic, Normocephalic  EYES: EOMI, PERRLA, conjunctiva and sclera clear  CHEST/LUNG: Clear to percussion bilaterally; No rales, rhonchi, wheezing, or rubs  HEART: Regular rate and rhythm; No murmurs, rubs, or gallops  ABDOMEN: Soft, midly distended, diffusely tender to palpation but no rebound; Bowel sounds present  SKIN: No rashes or lesions  NERVOUS SYSTEM:  Alert & Oriented X3, no focal deficits                                       LABS:  09-12    135  |  97  |  8   ----------------------------<  77  3.6   |  22  |  0.73    Ca    8.3<L>      12 Sep 2018 07:10  Phos  2.9     09-11  Mg     1.4     09-12    TPro  5.6<L>  /  Alb  2.8<L>  /  TBili  0.5  /  DBili  x   /  AST  12  /  ALT  5<L>  /  AlkPhos  118  09-11                  Urinalysis Basic - ( 11 Sep 2018 20:20 )    Color: Yellow / Appearance: Clear / SG: >1.030 / pH: x  Gluc: x / Ketone: Negative  / Bili: Negative / Urobili: Negative   Blood: x / Protein: 30 mg/dL / Nitrite: Negative   Leuk Esterase: Negative / RBC: 287 /hpf / WBC 13 /hpf   Sq Epi: x / Non Sq Epi: 2 /hpf / Bacteria: Negative                          7.6    18.7  )-----------( 360      ( 12 Sep 2018 07:11 )             25.8       IMAGING:    Abdominal Ultrasound 9/12:  Nonobstructive bowel gas pattern without evidence of free air.

## 2018-09-12 NOTE — CONSULT NOTE ADULT - SUBJECTIVE AND OBJECTIVE BOX
SUBJECTIVE:  78yFemale presents with malaise, fatigue, abdominal cramp and diarrhea.  Patient admitted to [facility] mid 8/2018 for UTI, treated with antibiotics.  At some point after that began to experience increasing soft/liquid urgent stools, up to 4 per day, worsening lower abdominal cramps and chills.  No fever until she got to the hospital.  Some nausea but no vomiting.  No appetite and lost 2-5 lbs (unsure how much).    Patient followed in our office by Dr. Michel for radiation induced colitis (recent visit 8/2018 patient had some increase in diarrhea due to chemo - Taxol).  She has been doing well on Imodium 8 mg per day prior to her admission to [place].  ______________________________________________________________________  PMH/PSH:  PAST MEDICAL & SURGICAL HISTORY:  Malignant neoplasm of ovary, unspecified laterality  GERD (gastroesophageal reflux disease)  Fatty liver  Multiple thyroid nodules  Anemia  Cataracts, bilateral: monitored  History of chemotherapy  Lymphedema of leg: right  Hydronephrosis determined by ultrasound: right kidney  Abdominal mass: 2013 metastatic endometrial cancer, s/p surgery, on chemotherapy at present-infusa port right chest  Atrial fibrillation: one episode &gt;30 years ago  Mitral valve prolapse  Endometrial carcinoma: 2001 - s/p KATH - tx with radiation, and currently receiving chemotherapy  Hyperlipidemia  Ulcerative colitis  Anxiety  Post-Herpetic Trigeminal Neuralgia: left  Benign Hypertension  H/O hydronephrosis: ureteral stent, multiple stent exchanges, last in 9/2017  History of chemotherapy: Infusaport insertion ; 2013  Abdominal mass: s/p mass removed 2013,on chemo-right chest infusa port  Encounter for biopsy: 2/14/13 - right iliac lymph node biopsy  History of tonsillectomy  H/O total hysterectomy: 2001  History of D&C: age 22    ______________________________________________________________________  MEDS:  MEDICATIONS  (STANDING):  amLODIPine   Tablet 2.5 milliGRAM(s) Oral daily  atorvastatin 10 milliGRAM(s) Oral at bedtime  balsalazide 2250 milliGRAM(s) Oral three times a day  ciprofloxacin   IVPB 400 milliGRAM(s) IV Intermittent every 12 hours  enoxaparin Injectable 40 milliGRAM(s) SubCutaneous every 24 hours  lactated ringers. 1000 milliLiter(s) (100 mL/Hr) IV Continuous <Continuous>  losartan 100 milliGRAM(s) Oral daily  metroNIDAZOLE  IVPB 500 milliGRAM(s) IV Intermittent every 8 hours  pantoprazole    Tablet 40 milliGRAM(s) Oral before breakfast  sertraline 25 milliGRAM(s) Oral two times a day    MEDICATIONS  (PRN):  acetaminophen   Tablet .. 650 milliGRAM(s) Oral every 6 hours PRN Temp greater or equal to 38C (100.4F)  loperamide 2 milliGRAM(s) Oral every 4 hours PRN Diarrhea    ______________________________________________________________________  ALL:   Allergies    erythromycin (Unknown)  macrolide antibiotics (Unknown)  morphine (Diarrhea)  sulfa drugs (Unknown)    Intolerances      ______________________________________________________________________  SH: no active toxic habits  ______________________________________________________________________  FH:  FAMILY HISTORY:  Hypertension  Family history of early CAD (Father)    ______________________________________________________________________  ROS:    CONSTITUTIONAL:  PER HPI    HEENT:  Eyes:  No visual loss, blurred vision, double vision or yellow sclerae. Ears, Nose, Throat:  No hearing loss, sneezing, congestion, runny nose or sore throat.    SKIN:  No rash or itching.    CARDIOVASCULAR:  No chest pain, chest pressure or chest discomfort. No palpitations or edema.    RESPIRATORY:  No shortness of breath, cough or sputum.    GASTROINTESTINAL:  SEE HPI    GENITOURINARY:  No dysuria, hematuria, urinary frequency    NEUROLOGICAL:  No headache, dizziness, syncope, paralysis, ataxia, numbness or tingling in the extremities. No change in bowel or bladder control.    MUSCULOSKELETAL:  No muscle, back pain, joint pain or stiffness.    HEMATOLOGIC:  No anemia, bleeding or bruising.    LYMPHATICS:  No enlarged nodes. No history of splenectomy.    PSYCHIATRIC:  No history of depression or anxiety.    ENDOCRINOLOGIC:  No reports of sweating, cold or heat intolerance. No polyuria or polydipsia.    ALLERGIES:  No history of asthma, hives, eczema or rhinitis.  ______________________________________________________________________  PHYSICAL EXAM:  T(C): 37.3 (09-12-18 @ 04:48), Max: 38.5 (09-12-18 @ 01:47)  HR: 77 (09-12-18 @ 04:48)  BP: 127/74 (09-12-18 @ 04:48)  RR: 18 (09-12-18 @ 04:48)  SpO2: 93% (09-12-18 @ 04:48)  Wt(kg): --    09-11 - 09-12  --------------------------------------------------------  IN:  Total IN: 0 mL    OUT:    Voided: 100 mL  Total OUT: 100 mL    Total NET: -100 mL        PHYSICAL EXAM:      Constitutional: NAD    Respiratory: CTA    Cardiovascular: RR S1/S2 nl    Gastrointestinal: soft distended +tenderness no r/g/HSM +soft BS    Extremities: no C/C    Skin: intact    ______________________________________________________________________  LABS:                        7.6    18.7  )-----------( 360      ( 12 Sep 2018 07:11 )             25.8     Hemoglobin: 7.6 (09-12-18 @ 07:11)  Hemoglobin: 8.0 (09-11-18 @ 17:41)    In the office patient's Hgb was 9.7 as of 2 months ago    09-12    135  |  97  |  8   ----------------------------<  77  3.6   |  22  |  0.73    Ca    8.3<L>      12 Sep 2018 07:10  Phos  2.9     09-11  Mg     1.4     09-12    TPro  5.6<L>  /  Alb  2.8<L> was 3.9 as of 7/2018 /  TBili  0.5  /  DBili  x   /  AST  12  /  ALT  5<L>  /  AlkPhos  118  09-11    LIVER FUNCTIONS - ( 11 Sep 2018 17:41 )  Alb: 2.8 g/dL / Pro: 5.6 g/dL / ALK PHOS: 118 U/L / ALT: 5 U/L / AST: 12 U/L / GGT: x           ______________________________________________________________________  IMAGING:  None available  ______________________________________________________________________  ASSESSMENT:  78y Female with known radiation colitis and recent increase in diarrhea due presumably to Taxol (at that time her C.diff and other infectious stool tests were negative) now p/w worsening diarrhea, leukocytosis, hypoalbuminemia, increasing abdominal pain after a course of antibiotics for UTI, all highly suggestive of C.diff infection    PLAN:  1.  Stool for C.diff toxin ASAP  2.  Continue Cipro/Flagyl - if positive stool test, would stop Cipro, continue Flagyl and add Vancocin 125 mg four times a day (may also consider Dificid 200 mg bid given high risk features for complicated C.diff course)  3.  Get AXR to r/o megacolon   4.  Minimize Imodium and opioid use  5.  Will follow closely.  Strongly consider surgical consult if abdominal pain worsens.  6.  D/w MAI Hercules M.D.  [facility]  (O) [phone]

## 2018-09-12 NOTE — PROGRESS NOTE ADULT - PROBLEM SELECTOR PLAN 5
Pt states her UC has been well controlled on balsalazide. Current colitis due to C. diff - see above  -Cont. balsalazide

## 2018-09-12 NOTE — PROGRESS NOTE ADULT - ASSESSMENT
78Fw/ hx of endometrial cancer w/ mets currently receiving weekly chemotherapy with paclitaxel which she seems to be tolerating, ulcerative colitis, HTN, presenting with watery diarrhea in the setting of recent UTI s/p antibiotic therapy. Stool positive for C diff.

## 2018-09-12 NOTE — PROGRESS NOTE ADULT - PROBLEM SELECTOR PLAN 6
-Cont. amlodipine 2.5mg daily and cont. to monitor  -Cont. losartan for benicar inpatient  -will hold antihypertensives if BP drops

## 2018-09-12 NOTE — PROGRESS NOTE ADULT - PROBLEM SELECTOR PLAN 1
Pt p/w several days watery diarrhea, worsening. Has Hx radiation colitis, UC. Was taking imodium  -C. diff stool toxin positive 9/12  -Was on Cipro, Flagyl when admitted. Will switch to PO vancomycin.  -EKG for QTc  -GI on board ([facility]) - continue to appreciate rec's

## 2018-09-13 DIAGNOSIS — R00.0 TACHYCARDIA, UNSPECIFIED: ICD-10-CM

## 2018-09-13 LAB
ANION GAP SERPL CALC-SCNC: 18 MMOL/L — HIGH (ref 5–17)
BUN SERPL-MCNC: 10 MG/DL — SIGNIFICANT CHANGE UP (ref 7–23)
CALCIUM SERPL-MCNC: 9.5 MG/DL — SIGNIFICANT CHANGE UP (ref 8.4–10.5)
CHLORIDE SERPL-SCNC: 92 MMOL/L — LOW (ref 96–108)
CO2 SERPL-SCNC: 25 MMOL/L — SIGNIFICANT CHANGE UP (ref 22–31)
CREAT SERPL-MCNC: 0.84 MG/DL — SIGNIFICANT CHANGE UP (ref 0.5–1.3)
GLUCOSE SERPL-MCNC: 72 MG/DL — SIGNIFICANT CHANGE UP (ref 70–99)
HCT VFR BLD CALC: 31.2 % — LOW (ref 34.5–45)
HGB BLD-MCNC: 9.8 G/DL — LOW (ref 11.5–15.5)
MAGNESIUM SERPL-MCNC: 1.7 MG/DL — SIGNIFICANT CHANGE UP (ref 1.6–2.6)
MCHC RBC-ENTMCNC: 26.5 PG — LOW (ref 27–34)
MCHC RBC-ENTMCNC: 31.4 GM/DL — LOW (ref 32–36)
MCV RBC AUTO: 84.5 FL — SIGNIFICANT CHANGE UP (ref 80–100)
PHOSPHATE SERPL-MCNC: 3.6 MG/DL — SIGNIFICANT CHANGE UP (ref 2.5–4.5)
PLATELET # BLD AUTO: 490 K/UL — HIGH (ref 150–400)
POTASSIUM SERPL-MCNC: 3.2 MMOL/L — LOW (ref 3.5–5.3)
POTASSIUM SERPL-SCNC: 3.2 MMOL/L — LOW (ref 3.5–5.3)
RBC # BLD: 3.69 M/UL — LOW (ref 3.8–5.2)
RBC # FLD: 18.1 % — HIGH (ref 10.3–14.5)
SODIUM SERPL-SCNC: 135 MMOL/L — SIGNIFICANT CHANGE UP (ref 135–145)
WBC # BLD: 35.4 K/UL — HIGH (ref 3.8–10.5)
WBC # FLD AUTO: 35.4 K/UL — HIGH (ref 3.8–10.5)

## 2018-09-13 PROCEDURE — 99233 SBSQ HOSP IP/OBS HIGH 50: CPT | Mod: GC

## 2018-09-13 PROCEDURE — 93010 ELECTROCARDIOGRAM REPORT: CPT | Mod: 76

## 2018-09-13 RX ORDER — METRONIDAZOLE 500 MG
500 TABLET ORAL EVERY 8 HOURS
Qty: 0 | Refills: 0 | Status: DISCONTINUED | OUTPATIENT
Start: 2018-09-13 | End: 2018-09-18

## 2018-09-13 RX ORDER — SODIUM CHLORIDE 9 MG/ML
1000 INJECTION, SOLUTION INTRAVENOUS
Qty: 0 | Refills: 0 | Status: DISCONTINUED | OUTPATIENT
Start: 2018-09-13 | End: 2018-09-14

## 2018-09-13 RX ORDER — POTASSIUM CHLORIDE 20 MEQ
40 PACKET (EA) ORAL ONCE
Qty: 0 | Refills: 0 | Status: DISCONTINUED | OUTPATIENT
Start: 2018-09-13 | End: 2018-09-13

## 2018-09-13 RX ORDER — ONDANSETRON 8 MG/1
4 TABLET, FILM COATED ORAL ONCE
Qty: 0 | Refills: 0 | Status: COMPLETED | OUTPATIENT
Start: 2018-09-13 | End: 2018-09-13

## 2018-09-13 RX ORDER — METRONIDAZOLE 500 MG
500 TABLET ORAL ONCE
Qty: 0 | Refills: 0 | Status: COMPLETED | OUTPATIENT
Start: 2018-09-13 | End: 2018-09-13

## 2018-09-13 RX ORDER — SODIUM CHLORIDE 9 MG/ML
1000 INJECTION, SOLUTION INTRAVENOUS ONCE
Qty: 0 | Refills: 0 | Status: COMPLETED | OUTPATIENT
Start: 2018-09-13 | End: 2018-09-13

## 2018-09-13 RX ORDER — SODIUM CHLORIDE 9 MG/ML
500 INJECTION, SOLUTION INTRAVENOUS ONCE
Qty: 0 | Refills: 0 | Status: DISCONTINUED | OUTPATIENT
Start: 2018-09-13 | End: 2018-09-13

## 2018-09-13 RX ORDER — METRONIDAZOLE 500 MG
TABLET ORAL
Qty: 0 | Refills: 0 | Status: DISCONTINUED | OUTPATIENT
Start: 2018-09-13 | End: 2018-09-18

## 2018-09-13 RX ORDER — POTASSIUM CHLORIDE 20 MEQ
10 PACKET (EA) ORAL
Qty: 0 | Refills: 0 | Status: COMPLETED | OUTPATIENT
Start: 2018-09-13 | End: 2018-09-13

## 2018-09-13 RX ADMIN — BALSALAZIDE DISODIUM 2250 MILLIGRAM(S): 750 CAPSULE ORAL at 06:08

## 2018-09-13 RX ADMIN — Medication 100 MILLIGRAM(S): at 11:12

## 2018-09-13 RX ADMIN — SERTRALINE 25 MILLIGRAM(S): 25 TABLET, FILM COATED ORAL at 06:08

## 2018-09-13 RX ADMIN — LOSARTAN POTASSIUM 100 MILLIGRAM(S): 100 TABLET, FILM COATED ORAL at 06:08

## 2018-09-13 RX ADMIN — BALSALAZIDE DISODIUM 2250 MILLIGRAM(S): 750 CAPSULE ORAL at 21:10

## 2018-09-13 RX ADMIN — ATORVASTATIN CALCIUM 10 MILLIGRAM(S): 80 TABLET, FILM COATED ORAL at 21:09

## 2018-09-13 RX ADMIN — SERTRALINE 25 MILLIGRAM(S): 25 TABLET, FILM COATED ORAL at 17:17

## 2018-09-13 RX ADMIN — SODIUM CHLORIDE 100 MILLILITER(S): 9 INJECTION, SOLUTION INTRAVENOUS at 11:08

## 2018-09-13 RX ADMIN — Medication 125 MILLIGRAM(S): at 11:56

## 2018-09-13 RX ADMIN — Medication 125 MILLIGRAM(S): at 23:19

## 2018-09-13 RX ADMIN — BALSALAZIDE DISODIUM 2250 MILLIGRAM(S): 750 CAPSULE ORAL at 13:50

## 2018-09-13 RX ADMIN — PANTOPRAZOLE SODIUM 40 MILLIGRAM(S): 20 TABLET, DELAYED RELEASE ORAL at 06:08

## 2018-09-13 RX ADMIN — Medication 100 MILLIGRAM(S): at 21:08

## 2018-09-13 RX ADMIN — Medication 100 MILLIEQUIVALENT(S): at 12:57

## 2018-09-13 RX ADMIN — Medication 100 MILLIEQUIVALENT(S): at 13:53

## 2018-09-13 RX ADMIN — Medication 125 MILLIGRAM(S): at 17:17

## 2018-09-13 RX ADMIN — Medication 125 MILLIGRAM(S): at 06:07

## 2018-09-13 RX ADMIN — AMLODIPINE BESYLATE 2.5 MILLIGRAM(S): 2.5 TABLET ORAL at 06:08

## 2018-09-13 RX ADMIN — ENOXAPARIN SODIUM 40 MILLIGRAM(S): 100 INJECTION SUBCUTANEOUS at 06:08

## 2018-09-13 RX ADMIN — SODIUM CHLORIDE 1000 MILLILITER(S): 9 INJECTION, SOLUTION INTRAVENOUS at 09:58

## 2018-09-13 RX ADMIN — Medication 100 MILLIEQUIVALENT(S): at 15:03

## 2018-09-13 RX ADMIN — ONDANSETRON 4 MILLIGRAM(S): 8 TABLET, FILM COATED ORAL at 11:12

## 2018-09-13 NOTE — PROGRESS NOTE ADULT - PROBLEM SELECTOR PLAN 6
-Cont. amlodipine 2.5mg daily and cont. to monitor  -Cont. losartan for benicar inpatient  -will hold antihypertensives if BP drops Pt states her UC has been well controlled on balsalazide. Current colitis due to C. diff - see above  -Cont. balsalazide

## 2018-09-13 NOTE — PROGRESS NOTE ADULT - PROBLEM SELECTOR PLAN 7
DVT PPx  -Lovenox -Home meds: amlodipine 2.5mg daily, losartan for benicar  -Currently on hold given softer BPs from diarrhea

## 2018-09-13 NOTE — PROGRESS NOTE ADULT - PROBLEM SELECTOR PLAN 1
Pt p/w several days watery diarrhea, worsening. Has Hx radiation colitis, UC. Was taking imodium  -C. diff stool toxin positive 9/12  -c/w PO vancomycin  -EKG for QTc  -GI on board ([facility]) - continue to appreciate rec's Severe. Pt p/w several days watery diarrhea, worsening. Has Hx radiation colitis, UC. Was taking imodium  -C. diff stool toxin positive 9/12  -WBC 18 -> 35  -c/w PO vancomycin, restarted IV flagyl given severe  -monitor abdominal exams -> if worsening, should get AXR  -clear liquid diet but c/w maintenance IVF LR @ 100cc given tachycardic episode this AM & poor PO intake  -GI on board ([facility]) - continue to appreciate rec's

## 2018-09-13 NOTE — PROGRESS NOTE ADULT - PROBLEM SELECTOR PLAN 4
UA with blood. Unclear etiology  -F/u UCx, not complaining of dysuria Hypokalemia & hypomagnesemia likely 2/2 diarrhea  -Replete lytes as needed

## 2018-09-13 NOTE — PROGRESS NOTE ADULT - SUBJECTIVE AND OBJECTIVE BOX
***************************************************************  Anup Cruz MD Pgy-1  Contact/Pager [phone]  ***************************************************************    FRIDA ROMANO  78y  MRN: [id number]      Patient is a 78y old  Female who presents with a chief complaint of Colitis (12 Sep 2018 13:41)      Subjective/ON Events:      MEDICATIONS  (STANDING):  amLODIPine   Tablet 2.5 milliGRAM(s) Oral daily  atorvastatin 10 milliGRAM(s) Oral at bedtime  balsalazide 2250 milliGRAM(s) Oral three times a day  enoxaparin Injectable 40 milliGRAM(s) SubCutaneous every 24 hours  lactated ringers. 1000 milliLiter(s) (100 mL/Hr) IV Continuous <Continuous>  losartan 100 milliGRAM(s) Oral daily  pantoprazole    Tablet 40 milliGRAM(s) Oral before breakfast  sertraline 25 milliGRAM(s) Oral two times a day  vancomycin    Solution 125 milliGRAM(s) Oral every 6 hours    MEDICATIONS  (PRN):  acetaminophen   Tablet .. 650 milliGRAM(s) Oral every 6 hours PRN Temp greater or equal to 38C (100.4F)  acetaminophen   Tablet .. 650 milliGRAM(s) Oral every 6 hours PRN Moderate Pain (4 - 6)        Objective:    Vitals: Vital Signs Last 24 Hrs  T(C): 37.2 (09-13-18 @ 05:02), Max: 38.8 (09-12-18 @ 21:18)  T(F): 99 (09-13-18 @ 05:02), Max: 101.9 (09-12-18 @ 21:18)  HR: 99 (09-13-18 @ 05:02) (69 - 99)  BP: 128/73 (09-13-18 @ 05:02) (120/69 - 159/72)  BP(mean): --  RR: 18 (09-13-18 @ 05:02) (18 - 18)  SpO2: 97% (09-13-18 @ 05:02) (95% - 98%)            I&O's Summary    12 Sep 2018 07:01  -  13 Sep 2018 07:00  --------------------------------------------------------  IN: 1180 mL / OUT: 350 mL / NET: 830 mL        PHYSICAL EXAM:  GENERAL: NAD  HEAD:  Atraumatic, Normocephalic  EYES: EOMI, PERRLA, conjunctiva and sclera clear  CHEST/LUNG: Clear to percussion bilaterally; No rales, rhonchi, wheezing, or rubs  HEART: Regular rate and rhythm; No murmurs, rubs, or gallops  ABDOMEN: Soft, Nontender, Nondistended; Bowel sounds present  SKIN: No rashes or lesions  NERVOUS SYSTEM:  Alert & Oriented X3, Good concentration; Motor Strength 5/5 B/L upper and lower extremities                                           LABS:  09-12    135  |  97  |  8   ----------------------------<  77  3.6   |  22  |  0.73    Ca    8.3<L>      12 Sep 2018 07:10  Ca    8.5      12 Sep 2018 00:16  Phos  2.9     09-11  Mg     1.4     09-12    TPro  5.6<L>  /  Alb  2.8<L>  /  TBili  0.5  /  DBili  x   /  AST  12  /  ALT  5<L>  /  AlkPhos  118  09-11                          7.6    18.7  )-----------( 360      ( 12 Sep 2018 07:11 )             25.8                         8.0    17.8  )-----------( 335      ( 11 Sep 2018 17:41 )             24.8     CAPILLARY BLOOD GLUCOSE          RADIOLOGY & ADDITIONAL TESTS:    Imaging Personally Reviewed:  [ ] YES  [ ] NO      Consultants involved in case:   Consultant(s) Notes Reviewed:  [ ] YES  [ ] NO:   Care Discussed with Consultants/Other Providers [ ] YES  [ ] NO ***************************************************************  Anup Cruz MD Pgy[facility]1  Contact/Pager [phone] / [phone]  ***************************************************************    FRIDA ROMANO  78y  MRN: [id number]      Patient is a 78y old  Female who presents with a chief complaint of Colitis (12 Sep 2018 13:41)      Subjective/ON Events: Febrile at 9:30pm to 101.9, given tylenol. > 7 watery BMs since yesterday afternoon, with 1 episode of nausea & small amount NB vomiting. Upon examination this morning, pt with rapid pulse -> vital sign recheck demonstrating HR in 150-160's, EKG with section concerning for Afib/flutter. Returned to NSR 80's following 1L LR bolus. BP remains stable 100-110's/70's. No change in mental status.      MEDICATIONS  (STANDING):  amLODIPine   Tablet 2.5 milliGRAM(s) Oral daily  atorvastatin 10 milliGRAM(s) Oral at bedtime  balsalazide 2250 milliGRAM(s) Oral three times a day  enoxaparin Injectable 40 milliGRAM(s) SubCutaneous every 24 hours  lactated ringers. 1000 milliLiter(s) (100 mL/Hr) IV Continuous <Continuous>  losartan 100 milliGRAM(s) Oral daily  pantoprazole    Tablet 40 milliGRAM(s) Oral before breakfast  sertraline 25 milliGRAM(s) Oral two times a day  vancomycin    Solution 125 milliGRAM(s) Oral every 6 hours    MEDICATIONS  (PRN):  acetaminophen   Tablet .. 650 milliGRAM(s) Oral every 6 hours PRN Temp greater or equal to 38C (100.4F)  acetaminophen   Tablet .. 650 milliGRAM(s) Oral every 6 hours PRN Moderate Pain (4 - 6)        Objective:    Vital Signs Last 24 Hrs  T(C): 36.9 (13 Sep 2018 09:55), Max: 38.8 (12 Sep 2018 21:18)  T(F): 98.4 (13 Sep 2018 09:55), Max: 101.9 (12 Sep 2018 21:18)  HR: 88 (13 Sep 2018 09:55) (69 - 166)  BP: 118/64 (13 Sep 2018 09:55) (105/72 - 159/72)  RR: 18 (13 Sep 2018 09:55) (18 - 18)  SpO2: 97% (13 Sep 2018 09:55) (95% - 98%)          I&O's Summary    12 Sep 2018 07:01  -  13 Sep 2018 07:00  --------------------------------------------------------  IN: 1180 mL / OUT: 350 mL / NET: 830 mL        PHYSICAL EXAM:  GENERAL: NAD  HEAD:  Atraumatic, Normocephalic  EYES: EOMI, PERRLA, conjunctiva and sclera clear  CHEST/LUNG: Clear to auscultation bilaterally; No rales, rhonchi, wheezing, or rubs  HEART: very tachycardic; No murmurs, rubs, or gallops  ABDOMEN: Soft, mild-mod tender to palpation greatest over LLQ, Nondistended; Bowel sounds present  SKIN: No rashes or lesions  NERVOUS SYSTEM:  Alert & Oriented X3, Good concentration, no focal deficits                                          LABS:  09-12    135  |  97  |  8   ----------------------------<  77  3.6   |  22  |  0.73    Ca    8.3<L>      12 Sep 2018 07:10  Ca    8.5      12 Sep 2018 00:16  Phos  2.9     09-11  Mg     1.4     09-12    TPro  5.6<L>  /  Alb  2.8<L>  /  TBili  0.5  /  DBili  x   /  AST  12  /  ALT  5<L>  /  AlkPhos  118  09-11                          7.6    18.7  )-----------( 360      ( 12 Sep 2018 07:11 )             25.8                         8.0    17.8  )-----------( 335      ( 11 Sep 2018 17:41 )             24.8

## 2018-09-13 NOTE — PROGRESS NOTE ADULT - PROBLEM SELECTOR PLAN 5
Pt states her UC has been well controlled on balsalazide. Current colitis due to C. diff - see above  -Cont. balsalazide UA with blood. Unclear etiology  -F/u UCx, not complaining of dysuria

## 2018-09-13 NOTE — PROGRESS NOTE ADULT - SUBJECTIVE AND OBJECTIVE BOX
SUBJECTIVE:  BM frequency seemed to decrease at first, but now has increased again. She continues to pass gas. She has nausea but no vomiting. She has not eaten for the past several days. She denies pain at rest, but remains tender when she self-palpates her abdomen.  _____________________________________________________  OBJECTIVE:    T(C): 37.2 (09-13-18 @ 05:02), Max: 38.8 (09-12-18 @ 21:18)  HR: 166 (09-13-18 @ 09:17) (increased from 90s, evaluated by Medicine)  BP: 105/72 (09-13-18 @ 09:17)  RR: 18 (09-13-18 @ 05:02)  SpO2: 97% (09-13-18 @ 05:02)  Wt(kg): --    General = Comfortable-appearing, no acute distress  Abdomen = Mildly protuberant (no change from usual according to patient), active bowel sounds, soft, diffuse tenderness, no palpable mass or organomegaly, no bruit  _____________________________________________________  LABS:                        9.8    35.4  )-----------( 490      ( 13 Sep 2018 07:50 )             31.2     09-13    135  |  92<L>  |  10  ----------------------------<  72  3.2<L>   |  25  |  0.84    Ca    9.5      13 Sep 2018 07:50  Phos  3.6     09-13  Mg     1.7     09-13    TPro  5.6<L>  /  Alb  2.8<L>  /  TBili  0.5  /  DBili  x   /  AST  12  /  ALT  5<L>  /  AlkPhos  118  09-11    LIVER FUNCTIONS - ( 11 Sep 2018 17:41 )  Alb: 2.8 g/dL / Pro: 5.6 g/dL / ALK PHOS: 118 U/L / ALT: 5 U/L / AST: 12 U/L / GGT: x             _____________________________________________________  ACTIVE MEDS:  MEDICATIONS  (STANDING):  amLODIPine   Tablet 2.5 milliGRAM(s) Oral daily  atorvastatin 10 milliGRAM(s) Oral at bedtime  balsalazide 2250 milliGRAM(s) Oral three times a day  enoxaparin Injectable 40 milliGRAM(s) SubCutaneous every 24 hours  lactated ringers Bolus 1000 milliLiter(s) IV Bolus once  losartan 100 milliGRAM(s) Oral daily  metroNIDAZOLE  IVPB      pantoprazole    Tablet 40 milliGRAM(s) Oral before breakfast  potassium chloride    Tablet ER 40 milliEquivalent(s) Oral once  sertraline 25 milliGRAM(s) Oral two times a day  vancomycin    Solution 125 milliGRAM(s) Oral every 6 hours    MEDICATIONS  (PRN):  acetaminophen   Tablet .. 650 milliGRAM(s) Oral every 6 hours PRN Temp greater or equal to 38C (100.4F)  acetaminophen   Tablet .. 650 milliGRAM(s) Oral every 6 hours PRN Moderate Pain (4 - 6)    _____________________________________________________  ASSESSMENT:  78yFemale with severe C. difficile colitis superimposed on underlying radiation colitis (and remote history of ulcerative colitis, inactive x decades) with multiple concerning features (WBC 35K, tachycardia, low albumin) in the setting of metastatic endometrial cancer. Prognosis is guarded.    PLAN:  Add back IV Flagyl, and continue oral vanco  Change diet to clear liquids  Monitor abdominal exam closely; should order follow-up AXR and request colorectal surgical consultation if the patient's symptoms and/or exam worsens  Discussed with patient and medical team    Karl Michel M.D.  (O) [phone]  (C) [phone]

## 2018-09-13 NOTE — PROVIDER CONTACT NOTE (CHANGE IN STATUS NOTIFICATION) - ACTION/TREATMENT ORDERED:
MD made aware, pt medicated with Tylenol 650 mg PO as ordered, blood cx X 2 sets done as ordered, will recheck temp., will continue to monitor.

## 2018-09-13 NOTE — PROGRESS NOTE ADULT - PROBLEM SELECTOR PLAN 2
Endometrial cancer w/ mets to abdominal wall & iliac nodes. Gets taxol through port weekly. Has had abdominal wall resection in past. Recently had 2nd opinion with Dr. Altaf Arteaga at [facility] as pt states she did not like the bad news she was given at [facility] [place]. Jenni recommended to consider hormone therapy. Pt states she has a significant other named Wolf who she does not live with. She lives with herself but as per records pt has with her she has good functional status. Pt states she would want her HCP to be her daughter Vidya Tripp who lives in [place]. Discussed possible life sustaining measures given her current condition and pt states she would want all possible resuscitative efforts.  -Full Code -On examination this morning, pt with rapid pulse. VS check showing -160. EKG with section of irregular rhythm concerning for Afib/flutter. Resolved following 1L LR bolus  -Episode likely d/t dehydration in setting of severe C. diff infection, as resolution with bolus  -Pt w/ known MVP & remote Hx of A-fib > 30 years ago. Pt & daughter do not recall being started on AC or rate control medications.

## 2018-09-13 NOTE — CHART NOTE - NSCHARTNOTEFT_GEN_A_CORE
Called to bedside around 9:30 AM by resident who noted HR of 150s on VS check and on palpation of pulse. The patient Ms. Belcher is admitted with clostridium difficile and has been having diarrhea and decreased PO intake. On bedside telemetry patient with irregular HR 120s-140s. On EKG patient with sinus tachycardia with PACs, second EKG with portion concerning for afib or aflutter given rate 150s and regular. Patient has hx of afib diagnosed several years ago and is not on anticoagulation. Patient likely with tachycardia in setting of dehydration and c. diff infection. HR in 80-90s after IV fluid bolus started. On bedside discussion with patient she reported she did not want to start anticoagulation at this time and has not been on it in the past. Her daughter is on her way in and will have discussion with team on a/c.     Plan:  -c/w IVF hydration  -VS q 4 hours  -c/w treatment of c. diff  -discuss plan of care with patient and her daughter  -no transfer to tele at this time given afib is not new and resolved with iv hydration.     Plan d/w patient, RN, medicine team at bedside,    Mercedes Gregory, PGY 3  Internal Medicine  Pager [phone] | [phone]

## 2018-09-13 NOTE — PROGRESS NOTE ADULT - PROBLEM SELECTOR PLAN 3
Hypokalemia & hypomagnesemia likely 2/2 diarrhea  -Replete lytes as needed Endometrial cancer w/ mets to abdominal wall & iliac nodes. Gets taxol through port weekly. Has had abdominal wall resection in past. Recently had 2nd opinion with Dr. Altaf Arteaga at [facility] as pt states she did not like the bad news [place] [facility] [place] Jenni recommended to consider hormone therapy. Pt states she has a significant other named Wolf who she does not live with. She lives with herself but as per records pt has with her she has good functional status. Pt states she would want her HCP to be her daughter Vidya Tripp who lives in [place]. Discussed possible life sustaining measures given her current condition and pt states she would want all possible resuscitative efforts.  -Full Code

## 2018-09-14 LAB
ANION GAP SERPL CALC-SCNC: 13 MMOL/L — SIGNIFICANT CHANGE UP (ref 5–17)
BUN SERPL-MCNC: 18 MG/DL — SIGNIFICANT CHANGE UP (ref 7–23)
CALCIUM SERPL-MCNC: 9 MG/DL — SIGNIFICANT CHANGE UP (ref 8.4–10.5)
CHLORIDE SERPL-SCNC: 95 MMOL/L — LOW (ref 96–108)
CO2 SERPL-SCNC: 25 MMOL/L — SIGNIFICANT CHANGE UP (ref 22–31)
CREAT SERPL-MCNC: 0.88 MG/DL — SIGNIFICANT CHANGE UP (ref 0.5–1.3)
CULTURE RESULTS: SIGNIFICANT CHANGE UP
GLUCOSE SERPL-MCNC: 99 MG/DL — SIGNIFICANT CHANGE UP (ref 70–99)
HCT VFR BLD CALC: 30.8 % — LOW (ref 34.5–45)
HGB BLD-MCNC: 9.6 G/DL — LOW (ref 11.5–15.5)
MAGNESIUM SERPL-MCNC: 1.7 MG/DL — SIGNIFICANT CHANGE UP (ref 1.6–2.6)
MCHC RBC-ENTMCNC: 26.4 PG — LOW (ref 27–34)
MCHC RBC-ENTMCNC: 31.3 GM/DL — LOW (ref 32–36)
MCV RBC AUTO: 84.5 FL — SIGNIFICANT CHANGE UP (ref 80–100)
PHOSPHATE SERPL-MCNC: 3 MG/DL — SIGNIFICANT CHANGE UP (ref 2.5–4.5)
PLATELET # BLD AUTO: 501 K/UL — HIGH (ref 150–400)
POTASSIUM SERPL-MCNC: 3.8 MMOL/L — SIGNIFICANT CHANGE UP (ref 3.5–5.3)
POTASSIUM SERPL-SCNC: 3.8 MMOL/L — SIGNIFICANT CHANGE UP (ref 3.5–5.3)
RBC # BLD: 3.64 M/UL — LOW (ref 3.8–5.2)
RBC # FLD: 18.1 % — HIGH (ref 10.3–14.5)
SODIUM SERPL-SCNC: 133 MMOL/L — LOW (ref 135–145)
SPECIMEN SOURCE: SIGNIFICANT CHANGE UP
WBC # BLD: 36.2 K/UL — HIGH (ref 3.8–10.5)
WBC # FLD AUTO: 36.2 K/UL — HIGH (ref 3.8–10.5)

## 2018-09-14 PROCEDURE — 74018 RADEX ABDOMEN 1 VIEW: CPT | Mod: 26

## 2018-09-14 PROCEDURE — 99223 1ST HOSP IP/OBS HIGH 75: CPT

## 2018-09-14 PROCEDURE — 99233 SBSQ HOSP IP/OBS HIGH 50: CPT | Mod: GC

## 2018-09-14 PROCEDURE — 99223 1ST HOSP IP/OBS HIGH 75: CPT | Mod: AI

## 2018-09-14 PROCEDURE — 71045 X-RAY EXAM CHEST 1 VIEW: CPT | Mod: 26

## 2018-09-14 RX ORDER — ONDANSETRON 8 MG/1
4 TABLET, FILM COATED ORAL ONCE
Qty: 0 | Refills: 0 | Status: COMPLETED | OUTPATIENT
Start: 2018-09-14 | End: 2018-09-14

## 2018-09-14 RX ORDER — VANCOMYCIN HCL 1 G
500 VIAL (EA) INTRAVENOUS EVERY 6 HOURS
Qty: 0 | Refills: 0 | Status: DISCONTINUED | OUTPATIENT
Start: 2018-09-14 | End: 2018-09-18

## 2018-09-14 RX ORDER — SODIUM CHLORIDE 9 MG/ML
1000 INJECTION, SOLUTION INTRAVENOUS
Qty: 0 | Refills: 0 | Status: DISCONTINUED | OUTPATIENT
Start: 2018-09-14 | End: 2018-09-18

## 2018-09-14 RX ORDER — METOPROLOL TARTRATE 50 MG
12.5 TABLET ORAL EVERY 12 HOURS
Qty: 0 | Refills: 0 | Status: DISCONTINUED | OUTPATIENT
Start: 2018-09-14 | End: 2018-09-23

## 2018-09-14 RX ADMIN — Medication 100 MILLIGRAM(S): at 21:04

## 2018-09-14 RX ADMIN — BALSALAZIDE DISODIUM 2250 MILLIGRAM(S): 750 CAPSULE ORAL at 21:04

## 2018-09-14 RX ADMIN — BALSALAZIDE DISODIUM 2250 MILLIGRAM(S): 750 CAPSULE ORAL at 06:21

## 2018-09-14 RX ADMIN — SODIUM CHLORIDE 75 MILLILITER(S): 9 INJECTION, SOLUTION INTRAVENOUS at 16:00

## 2018-09-14 RX ADMIN — PANTOPRAZOLE SODIUM 40 MILLIGRAM(S): 20 TABLET, DELAYED RELEASE ORAL at 06:21

## 2018-09-14 RX ADMIN — ATORVASTATIN CALCIUM 10 MILLIGRAM(S): 80 TABLET, FILM COATED ORAL at 21:04

## 2018-09-14 RX ADMIN — Medication 650 MILLIGRAM(S): at 21:40

## 2018-09-14 RX ADMIN — Medication 500 MILLIGRAM(S): at 18:31

## 2018-09-14 RX ADMIN — BALSALAZIDE DISODIUM 2250 MILLIGRAM(S): 750 CAPSULE ORAL at 14:34

## 2018-09-14 RX ADMIN — SODIUM CHLORIDE 75 MILLILITER(S): 9 INJECTION, SOLUTION INTRAVENOUS at 08:08

## 2018-09-14 RX ADMIN — Medication 12.5 MILLIGRAM(S): at 21:40

## 2018-09-14 RX ADMIN — ONDANSETRON 4 MILLIGRAM(S): 8 TABLET, FILM COATED ORAL at 08:07

## 2018-09-14 RX ADMIN — Medication 125 MILLIGRAM(S): at 12:37

## 2018-09-14 RX ADMIN — Medication 100 MILLIGRAM(S): at 12:30

## 2018-09-14 RX ADMIN — ENOXAPARIN SODIUM 40 MILLIGRAM(S): 100 INJECTION SUBCUTANEOUS at 06:22

## 2018-09-14 RX ADMIN — Medication 100 MILLIGRAM(S): at 06:20

## 2018-09-14 RX ADMIN — Medication 650 MILLIGRAM(S): at 22:40

## 2018-09-14 RX ADMIN — Medication 125 MILLIGRAM(S): at 06:21

## 2018-09-14 RX ADMIN — SERTRALINE 25 MILLIGRAM(S): 25 TABLET, FILM COATED ORAL at 18:31

## 2018-09-14 RX ADMIN — SERTRALINE 25 MILLIGRAM(S): 25 TABLET, FILM COATED ORAL at 06:21

## 2018-09-14 NOTE — PROGRESS NOTE ADULT - PROBLEM SELECTOR PLAN 3
Endometrial cancer w/ mets to abdominal wall & iliac nodes. Gets taxol through port weekly. Has had abdominal wall resection in past. Recently had 2nd opinion with Dr. Altaf Arteaga at [facility] as pt states she did not like the bad news she was given at [facility] [place]. Jenni recommended to consider hormone therapy. Pt states she has a significant other named Wolf who she does not live with. She lives with herself but as per records pt has with her she has good functional status. Pt states she would want her HCP to be her daughter Vidya Tripp who lives in [place]. Discussed possible life sustaining measures given her current condition and pt states she would want all possible resuscitative efforts.  -Full Code

## 2018-09-14 NOTE — PROGRESS NOTE ADULT - ASSESSMENT
78Fw/ hx of endometrial cancer w/ mets currently receiving weekly chemotherapy with paclitaxel which she seems to be tolerating, ulcerative colitis, HTN, presenting with watery diarrhea in the setting of recent UTI s/p antibiotic therapy. Stool positive for C diff. 78Fw/ hx of endometrial cancer w/ mets currently receiving weekly chemotherapy with paclitaxel which she seems to be tolerating, ulcerative colitis, HTN, presenting with watery diarrhea in the setting of recent UTI s/p antibiotic therapy. Found to have C. diff colitis, worsening.

## 2018-09-14 NOTE — CONSULT NOTE ADULT - SUBJECTIVE AND OBJECTIVE BOX
General Surgery Consult  Consulting surgical team: Red  Consulting attending: Dr. Pressley    HPI: Emilee Belcher is a 78 y.o. woman with history of endometrial cancer w/ mets s/p KATH + radiation (2001), R abdominal wall resection with reconstruction (2013, Beg + Kajonathanon), and currently receiving chemotherapy (Paclitaxel), hydronephrosis requiring repeated cystoscopy, HTN, HLD, and UC who was admitted on 9/11 for watery diarrhea. At the time of admission, the patient states that she recently complete a course of antibiotics for a UTI.     The patient states that her symptoms began approximately 2 weeks ago with upper abdominal pain. The patient describes the pain as intermittent and localized the pain to her upper abdomen. The patient also complains of nausea and vomiting. The patient states that she has not been able to tolerate an PO in the last several days. The patient denies associated fever, chills, diaphoresis, lightheadedness, or dizziness.     PAST MEDICAL HISTORY:  Malignant neoplasm of ovary, unspecified laterality  GERD (gastroesophageal reflux disease)  Trigeminal neuralgia  Fatty liver  Multiple thyroid nodules  Anemia  Cataracts, bilateral  History of chemotherapy  Hydronephrosis of right kidney  Lymphedema of leg  Uterine cancer  Hydronephrosis determined by ultrasound  Abdominal mass  Atrial fibrillation  Mitral valve prolapse  Endometrial carcinoma  Hyperlipidemia  Ulcerative colitis  Anxiety  Post-Herpetic Trigeminal Neuralgia  Benign Neoplasm of Breast  Benign Hypertension      PAST SURGICAL HISTORY:  H/O hydronephrosis  History of urethral stent  History of chemotherapy  Abdominal mass  Encounter for biopsy  History of tonsillectomy  H/O total hysterectomy  History of D&C      MEDICATIONS:  acetaminophen   Tablet .. 650 milliGRAM(s) Oral every 6 hours PRN  acetaminophen   Tablet .. 650 milliGRAM(s) Oral every 6 hours PRN  atorvastatin 10 milliGRAM(s) Oral at bedtime  balsalazide 2250 milliGRAM(s) Oral three times a day  enoxaparin Injectable 40 milliGRAM(s) SubCutaneous every 24 hours  lactated ringers. 1000 milliLiter(s) IV Continuous <Continuous>  metroNIDAZOLE  IVPB      metroNIDAZOLE  IVPB 500 milliGRAM(s) IV Intermittent every 8 hours  pantoprazole    Tablet 40 milliGRAM(s) Oral before breakfast  sertraline 25 milliGRAM(s) Oral two times a day  vancomycin    Solution 125 milliGRAM(s) Oral every 6 hours      ALLERGIES:  erythromycin (Unknown)  macrolide antibiotics (Unknown)  morphine (Diarrhea)  sulfa drugs (Unknown)      VITALS & I/Os:  Vital Signs Last 24 Hrs  T(C): 36.9 (14 Sep 2018 05:30), Max: 37.6 (14 Sep 2018 04:50)  T(F): 98.4 (14 Sep 2018 05:30), Max: 99.7 (14 Sep 2018 04:50)  HR: 73 (14 Sep 2018 05:30) (73 - 166)  BP: 136/81 (14 Sep 2018 05:30) (105/64 - 138/74)  BP(mean): --  RR: 18 (14 Sep 2018 05:30) (18 - 18)  SpO2: 95% (14 Sep 2018 05:30) (95% - 99%)    I&O's Summary    13 Sep 2018 07:01  -  14 Sep 2018 07:00  --------------------------------------------------------  IN: 1100 mL / OUT: 0 mL / NET: 1100 mL        PHYSICAL EXAM:  General: No acute distress  Respiratory: Nonlabored  Cardiovascular: normotensive, regular rate   Abdominal: Soft, mildly distended, RUQ + LUQ tenderness without rebound or guarding, no mass   Extremities: Warm    LABS:                        9.6    36.2  )-----------( 501      ( 14 Sep 2018 07:05 )             30.8     09-14    133<L>  |  95<L>  |  18  ----------------------------<  99  3.8   |  25  |  0.88    Ca    9.0      14 Sep 2018 07:02  Phos  3.0     09-14  Mg     1.7     09-14      IMAGING:  < from: Xray Abdomen 2 Views (09.12.18 @ 10:38) >  Right-sided nephro ureteral stent in situ. Surgical clips projecting over   the pelvis, unchanged in position. Residual oral contrast seen within the   colon. Contrast is seen within the bladder.     Nonobstructive bowel gas pattern.    There is no evidence of organomegaly or pathologic calcification.    The visualized osseous structures demonstrate no acute pathology.    IMPRESSION:     Nonobstructive bowel gas pattern without evidence of free air.    < end of copied text >

## 2018-09-14 NOTE — CONSULT NOTE ADULT - SUBJECTIVE AND OBJECTIVE BOX
HPI:  78F mediocre historian w/ hx of endometrial cancer w/ mets currently receiving weekly chemotherapy with paclitaxel which she seems to be tolerating, Ulcerative colitis?, chronic diarrhea on immodium, HTN, p/w not feeling well. Reportedly patient was in [place] 2 weeks ago for unclear reason. Pt states she was feeling better when starting roughly 2 days ago she developed weakness and nausea. Symptoms continued today without improvement so went for further evaluation. She had an outpatient CT abd/pelvis performed which indicated sigmoid colitis and came to the ER. Of note, pt's outpatient images have been uploaded into EMR. As per ER signout, pt's GI Dr. Hollis thinks radiation colitis? Endorses some LLQ pain and pain is worse with palpation. She endorses constipation over the last 2 days but had large solid BM today. Denies hematochezia or melena. No obvious source of bleeding as per patient. Pt has extensive paperwork with her including results of her CT abd/pelvis earlier today.     In ER: Given Cipro, flagyl IV, LR 1L, KCl 40mg PO, KCl 10mg IV (11 Sep 2018 22:31)      PERTINENT PM/SXH:   Malignant neoplasm of ovary, unspecified laterality  GERD (gastroesophageal reflux disease)  Trigeminal neuralgia  Fatty liver  Multiple thyroid nodules  Anemia  Cataracts, bilateral  History of chemotherapy  Hydronephrosis of right kidney  Lymphedema of leg  Uterine cancer  Hydronephrosis determined by ultrasound  Abdominal mass  Atrial fibrillation  Mitral valve prolapse  Endometrial carcinoma  Hyperlipidemia  Ulcerative colitis  Anxiety  Post-Herpetic Trigeminal Neuralgia  Benign Neoplasm of Breast  Benign Hypertension    H/O hydronephrosis  History of urethral stent  History of chemotherapy  Abdominal mass  Encounter for biopsy  History of tonsillectomy  H/O total hysterectomy  History of D&C    SOCIAL HISTORY:   Significant other/partner:  [ ] YES  [ ] NO               Children:  [ ] YES  [ ] NO                   Latter day/Spirituality:  Substance hx:  [ ] YES   [ ] NO                   Tobacco hx:  [ ] YES  [ ] NO                       Alcohol hx: [ ] YES  [ ] NO         Home Opioid hx:  [ ] YES  [ ] NO   Living Situation: [ ] Home  [ ] Long term care  [ ] Rehab [ ] Other    FAMILY HISTORY:  Hypertension  Family history of early CAD (Father)    [ ] Family history non-contributory     BASELINE (I)ADLs (prior to admission):  [place]: [ ] total  [ ] moderate [ ] dependent    ADVANCE DIRECTIVES:    DNR   MOLST  [ ] YES [ ] NO                      [ ] Completed  Health Care Proxy [ ] YES  [ ] NO   [ ] Completed  Living Will  [ ] YES [ ] NO             [ ] Surrogate  [ ] HCP  [ ] Guardian:                                                                  Phone#:    Allergies    erythromycin (Unknown)  macrolide antibiotics (Unknown)  morphine (Diarrhea)  sulfa drugs (Unknown)    Intolerances        MEDICATIONS  (STANDING):  atorvastatin 10 milliGRAM(s) Oral at bedtime  balsalazide 2250 milliGRAM(s) Oral three times a day  enoxaparin Injectable 40 milliGRAM(s) SubCutaneous every 24 hours  lactated ringers. 1000 milliLiter(s) (75 mL/Hr) IV Continuous <Continuous>  metroNIDAZOLE  IVPB      metroNIDAZOLE  IVPB 500 milliGRAM(s) IV Intermittent every 8 hours  pantoprazole    Tablet 40 milliGRAM(s) Oral before breakfast  sertraline 25 milliGRAM(s) Oral two times a day  vancomycin    Solution 125 milliGRAM(s) Oral every 6 hours    MEDICATIONS  (PRN):  acetaminophen   Tablet .. 650 milliGRAM(s) Oral every 6 hours PRN Temp greater or equal to 38C (100.4F)  acetaminophen   Tablet .. 650 milliGRAM(s) Oral every 6 hours PRN Moderate Pain (4 - 6)      PRESENT SYMPTOMS:  Source: [ ] Patient   [ ] Family   [ ] Team     Pain:                        [ ] No [ ] Yes             [ ] Mild [ ] Moderate [ ] Severe    Onset -  Location -  Duration -  Character -  Alleviating/Aggravating -  Radiation -  Timing -      Dyspnea:                [ ] No [ ] Yes             [ ] Mild [ ] Moderate [ ] Severe    Anxiety:                  [ ] No [ ] Yes             [ ] Mild [ ] Moderate [ ] Severe    Fatigue:                  [ ] No [ ] Yes             [ ] Mild [ ] Moderate [ ] Severe    Nausea:                  [ ] No [ ] Yes             [ ] Mild [ ] Moderate [ ] Severe    Loss of appetite:   [ ] No [ ] Yes             [ ] Mild [ ] Moderate [ ] Severe    Constipation:        [ ] No [ ] Yes             [ ] Mild [ ] Moderate [ ] Severe    Other Symptoms:  [ ] All other review of systems negative   [ ] Unable to obtain due to poor mentation     Karnofsky Performance Score/Palliative Performance Status Version 2:         %    PHYSICAL EXAM:  Vital Signs Last 24 Hrs  T(C): 36.8 (14 Sep 2018 13:37), Max: 37.6 (14 Sep 2018 04:50)  T(F): 98.2 (14 Sep 2018 13:37), Max: 99.7 (14 Sep 2018 04:50)  HR: 63 (14 Sep 2018 13:37) (63 - 91)  BP: 111/65 (14 Sep 2018 13:37) (105/63 - 138/74)  BP(mean): --  RR: 18 (14 Sep 2018 13:37) (18 - 18)  SpO2: 97% (14 Sep 2018 13:37) (95% - 99%) I&O's Summary    13 Sep 2018 07:01  -  14 Sep 2018 07:00  --------------------------------------------------------  IN: 1100 mL / OUT: 0 mL / NET: 1100 mL    14 Sep 2018 07:01  -  14 Sep 2018 15:27  --------------------------------------------------------  IN: 155 mL / OUT: 0 mL / NET: 155 mL        General:  [ ] Alert  [ ] Oriented x      [ ] Lethargic  [ ] Agitated   [ ] Cachexia   [ ] Unarousable  [ ] Verbal  [ ] Non-Verbal    HEENT:  [ ] Normal   [ ] Dry mouth   [ ] ET Tube    [ ] Trach  [ ] Oral lesions    Lungs:   [ ] Clear [ ] Tachypnea  [ ] Audible excessive secretions   [ ] Rhonchi        [ ] Right [ ] Left [ ] Bilateral  [ ] Crackles        [ ] Right [ ] Left [ ] Bilateral  [ ] Wheezing     [ ] Right [ ] Left [ ] Bilateral    Cardiovascular:  [ ] Regular [ ] Irregular [ ] Tachycardia   [ ] Bradycardia  [ ] Murmur [ ] Other    Abdomen: [ ] Soft  [ ] Distended   [ ] +BS  [ ] Non tender [ ] Tender  [ ]PEG   [ ]OGT/ NGT   Last BM:       Genitourinary: [ ] Normal [ ] Incontinent   [ ] Oliguria/Anuria   [ ] Murray    Musculoskeletal:  [ ] Normal   [ ] Weakness  [ ] Bedbound/Wheelchair bound [ ] Edema    Neurological: [ ] No focal deficits  [ ] Cognitive impairment  [ ] Dysphagia [ ] Dysarthria [ ] Paresis [ ] Other     Skin: [ ] Normal   [ ] Pressure ulcer(s)                  [ ] Rash    LABS:                        9.6    36.2  )-----------( 501      ( 14 Sep 2018 07:05 )             30.8     09-14    133<L>  |  95<L>  |  18  ----------------------------<  99  3.8   |  25  |  0.88    Ca    9.0      14 Sep 2018 07:02  Phos  3.0     09-14  Mg     1.7     09-14            Shock: [ ] Septic [ ] Cardiogenic [ ] Neurologic [ ] Hypovolemic  Vasopressors x   Inotrophs x     Protein Calorie Malnutrition: [ ] Mild [ ] Moderate [ ] Severe    Oral Intake: [ ] Unable/mouth care only [ ] Minimal [ ] Moderate [ ] Full Capability  Diet: [ ] NPO [ ] Tube feeds [ ] TPN [ ] Other     RADIOLOGY & ADDITIONAL STUDIES:    REFERRALS:   [ ] Chaplaincy  [ ] Hospice  [ ] Child Life  [ ] Social Work  [ ] Case management [ ] Holistic Therapy

## 2018-09-14 NOTE — PROGRESS NOTE ADULT - PROBLEM SELECTOR PLAN 6
-Home meds: amlodipine 2.5mg daily, losartan for benicar  -Currently on hold given softer BPs from diarrhea

## 2018-09-14 NOTE — PROGRESS NOTE ADULT - PROBLEM SELECTOR PLAN 2
-9/14, pt with -160. EKG with section of irregular rhythm concerning for Afib/flutter. Resolved following 1L LR bolus  -Episode likely d/t dehydration in setting of severe C. diff infection, as resolution with bolus  -Pt w/ known MVP & remote Hx of A-fib > 30 years ago. Pt & daughter do not recall being started on AC or rate control medications  -cont to monitor

## 2018-09-14 NOTE — PROGRESS NOTE ADULT - PROBLEM SELECTOR PLAN 1
Severe. Pt p/w several days watery diarrhea, worsening. Has Hx radiation colitis, UC. Was taking imodium  -C. diff stool toxin positive 9/12  -WBC 18 -> 35  -c/w PO vancomycin, IV flagyl  -monitor abdominal exams -> if worsening, should get AXR  -clear liquid diet but c/w maintenance IVF LR @ 100cc given tachycardic episode this AM & poor PO intake  -GI on board ([facility]) - continue to appreciate rec's Severe. Pt p/w several days watery diarrhea. Has Hx radiation colitis, UC.   -C. diff stool toxin positive 9/12  -WBC 18 -> 36  -c/w PO vancomycin, IV flagyl  -monitor abdominal exams -> worse today. AXR not c/w toxic megacolon, no free air. Will repeat if further worsening.  -clear liquid diet but c/w LR @ 75cc given nausea, poor PO intake  -GI on board ([facility]) - continue to appreciate rec's  -Colorectal surgery consulted - no current surgical intervention given status of endometrial cancer  -ID, palliative consulted

## 2018-09-14 NOTE — PROGRESS NOTE ADULT - SUBJECTIVE AND OBJECTIVE BOX
***************************************************************  Anup Cruz MD Pgy-[facility]  Contact/Pager [phone] / [phone]  ***************************************************************    FRIDA ROMANO  78y  MRN: [id number]      Patient is a 78y old  Female who presents with a chief complaint of Not feeling well (13 Sep 2018 09:54)      Subjective/ON Events: Bowel movements _____. Otherwise no events, afebrile, VSS. Denies ____      MEDICATIONS  (STANDING):  atorvastatin 10 milliGRAM(s) Oral at bedtime  balsalazide 2250 milliGRAM(s) Oral three times a day  enoxaparin Injectable 40 milliGRAM(s) SubCutaneous every 24 hours  lactated ringers. 1000 milliLiter(s) (100 mL/Hr) IV Continuous <Continuous>  metroNIDAZOLE  IVPB      metroNIDAZOLE  IVPB 500 milliGRAM(s) IV Intermittent every 8 hours  pantoprazole    Tablet 40 milliGRAM(s) Oral before breakfast  sertraline 25 milliGRAM(s) Oral two times a day  vancomycin    Solution 125 milliGRAM(s) Oral every 6 hours    MEDICATIONS  (PRN):  acetaminophen   Tablet .. 650 milliGRAM(s) Oral every 6 hours PRN Temp greater or equal to 38C (100.4F)  acetaminophen   Tablet .. 650 milliGRAM(s) Oral every 6 hours PRN Moderate Pain (4 - 6)      Objective:    Vitals: Vital Signs Last 24 Hrs  T(C): 36.9 (09-14-18 @ 05:30), Max: 37.6 (09-14-18 @ 04:50)  T(F): 98.4 (09-14-18 @ 05:30), Max: 99.7 (09-14-18 @ 04:50)  HR: 73 (09-14-18 @ 05:30) (73 - 166)  BP: 136/81 (09-14-18 @ 05:30) (105/64 - 138/74)  RR: 18 (09-14-18 @ 05:30) (18 - 18)  SpO2: 95% (09-14-18 @ 05:30) (95% - 99%)                I&O's Summary    13 Sep 2018 07:01  -  14 Sep 2018 07:00  --------------------------------------------------------  IN: 1100 mL / OUT: 0 mL / NET: 1100 mL        PHYSICAL EXAM:  GENERAL: NAD  HEAD:  Atraumatic, Normocephalic  EYES: EOMI, PERRLA, conjunctiva and sclera clear  CHEST/LUNG: Clear to percussion bilaterally; No rales, rhonchi, wheezing, or rubs  HEART: Regular rate and rhythm; No murmurs, rubs, or gallops  ABDOMEN: Soft, Nontender, Nondistended; Bowel sounds present  SKIN: No rashes or lesions  NERVOUS SYSTEM:  Alert & Oriented X3, Good concentration; Motor Strength 5/5 B/L upper and lower extremities                                           LABS:  09-13    135  |  92<L>  |  10  ----------------------------<  72  3.2<L>   |  25  |  0.84  09-12    135  |  97  |  8   ----------------------------<  77  3.6   |  22  |  0.73  09-12    135  |  97  |  10  ----------------------------<  93  3.5   |  24  |  0.86    Ca    9.5      13 Sep 2018 07:50  Ca    8.3<L>      12 Sep 2018 07:10  Ca    8.5      12 Sep 2018 00:16  Phos  3.6     09-13  Mg     1.7     09-13    TPro  5.6<L>  /  Alb  2.8<L>  /  TBili  0.5  /  DBili  x   /  AST  12  /  ALT  5<L>  /  AlkPhos  118  09-11                                              9.8    35.4  )-----------( 490      ( 13 Sep 2018 07:50 )             31.2                         7.6    18.7  )-----------( 360      ( 12 Sep 2018 07:11 )             25.8                         8.0    17.8  )-----------( 335      ( 11 Sep 2018 17:41 )             24.8 ***************************************************************  Anup Cruz MD Pgy-1  Contact/Pager [phone] / [phone]  ***************************************************************    FRIDA ROMANO  78y  MRN: [id number]      Patient is a 78y old  Female who presents with a chief complaint of Not feeling well (13 Sep 2018 09:54)      Subjective/ON Events: Continues to have profuse watery diarrhea, nausea, poor PO intake. No improvement in abdominal pain. Afebrile overnight, VSS. Without fever, chills, SOB, CP.      MEDICATIONS  (STANDING):  atorvastatin 10 milliGRAM(s) Oral at bedtime  balsalazide 2250 milliGRAM(s) Oral three times a day  enoxaparin Injectable 40 milliGRAM(s) SubCutaneous every 24 hours  lactated ringers. 1000 milliLiter(s) (100 mL/Hr) IV Continuous <Continuous>  metroNIDAZOLE  IVPB      metroNIDAZOLE  IVPB 500 milliGRAM(s) IV Intermittent every 8 hours  pantoprazole    Tablet 40 milliGRAM(s) Oral before breakfast  sertraline 25 milliGRAM(s) Oral two times a day  vancomycin    Solution 125 milliGRAM(s) Oral every 6 hours    MEDICATIONS  (PRN):  acetaminophen   Tablet .. 650 milliGRAM(s) Oral every 6 hours PRN Temp greater or equal to 38C (100.4F)  acetaminophen   Tablet .. 650 milliGRAM(s) Oral every 6 hours PRN Moderate Pain (4 - 6)      Objective:    Vitals: Vital Signs Last 24 Hrs  T(C): 36.9 (09-14-18 @ 05:30), Max: 37.6 (09-14-18 @ 04:50)  T(F): 98.4 (09-14-18 @ 05:30), Max: 99.7 (09-14-18 @ 04:50)  HR: 73 (09-14-18 @ 05:30) (73 - 166)  BP: 136/81 (09-14-18 @ 05:30) (105/64 - 138/74)  RR: 18 (09-14-18 @ 05:30) (18 - 18)  SpO2: 95% (09-14-18 @ 05:30) (95% - 99%)                I&O's Summary    13 Sep 2018 07:01  -  14 Sep 2018 07:00  --------------------------------------------------------  IN: 1100 mL / OUT: 0 mL / NET: 1100 mL        PHYSICAL EXAM:  GENERAL: NAD  HEAD:  Atraumatic, Normocephalic  EYES: EOMI, PERRLA, conjunctiva and sclera clear  CHEST/LUNG: Clear to auscultation bilaterally; No rales, rhonchi, wheezing, or rubs  HEART: Regular rate and rhythm; No murmurs, rubs, or gallops  ABDOMEN: Soft, diffusely tender to palpation but greatest LLQ, hypoactive bowel wounds  SKIN: No rashes or lesions  NERVOUS SYSTEM:  Alert & Oriented X3, Good concentration; Motor Strength 5/5 B/L upper and lower extremities                                           LABS:  09-13    135  |  92<L>  |  10  ----------------------------<  72  3.2<L>   |  25  |  0.84  09-12    135  |  97  |  8   ----------------------------<  77  3.6   |  22  |  0.73  09-12    135  |  97  |  10  ----------------------------<  93  3.5   |  24  |  0.86    Ca    9.5      13 Sep 2018 07:50  Phos  3.6     09-13  Mg     1.7     09-13    TPro  5.6<L>  /  Alb  2.8<L>  /  TBili  0.5  /  DBili  x   /  AST  12  /  ALT  5<L>  /  AlkPhos  118  09-11                        9.8    35.4  )-----------( 490      ( 13 Sep 2018 07:50 )             31.2                         7.6    18.7  )-----------( 360      ( 12 Sep 2018 07:11 )             25.8                         8.0    17.8  )-----------( 335      ( 11 Sep 2018 17:41 )             24.8 ***************************************************************  Anup Cruz MD Pgy-1  Contact/Pager [phone] / [phone]  ***************************************************************    FRIDA ROMANO  78y  MRN: [id number]      Patient is a 78y old  Female who presents with a chief complaint of Not feeling well (13 Sep 2018 09:54)      Subjective/ON Events: Continues to have profuse watery diarrhea, nausea, poor PO intake. No improvement in abdominal pain. Afebrile overnight, VSS. Without fever, chills, SOB, CP.      MEDICATIONS  (STANDING):  atorvastatin 10 milliGRAM(s) Oral at bedtime  balsalazide 2250 milliGRAM(s) Oral three times a day  enoxaparin Injectable 40 milliGRAM(s) SubCutaneous every 24 hours  lactated ringers. 1000 milliLiter(s) (100 mL/Hr) IV Continuous <Continuous>  metroNIDAZOLE  IVPB      metroNIDAZOLE  IVPB 500 milliGRAM(s) IV Intermittent every 8 hours  pantoprazole    Tablet 40 milliGRAM(s) Oral before breakfast  sertraline 25 milliGRAM(s) Oral two times a day  vancomycin    Solution 125 milliGRAM(s) Oral every 6 hours    MEDICATIONS  (PRN):  acetaminophen   Tablet .. 650 milliGRAM(s) Oral every 6 hours PRN Temp greater or equal to 38C (100.4F)  acetaminophen   Tablet .. 650 milliGRAM(s) Oral every 6 hours PRN Moderate Pain (4 - 6)      Objective:    Vitals: Vital Signs Last 24 Hrs  T(C): 36.9 (09-14-18 @ 05:30), Max: 37.6 (09-14-18 @ 04:50)  T(F): 98.4 (09-14-18 @ 05:30), Max: 99.7 (09-14-18 @ 04:50)  HR: 73 (09-14-18 @ 05:30) (73 - 166)  BP: 136/81 (09-14-18 @ 05:30) (105/64 - 138/74)  RR: 18 (09-14-18 @ 05:30) (18 - 18)  SpO2: 95% (09-14-18 @ 05:30) (95% - 99%)                I&O's Summary    13 Sep 2018 07:01  -  14 Sep 2018 07:00  --------------------------------------------------------  IN: 1100 mL / OUT: 0 mL / NET: 1100 mL        PHYSICAL EXAM:  GENERAL: NAD  HEAD:  Atraumatic, Normocephalic  EYES: EOMI, PERRLA, conjunctiva and sclera clear  CHEST/LUNG: Clear to auscultation bilaterally; No rales, rhonchi, wheezing, or rubs  HEART: Regular rate and rhythm; No murmurs, rubs, or gallops  ABDOMEN: Soft, diffusely tender to palpation but greatest LLQ, hypoactive bowel wounds  SKIN: No rashes or lesions  NERVOUS SYSTEM:  Alert & Oriented X3, Good concentration; Motor Strength 5/5 B/L upper and lower extremities                                           LABS:  09-13    135  |  92<L>  |  10  ----------------------------<  72  3.2<L>   |  25  |  0.84  09-12    135  |  97  |  8   ----------------------------<  77  3.6   |  22  |  0.73  09-12    135  |  97  |  10  ----------------------------<  93  3.5   |  24  |  0.86    Ca    9.5      13 Sep 2018 07:50  Phos  3.6     09-13  Mg     1.7     09-13    TPro  5.6<L>  /  Alb  2.8<L>  /  TBili  0.5  /  DBili  x   /  AST  12  /  ALT  5<L>  /  AlkPhos  118  09-11                        9.8    35.4  )-----------( 490      ( 13 Sep 2018 07:50 )             31.2                         7.6    18.7  )-----------( 360      ( 12 Sep 2018 07:11 )             25.8                         8.0    17.8  )-----------( 335      ( 11 Sep 2018 17:41 )             24.8     IMAGING:    Abdominal Xray 9/14  Nonobstructive bowel gas pattern without evidence of free air

## 2018-09-14 NOTE — CHART NOTE - NSCHARTNOTEFT_GEN_A_CORE
Patient is 77yo female who presented after having 2 months of diarrhea with worsening of symptoms and abdominal pain. Patient was interviewed and examined while her family was visiting today. She was sitting in a recliner chair in good spirits. She stated that this is the best she has felt in a while, endorsing only one episode of diarrhea this morning. She still reports some abdominal tenderness with palpation (4-5 in severity, sometimes sharp quality) but denies any pain when left alone. She denies CP, SOB, dizziness, headache, or fevers. She continues to have decreased appetite.  ROS: otherwise negative    VS: Vital Signs Last 24 Hrs  T(C): 36.8 (14 Sep 2018 13:37), Max: 37.6 (14 Sep 2018 04:50)  T(F): 98.2 (14 Sep 2018 13:37), Max: 99.7 (14 Sep 2018 04:50)  HR: 63 (14 Sep 2018 13:37) (63 - 91)  BP: 111/65 (14 Sep 2018 13:37) (105/63 - 138/74)  BP(mean): --  RR: 18 (14 Sep 2018 13:37) (18 - 18)  SpO2: 97% (14 Sep 2018 13:37) (95% - 99%)    PHYSICAL EXAM:  GENERAL: NAD  HEAD:  Atraumatic, Normocephalic  EYES: EOMI, PERRLA, conjunctiva and sclera clear  CHEST/LUNG: Clear to auscultation bilaterally; No rales, rhonchi, wheezing, or rubs  HEART: Regular rate and rhythm; No murmurs, rubs, or gallops  ABDOMEN: Soft, tender to palpation mainly in the RLQ, bowel sounds appreciated  SKIN: No rashes or lesions  NERVOUS SYSTEM:  Alert & Oriented X3, Good concentration; Motor Strength 5/5 B/L upper and lower extremities  EXT: Rt leg has chronic lymphedema, distal pulses +2    LABS:                        9.6    36.2  )-----------( 501      ( 14 Sep 2018 07:05 )             30.8   09-14    133<L>  |  95<L>  |  18  ----------------------------<  99  3.8   |  25  |  0.88    Ca    9.0      14 Sep 2018 07:02  Phos  3.0     09-14  Mg     1.7     09-14    IMAGING:  Abdominal Xray 9/14:  Nonobstructive bowel gas pattern without evidence of free air    ASSESSMENT:  78Fw/ hx of endometrial cancer w/ mets currently receiving weekly chemotherapy (which she has been on/off for years) with paclitaxel which she seems to be tolerating, ulcerative colitis, HTN, presenting with watery diarrhea in the setting of recent UTI s/p antibiotic therapy (2 months ago) and abdominal pain. Stool came back positive for C. difficile stool toxin (9/12) and being treated with vancomycin PO and IV flagyl.  Despite reporting improvement in her symptoms this afternoon, her WBC continues to increase (to 36.2).    PLAN:  1. C. Difficile: almost 2 months of watery diarrhea, she presented with worsening diarrhea and abdominal pain; hx of recent antibiotic use for UTI.  - C. diff stool toxin + on 9/12  - WBC increasing 18>36.2  - AXR showed no acute surgical abdomen or toxic megacolon  - Continue to monitor CBC, abdominal exams  - C/w PO vancomycin, IV flagyl  - Diet: clear liquids, and continue LR  - GI c/s  - Colorectal surgery c/s  - Palliative c/s  - ID c/s  2. Problem: Tachycardia:  - 9/14, pt with -160. EKG with section of irregular rhythm concerning for Afib/flutter. Resolved following 1L LR bolus  -Episode likely d/t dehydration in setting of severe C. diff infection, as resolution with bolus  -Pt w/ known MVP & remote Hx of A-fib > 30 years ago. Pt & daughter do not recall being started on AC or rate control medications  -cont to monitor  3. Endometrial cancer: Endometrial cancer w/ mets to abdominal wall & iliac nodes. Gets taxol through port weekly. Has had KATH, and abdominal wall resection in past. Recently had 2nd opinion with Dr. Altaf Arteaga at [facility] as pt states she did not like [place] [facility] [place] Jenni recommended to consider hormone therapy. Pt states she would want her HCP to be her daughter Vidya Tripp who lives in [place]. Discussed possible life sustaining measures given her current condition and pt states she would want all possible resuscitative efforts.  -Full Code.   4. Hypokalemia: Hypokalemia & hypomagnesemia likely 2/2 diarrhea  - Replete lytes as needed.   - Levels have improved, continue to monitor closely  5. Ulcerative colitis with complication, unspecified location: Pt states her UC has been well controlled on balsalazide. Current colitis due to C. diff - see above  -Cont. balsalazide.   6. Essential hypertension: Home meds: amlodipine 2.5mg daily, losartan for benicar  -Currently on hold given softer BPs from diarrhea.  7. Prophylactic measure: DVT PPx  -Lovenox. Patient is 79yo female who presented after having 2 months of diarrhea with worsening of symptoms and abdominal pain. Patient was interviewed and examined while her family was visiting today. She was sitting in a recliner chair in good spirits. She stated that this is the best she has felt in a while, endorsing only one episode of diarrhea this morning. She still reports some abdominal tenderness with palpation (4-5 in severity, sometimes sharp quality) but denies any pain when left alone. She denies CP, SOB, dizziness, headache, or fevers. She continues to have decreased appetite.  ROS: otherwise negative    VS: Vital Signs Last 24 Hrs  T(C): 36.8 (14 Sep 2018 13:37), Max: 37.6 (14 Sep 2018 04:50)  T(F): 98.2 (14 Sep 2018 13:37), Max: 99.7 (14 Sep 2018 04:50)  HR: 63 (14 Sep 2018 13:37) (63 - 91)  BP: 111/65 (14 Sep 2018 13:37) (105/63 - 138/74)  BP(mean): --  RR: 18 (14 Sep 2018 13:37) (18 - 18)  SpO2: 97% (14 Sep 2018 13:37) (95% - 99%)    PHYSICAL EXAM:  GENERAL: NAD  HEAD:  Atraumatic, Normocephalic  EYES: EOMI, PERRLA, conjunctiva and sclera clear  CHEST/LUNG: Clear to auscultation bilaterally; No rales, rhonchi, wheezing, or rubs  HEART: Regular rate and rhythm; No murmurs, rubs, or gallops  ABDOMEN: Soft, tender to palpation mainly in the RLQ, bowel sounds appreciated  SKIN: No rashes or lesions  NERVOUS SYSTEM:  Alert & Oriented X3, Good concentration; Motor Strength 5/5 B/L upper and lower extremities  EXT: Rt leg has chronic lymphedema, distal pulses +2    LABS:                        9.6    36.2  )-----------( 501      ( 14 Sep 2018 07:05 )             30.8   09-14    133<L>  |  95<L>  |  18  ----------------------------<  99  3.8   |  25  |  0.88    Ca    9.0      14 Sep 2018 07:02  Phos  3.0     09-14  Mg     1.7     09-14    IMAGING:  Abdominal Xray 9/14:  Nonobstructive bowel gas pattern without evidence of free air    ASSESSMENT:  78Fw/ hx of endometrial cancer w/ mets currently receiving weekly chemotherapy (which she has been on/off for years) with paclitaxel which she seems to be tolerating, ulcerative colitis, HTN, presenting with watery diarrhea in the setting of recent UTI s/p antibiotic therapy (2 months ago) and abdominal pain. Stool came back positive for C. difficile stool toxin (9/12) and being treated with vancomycin PO and IV flagyl.  Despite reporting improvement in her symptoms this afternoon, her WBC continues to increase (to 36.2).    PLAN:  1. C. Difficile: almost 2 months of watery diarrhea, she presented with worsening diarrhea and abdominal pain; hx of recent antibiotic use for UTI.  - C. diff stool toxin + on 9/12  - WBC increasing 18>36.2  - AXR showed no acute surgical abdomen or toxic megacolon  - Continue to monitor CBC, abdominal exams  - C/w PO vancomycin, IV flagyl  - Diet: clear liquids, and continue LR  - GI c/s  - Colorectal surgery c/s  - Palliative c/s  - ID c/s  2. Problem: Tachycardia:  - 9/14, pt with -160. EKG with section of irregular rhythm concerning for Afib/flutter. Resolved following 1L LR bolus  -Episode likely d/t dehydration in setting of severe C. diff infection, as resolution with bolus  -Pt w/ known MVP & remote Hx of A-fib > 30 years ago. Pt & daughter do not recall being started on AC or rate control medications  -cont to monitor  3. Endometrial cancer: Endometrial cancer w/ mets to abdominal wall & iliac nodes. Gets taxol through port weekly. Has had KATH, and abdominal wall resection in past. Recently had 2nd opinion with Dr. Altaf Arteaga at [facility] as pt states she did not like the bad news she was given [place] [facility] [place] Jenni recommended to consider hormone therapy. Pt states she would want her HCP to be her daughter Vidya Tripp who lives in [place]. Discussed possible life sustaining measures given her current condition and pt states she would want all possible resuscitative efforts.  -Full Code.   4. Hypokalemia: Hypokalemia & hypomagnesemia likely 2/2 diarrhea  - Replete lytes as needed.   - Levels have improved, continue to monitor closely  5. Ulcerative colitis with complication, unspecified location: Pt states her UC has been well controlled on balsalazide. Current colitis due to C. diff - see above  -Cont. balsalazide.   6. Essential hypertension: Home meds: amlodipine 2.5mg daily, losartan for benicar  -Currently on hold given softer BPs from diarrhea.  7. Prophylactic measure: DVT PPx  -Lovenox.        Attending Attestation:   Agree with Above. Pt with severe c diff, WBC appears to be plateauing. Increase PO Vanc to 500mg, c/w Flagyl. ID consulted by 5 Iam team earlier in the Day, f/u recommendations

## 2018-09-14 NOTE — CONSULT NOTE ADULT - SUBJECTIVE AND OBJECTIVE BOX
Patient is a 78y old  Female who presents with a chief complaint of Not feeling well (14 Sep 2018 08:36)    HPI:  78F  w/ hx of endometrial cancer w/ mets currently receiving weekly chemotherapy with paclitaxel which she seems to be tolerating, Ulcerative colitis?, chronic diarrhea on immodium, HTN, p/w not feeling well. Reportedly patient was in [place] 2 weeks ago for UTI and was given antibiotics.  She is on regular chemotherapy. She has taken multiple courses of antibiotics. Pt states she was feeling better when starting roughly 2 days ago she developed weakness and nausea. Symptoms continued 9/11without improvement so went for further evaluation. She had an outpatient CT abd/pelvis performed which indicated sigmoid colitis and came to the ER. Of note, pt's outpatient images have been uploaded into EMR. As per ER signout, pt's GI was considering radiation colitis? Endorses some LLQ pain and pain is worse with palpation. She endorses constipation over the last 2 days but had large solid BM today. Denies hematochezia or melena. No obvious source of bleeding as per patient. Pt has extensive paperwork with her including results of her CT abd/pelvis earlier today.     In ER: Given Cipro, flagyl IV, LR 1L, KCl 40mg PO, KCl 10mg IV (11 Sep 2018 22:31)  She subsequently was found to have Cdiff.   Yesterday on 9/13/18 she had abd pain, malaise and profuse diarrhea with marked leukocytosis. She has not taken any Granulocyte Stimulating Factor.    Today, she notes considerable improvement. She had 1 loose bowel movement this am. She is tolerating liquid diet and her abdominal pain and nausea has resolved.      PAST MEDICAL & SURGICAL HISTORY:  Malignant neoplasm of ovary, unspecified laterality  GERD (gastroesophageal reflux disease)  Fatty liver  Multiple thyroid nodules  Anemia  Cataracts, bilateral: monitored  History of chemotherapy  Lymphedema of leg: right  Hydronephrosis determined by ultrasound: right kidney  Abdominal mass: 2013 metastatic endometrial cancer, s/p surgery, on chemotherapy at present-infusa port right chest  Atrial fibrillation: one episode &gt;30 years ago  Mitral valve prolapse  Endometrial carcinoma: 2001 - s/p KATH - tx with radiation, and currently receiving chemotherapy  Hyperlipidemia  Ulcerative colitis  Anxiety  Post-Herpetic Trigeminal Neuralgia: left  Benign Hypertension  H/O hydronephrosis: ureteral stent, multiple stent exchanges, last in 9/2017  History of chemotherapy: Infusaport insertion ; 2013  Abdominal mass: s/p mass removed 2013,on chemo-right chest infusa port  Encounter for biopsy: 2/14/13 - right iliac lymph node biopsy  History of tonsillectomy  H/O total hysterectomy: 2001  History of D&C: age 22      Social history: significant other, lives alone, no tobacco - retired     FAMILY HISTORY:  Hypertension  Family history of early CAD (Father)      REVIEW OF SYSTEMS:  CONSTITUTIONAL: + weakness, fevers or chills  EYES/ENT: No visual changes;  No vertigo or throat pain   NECK: No pain or stiffness  RESPIRATORY: No cough, wheezing, hemoptysis; No shortness of breath  CARDIOVASCULAR: No chest pain or palpitations  GASTROINTESTINAL: as per HPI  GENITOURINARY: No dysuria, frequency or hematuria  NEUROLOGICAL: No numbness or weakness  SKIN: No itching, burning, rashes, or lesions   All other review of systems is negative unless indicated above    Allergies  erythromycin (Unknown)  macrolide antibiotics (Unknown)  morphine (Diarrhea)  sulfa drugs (Unknown)    Antimicrobials:  metroNIDAZOLE  IVPB      metroNIDAZOLE  IVPB 500 milliGRAM(s) IV Intermittent every 8 hours  vancomycin    Solution 500 milliGRAM(s) Oral every 6 hours    Vital Signs Last 24 Hrs  T(C): 36.8 (14 Sep 2018 13:37), Max: 37.6 (14 Sep 2018 04:50)  T(F): 98.2 (14 Sep 2018 13:37), Max: 99.7 (14 Sep 2018 04:50)  HR: 63 (14 Sep 2018 13:37) (63 - 91)  BP: 111/65 (14 Sep 2018 13:37) (105/63 - 138/74)  BP(mean): --  RR: 18 (14 Sep 2018 13:37) (18 - 18)  SpO2: 97% (14 Sep 2018 13:37) (95% - 99%)    PHYSICAL EXAM:  General: chronically ill appearing NAD, Non-toxic  Neurology: A&Ox3, nonfocal  Respiratory: Clear to auscultation bilaterally  CV: RRR, S1S2, no murmurs, rubs or gallops  Abdominal: Soft, Non-tender, non-distended, normal bowel sounds  Extremities: No edema,   Line Sites: Clear  Skin: No rash                        9.6    36.2  )-----------( 501      ( 14 Sep 2018 07:05 )             30.8   WBC Count: 36.2 (09-14 @ 07:05)  WBC Count: 35.4 (09-13 @ 07:50)  WBC Count: 18.7 (09-12 @ 07:11)  WBC Count: 17.8 (09-11 @ 17:41)    09-14    133<L>  |  95<L>  |  18  ----------------------------<  99  3.8   |  25  |  0.88    Ca    9.0      14 Sep 2018 07:02  Phos  3.0     09-14  Mg     1.7     09-14    C. difficile GDH &amp; toxins A/B by EIA . (09.12.18 @ 12:45)    Clostridium difficile GDH Toxins A&amp;B, EIA:   Positive    Clostridium difficile GDH Interpretation: Positive for toxigenic C. Difficile.  This specimen is positive for C.  Difficile glutamate dehydrogenase (GDH) antigen and positive for C.  Difficile Toxins A & B, by EIA.      Clostridium difficile Toxin by PCR (03.26.13 @ 14:08)    C Diff by PCR Result: [facility]    MICROBIOLOGY:  .Blood Blood-Peripheral  09-12-18   No growth to date.  --  --      .Stool Feces  09-12-18   No enteric pathogens to date: Final culture pending  --  --      .Urine Clean Catch (Midstream)  09-11-18   Culture grew 3 or more types of organisms which indicate  collection contamination; consider recollection only if clinically  indicated.  --  --      .Blood Blood-Peripheral  09-11-18   No growth to date.  --  --      .Urine Clean Catch (Midstream)  08-27-18   <10,000 CFU/ml Normal Urogenital long present  --  --      .Blood Blood-Peripheral  08-26-18   No growth at 5 days.  --  --    Radiology: axr and CXR images reviewed by me  < from: Xray Abdomen 1 View PORTABLE -Urgent (09.14.18 @ 11:02) >  Nonobstructive bowel gas pattern without evidence of free air.    < end of copied text >  < from: Xray Chest 1 View- PORTABLE-Urgent (09.14.18 @ 10:58) >  IMPRESSION:     Clear lungs.    < end of copied text >      Richard Amato MD; Division of Infectious Disease; Pager: [phone]; nights and weekends: [phone]

## 2018-09-14 NOTE — PROGRESS NOTE ADULT - SUBJECTIVE AND OBJECTIVE BOX
SUBJECTIVE:  Distraught, complaining of abdominal pain, ongoing diarrhea, and nausea.  _____________________________________________________  OBJECTIVE:    T(C): 36.9 (09-14-18 @ 05:30), Max: 37.6 (09-14-18 @ 04:50)  HR: 73 (09-14-18 @ 05:30)  BP: 136/81 (09-14-18 @ 05:30)  RR: 18 (09-14-18 @ 05:30)  SpO2: 95% (09-14-18 @ 05:30)  Wt(kg): --    General = Ill-appearing  Abdomen = Distended, no audible BS, tender diffusely  _____________________________________________________  LABS:                        9.6    36.2  )-----------( 501      ( 14 Sep 2018 07:05 )             30.8     09-14    133<L>  |  95<L>  |  18  ----------------------------<  99  3.8   |  25  |  0.88    Ca    9.0      14 Sep 2018 07:02  Phos  3.0     09-14  Mg     1.7     09-14          _____________________________________________________  ACTIVE MEDS:  MEDICATIONS  (STANDING):  atorvastatin 10 milliGRAM(s) Oral at bedtime  balsalazide 2250 milliGRAM(s) Oral three times a day  enoxaparin Injectable 40 milliGRAM(s) SubCutaneous every 24 hours  lactated ringers. 1000 milliLiter(s) (75 mL/Hr) IV Continuous <Continuous>  metroNIDAZOLE  IVPB      metroNIDAZOLE  IVPB 500 milliGRAM(s) IV Intermittent every 8 hours  ondansetron Injectable 4 milliGRAM(s) IV Push once  pantoprazole    Tablet 40 milliGRAM(s) Oral before breakfast  sertraline 25 milliGRAM(s) Oral two times a day  vancomycin    Solution 125 milliGRAM(s) Oral every 6 hours    MEDICATIONS  (PRN):  acetaminophen   Tablet .. 650 milliGRAM(s) Oral every 6 hours PRN Temp greater or equal to 38C (100.4F)  acetaminophen   Tablet .. 650 milliGRAM(s) Oral every 6 hours PRN Moderate Pain (4 - 6)    _____________________________________________________  ASSESSMENT:  78yFemale with severe C. diff colitis, in the setting of radiation colitis, a remote history of UC, and metastatic endometrial cancer on chemotherapy, with ongoing pain, diarrhea, and leukocytosis (though with moderation of fever) on oral vanco and IV Flagyl. Very concerning for progression to fulminant colitis. Prognosis guarded.    PLAN:  Check follow-up AXR  ID and colorectal consultations  Continue vanco/Flagyl pending ID consultation  At some point, would have to consider fecal microbial transplant if the abx fail    Discussed with patient and house staff    Karl Michel M.D.  (O) [phone]  (C) [phone]

## 2018-09-15 LAB
ANION GAP SERPL CALC-SCNC: 13 MMOL/L — SIGNIFICANT CHANGE UP (ref 5–17)
BUN SERPL-MCNC: 19 MG/DL — SIGNIFICANT CHANGE UP (ref 7–23)
CALCIUM SERPL-MCNC: 8.4 MG/DL — SIGNIFICANT CHANGE UP (ref 8.4–10.5)
CHLORIDE SERPL-SCNC: 96 MMOL/L — SIGNIFICANT CHANGE UP (ref 96–108)
CO2 SERPL-SCNC: 25 MMOL/L — SIGNIFICANT CHANGE UP (ref 22–31)
CREAT SERPL-MCNC: 0.87 MG/DL — SIGNIFICANT CHANGE UP (ref 0.5–1.3)
GLUCOSE SERPL-MCNC: 81 MG/DL — SIGNIFICANT CHANGE UP (ref 70–99)
HCT VFR BLD CALC: 28.7 % — LOW (ref 34.5–45)
HGB BLD-MCNC: 8.9 G/DL — LOW (ref 11.5–15.5)
MAGNESIUM SERPL-MCNC: 1.6 MG/DL — SIGNIFICANT CHANGE UP (ref 1.6–2.6)
MCHC RBC-ENTMCNC: 25.6 PG — LOW (ref 27–34)
MCHC RBC-ENTMCNC: 31 GM/DL — LOW (ref 32–36)
MCV RBC AUTO: 82.7 FL — SIGNIFICANT CHANGE UP (ref 80–100)
PHOSPHATE SERPL-MCNC: 2.5 MG/DL — SIGNIFICANT CHANGE UP (ref 2.5–4.5)
PLATELET # BLD AUTO: 485 K/UL — HIGH (ref 150–400)
POTASSIUM SERPL-MCNC: 3.2 MMOL/L — LOW (ref 3.5–5.3)
POTASSIUM SERPL-SCNC: 3.2 MMOL/L — LOW (ref 3.5–5.3)
RBC # BLD: 3.47 M/UL — LOW (ref 3.8–5.2)
RBC # FLD: 19 % — HIGH (ref 10.3–14.5)
SODIUM SERPL-SCNC: 134 MMOL/L — LOW (ref 135–145)
WBC # BLD: 30.63 K/UL — HIGH (ref 3.8–10.5)
WBC # FLD AUTO: 30.63 K/UL — HIGH (ref 3.8–10.5)

## 2018-09-15 PROCEDURE — 99233 SBSQ HOSP IP/OBS HIGH 50: CPT | Mod: GC

## 2018-09-15 PROCEDURE — 99232 SBSQ HOSP IP/OBS MODERATE 35: CPT

## 2018-09-15 RX ORDER — ACETAMINOPHEN 500 MG
1000 TABLET ORAL ONCE
Qty: 0 | Refills: 0 | Status: COMPLETED | OUTPATIENT
Start: 2018-09-15 | End: 2018-09-15

## 2018-09-15 RX ORDER — POTASSIUM CHLORIDE 20 MEQ
40 PACKET (EA) ORAL EVERY 4 HOURS
Qty: 0 | Refills: 0 | Status: DISCONTINUED | OUTPATIENT
Start: 2018-09-15 | End: 2018-09-15

## 2018-09-15 RX ORDER — POTASSIUM CHLORIDE 20 MEQ
10 PACKET (EA) ORAL
Qty: 0 | Refills: 0 | Status: COMPLETED | OUTPATIENT
Start: 2018-09-15 | End: 2018-09-15

## 2018-09-15 RX ADMIN — Medication 100 MILLIEQUIVALENT(S): at 08:37

## 2018-09-15 RX ADMIN — BALSALAZIDE DISODIUM 2250 MILLIGRAM(S): 750 CAPSULE ORAL at 13:38

## 2018-09-15 RX ADMIN — Medication 100 MILLIEQUIVALENT(S): at 10:16

## 2018-09-15 RX ADMIN — Medication 500 MILLIGRAM(S): at 17:21

## 2018-09-15 RX ADMIN — ATORVASTATIN CALCIUM 10 MILLIGRAM(S): 80 TABLET, FILM COATED ORAL at 21:11

## 2018-09-15 RX ADMIN — BALSALAZIDE DISODIUM 2250 MILLIGRAM(S): 750 CAPSULE ORAL at 21:11

## 2018-09-15 RX ADMIN — Medication 100 MILLIGRAM(S): at 05:00

## 2018-09-15 RX ADMIN — Medication 100 MILLIGRAM(S): at 11:19

## 2018-09-15 RX ADMIN — SERTRALINE 25 MILLIGRAM(S): 25 TABLET, FILM COATED ORAL at 17:20

## 2018-09-15 RX ADMIN — SERTRALINE 25 MILLIGRAM(S): 25 TABLET, FILM COATED ORAL at 06:22

## 2018-09-15 RX ADMIN — Medication 500 MILLIGRAM(S): at 00:11

## 2018-09-15 RX ADMIN — Medication 100 MILLIEQUIVALENT(S): at 09:20

## 2018-09-15 RX ADMIN — Medication 500 MILLIGRAM(S): at 06:22

## 2018-09-15 RX ADMIN — PANTOPRAZOLE SODIUM 40 MILLIGRAM(S): 20 TABLET, DELAYED RELEASE ORAL at 06:22

## 2018-09-15 RX ADMIN — Medication 100 MILLIGRAM(S): at 21:09

## 2018-09-15 RX ADMIN — Medication 400 MILLIGRAM(S): at 14:39

## 2018-09-15 RX ADMIN — Medication 1000 MILLIGRAM(S): at 15:10

## 2018-09-15 RX ADMIN — Medication 650 MILLIGRAM(S): at 10:00

## 2018-09-15 RX ADMIN — ENOXAPARIN SODIUM 40 MILLIGRAM(S): 100 INJECTION SUBCUTANEOUS at 06:22

## 2018-09-15 RX ADMIN — Medication 650 MILLIGRAM(S): at 09:33

## 2018-09-15 RX ADMIN — Medication 12.5 MILLIGRAM(S): at 21:11

## 2018-09-15 RX ADMIN — BALSALAZIDE DISODIUM 2250 MILLIGRAM(S): 750 CAPSULE ORAL at 06:22

## 2018-09-15 RX ADMIN — Medication 500 MILLIGRAM(S): at 11:20

## 2018-09-15 RX ADMIN — Medication 12.5 MILLIGRAM(S): at 09:20

## 2018-09-15 NOTE — PROGRESS NOTE ADULT - PROBLEM SELECTOR PLAN 1
Severe. Pt p/w several days watery diarrhea. Has Hx radiation colitis, UC.   -C. diff stool toxin positive 9/12  -WBC 18 -> 36  -c/w PO vancomycin, IV flagyl  -monitor abdominal exams -> worse today. AXR not c/w toxic megacolon, no free air. Will repeat if further worsening.  -clear liquid diet but c/w LR @ 75cc given nausea, poor PO intake  -GI on board ([facility]) - continue to appreciate rec's  -Colorectal surgery consulted - no current surgical intervention given status of endometrial cancer  -ID, palliative consulted Severe. Pt p/w several days watery diarrhea. Has Hx radiation colitis, UC.   -C. diff stool toxin positive 9/12  -WBC 18 >36 >30.6, continue to trend  -monitor abdominal exams -> worse today. AXR not c/w toxic megacolon, no free air. Will repeat if further worsening.  -clear liquid diet, advance diet as tolerated, continue with supplement drinks as per GI  -GI on board (guanakitodaxa) - continue to appreciate rec's  -Colorectal surgery consulted - no current surgical intervention given status of endometrial cancer  - palliative consulted  - ID recs appreciated  - c/w IV flagyl, and PO vancomycin at 500 Q6

## 2018-09-15 NOTE — PROGRESS NOTE ADULT - SUBJECTIVE AND OBJECTIVE BOX
Follow Up:  Cdiff    Interval History/ROS: reports malaise, drinking only small amounts, had abd pain after drinking. brother at bedside reports that she has had some confusion    Allergies  erythromycin (Unknown)  macrolide antibiotics (Unknown)  morphine (Diarrhea)  sulfa drugs (Unknown)    ANTIMICROBIALS:  metroNIDAZOLE  IVPB    metroNIDAZOLE  IVPB 500 every 8 hours  vancomycin    Solution 500 every 6 hours    OTHER MEDS:  MEDICATIONS  (STANDING):  acetaminophen   Tablet .. 650 every 6 hours PRN  acetaminophen   Tablet .. 650 every 6 hours PRN  atorvastatin 10 at bedtime  balsalazide 2250 three times a day  enoxaparin Injectable 40 every 24 hours  metoprolol tartrate 12.5 every 12 hours  pantoprazole    Tablet 40 before breakfast  sertraline 25 two times a day      Vital Signs Last 24 Hrs  T(C): 36.7 (15 Sep 2018 14:29), Max: 36.8 (14 Sep 2018 21:37)  T(F): 98.1 (15 Sep 2018 14:29), Max: 98.3 (14 Sep 2018 21:37)  HR: 57 (15 Sep 2018 14:29) (54 - 68)  BP: 120/69 (15 Sep 2018 14:29) (119/66 - 153/81)  BP(mean): --  RR: 16 (15 Sep 2018 14:29) (16 - 20)  SpO2: 97% (15 Sep 2018 08:30) (96% - 98%)    PHYSICAL EXAM:  General: WN/WD NAD, uncomfortable appearing  Neurology: A&Ox3, nonfocal  Respiratory: Clear to auscultation bilaterally  CV: RRR, S1S2, no murmurs, rubs or gallops  Abdominal: Soft,-tender, non-distended,  Line Sites: Clear  Skin: No rash                          8.9    30.63 )-----------( 485      ( 15 Sep 2018 08:27 )             28.7   WBC Count: 30.63 (09-15 @ 08:27)  WBC Count: 36.2 (09-14 @ 07:05)  WBC Count: 35.4 (09-13 @ 07:50)  WBC Count: 18.7 (09-12 @ 07:11)  WBC Count: 17.8 (09-11 @ 17:41)    09-15    134<L>  |  96  |  19  ----------------------------<  81  3.2<L>   |  25  |  0.87    Ca    8.4      15 Sep 2018 06:49  Phos  2.5     09-15  Mg     1.6     09-15      MICROBIOLOGY:  .Blood Blood-Peripheral  09-12-18   No growth to date.  --  --      .Stool Feces  09-12-18   No enteric pathogens isolated.  (Stool culture examined for Salmonella,  Shigella, Campylobacter, Aeromonas, Plesiomonas,  Vibrio, E.coli O157 and Yersinia)  --  --      .Urine Clean Catch (Midstream)  09-11-18   Culture grew 3 or more types of organisms which indicate  collection contamination; consider recollection only if clinically  indicated.  --  --      .Blood Blood-Peripheral  09-11-18   No growth to date.  --  --      .Urine Clean Catch (Midstream)  08-27-18   <10,000 CFU/ml Normal Urogenital long present  --  --      .Blood Blood-Peripheral  08-26-18   No growth at 5 days.  --  --      Clostridium difficile GDH Toxins A&amp;B, EIA:   Positive (09-12-18 @ 12:45)  Clostridium difficile GDH Interpretation: Positive for toxigenic C. Difficile.  This specimen is positive for C.  Difficile glutamate dehydrogenase (GDH) antigen and positive for C.  Difficile Toxins A & B, by EIA.  GDH is a highly sensitive marker for C.  Difficile that is produced in largeamounts by all C. Difficile strains,  both toxigenic and nontoxigenic.  This assay has not been validated as a  test of cure.  The results of this assay should always be interpreted in  conjunction with patient's clinical history. (09-12-18 @ 12:45)      RADIOLOGY:  < from: Xray Abdomen 1 View PORTABLE -Urgent (09.14.18 @ 11:02) >  Nonobstructive bowel gas pattern without evidence of free air.    < end of copied text >      Richard Amato MD; Division of Infectious Disease; Pager: [phone]; nights and weekends: [phone]

## 2018-09-15 NOTE — PROGRESS NOTE ADULT - ASSESSMENT
Ms Belcher is 78F with mutliple comordities including  endometrial cancer w/ mets currently receiving weekly chemotherapy with paclitaxel which she seems to be tolerating, Ulcerative colitis?, chronic diarrhea on immodium, HTN, previous antibiotic exposure.  She has severe Cdiff on day #3 therapy  no suggestion of toxic megacolon  leukocytosis improved    Antibiotics:  Cipro 9/11  Flagyl 9/11; 9/13 -->  Vanco po 125 q 6h 9/12 -->14; 500 q 6 h 9/14-->    Suggest  Continue IV Flagyl for now  Continue high dose po Vanco  advance diet as tolerated  trend WBC

## 2018-09-15 NOTE — PROGRESS NOTE ADULT - PROBLEM SELECTOR PLAN 3
Endometrial cancer w/ mets to abdominal wall & iliac nodes. Gets taxol through port weekly. Has had abdominal wall resection in past. Recently had 2nd opinion with Dr. Altaf Arteaga at [facility] as pt states she did not like the bad news she was given at [facility]. Jenni recommended to consider hormone therapy. Pt states she has a significant other named Wolf who she does not live with. She lives with herself but as per records pt has with her she has good functional status. Pt states she would want her HCP to be her daughter Vidya Tripp who lives in [place]. Discussed possible life sustaining measures given her current condition and pt states she would want all possible resuscitative efforts.  -Full Code Endometrial cancer w/ mets to abdominal wall & iliac nodes. Gets taxol through port weekly. Has had abdominal wall resection in past. Recently had 2nd opinion with Dr. Altaf Arteaga at [facility] as pt states she did not like the bad news she was given at [facility] [place]. Jenni recommended to consider hormone therapy. Pt states she would want her HCP to be her daughter Vidya Tripp who lives in [place]. Discussed possible life sustaining measures given her current condition and pt states she would want all possible resuscitative efforts.  -Full Code  - Palliative consulted

## 2018-09-15 NOTE — PROGRESS NOTE ADULT - SUBJECTIVE AND OBJECTIVE BOX
SUBJECTIVE:  Yesterday, "a good day" -- less BM, little bit of form with stool, less abdominal discomfort.  Tolerating clears, which started at lunchtime.  Per RN and patient, 3 loose BM with little pieces of stool overnight.  No BPR.  Patient says she is tired.  _____________________________________________________  OBJECTIVE:    T(C): 36.4 (09-15-18 @ 04:31), Max: 36.8 (09-14-18 @ 13:37)  HR: 60 (09-15-18 @ 04:31)  BP: 128/71 (09-15-18 @ 04:31)  RR: 20 (09-15-18 @ 04:31)  SpO2: 96% (09-15-18 @ 04:31)  Wt(kg): --    General = Comfortable-appearing, no acute distress.  Patient is waking up.  Abdomen = Thin soft, no significant tenderness    LABS:                        9.6    36.2  )-----------( 501      ( 14 Sep 2018 07:05 )             30.8     09-15    134<L>  |  96  |  19  ----------------------------<  81  3.2<L>   |  25  |  0.87    Ca    8.4      15 Sep 2018 06:49  Phos  2.5     09-15  Mg     1.6     09-15  _____________________________________________________  ACTIVE MEDS:  MEDICATIONS  (STANDING):  atorvastatin 10 milliGRAM(s) Oral at bedtime  balsalazide 2250 milliGRAM(s) Oral three times a day  enoxaparin Injectable 40 milliGRAM(s) SubCutaneous every 24 hours  lactated ringers. 1000 milliLiter(s) (75 mL/Hr) IV Continuous <Continuous>  metoprolol tartrate 12.5 milliGRAM(s) Oral every 12 hours  metroNIDAZOLE  IVPB      metroNIDAZOLE  IVPB 500 milliGRAM(s) IV Intermittent every 8 hours  pantoprazole    Tablet 40 milliGRAM(s) Oral before breakfast  potassium chloride  10 mEq/100 mL IVPB 10 milliEquivalent(s) IV Intermittent every 1 hour  sertraline 25 milliGRAM(s) Oral two times a day  vancomycin    Solution 500 milliGRAM(s) Oral every 6 hours    MEDICATIONS  (PRN):  acetaminophen   Tablet .. 650 milliGRAM(s) Oral every 6 hours PRN Temp greater or equal to 38C (100.4F)  acetaminophen   Tablet .. 650 milliGRAM(s) Oral every 6 hours PRN Moderate Pain (4 - 6)    _____________________________________________________  ASSESSMENT:  78yFemale admitted with C difficile colitis, in the setting of metastatic endometrial CA (on chemo), h/o radiation colitis and remote history of ulcerative colitis.  Showing signs of slow improvement.    PLAN:  - Continue Flagyl IV and PO vanco.  - Continue balsalazide  - Diet can be slowly advanced as tolerated.  Continue supplement drinks.    Kathleen Parkinson M.D.  (o) [phone]

## 2018-09-15 NOTE — PROGRESS NOTE ADULT - PROBLEM SELECTOR PLAN 2
-9/14, pt with -160. EKG with section of irregular rhythm concerning for Afib/flutter. Resolved following 1L LR bolus  -Episode likely d/t dehydration in setting of severe C. diff infection, as resolution with bolus  -Pt w/ known MVP & remote Hx of A-fib > 30 years ago. Pt & daughter do not recall being started on AC or rate control medications  -cont to monitor -9/14, pt with -160. EKG with section of irregular rhythm concerning for Afib/flutter. Resolved following 1L LR bolus  -Episode likely d/t dehydration in setting of severe C. diff infection, as resolution with bolus  -Pt w/ known MVP & remote Hx of A-fib > 30 years ago. Pt & daughter had been resistant to AC or rate control medications, but with recent episode RVR, started metop 12.5mg BID  -cont to monitor

## 2018-09-15 NOTE — PROGRESS NOTE ADULT - ASSESSMENT
78Fw/ hx of endometrial cancer w/ mets currently receiving weekly chemotherapy with paclitaxel which she seems to be tolerating, ulcerative colitis, HTN, presenting with watery diarrhea in the setting of recent UTI s/p antibiotic therapy. Found to have C. diff colitis, worsening. 78Fw/ hx of endometrial cancer w/ mets currently receiving weekly chemotherapy (which she has been on/off for years) with paclitaxel which she seems to be tolerating, ulcerative colitis, HTN, presenting with watery diarrhea in the setting of recent UTI s/p antibiotic therapy (2 months ago) and abdominal pain. Stool came back positive for C. difficile stool toxin (9/12) and being treated with vancomycin PO and IV flagyl. Pt continues to have episodes of diarrhea and reports overall improvement, and today her WBC is down from yesterday (36.2>>30.6) following increased dose of vancomycin.

## 2018-09-15 NOTE — PROGRESS NOTE ADULT - SUBJECTIVE AND OBJECTIVE BOX
Patient is a 77yo female who presents with a chief complaint of C difficile colitis.  INTERVAL HPI/OVERNIGHT EVENTS:  Patient continues to have episodic diarrhea, with 3 episodes last evening and 1 so far this morning. Patient is fatigued and weak due to the loss of fluid volume. She endorses some diffusely sharp abdominal pain this morning, at a 7 in severity. She also endorses nausea and occasional gagging.  She denies SOP, CP, headache, or dizziness.  REVIEW OF SYSTEMS:  Constitutional: Denies fever, weight loss, fatigue  Resp: Denies SOB, cough, hemoptysis  CV: Denies chest pain, palpitations, dizziness  GI: endorses abdominal pain, N/D, denies melena, hematochezia  : Denies dysuria, increased frequency, hematuria  Neuro: Denies HA, weakness, numbness  Skin: Denies rashes, lesions  Lymph Nodes: Denies enlarged glands  MSK: Denies joint pain, swelling    VITAL SIGNS:  T(C): 36.4 (09-15-18 @ 04:31), Max: 36.8 (09-14-18 @ 13:37)  HR: 60 (09-15-18 @ 04:31) (54 - 72)  BP: 128/71 (09-15-18 @ 04:31) (105/63 - 153/81)  RR: 20 (09-15-18 @ 04:31) (18 - 20)  SpO2: 96% (09-15-18 @ 04:31) (96% - 98%)    PHYSICAL EXAM:  General: NAD, fatigued, general weakness, lying in bed  HEENT: Conjunctiva and sclera clear, moist mucous membranes  Neck: Supple  Chest: Clear to auscultation bilaterally; no rales, rhonchi, or wheezing  Heart: Regular rate and rhythm; no murmurs, rubs, or gallops  Abd: +BS, soft, nontender, nondistended  Nervous System: AAOX3  Psych: Appropriate affect and mood  Ext:  no LE edema    LABS:    15 Sep 2018 06:49    134    |  96     |  19     ----------------------------<  81     3.2     |  25     |  0.87     Ca    8.4        15 Sep 2018 06:49  Phos  2.5       15 Sep 2018 06:49  Mg     1.6       15 Sep 2018 06:49      CAPILLARY BLOOD GLUCOSE        BLOOD CULTURE  09-12 @ 23:57   No growth to date.  --  --  09-12 @ 17:14   No enteric pathogens isolated.  (Stool culture examined for Salmonella,  Shigella, Campylobacter, Aeromonas, Plesiomonas,  Vibrio, E.coli O157 and Yersinia)  --  --  09-11 @ 22:23   Culture grew 3 or more types of organisms which indicate  collection contamination; consider recollection only if clinically  indicated.  --  --  09-11 @ 19:29   No growth to date.  --  --          MEDICATIONS  (STANDING):  atorvastatin 10 milliGRAM(s) Oral at bedtime  balsalazide 2250 milliGRAM(s) Oral three times a day  enoxaparin Injectable 40 milliGRAM(s) SubCutaneous every 24 hours  lactated ringers. 1000 milliLiter(s) (75 mL/Hr) IV Continuous <Continuous>  metoprolol tartrate 12.5 milliGRAM(s) Oral every 12 hours  metroNIDAZOLE  IVPB      metroNIDAZOLE  IVPB 500 milliGRAM(s) IV Intermittent every 8 hours  pantoprazole    Tablet 40 milliGRAM(s) Oral before breakfast  potassium chloride  10 mEq/100 mL IVPB 10 milliEquivalent(s) IV Intermittent every 1 hour  sertraline 25 milliGRAM(s) Oral two times a day  vancomycin    Solution 500 milliGRAM(s) Oral every 6 hours    MEDICATIONS  (PRN):  acetaminophen   Tablet .. 650 milliGRAM(s) Oral every 6 hours PRN Temp greater or equal to 38C (100.4F)  acetaminophen   Tablet .. 650 milliGRAM(s) Oral every 6 hours PRN Moderate Pain (4 - 6)    Allergies:  erythromycin (Unknown)  macrolide antibiotics (Unknown)  morphine (Diarrhea)  sulfa drugs (Unknown) Patient is a 79yo female who presents with a chief complaint of C difficile colitis.  INTERVAL HPI/OVERNIGHT EVENTS:  Patient continues to have episodic diarrhea, with 3 episodes last evening and 1 so far this morning. Patient is fatigued and weak due to the loss of fluid volume. She endorses some diffusely sharp abdominal pain this morning, at a 7 in severity, but she still believes that she's improving overall. She also endorses nausea and occasional gagging.  She denies SOP, CP, headache, or dizziness.  REVIEW OF SYSTEMS:  Constitutional: Denies fever, weight loss, fatigue  Resp: Denies SOB, cough, hemoptysis  CV: Denies chest pain, palpitations, dizziness  GI: endorses abdominal pain, N/D, denies melena, hematochezia  : Denies dysuria, increased frequency, hematuria  Neuro: Denies HA, weakness, numbness  Skin: Denies rashes, lesions  Lymph Nodes: Denies enlarged glands  MSK: Denies joint pain, swelling    VITAL SIGNS:  T(C): 36.4 (09-15-18 @ 04:31), Max: 36.8 (09-14-18 @ 13:37)  HR: 60 (09-15-18 @ 04:31) (54 - 72)  BP: 128/71 (09-15-18 @ 04:31) (105/63 - 153/81)  RR: 20 (09-15-18 @ 04:31) (18 - 20)  SpO2: 96% (09-15-18 @ 04:31) (96% - 98%)    PHYSICAL EXAM:  General: NAD, fatigued, general weakness, lying in bed  HEENT: Conjunctiva and sclera clear, moist mucous membranes  Neck: Supple  Chest: Clear to auscultation bilaterally; no rales, rhonchi, or wheezing  Heart: Regular rate and rhythm; no murmurs, rubs, or gallops  Abd: hypoactive BS, soft, nondistended, some tenderness and voluntary guarding to palpation at the RLQ  Nervous System: AAOX3  Psych: Appropriate affect and mood  Ext:  chronic R lower extremity lymphedema    LABS:                        8.9    30.63 )-----------( 485      ( 15 Sep 2018 08:27 )             28.7     15 Sep 2018 06:49    134    |  96     |  19     ----------------------------<  81     3.2     |  25     |  0.87     Ca    8.4        15 Sep 2018 06:49  Phos  2.5       15 Sep 2018 06:49  Mg     1.6       15 Sep 2018 06:49    BLOOD CULTURE  09-12 @ 23:57   No growth to date.  --  --  09-12 @ 17:14   No enteric pathogens isolated.  (Stool culture examined for Salmonella,  Shigella, Campylobacter, Aeromonas, Plesiomonas,  Vibrio, E.coli O157 and Yersinia)  --  --  09-11 @ 22:23   Culture grew 3 or more types of organisms which indicate  collection contamination; consider recollection only if clinically  indicated.  --  --  09-11 @ 19:29   No growth to date.  --  --    Culture - Stool (09.12.18 @ 17:14)    Specimen Source: .Stool Feces    Culture Results:   No enteric pathogens isolated.  (Stool culture examined for Salmonella,  Shigella, Campylobacter, Aeromonas, Plesiomonas,  Vibrio, E.coli O157 and Yersinia)    C. difficile GDH &amp; toxins A/B by EIA . (09.12.18 @ 12:45)    Clostridium difficile GDH Toxins A&amp;B, EIA:   Positive    Clostridium difficile GDH Interpretation: Positive for toxigenic C. Difficile.  This specimen is positive for C.  Difficile glutamate dehydrogenase (GDH) antigen and positive for C.  Difficile Toxins A & B, by EIA.  GDH is a highly sensitive marker for C.  Difficile that is produced in largeamounts by all C. Difficile strains,  both toxigenic and nontoxigenic.  This assay has not been validated as a  test of cure.  The results of this assay should always be interpreted in  conjunction with patient's clinical history.    IMAGING:  Abdominal Xray 9/14  Nonobstructive bowel gas pattern without evidence of free air    MEDICATIONS  (STANDING):  atorvastatin 10 milliGRAM(s) Oral at bedtime  balsalazide 2250 milliGRAM(s) Oral three times a day  enoxaparin Injectable 40 milliGRAM(s) SubCutaneous every 24 hours  lactated ringers. 1000 milliLiter(s) (75 mL/Hr) IV Continuous <Continuous>  metoprolol tartrate 12.5 milliGRAM(s) Oral every 12 hours  metroNIDAZOLE  IVPB      metroNIDAZOLE  IVPB 500 milliGRAM(s) IV Intermittent every 8 hours  pantoprazole    Tablet 40 milliGRAM(s) Oral before breakfast  potassium chloride  10 mEq/100 mL IVPB 10 milliEquivalent(s) IV Intermittent every 1 hour  sertraline 25 milliGRAM(s) Oral two times a day  vancomycin    Solution 500 milliGRAM(s) Oral every 6 hours    MEDICATIONS  (PRN):  acetaminophen   Tablet .. 650 milliGRAM(s) Oral every 6 hours PRN Temp greater or equal to 38C (100.4F)  acetaminophen   Tablet .. 650 milliGRAM(s) Oral every 6 hours PRN Moderate Pain (4 - 6)    Allergies:  erythromycin (Unknown)  macrolide antibiotics (Unknown)  morphine (Diarrhea)  sulfa drugs (Unknown)

## 2018-09-16 LAB
ALBUMIN SERPL ELPH-MCNC: 2.5 G/DL — LOW (ref 3.3–5)
ALP SERPL-CCNC: 173 U/L — HIGH (ref 40–120)
ALT FLD-CCNC: <5 U/L — LOW (ref 10–45)
ANION GAP SERPL CALC-SCNC: 12 MMOL/L — SIGNIFICANT CHANGE UP (ref 5–17)
AST SERPL-CCNC: 15 U/L — SIGNIFICANT CHANGE UP (ref 10–40)
BILIRUB SERPL-MCNC: 0.3 MG/DL — SIGNIFICANT CHANGE UP (ref 0.2–1.2)
BUN SERPL-MCNC: 15 MG/DL — SIGNIFICANT CHANGE UP (ref 7–23)
CALCIUM SERPL-MCNC: 8.6 MG/DL — SIGNIFICANT CHANGE UP (ref 8.4–10.5)
CHLORIDE SERPL-SCNC: 95 MMOL/L — LOW (ref 96–108)
CO2 SERPL-SCNC: 25 MMOL/L — SIGNIFICANT CHANGE UP (ref 22–31)
CREAT SERPL-MCNC: 0.9 MG/DL — SIGNIFICANT CHANGE UP (ref 0.5–1.3)
CULTURE RESULTS: SIGNIFICANT CHANGE UP
CULTURE RESULTS: SIGNIFICANT CHANGE UP
GLUCOSE SERPL-MCNC: 97 MG/DL — SIGNIFICANT CHANGE UP (ref 70–99)
HCT VFR BLD CALC: 32.9 % — LOW (ref 34.5–45)
HGB BLD-MCNC: 10.1 G/DL — LOW (ref 11.5–15.5)
MAGNESIUM SERPL-MCNC: 1.5 MG/DL — LOW (ref 1.6–2.6)
MCHC RBC-ENTMCNC: 25.8 PG — LOW (ref 27–34)
MCHC RBC-ENTMCNC: 30.6 GM/DL — LOW (ref 32–36)
MCV RBC AUTO: 84.3 FL — SIGNIFICANT CHANGE UP (ref 80–100)
PHOSPHATE SERPL-MCNC: 2.5 MG/DL — SIGNIFICANT CHANGE UP (ref 2.5–4.5)
PLATELET # BLD AUTO: 535 K/UL — HIGH (ref 150–400)
POTASSIUM SERPL-MCNC: 3.2 MMOL/L — LOW (ref 3.5–5.3)
POTASSIUM SERPL-SCNC: 3.2 MMOL/L — LOW (ref 3.5–5.3)
PROT SERPL-MCNC: 4.7 G/DL — LOW (ref 6–8.3)
RBC # BLD: 3.91 M/UL — SIGNIFICANT CHANGE UP (ref 3.8–5.2)
RBC # FLD: 18.8 % — HIGH (ref 10.3–14.5)
SODIUM SERPL-SCNC: 132 MMOL/L — LOW (ref 135–145)
SPECIMEN SOURCE: SIGNIFICANT CHANGE UP
SPECIMEN SOURCE: SIGNIFICANT CHANGE UP
WBC # BLD: 36.1 K/UL — HIGH (ref 3.8–10.5)
WBC # FLD AUTO: 36.1 K/UL — HIGH (ref 3.8–10.5)

## 2018-09-16 PROCEDURE — 99232 SBSQ HOSP IP/OBS MODERATE 35: CPT

## 2018-09-16 PROCEDURE — 99233 SBSQ HOSP IP/OBS HIGH 50: CPT | Mod: GC

## 2018-09-16 PROCEDURE — 74018 RADEX ABDOMEN 1 VIEW: CPT | Mod: 26

## 2018-09-16 RX ORDER — POTASSIUM CHLORIDE 20 MEQ
10 PACKET (EA) ORAL
Qty: 0 | Refills: 0 | Status: COMPLETED | OUTPATIENT
Start: 2018-09-16 | End: 2018-09-16

## 2018-09-16 RX ORDER — ACETAMINOPHEN 500 MG
650 TABLET ORAL EVERY 6 HOURS
Qty: 0 | Refills: 0 | Status: DISCONTINUED | OUTPATIENT
Start: 2018-09-16 | End: 2018-09-26

## 2018-09-16 RX ORDER — ACETAMINOPHEN 500 MG
1000 TABLET ORAL ONCE
Qty: 0 | Refills: 0 | Status: DISCONTINUED | OUTPATIENT
Start: 2018-09-16 | End: 2018-09-16

## 2018-09-16 RX ORDER — MAGNESIUM SULFATE 500 MG/ML
2 VIAL (ML) INJECTION ONCE
Qty: 0 | Refills: 0 | Status: COMPLETED | OUTPATIENT
Start: 2018-09-16 | End: 2018-09-16

## 2018-09-16 RX ADMIN — Medication 100 MILLIGRAM(S): at 13:14

## 2018-09-16 RX ADMIN — BALSALAZIDE DISODIUM 2250 MILLIGRAM(S): 750 CAPSULE ORAL at 13:10

## 2018-09-16 RX ADMIN — Medication 12.5 MILLIGRAM(S): at 17:04

## 2018-09-16 RX ADMIN — Medication 12.5 MILLIGRAM(S): at 05:17

## 2018-09-16 RX ADMIN — Medication 100 MILLIEQUIVALENT(S): at 18:59

## 2018-09-16 RX ADMIN — Medication 100 MILLIGRAM(S): at 05:17

## 2018-09-16 RX ADMIN — Medication 500 MILLIGRAM(S): at 17:05

## 2018-09-16 RX ADMIN — Medication 100 MILLIGRAM(S): at 22:26

## 2018-09-16 RX ADMIN — Medication 500 MILLIGRAM(S): at 13:10

## 2018-09-16 RX ADMIN — BALSALAZIDE DISODIUM 2250 MILLIGRAM(S): 750 CAPSULE ORAL at 22:26

## 2018-09-16 RX ADMIN — Medication 100 MILLIEQUIVALENT(S): at 17:39

## 2018-09-16 RX ADMIN — Medication 500 MILLIGRAM(S): at 23:45

## 2018-09-16 RX ADMIN — ATORVASTATIN CALCIUM 10 MILLIGRAM(S): 80 TABLET, FILM COATED ORAL at 22:26

## 2018-09-16 RX ADMIN — Medication 650 MILLIGRAM(S): at 17:04

## 2018-09-16 RX ADMIN — Medication 100 MILLIEQUIVALENT(S): at 15:00

## 2018-09-16 RX ADMIN — Medication 500 MILLIGRAM(S): at 00:30

## 2018-09-16 RX ADMIN — BALSALAZIDE DISODIUM 2250 MILLIGRAM(S): 750 CAPSULE ORAL at 05:17

## 2018-09-16 RX ADMIN — Medication 650 MILLIGRAM(S): at 17:13

## 2018-09-16 RX ADMIN — SERTRALINE 25 MILLIGRAM(S): 25 TABLET, FILM COATED ORAL at 05:19

## 2018-09-16 RX ADMIN — Medication 500 MILLIGRAM(S): at 05:17

## 2018-09-16 RX ADMIN — Medication 50 GRAM(S): at 17:08

## 2018-09-16 RX ADMIN — SERTRALINE 25 MILLIGRAM(S): 25 TABLET, FILM COATED ORAL at 17:06

## 2018-09-16 RX ADMIN — PANTOPRAZOLE SODIUM 40 MILLIGRAM(S): 20 TABLET, DELAYED RELEASE ORAL at 05:18

## 2018-09-16 RX ADMIN — ENOXAPARIN SODIUM 40 MILLIGRAM(S): 100 INJECTION SUBCUTANEOUS at 05:18

## 2018-09-16 NOTE — PROGRESS NOTE ADULT - SUBJECTIVE AND OBJECTIVE BOX
SUBJECTIVE:  Tired.  BM overnight, cannot recall how many.  Tolerating the clears, and wants to eat more.  Little appetite.  No abd pain.  Son Garret at bedside.  _____________________________________________________  OBJECTIVE:    T(C): 36.5 (09-16-18 @ 05:40), Max: 36.7 (09-15-18 @ 14:29)  HR: 65 (09-16-18 @ 05:40)  BP: 144/80 (09-16-18 @ 05:40)  RR: 20 (09-16-18 @ 05:40)  SpO2: 95% (09-16-18 @ 05:40)  Wt(kg): --    General = Comfortable-appearing, appears tired  Abdomen =  soft, nontender  _____________________________________________________  LABS:                        8.9    30.63 )-----------( 485      ( 15 Sep 2018 08:27 )             28.7     09-15    134<L>  |  96  |  19  ----------------------------<  81  3.2<L>   |  25  |  0.87    Ca    8.4      15 Sep 2018 06:49  Phos  2.5     09-15  Mg     1.6     09-15  _____________________________________________________  ACTIVE MEDS:  MEDICATIONS  (STANDING):  atorvastatin 10 milliGRAM(s) Oral at bedtime  balsalazide 2250 milliGRAM(s) Oral three times a day  enoxaparin Injectable 40 milliGRAM(s) SubCutaneous every 24 hours  lactated ringers. 1000 milliLiter(s) (75 mL/Hr) IV Continuous <Continuous>  metoprolol tartrate 12.5 milliGRAM(s) Oral every 12 hours  metroNIDAZOLE  IVPB      metroNIDAZOLE  IVPB 500 milliGRAM(s) IV Intermittent every 8 hours  pantoprazole    Tablet 40 milliGRAM(s) Oral before breakfast  sertraline 25 milliGRAM(s) Oral two times a day  vancomycin    Solution 500 milliGRAM(s) Oral every 6 hours    MEDICATIONS  (PRN):  acetaminophen   Tablet .. 650 milliGRAM(s) Oral every 6 hours PRN Temp greater or equal to 38C (100.4F)  acetaminophen   Tablet .. 650 milliGRAM(s) Oral every 6 hours PRN Moderate Pain (4 - 6)    _____________________________________________________  ASSESSMENT:  78yFemale with C difficile colitis.    PLAN:  - Continue current abx regimen.  - I advanced her diet to a soft, bland diet, fiber restricted, and changed her supplement to Ensure because patient and son state she is allergic to canola/saffron oil, but she takes and tolerates a can of Ensure (prefers vanilla) at home.    Kathleen Parkinson M.D.  (o) [phone]

## 2018-09-16 NOTE — PROGRESS NOTE ADULT - PROBLEM SELECTOR PLAN 3
Endometrial cancer w/ mets to abdominal wall & iliac nodes. Gets taxol through port weekly. Has had abdominal wall resection in past. Recently had 2nd opinion with Dr. Altaf Arteaga at [facility] as pt states she did not like the bad news she was [place] [facility] [place] Jenni recommended to consider hormone therapy. Pt states she would want her HCP to be her daughter Vidya Tripp who lives in [place]. Discussed possible life sustaining measures given her current condition and pt states she would want all possible resuscitative efforts.  -Full Code  - Palliative consulted

## 2018-09-16 NOTE — PROGRESS NOTE ADULT - PROBLEM SELECTOR PLAN 1
Severe. Pt p/w several days watery diarrhea. Has Hx radiation colitis, UC.   -C. diff stool toxin positive 9/12  -WBC 18 >36 >30.6, continue to trend  -monitor abdominal exams -> worse today. AXR not c/w toxic megacolon, no free air. Will repeat if further worsening.  -clear liquid diet, advance diet as tolerated, continue with supplement drinks as per GI  -GI on board (guanakitodaxa) - continue to appreciate rec's  -Colorectal surgery consulted - no current surgical intervention given status of endometrial cancer  - palliative consulted  - ID recs appreciated  - c/w IV flagyl, and PO vancomycin at 500 Q6 Severe. Pt p/w several days watery diarrhea. Has Hx radiation colitis, UC.   -C. diff stool toxin positive 9/12  -monitor abdominal exams -> worse today. AXR not c/w toxic megacolon, no free air. Discussed case with radiology resident, enlarged colon loop but unlikely toxic megacolon. Will repeat if further worsening.  -clear liquid diet, advance diet as tolerated, continue with supplement drinks as per GI  -GI on board (Diomedes) - continue to appreciate rec's  -Colorectal surgery consulted - no current surgical intervention given status of endometrial cancer  - palliative consulted  - ID recs appreciated  - c/w IV flagyl, and PO vancomycin at 500 Q6  - IV tylenol PRn pain

## 2018-09-16 NOTE — PROGRESS NOTE ADULT - SUBJECTIVE AND OBJECTIVE BOX
Follow Up:  severe C diff    Interval History/ROS: sleeping comfortably - daughter and partner at bedside    Allergies  erythromycin (Unknown)  macrolide antibiotics (Unknown)  morphine (Diarrhea)  sulfa drugs (Unknown)    ANTIMICROBIALS:  metroNIDAZOLE  IVPB    metroNIDAZOLE  IVPB 500 every 8 hours  vancomycin    Solution 500 every 6 hours      OTHER MEDS:  MEDICATIONS  (STANDING):  acetaminophen    Suspension .. 650 every 6 hours PRN  acetaminophen   Tablet .. 650 every 6 hours PRN  acetaminophen   Tablet .. 650 every 6 hours PRN  atorvastatin 10 at bedtime  balsalazide 2250 three times a day  enoxaparin Injectable 40 every 24 hours  metoprolol tartrate 12.5 every 12 hours  pantoprazole    Tablet 40 before breakfast  sertraline 25 two times a day    Vital Signs Last 24 Hrs  T(C): 36.6 (16 Sep 2018 13:54), Max: 36.6 (15 Sep 2018 18:20)  T(F): 97.9 (16 Sep 2018 13:54), Max: 97.9 (15 Sep 2018 18:20)  HR: 68 (16 Sep 2018 13:54) (58 - 68)  BP: 121/68 (16 Sep 2018 13:54) (117/70 - 144/80)  BP(mean): --  RR: 20 (16 Sep 2018 05:40) (16 - 20)  SpO2: 95% (16 Sep 2018 05:40) (95% - 96%)    PHYSICAL EXAM:  General: WN/WD NAD, Non-toxic  Neurology: asleep                          10.1   36.1  )-----------( 535      ( 16 Sep 2018 11:16 )             32.9   WBC Count: 36.1 (09-16 @ 11:16)  WBC Count: 30.63 (09-15 @ 08:27)  WBC Count: 36.2 (09-14 @ 07:05)  WBC Count: 35.4 (09-13 @ 07:50)  WBC Count: 18.7 (09-12 @ 07:11)  WBC Count: 17.8 (09-11 @ 17:41)    09-16    132<L>  |  95<L>  |  15  ----------------------------<  97  3.2<L>   |  25  |  0.90    Ca    8.6      16 Sep 2018 11:16  Phos  2.5     09-16  Mg     1.5     09-16    TPro  4.7<L>  /  Alb  2.5<L>  /  TBili  0.3  /  DBili  x   /  AST  15  /  ALT  <5<L>  /  AlkPhos  173<H>  09-16    MICROBIOLOGY:  .Blood Blood-Peripheral  09-12-18   No growth to date.  --  --      .Stool Feces  09-12-18   No enteric pathogens isolated.  (Stool culture examined for Salmonella,  Shigella, Campylobacter, Aeromonas, Plesiomonas,  Vibrio, E.coli O157 and Yersinia)  --  --      .Urine Clean Catch (Midstream)  09-11-18   Culture grew 3 or more types of organisms which indicate  collection contamination; consider recollection only if clinically  indicated.  --  --      .Blood Blood-Peripheral  09-11-18   No growth to date.  --  --      .Urine Clean Catch (Midstream)  08-27-18   <10,000 CFU/ml Normal Urogenital long present  --  --      .Blood Blood-Peripheral  08-26-18   No growth at 5 days.  --  --    Clostridium difficile GDH Toxins A&amp;B, EIA:   Positive (09-12-18 @ 12:45)  Clostridium difficile GDH Interpretation: Positive for toxigenic C. Difficile.  This specimen is positive for C.  Difficile glutamate dehydrogenase (GDH) antigen and positive for C.  Difficile Toxins A & B, by EIA.  GDH is a highly sensitive marker for C.  Difficile that is produced in largeamounts by all C. Difficile strains,  both toxigenic and nontoxigenic.  This assay has not been validated as a  test of cure.  The results of this assay should always be interpreted in  conjunction with patient's clinical history. (09-12-18 @ 12:45)      RADIOLOGY:    Richard Amato MD; Division of Infectious Disease; Pager: [phone]; nights and weekends: [phone]

## 2018-09-16 NOTE — PROGRESS NOTE ADULT - ASSESSMENT
78Fw/ hx of endometrial cancer w/ mets currently receiving weekly chemotherapy (which she has been on/off for years) with paclitaxel which she seems to be tolerating, ulcerative colitis, HTN, presenting with watery diarrhea in the setting of recent UTI s/p antibiotic therapy (2 months ago) and abdominal pain. Stool came back positive for C. difficile stool toxin (9/12) and being treated with vancomycin PO and IV flagyl. Pt continues to have episodes of diarrhea and reports overall improvement, and today her WBC is down from yesterday (36.2>>30.6) following increased dose of vancomycin.

## 2018-09-16 NOTE — PROGRESS NOTE ADULT - ASSESSMENT
Ms Belcehr is 78F with mutliple comordities including  endometrial cancer w/ mets currently receiving weekly chemotherapy with paclitaxel which she seems to be tolerating, Ulcerative colitis?, chronic diarrhea on immodium, HTN, previous antibiotic exposure.  She has severe Cdiff on day #4 therapy  no suggestion of toxic megacolon  leukocytosis increased.  family impression is that patient is somewhat improved - mild abd pain    Antibiotics:  Cipro 9/11  Flagyl 9/11; 9/13 -->  Vanco po 125 q 6h 9/12 -->14; 500 q 6 h 9/14-->    Suggest  Continue IV Flagyl   Continue high dose po Vanco  advance diet as tolerated  trend WBC (if wbc increases, will consider Tygecycline)    discussed therapeutic options with family at bedside

## 2018-09-16 NOTE — PROGRESS NOTE ADULT - SUBJECTIVE AND OBJECTIVE BOX
Patient is a 78y old  Female who presents with a chief complaint of C diff colitis (16 Sep 2018 10:29)        SUBJECTIVE / OVERNIGHT EVENTS:      MEDICATIONS  (STANDING):  atorvastatin 10 milliGRAM(s) Oral at bedtime  balsalazide 2250 milliGRAM(s) Oral three times a day  enoxaparin Injectable 40 milliGRAM(s) SubCutaneous every 24 hours  lactated ringers. 1000 milliLiter(s) (75 mL/Hr) IV Continuous <Continuous>  metoprolol tartrate 12.5 milliGRAM(s) Oral every 12 hours  metroNIDAZOLE  IVPB      metroNIDAZOLE  IVPB 500 milliGRAM(s) IV Intermittent every 8 hours  pantoprazole    Tablet 40 milliGRAM(s) Oral before breakfast  sertraline 25 milliGRAM(s) Oral two times a day  vancomycin    Solution 500 milliGRAM(s) Oral every 6 hours    MEDICATIONS  (PRN):  acetaminophen   Tablet .. 650 milliGRAM(s) Oral every 6 hours PRN Temp greater or equal to 38C (100.4F)  acetaminophen   Tablet .. 650 milliGRAM(s) Oral every 6 hours PRN Moderate Pain (4 - 6)        CAPILLARY BLOOD GLUCOSE        I&O's Summary    15 Sep 2018 07:01  -  16 Sep 2018 07:00  --------------------------------------------------------  IN: 1500 mL / OUT: 0 mL / NET: 1500 mL        PHYSICAL EXAM  GENERAL: NAD, well-developed  HEAD:  Atraumatic, Normocephalic  EYES: EOMI, PERRLA, conjunctiva and sclera clear  NECK: Supple, No JVD  CHEST/LUNG: Clear to auscultation bilaterally; No wheeze  HEART: Regular rate and rhythm; No murmurs, rubs, or gallops  ABDOMEN: Soft, Nontender, Nondistended; Bowel sounds present  EXTREMITIES:  2+ Peripheral Pulses, No clubbing, cyanosis, or edema  PSYCH: AAOx3  SKIN: No rashes or lesions    LABS:                        8.9    30.63 )-----------( 485      ( 15 Sep 2018 08:27 )             28.7     09-15    134<L>  |  96  |  19  ----------------------------<  81  3.2<L>   |  25  |  0.87    Ca    8.4      15 Sep 2018 06:49  Phos  2.5     09-15  Mg     1.6     09-15                RADIOLOGY & ADDITIONAL TESTS:    Imaging Personally Reviewed:  Consultant(s) Notes Reviewed:    Care Discussed with Consultants/Other Providers: Patient is a 78y old  Female who presents with a chief complaint of C diff colitis (16 Sep 2018 10:29)        SUBJECTIVE / OVERNIGHT EVENTS: Endorses frequent diarrhea      MEDICATIONS  (STANDING):  atorvastatin 10 milliGRAM(s) Oral at bedtime  balsalazide 2250 milliGRAM(s) Oral three times a day  enoxaparin Injectable 40 milliGRAM(s) SubCutaneous every 24 hours  lactated ringers. 1000 milliLiter(s) (75 mL/Hr) IV Continuous <Continuous>  metoprolol tartrate 12.5 milliGRAM(s) Oral every 12 hours  metroNIDAZOLE  IVPB      metroNIDAZOLE  IVPB 500 milliGRAM(s) IV Intermittent every 8 hours  pantoprazole    Tablet 40 milliGRAM(s) Oral before breakfast  sertraline 25 milliGRAM(s) Oral two times a day  vancomycin    Solution 500 milliGRAM(s) Oral every 6 hours    MEDICATIONS  (PRN):  acetaminophen   Tablet .. 650 milliGRAM(s) Oral every 6 hours PRN Temp greater or equal to 38C (100.4F)  acetaminophen   Tablet .. 650 milliGRAM(s) Oral every 6 hours PRN Moderate Pain (4 - 6)        CAPILLARY BLOOD GLUCOSE        I&O's Summary    15 Sep 2018 07:01  -  16 Sep 2018 07:00  --------------------------------------------------------  IN: 1500 mL / OUT: 0 mL / NET: 1500 mL        PHYSICAL EXAM:  General: NAD, fatigued, general weakness, lying in bed  HEENT: Conjunctiva and sclera clear, moist mucous membranes  Neck: Supple  Chest: Clear to auscultation bilaterally; no rales, rhonchi, or wheezing  Heart: Regular rate and rhythm; no murmurs, rubs, or gallops  Abd: hypoactive BS, soft, nondistended, some tenderness and voluntary guarding to palpation at the RLQ  Nervous System: AAOX3    LABS:                        8.9    30.63 )-----------( 485      ( 15 Sep 2018 08:27 )             28.7     09-15    134<L>  |  96  |  19  ----------------------------<  81  3.2<L>   |  25  |  0.87    Ca    8.4      15 Sep 2018 06:49  Phos  2.5     09-15  Mg     1.6     09-15                RADIOLOGY & ADDITIONAL TESTS:    Imaging Personally Reviewed:  Consultant(s) Notes Reviewed:    Care Discussed with Consultants/Other Providers:

## 2018-09-16 NOTE — PROGRESS NOTE ADULT - PROBLEM SELECTOR PLAN 2
-9/14, pt with -160. EKG with section of irregular rhythm concerning for Afib/flutter. Resolved following 1L LR bolus  -Episode likely d/t dehydration in setting of severe C. diff infection, as resolution with bolus  -Pt w/ known MVP & remote Hx of A-fib > 30 years ago. Pt & daughter had been resistant to AC or rate control medications, but with recent episode RVR, started metop 12.5mg BID  -cont to monitor

## 2018-09-17 LAB
ALBUMIN SERPL ELPH-MCNC: 2.3 G/DL — LOW (ref 3.3–5)
ALP SERPL-CCNC: 161 U/L — HIGH (ref 40–120)
ALT FLD-CCNC: <5 U/L — LOW (ref 10–45)
ANION GAP SERPL CALC-SCNC: 12 MMOL/L — SIGNIFICANT CHANGE UP (ref 5–17)
AST SERPL-CCNC: 15 U/L — SIGNIFICANT CHANGE UP (ref 10–40)
BASOPHILS # BLD AUTO: 0 K/UL — SIGNIFICANT CHANGE UP (ref 0–0.2)
BASOPHILS NFR BLD AUTO: 0.1 % — SIGNIFICANT CHANGE UP (ref 0–2)
BILIRUB SERPL-MCNC: 0.3 MG/DL — SIGNIFICANT CHANGE UP (ref 0.2–1.2)
BUN SERPL-MCNC: 14 MG/DL — SIGNIFICANT CHANGE UP (ref 7–23)
CALCIUM SERPL-MCNC: 8.6 MG/DL — SIGNIFICANT CHANGE UP (ref 8.4–10.5)
CHLORIDE SERPL-SCNC: 94 MMOL/L — LOW (ref 96–108)
CO2 SERPL-SCNC: 25 MMOL/L — SIGNIFICANT CHANGE UP (ref 22–31)
CREAT SERPL-MCNC: 0.9 MG/DL — SIGNIFICANT CHANGE UP (ref 0.5–1.3)
EOSINOPHIL # BLD AUTO: 0.4 K/UL — SIGNIFICANT CHANGE UP (ref 0–0.5)
EOSINOPHIL NFR BLD AUTO: 1.3 % — SIGNIFICANT CHANGE UP (ref 0–6)
GLUCOSE SERPL-MCNC: 100 MG/DL — HIGH (ref 70–99)
HCT VFR BLD CALC: 31.9 % — LOW (ref 34.5–45)
HGB BLD-MCNC: 9.8 G/DL — LOW (ref 11.5–15.5)
LYMPHOCYTES # BLD AUTO: 2.4 K/UL — SIGNIFICANT CHANGE UP (ref 1–3.3)
LYMPHOCYTES # BLD AUTO: 8.2 % — LOW (ref 13–44)
MAGNESIUM SERPL-MCNC: 1.8 MG/DL — SIGNIFICANT CHANGE UP (ref 1.6–2.6)
MCHC RBC-ENTMCNC: 25.7 PG — LOW (ref 27–34)
MCHC RBC-ENTMCNC: 30.8 GM/DL — LOW (ref 32–36)
MCV RBC AUTO: 83.4 FL — SIGNIFICANT CHANGE UP (ref 80–100)
MONOCYTES # BLD AUTO: 1.2 K/UL — HIGH (ref 0–0.9)
MONOCYTES NFR BLD AUTO: 4.2 % — SIGNIFICANT CHANGE UP (ref 2–14)
NEUTROPHILS # BLD AUTO: 25.8 K/UL — HIGH (ref 1.8–7.4)
NEUTROPHILS NFR BLD AUTO: 86.2 % — HIGH (ref 43–77)
PHOSPHATE SERPL-MCNC: 2.6 MG/DL — SIGNIFICANT CHANGE UP (ref 2.5–4.5)
PLATELET # BLD AUTO: 468 K/UL — HIGH (ref 150–400)
POTASSIUM SERPL-MCNC: 3.4 MMOL/L — LOW (ref 3.5–5.3)
POTASSIUM SERPL-SCNC: 3.4 MMOL/L — LOW (ref 3.5–5.3)
PROT SERPL-MCNC: 4.4 G/DL — LOW (ref 6–8.3)
RBC # BLD: 3.82 M/UL — SIGNIFICANT CHANGE UP (ref 3.8–5.2)
RBC # FLD: 18.6 % — HIGH (ref 10.3–14.5)
SODIUM SERPL-SCNC: 131 MMOL/L — LOW (ref 135–145)
WBC # BLD: 29.9 K/UL — HIGH (ref 3.8–10.5)
WBC # FLD AUTO: 29.9 K/UL — HIGH (ref 3.8–10.5)

## 2018-09-17 PROCEDURE — 99233 SBSQ HOSP IP/OBS HIGH 50: CPT | Mod: GC

## 2018-09-17 PROCEDURE — 99232 SBSQ HOSP IP/OBS MODERATE 35: CPT

## 2018-09-17 RX ORDER — POTASSIUM CHLORIDE 20 MEQ
40 PACKET (EA) ORAL ONCE
Qty: 0 | Refills: 0 | Status: COMPLETED | OUTPATIENT
Start: 2018-09-17 | End: 2018-09-17

## 2018-09-17 RX ORDER — POTASSIUM CHLORIDE 20 MEQ
10 PACKET (EA) ORAL
Qty: 0 | Refills: 0 | Status: COMPLETED | OUTPATIENT
Start: 2018-09-17 | End: 2018-09-17

## 2018-09-17 RX ADMIN — PANTOPRAZOLE SODIUM 40 MILLIGRAM(S): 20 TABLET, DELAYED RELEASE ORAL at 06:03

## 2018-09-17 RX ADMIN — Medication 100 MILLIGRAM(S): at 06:05

## 2018-09-17 RX ADMIN — SERTRALINE 25 MILLIGRAM(S): 25 TABLET, FILM COATED ORAL at 06:03

## 2018-09-17 RX ADMIN — ATORVASTATIN CALCIUM 10 MILLIGRAM(S): 80 TABLET, FILM COATED ORAL at 21:43

## 2018-09-17 RX ADMIN — BALSALAZIDE DISODIUM 2250 MILLIGRAM(S): 750 CAPSULE ORAL at 21:43

## 2018-09-17 RX ADMIN — Medication 100 MILLIEQUIVALENT(S): at 16:46

## 2018-09-17 RX ADMIN — Medication 100 MILLIEQUIVALENT(S): at 13:42

## 2018-09-17 RX ADMIN — Medication 12.5 MILLIGRAM(S): at 18:07

## 2018-09-17 RX ADMIN — Medication 100 MILLIEQUIVALENT(S): at 14:19

## 2018-09-17 RX ADMIN — Medication 100 MILLIGRAM(S): at 21:43

## 2018-09-17 RX ADMIN — SERTRALINE 25 MILLIGRAM(S): 25 TABLET, FILM COATED ORAL at 18:07

## 2018-09-17 RX ADMIN — ENOXAPARIN SODIUM 40 MILLIGRAM(S): 100 INJECTION SUBCUTANEOUS at 06:03

## 2018-09-17 RX ADMIN — Medication 500 MILLIGRAM(S): at 12:54

## 2018-09-17 RX ADMIN — Medication 12.5 MILLIGRAM(S): at 06:02

## 2018-09-17 RX ADMIN — Medication 650 MILLIGRAM(S): at 07:38

## 2018-09-17 RX ADMIN — Medication 500 MILLIGRAM(S): at 06:03

## 2018-09-17 RX ADMIN — BALSALAZIDE DISODIUM 2250 MILLIGRAM(S): 750 CAPSULE ORAL at 06:02

## 2018-09-17 RX ADMIN — BALSALAZIDE DISODIUM 2250 MILLIGRAM(S): 750 CAPSULE ORAL at 13:43

## 2018-09-17 RX ADMIN — Medication 100 MILLIGRAM(S): at 15:34

## 2018-09-17 RX ADMIN — Medication 650 MILLIGRAM(S): at 07:08

## 2018-09-17 RX ADMIN — Medication 500 MILLIGRAM(S): at 18:07

## 2018-09-17 NOTE — PROGRESS NOTE ADULT - SUBJECTIVE AND OBJECTIVE BOX
Red Team Surgery Consult - Daily Progress Note    SUBJECTIVE:  Doing well.   No overnight events.   Continues to have frequent, watery bowel movements. No blood.   Denies abdominal pain.     OBJECTIVE:     ** VITAL SIGNS / I&O's **    T(C): 37 (09-17-18 @ 05:56), Max: 37 (09-17-18 @ 05:56)  T(F): 98.6 (09-17-18 @ 05:56), Max: 98.6 (09-17-18 @ 05:56)  HR: 78 (09-17-18 @ 05:56) (54 - 78)  BP: 139/90 (09-17-18 @ 05:56) (121/68 - 148/78)  RR: 16 (09-17-18 @ 05:56) (16 - 20)  SpO2: 97% (09-17-18 @ 05:56) (96% - 97%)      15 Sep 2018 07:01  -  16 Sep 2018 07:00  --------------------------------------------------------  IN:    IV PiggyBack: 700 mL    Oral Fluid: 800 mL  Total IN: 1500 mL    OUT:  Total OUT: 0 mL    Total NET: 1500 mL      16 Sep 2018 07:01  -  17 Sep 2018 06:10  --------------------------------------------------------  IN:    IV PiggyBack: 550 mL    Oral Fluid: 740 mL  Total IN: 1290 mL    OUT:  Total OUT: 0 mL    Total NET: 1290 mL          ** PHYSICAL EXAM **    -- CONSTITUTIONAL: AOx3. NAD.   -- CARDIOVASCULAR: normotensive, regular rate   -- RESPIRATORY: breathing comfortably   -- ABDOMEN: soft, mild RUQ + LUQ tenderness without rebound or guarding, mildly distended    -- EXTREMITIES: warm, well perfused     ** LABS **                 10.1   36.1   )----------(  535       ( 16 Sep 2018 11:16 )               32.9      132    |  95     |  15     ----------------------------<  97         ( 16 Sep 2018 11:16 )  3.2     |  25     |  0.90     Ca    8.6        ( 16 Sep 2018 11:16 )  Phos  2.5       ( 16 Sep 2018 11:16 )  Mg     1.5       ( 16 Sep 2018 11:16 )    TPro  4.7    /  Alb  2.5    /  TBili  0.3    /  DBili  x      /  AST  15     /  ALT  <5     /  AlkPhos  173    ( 16 Sep 2018 11:16 )    **RADS**  < from: Xray Abdomen 1 View PORTABLE -Urgent (09.16.18 @ 13:09) >  Nephro ureteral stent and surgical clips unchanged in appearance.  Nonobstructive bowel gas pattern.  There is no evidence of intraperitoneal free air.  There is no evidence of organomegaly or pathologic calcification.  The visualized osseous structures demonstrate no acute pathology.    IMPRESSION:   Nonobstructive bowel gas pattern without evidence of free air.    < end of copied text > Red Team Surgery Consult - Daily Progress Note    SUBJECTIVE:  Doing well.   No overnight events.   Continues to have frequent, watery bowel movements. No blood. Last two bowel movements were more formed.   Denies abdominal pain.     OBJECTIVE:     ** VITAL SIGNS / I&O's **    T(C): 37 (09-17-18 @ 05:56), Max: 37 (09-17-18 @ 05:56)  T(F): 98.6 (09-17-18 @ 05:56), Max: 98.6 (09-17-18 @ 05:56)  HR: 78 (09-17-18 @ 05:56) (54 - 78)  BP: 139/90 (09-17-18 @ 05:56) (121/68 - 148/78)  RR: 16 (09-17-18 @ 05:56) (16 - 20)  SpO2: 97% (09-17-18 @ 05:56) (96% - 97%)      15 Sep 2018 07:01  -  16 Sep 2018 07:00  --------------------------------------------------------  IN:    IV PiggyBack: 700 mL    Oral Fluid: 800 mL  Total IN: 1500 mL    OUT:  Total OUT: 0 mL    Total NET: 1500 mL      16 Sep 2018 07:01  -  17 Sep 2018 06:10  --------------------------------------------------------  IN:    IV PiggyBack: 550 mL    Oral Fluid: 740 mL  Total IN: 1290 mL    OUT:  Total OUT: 0 mL    Total NET: 1290 mL          ** PHYSICAL EXAM **    -- CONSTITUTIONAL: AOx3. NAD.   -- CARDIOVASCULAR: normotensive, regular rate   -- RESPIRATORY: breathing comfortably   -- ABDOMEN: soft, nontender, mildly distended    -- EXTREMITIES: warm, well perfused     ** LABS **                 10.1   36.1   )----------(  535       ( 16 Sep 2018 11:16 )               32.9      132    |  95     |  15     ----------------------------<  97         ( 16 Sep 2018 11:16 )  3.2     |  25     |  0.90     Ca    8.6        ( 16 Sep 2018 11:16 )  Phos  2.5       ( 16 Sep 2018 11:16 )  Mg     1.5       ( 16 Sep 2018 11:16 )    TPro  4.7    /  Alb  2.5    /  TBili  0.3    /  DBili  x      /  AST  15     /  ALT  <5     /  AlkPhos  173    ( 16 Sep 2018 11:16 )    **RADS**  < from: Xray Abdomen 1 View PORTABLE -Urgent (09.16.18 @ 13:09) >  Nephro ureteral stent and surgical clips unchanged in appearance.  Nonobstructive bowel gas pattern.  There is no evidence of intraperitoneal free air.  There is no evidence of organomegaly or pathologic calcification.  The visualized osseous structures demonstrate no acute pathology.    IMPRESSION:   Nonobstructive bowel gas pattern without evidence of free air.    < end of copied text >

## 2018-09-17 NOTE — PROGRESS NOTE ADULT - PROBLEM SELECTOR PLAN 3
Endometrial cancer w/ mets to abdominal wall & iliac nodes. Gets taxol through port weekly. Has had abdominal wall resection in past. Recently had 2nd opinion with Dr. Altaf Arteaga at [facility] as pt states she did not like the bad news she was given at [facility] [place] [facility]. Jenni recommended to consider hormone therapy. Pt states she would want her HCP to be her daughter Vidya Tripp who lives in [place]. Discussed possible life sustaining measures given her current condition and pt states she would want all possible resuscitative efforts.  -Full Code  - Palliative consulted Endometrial cancer w/ mets to abdominal wall & iliac nodes. Gets taxol through port weekly. Has had abdominal wall resection in past. Recently had 2nd opinion with Dr. Altaf Arteaga at [facility] as pt states she did not like the bad news she was given at [facility]. Jenni recommended to consider hormone therapy. Pt states she would want her HCP to be her daughter Vidya Tripp who lives in [place]. Discussed possible life sustaining measures given her current condition and pt states she would want all possible resuscitative efforts.  - Full Code  - Palliative consulted

## 2018-09-17 NOTE — PROGRESS NOTE ADULT - PROBLEM SELECTOR PLAN 1
Severe. Pt p/w several days watery diarrhea. Has Hx radiation colitis, UC.   -C. diff stool toxin positive 9/12  -monitor abdominal exams -> worse today. AXR not c/w toxic megacolon, no free air. Discussed case with radiology resident, enlarged colon loop but unlikely toxic megacolon. Will repeat if further worsening.  -clear liquid diet, advance diet as tolerated, continue with supplement drinks as per GI  -GI on board (Diomedes) - continue to appreciate rec's  -Colorectal surgery consulted - no current surgical intervention given status of endometrial cancer  - palliative consulted  - ID recs appreciated  - c/w IV flagyl, and PO vancomycin at 500 Q6  - IV tylenol PRn pain Severe. Pt p/w several days watery diarrhea. Has Hx radiation colitis, UC. No diarrhea since yesterday, well-formed BM this morning.  -C. diff stool toxin positive 9/12  -monitor abdominal exams -> worse today. AXR not c/w toxic megacolon, no free air. Discussed case with radiology resident, enlarged colon loop but unlikely toxic megacolon. Will repeat if further worsening.  - repeat ABX on 9/16: Nonobstructive bowel gas pattern without evidence of free air.  -Colorectal surgery consulted - no current surgical intervention given status of endometrial cancer  - palliative consulted  - ID recs appreciated  - c/w IV flagyl, and PO vancomycin at 500 Q6  - IV tylenol PRN pain  - As per GI, c/w soft, bland diet, fiber restricted, and Ensure supplements  - Leukocytosis: 18.7>35.4>36.2>30.63>36.1>29.9 Severe. Pt p/w several days watery diarrhea. Has Hx radiation colitis, UC. No diarrhea since yesterday, well-formed BM this morning.  -C. diff stool toxin positive 9/12  -monitor abdominal exams.  - repeat ABX on 9/16: Nonobstructive bowel gas pattern without evidence of free air.  -Colorectal surgery consulted - no current surgical intervention given status of endometrial cancer  - palliative consulted  - ID recs appreciated  - c/w IV flagyl, and PO vancomycin at 500 Q6  - IV tylenol PRN pain  - As per GI, c/w soft, bland diet, fiber restricted, and Ensure supplements  - Leukocytosis: 18.7>35.4>36.2>30.63>36.1>29.9

## 2018-09-17 NOTE — PROGRESS NOTE ADULT - PROBLEM SELECTOR PLAN 2
-9/14, pt with -160. EKG with section of irregular rhythm concerning for Afib/flutter. Resolved following 1L LR bolus  -Episode likely d/t dehydration in setting of severe C. diff infection, as resolution with bolus  -Pt w/ known MVP & remote Hx of A-fib > 30 years ago. Pt & daughter had been resistant to AC or rate control medications, but with recent episode RVR, started metop 12.5mg BID  -cont to monitor - 9/14, pt with -160. EKG with section of irregular rhythm concerning for Afib/flutter. Resolved following 1L LR bolus  - Episode likely d/t dehydration in setting of severe C. diff infection, as resolution with bolus  - Pt w/ known MVP & remote Hx of A-fib > 30 years ago. Pt & daughter had been resistant to AC or rate control medications, but with recent episode RVR, started metop 12.5mg BID  - cont to monitor

## 2018-09-17 NOTE — PROGRESS NOTE ADULT - SUBJECTIVE AND OBJECTIVE BOX
Follow Up:  severe cdiff    Interval History/ROS: better, interested in eating, 3 loose bowel movement this am, transient abd pain relieved by defecation    Allergies  erythromycin (Unknown)  macrolide antibiotics (Unknown)  morphine (Diarrhea)  sulfa drugs (Unknown)    ANTIMICROBIALS:  metroNIDAZOLE  IVPB    metroNIDAZOLE  IVPB 500 every 8 hours  vancomycin    Solution 500 every 6 hours      OTHER MEDS:  MEDICATIONS  (STANDING):  acetaminophen    Suspension .. 650 every 6 hours PRN  acetaminophen   Tablet .. 650 every 6 hours PRN  acetaminophen   Tablet .. 650 every 6 hours PRN  atorvastatin 10 at bedtime  balsalazide 2250 three times a day  enoxaparin Injectable 40 every 24 hours  metoprolol tartrate 12.5 every 12 hours  pantoprazole    Tablet 40 before breakfast  sertraline 25 two times a day      Vital Signs Last 24 Hrs  T(C): 36.2 (17 Sep 2018 14:14), Max: 37 (17 Sep 2018 05:56)  T(F): 97.2 (17 Sep 2018 14:14), Max: 98.6 (17 Sep 2018 05:56)  HR: 68 (17 Sep 2018 12:33) (54 - 82)  BP: 106/66 (17 Sep 2018 14:14) (102/64 - 148/78)  BP(mean): --  RR: 16 (17 Sep 2018 09:30) (16 - 20)  SpO2: 98% (17 Sep 2018 09:30) (96% - 98%)    PHYSICAL EXAM:  General: OOB to chair,  NAD, Non-toxic  Neurology: A&Ox3, pleasant and gracious  Respiratory: Clear to auscultation bilaterally  CV: RRR, S1S2, no murmurs, rubs or gallops  Abdominal: Soft, tender, mildly distended, normal bowel sounds  Extremities: No edema, + peripheral pulses  Line Sites: Clear  Skin: No rash               9.8    29.9  )-----------( 468      ( 17 Sep 2018 08:18 )             31.9   WBC Count: 29.9 (09-17 @ 08:18)  WBC Count: 36.1 (09-16 @ 11:16)  WBC Count: 30.63 (09-15 @ 08:27)  WBC Count: 36.2 (09-14 @ 07:05)  WBC Count: 35.4 (09-13 @ 07:50)    09-17    131<L>  |  94<L>  |  14  ----------------------------<  100<H>  3.4<L>   |  25  |  0.90    Ca    8.6      17 Sep 2018 08:18  Phos  2.6     09-17  Mg     1.8     09-17    TPro  4.4<L>  /  Alb  2.3<L>  /  TBili  0.3  /  DBili  x   /  AST  15  /  ALT  <5<L>  /  AlkPhos  161<H>  09-17      MICROBIOLOGY:  .Blood Blood-Peripheral  09-12-18   No growth to date.  --  --      .Stool Feces  09-12-18   No enteric pathogens isolated.  (Stool culture examined for Salmonella,  Shigella, Campylobacter, Aeromonas, Plesiomonas,  Vibrio, E.coli O157 and Yersinia)  --  --      .Urine Clean Catch (Midstream)  09-11-18   Culture grew 3 or more types of organisms which indicate  collection contamination; consider recollection only if clinically  indicated.  --  --      .Blood Blood-Peripheral  09-11-18   No growth at 5 days.  --  --      .Urine Clean Catch (Midstream)  08-27-18   <10,000 CFU/ml Normal Urogenital long present  --  --      .Blood Blood-Peripheral  08-26-18   No growth at 5 days.  --  --      Clostridium difficile GDH Toxins A&amp;B, EIA:   Positive (09-12-18 @ 12:45)  Clostridium difficile GDH Interpretation: Positive for toxigenic C. Difficile.  This specimen is positive for C.  Difficile glutamate dehydrogenase (GDH) antigen and positive for C.  Difficile Toxins A & B, by EIA.       RADIOLOGY:  < from: Xray Abdomen 1 View PORTABLE -Urgent (09.16.18 @ 13:09) >  IMPRESSION:   Nonobstructive bowel gas pattern without evidence of free air.    < end of copied text >      Richard Amato MD; Division of Infectious Disease; Pager: [phone]; nights and weekends: [phone]

## 2018-09-17 NOTE — PROGRESS NOTE ADULT - ASSESSMENT
Emilee Belcher is a 78 y.o. woman with history of endometrial cancer w/ mets s/p KATH + radiation (2001), R abdominal wall resection with reconstruction (2013, Beg + Christian), and currently receiving chemotherapy (Paclitaxel), hydronephrosis requiring repeated cystoscopy, HTN, HLD, and UC who was admitted on 9/11 for watery diarrhea. Found to have C. diff with an increasing leukocytosis.     Plan:  - Serial abdominal exams  - Agree with palliative care consultation  - Will continue to follow    Nagi Wray, PGY2  [phone] Emilee Belcher is a 78 y.o. woman with history of endometrial cancer w/ mets s/p KATH + radiation (2001), R abdominal wall resection with reconstruction (2013, Beg + Christian), and currently receiving chemotherapy (Paclitaxel), hydronephrosis requiring repeated cystoscopy, HTN, HLD, and UC who was admitted on 9/11 for watery diarrhea. Found to have C. diff with an increasing leukocytosis. Symptomatic improvement.     Plan:  - Serial abdominal exams  - Agree with palliative care consultation  - Will continue to follow    Nagi Wray, PGY2  [phone]

## 2018-09-17 NOTE — PROGRESS NOTE ADULT - PROBLEM SELECTOR PLAN 4
Hypokalemia & hypomagnesemia likely 2/2 diarrhea  -Replete lytes as needed Hypokalemia & hypomagnesemia likely 2/2 diarrhea  - K: 3.2>3.4, M.6>1.5>1.8 (wnl), Na: 133>134>132>131 (L), Cl: 95>96>95>94 (L)  - Replete lytes as needed  - Protein: (L) 4.7>4.4, albumin: (L) 2.5>2.3, alk phos: (H) 173> 161

## 2018-09-17 NOTE — PROGRESS NOTE ADULT - ASSESSMENT
Ms Belcher is 78F with mutliple comordities including  endometrial cancer w/ mets currently receiving weekly chemotherapy with paclitaxel which she seems to be tolerating, Ulcerative colitis?, chronic diarrhea on immodium, HTN, previous antibiotic exposure.  She has severe Cdiff on day #5 therapy  clinically improved  Decreased leukocytosis    Antibiotics:  Cipro 9/11  Flagyl 9/11; 9/13 -->  Vanco po 125 q 6h 9/12 -->14; 500 q 6 h 9/14-->    Suggest  Continue IV Flagyl   Continue high dose po Vanco  advance diet as tolerated  trend WBC (if sustained clinical improvement, will consider discontinuing Flagyl and decreasing po Vanco dose in next 1-2 days)    discussed with patient, her son and partner at bedside

## 2018-09-17 NOTE — PHYSICAL THERAPY INITIAL EVALUATION ADULT - GAIT DEVIATIONS NOTED, PT EVAL
decreased velocity of limb motion/decreased weight-shifting ability/decreased van/decreased step length

## 2018-09-17 NOTE — PHYSICAL THERAPY INITIAL EVALUATION ADULT - PLANNED THERAPY INTERVENTIONS, PT EVAL
stair negotiation: GOAL: Pt will be able to negotiate 10 steps +HR independently with reciprocal pattern in 2 weeks./strengthening/bed mobility training/gait training/transfer training

## 2018-09-17 NOTE — PHYSICAL THERAPY INITIAL EVALUATION ADULT - PRECAUTIONS/LIMITATIONS, REHAB EVAL
Symptoms continued today without improvement so went for further evaluation. She had an outpatient CT abd/pelvis performed which indicated sigmoid colitis and came to the ER.  As per ER signout, pt's GI Dr. Hollis thinks radiation colitis? Endorses some LLQ pain and pain is worse with palpation. She endorses constipation over the last 2 days but had large solid BM today.  XR Abd: Nonobstructive bowel gas pattern without evidence of free air.

## 2018-09-17 NOTE — PROGRESS NOTE ADULT - SUBJECTIVE AND OBJECTIVE BOX
Patient is a 77yo female who presents with a chief complaint of not feeling well, currently being treated for C. difficile.   INTERVAL HPI/OVERNIGHT EVENTS:          REVIEW OF SYSTEMS:  Constitutional: Denies fever, weight loss, fatigue  Resp: Denies SOB, cough, hemoptysis  CV: Denies chest pain, palpitations, dizziness  GI: Denies abdominal pain, N/V/D/C, melena, hematochezia  : Denies dysuria, increased frequency, hematuria  Neuro: Denies HA, weakness, numbness  Skin: Denies rashes, lesions  Lymph Nodes: Denies enlarged glands  MSK: Denies joint pain, swelling    VITAL SIGNS:  T(C): 37 (09-17-18 @ 05:56), Max: 37 (09-17-18 @ 05:56)  HR: 78 (09-17-18 @ 05:56) (54 - 78)  BP: 139/90 (09-17-18 @ 05:56) (121/68 - 148/78)  RR: 16 (09-17-18 @ 05:56) (16 - 20)  SpO2: 97% (09-17-18 @ 05:56) (96% - 97%)    PHYSICAL EXAM:  General: NAD, well-groomed, well-developed  Eyes: Conjunctiva and sclera clear  ENMT: Moist mucous membranes  Neck: Supple  Chest: Clear to auscultation bilaterally; no rales, rhonchi, or wheezing  Heart: Regular rate and rhythm; no murmurs, rubs, or gallops  Abd: +BS, soft, nontender, nondistended  Nervous System: AAOX3  Psych: Appropriate affect and mood  Ext:  no LE edema    LABS:                        10.1   36.1  )-----------( 535      ( 16 Sep 2018 11:16 )             32.9     16 Sep 2018 11:16    132    |  95     |  15     ----------------------------<  97     3.2     |  25     |  0.90     Ca    8.6        16 Sep 2018 11:16  Phos  2.5       16 Sep 2018 11:16  Mg     1.5       16 Sep 2018 11:16    TPro  4.7    /  Alb  2.5    /  TBili  0.3    /  DBili  x      /  AST  15     /  ALT  <5     /  AlkPhos  173    16 Sep 2018 11:16        MEDICATIONS  (STANDING):  atorvastatin 10 milliGRAM(s) Oral at bedtime  balsalazide 2250 milliGRAM(s) Oral three times a day  enoxaparin Injectable 40 milliGRAM(s) SubCutaneous every 24 hours  lactated ringers. 1000 milliLiter(s) (75 mL/Hr) IV Continuous <Continuous>  metoprolol tartrate 12.5 milliGRAM(s) Oral every 12 hours  metroNIDAZOLE  IVPB      metroNIDAZOLE  IVPB 500 milliGRAM(s) IV Intermittent every 8 hours  pantoprazole    Tablet 40 milliGRAM(s) Oral before breakfast  sertraline 25 milliGRAM(s) Oral two times a day  vancomycin    Solution 500 milliGRAM(s) Oral every 6 hours    MEDICATIONS  (PRN):  acetaminophen    Suspension .. 650 milliGRAM(s) Oral every 6 hours PRN Mild Pain (1 - 3), Moderate Pain (4 - 6)  acetaminophen   Tablet .. 650 milliGRAM(s) Oral every 6 hours PRN Temp greater or equal to 38C (100.4F)  acetaminophen   Tablet .. 650 milliGRAM(s) Oral every 6 hours PRN Moderate Pain (4 - 6) Patient is a 77yo female who presents with a chief complaint of not feeling well, currently being treated for C. difficile.   INTERVAL HPI/OVERNIGHT EVENTS:  Patient reports symptomatic improvement this morning, stating that she had a well-formed BM this morning and no diarrhea. Her last episode of diarrhea was yesterday 9/16 during the day. Her abdominal pain has subsided significantly as well. She is still feeling very tired however, and she endorses an "achiness" in her R thigh that travels up her trunk on the R lateral side. She was given   some tylenol for the pain but has not felt relief yet. She denies, CP, SOB, v/n/d/c, and dizziness or headache.  REVIEW OF SYSTEMS:  Constitutional: Denies fever, weight loss, fatigue  Resp: Denies SOB, cough, hemoptysis  CV: Denies chest pain, palpitations, dizziness  GI: Denies abdominal pain, N/V/D/C, melena, hematochezia  : Denies dysuria, increased frequency, hematuria  Neuro: Denies HA, weakness, numbness  Skin: Denies rashes, lesions  Lymph Nodes: Denies enlarged glands  MSK: R thigh > R lateral trunk aching pain    VITAL SIGNS:  T(C): 37 (09-17-18 @ 05:56), Max: 37 (09-17-18 @ 05:56)  HR: 78 (09-17-18 @ 05:56) (54 - 78)  BP: 139/90 (09-17-18 @ 05:56) (121/68 - 148/78)  RR: 16 (09-17-18 @ 05:56) (16 - 20)  SpO2: 97% (09-17-18 @ 05:56) (96% - 97%)    PHYSICAL EXAM:  General: NAD, well-groomed, well-developed  HEENT: Conjunctiva and sclera clear, moist mucous membranes  Neck: Supple  Chest: Clear to auscultation bilaterally; no rales, rhonchi, or wheezing  Heart: Regular rate and rhythm; no murmurs, rubs, or gallops  Abd: +BS, soft, nondistended, mild tenderness to palpation in RLQ  Nervous System: AAOX3  Psych: Appropriate affect and mood  Ext:  chronic RLE lymphedema    LABS:                        10.1   36.1  )-----------( 535      ( 16 Sep 2018 11:16 )             32.9     16 Sep 2018 11:16    132    |  95     |  15     ----------------------------<  97     3.2     |  25     |  0.90     Ca    8.6        16 Sep 2018 11:16  Phos  2.5       16 Sep 2018 11:16  Mg     1.5       16 Sep 2018 11:16    TPro  4.7    /  Alb  2.5    /  TBili  0.3    /  DBili  x      /  AST  15     /  ALT  <5     /  AlkPhos  173    16 Sep 2018 11:16      **RADS**  < from: Xray Abdomen 1 View PORTABLE -Urgent (09.16.18 @ 13:09) >  Nephro ureteral stent and surgical clips unchanged in appearance.  Nonobstructive bowel gas pattern.  There is no evidence of intraperitoneal free air.  There is no evidence of organomegaly or pathologic calcification.  The visualized osseous structures demonstrate no acute pathology.    IMPRESSION:   Nonobstructive bowel gas pattern without evidence of free air.    < end of copied text >    MEDICATIONS  (STANDING):  atorvastatin 10 milliGRAM(s) Oral at bedtime  balsalazide 2250 milliGRAM(s) Oral three times a day  enoxaparin Injectable 40 milliGRAM(s) SubCutaneous every 24 hours  lactated ringers. 1000 milliLiter(s) (75 mL/Hr) IV Continuous <Continuous>  metoprolol tartrate 12.5 milliGRAM(s) Oral every 12 hours  metroNIDAZOLE  IVPB      metroNIDAZOLE  IVPB 500 milliGRAM(s) IV Intermittent every 8 hours  pantoprazole    Tablet 40 milliGRAM(s) Oral before breakfast  sertraline 25 milliGRAM(s) Oral two times a day  vancomycin    Solution 500 milliGRAM(s) Oral every 6 hours    MEDICATIONS  (PRN):  acetaminophen    Suspension .. 650 milliGRAM(s) Oral every 6 hours PRN Mild Pain (1 - 3), Moderate Pain (4 - 6)  acetaminophen   Tablet .. 650 milliGRAM(s) Oral every 6 hours PRN Temp greater or equal to 38C (100.4F)  acetaminophen   Tablet .. 650 milliGRAM(s) Oral every 6 hours PRN Moderate Pain (4 - 6)

## 2018-09-17 NOTE — PROGRESS NOTE ADULT - ASSESSMENT
78Fw/ hx of endometrial cancer w/ mets currently receiving weekly chemotherapy (which she has been on/off for years) with paclitaxel which she seems to be tolerating, ulcerative colitis, HTN, presenting with watery diarrhea in the setting of recent UTI s/p antibiotic therapy (2 months ago) and abdominal pain. Stool came back positive for C. difficile stool toxin (9/12) and being treated with vancomycin PO and IV flagyl. Pt continues to have episodes of diarrhea and reports overall improvement, and today her WBC is down from yesterday (36.2>>30.6) following increased dose of vancomycin. 78Fw/ hx of endometrial cancer w/ mets currently receiving weekly chemotherapy (which she has been on/off for years) with paclitaxel which she seems to be tolerating, ulcerative colitis, HTN, presenting with watery diarrhea in the setting of recent UTI s/p antibiotic therapy (2 months ago) and abdominal pain. Stool came back positive for C. difficile stool toxin (9/12) and being treated with vancomycin PO and IV flagyl. Pt reports symptomatic improvement, with last episode of diarrhea occuring a day ago. Leukocytosis has continued.

## 2018-09-17 NOTE — PHYSICAL THERAPY INITIAL EVALUATION ADULT - PERTINENT HX OF CURRENT PROBLEM, REHAB EVAL
Pt is a 77 y/o female admitted to [facility] on 9/11/18  w/ hx of endometrial cancer w/ mets currently receiving weekly chemotherapy with paclitaxel which she seems to be tolerating, Ulcerative colitis?, chronic diarrhea on immodium, HTN, p/w not feeling well. Reportedly patient was in [place] 2 weeks ago for unclear reason. Pt states she was feeling better when starting roughly 2 days ago she developed weakness and nausea.

## 2018-09-18 LAB
ALBUMIN SERPL ELPH-MCNC: 2.4 G/DL — LOW (ref 3.3–5)
ALP SERPL-CCNC: 174 U/L — HIGH (ref 40–120)
ALT FLD-CCNC: <5 U/L — LOW (ref 10–45)
ANION GAP SERPL CALC-SCNC: 8 MMOL/L — SIGNIFICANT CHANGE UP (ref 5–17)
AST SERPL-CCNC: 13 U/L — SIGNIFICANT CHANGE UP (ref 10–40)
BILIRUB SERPL-MCNC: 0.3 MG/DL — SIGNIFICANT CHANGE UP (ref 0.2–1.2)
BUN SERPL-MCNC: 14 MG/DL — SIGNIFICANT CHANGE UP (ref 7–23)
CALCIUM SERPL-MCNC: 8.7 MG/DL — SIGNIFICANT CHANGE UP (ref 8.4–10.5)
CHLORIDE SERPL-SCNC: 99 MMOL/L — SIGNIFICANT CHANGE UP (ref 96–108)
CO2 SERPL-SCNC: 25 MMOL/L — SIGNIFICANT CHANGE UP (ref 22–31)
CREAT SERPL-MCNC: 0.94 MG/DL — SIGNIFICANT CHANGE UP (ref 0.5–1.3)
CULTURE RESULTS: SIGNIFICANT CHANGE UP
CULTURE RESULTS: SIGNIFICANT CHANGE UP
GLUCOSE SERPL-MCNC: 116 MG/DL — HIGH (ref 70–99)
HCT VFR BLD CALC: 33.2 % — LOW (ref 34.5–45)
HGB BLD-MCNC: 9.8 G/DL — LOW (ref 11.5–15.5)
MAGNESIUM SERPL-MCNC: 1.6 MG/DL — SIGNIFICANT CHANGE UP (ref 1.6–2.6)
MCHC RBC-ENTMCNC: 24.3 PG — LOW (ref 27–34)
MCHC RBC-ENTMCNC: 29.5 GM/DL — LOW (ref 32–36)
MCV RBC AUTO: 82.4 FL — SIGNIFICANT CHANGE UP (ref 80–100)
PHOSPHATE SERPL-MCNC: 2.4 MG/DL — LOW (ref 2.5–4.5)
PLATELET # BLD AUTO: 452 K/UL — HIGH (ref 150–400)
POTASSIUM SERPL-MCNC: 4.2 MMOL/L — SIGNIFICANT CHANGE UP (ref 3.5–5.3)
POTASSIUM SERPL-SCNC: 4.2 MMOL/L — SIGNIFICANT CHANGE UP (ref 3.5–5.3)
PROT SERPL-MCNC: 4.8 G/DL — LOW (ref 6–8.3)
RBC # BLD: 4.03 M/UL — SIGNIFICANT CHANGE UP (ref 3.8–5.2)
RBC # FLD: 19.5 % — HIGH (ref 10.3–14.5)
SODIUM SERPL-SCNC: 132 MMOL/L — LOW (ref 135–145)
SPECIMEN SOURCE: SIGNIFICANT CHANGE UP
SPECIMEN SOURCE: SIGNIFICANT CHANGE UP
WBC # BLD: 25.22 K/UL — HIGH (ref 3.8–10.5)
WBC # FLD AUTO: 25.22 K/UL — HIGH (ref 3.8–10.5)

## 2018-09-18 PROCEDURE — 99233 SBSQ HOSP IP/OBS HIGH 50: CPT | Mod: GC

## 2018-09-18 PROCEDURE — 99223 1ST HOSP IP/OBS HIGH 75: CPT | Mod: GC

## 2018-09-18 PROCEDURE — 99232 SBSQ HOSP IP/OBS MODERATE 35: CPT

## 2018-09-18 RX ORDER — VANCOMYCIN HCL 1 G
125 VIAL (EA) INTRAVENOUS EVERY 6 HOURS
Qty: 0 | Refills: 0 | Status: DISCONTINUED | OUTPATIENT
Start: 2018-09-18 | End: 2018-09-20

## 2018-09-18 RX ADMIN — BALSALAZIDE DISODIUM 2250 MILLIGRAM(S): 750 CAPSULE ORAL at 14:04

## 2018-09-18 RX ADMIN — PANTOPRAZOLE SODIUM 40 MILLIGRAM(S): 20 TABLET, DELAYED RELEASE ORAL at 06:24

## 2018-09-18 RX ADMIN — BALSALAZIDE DISODIUM 2250 MILLIGRAM(S): 750 CAPSULE ORAL at 05:38

## 2018-09-18 RX ADMIN — Medication 500 MILLIGRAM(S): at 05:39

## 2018-09-18 RX ADMIN — Medication 650 MILLIGRAM(S): at 23:35

## 2018-09-18 RX ADMIN — Medication 12.5 MILLIGRAM(S): at 17:34

## 2018-09-18 RX ADMIN — SERTRALINE 25 MILLIGRAM(S): 25 TABLET, FILM COATED ORAL at 17:34

## 2018-09-18 RX ADMIN — Medication 650 MILLIGRAM(S): at 07:38

## 2018-09-18 RX ADMIN — Medication 125 MILLIGRAM(S): at 23:03

## 2018-09-18 RX ADMIN — Medication 500 MILLIGRAM(S): at 00:38

## 2018-09-18 RX ADMIN — BALSALAZIDE DISODIUM 2250 MILLIGRAM(S): 750 CAPSULE ORAL at 21:51

## 2018-09-18 RX ADMIN — Medication 100 MILLIGRAM(S): at 05:38

## 2018-09-18 RX ADMIN — Medication 650 MILLIGRAM(S): at 16:37

## 2018-09-18 RX ADMIN — Medication 12.5 MILLIGRAM(S): at 05:38

## 2018-09-18 RX ADMIN — Medication 125 MILLIGRAM(S): at 17:34

## 2018-09-18 RX ADMIN — ENOXAPARIN SODIUM 40 MILLIGRAM(S): 100 INJECTION SUBCUTANEOUS at 05:39

## 2018-09-18 RX ADMIN — Medication 650 MILLIGRAM(S): at 17:10

## 2018-09-18 RX ADMIN — Medication 125 MILLIGRAM(S): at 11:46

## 2018-09-18 RX ADMIN — Medication 650 MILLIGRAM(S): at 23:03

## 2018-09-18 RX ADMIN — SERTRALINE 25 MILLIGRAM(S): 25 TABLET, FILM COATED ORAL at 05:39

## 2018-09-18 RX ADMIN — ATORVASTATIN CALCIUM 10 MILLIGRAM(S): 80 TABLET, FILM COATED ORAL at 21:51

## 2018-09-18 RX ADMIN — Medication 650 MILLIGRAM(S): at 08:15

## 2018-09-18 NOTE — PROGRESS NOTE ADULT - ASSESSMENT
Emilee Belcher is a 78 y.o. woman with history of endometrial cancer w/ mets s/p KATH + radiation (2001), R abdominal wall resection with reconstruction (2013, Beg + Christian), and currently receiving chemotherapy (Paclitaxel), hydronephrosis requiring repeated cystoscopy, HTN, HLD, and UC who was admitted on 9/11 for watery diarrhea. Found to have C. diff with an increasing leukocytosis; now plateaued and decreasing. Symptomatic improvement.     Plan:  - Serial abdominal exams  - Agree with palliative care consultation  - No surgical intervention at this time  - Will continue to follow    Nagi Wray, PGY2  [phone]

## 2018-09-18 NOTE — DIETITIAN INITIAL EVALUATION ADULT. - PROBLEM SELECTOR PLAN 2
Endometrial cancer w/ mets. Gets chemo through port weekly. Recently had 2nd opinion with Dr. Altaf Arteaga at [facility] as pt states she did not like the bad news she was given at [facility]. Jenni recommended to consider hormone therapy. Pt states she has a significant other named Wolf who she does not live with. She lives with herself but as per records pt has with her she has good functional status. Pt states she would want her HCP to be her daughter Vidya Tripp who lives in [place]. Discussed possible life sustaining measures given her current condition and pt states she would want all possible resuscitative efforts.  -Consider oncology consult with Altaf Arteaga or rosalio Dumont at [facility]  -Full Code  - I spent 20 minutes in face to face time with patient.

## 2018-09-18 NOTE — PROGRESS NOTE ADULT - SUBJECTIVE AND OBJECTIVE BOX
Patient is a 77yo female who presents with a chief complaint of not feeling well, currently being treated for C. difficile.     INTERVAL HPI/OVERNIGHT EVENTS:  Patient is doing better this morning except for her R thigh and back/flank pain, which she describes as achy and a 6 in severity. She has chronic lymphedema of the RLE and has had this pain before attributed to her lymphedema. She has been given tylenol for pain control. As for her GI symptoms, she notes improvement with 2-3 partially-formed BM yesterday and no full-blown diarrhea since two days ago.  Her abdominal pain has subsided as well.      REVIEW OF SYSTEMS:  Constitutional: Denies fever, weight loss, fatigue, more energetic this morning  Resp: Denies SOB, cough, hemoptysis  CV: Denies chest pain, palpitations, dizziness  GI: Denies abdominal pain, N/V/D/C, melena, hematochezia  : Denies dysuria, increased frequency, hematuria  Neuro: Denies HA, weakness, numbness  Skin: Denies rashes, lesions  Lymph Nodes: Denies enlarged glands  MSK: Denies joint pain, chronic lymphedema of RLE with pain up to R flank/back    VITAL SIGNS:  T(C): 36.6 (09-18-18 @ 04:43), Max: 36.8 (09-17-18 @ 22:00)  HR: 68 (09-18-18 @ 04:43) (62 - 82)  BP: 147/78 (09-18-18 @ 04:43) (102/64 - 147/78)  RR: 16 (09-18-18 @ 04:43) (16 - 16)  SpO2: 95% (09-18-18 @ 04:43) (95% - 98%)    PHYSICAL EXAM:  General: NAD, well-groomed, well-developed  HEENT: Conjunctiva and sclera clear, moist mucous membranes  Neck: Supple  Chest: Clear to auscultation bilaterally; no rales, rhonchi, or wheezing  Heart: Regular rate and rhythm; no murmurs, rubs, or gallops  Abd: +BS, soft, nondistended, slight tenderness of RLQ with palpation  Nervous System: AAOX3  Psych: Appropriate affect and mood  Ext:  no LE edema beyond chronic RLE lymphedema    LABS:                        9.8    29.9  )-----------( 468      ( 17 Sep 2018 08:18 )             31.9     18 Sep 2018 07:04    132    |  99     |  14     ----------------------------<  116    4.2     |  25     |  0.94     Ca    8.7        18 Sep 2018 07:04  Phos  2.4       18 Sep 2018 07:04  Mg     1.6       18 Sep 2018 07:04    TPro  4.8    /  Alb  2.4    /  TBili  0.3    /  DBili  x      /  AST  13     /  ALT  <5  (L)   /  AlkPhos  174    18 Sep 2018 07:04  	  C. difficile GDH &amp; toxins A/B by EIA . (09.12.18 @ 12:45)    Clostridium difficile GDH Toxins A&amp;B, EIA:   Positive    Clostridium difficile GDH Interpretation: Positive for toxigenic C. Difficile.  This specimen is positive for C.  Difficile glutamate dehydrogenase (GDH) antigen and positive for C.  Difficile Toxins A & B, by EIA.     < from: Xray Abdomen 1 View PORTABLE -Urgent (09.16.18 @ 13:09) >    EXAM:  XR ABDOMEN PORTABLE URGENT 1V                        PROCEDURE DATE:  09/16/2018    INTERPRETATION:  CLINICAL INFORMATION: Abdominal pain.  EXAM: Single frontal radiograph the abdomen.  COMPARISON: Abdominal radiograph 9/14/2018.  FINDINGS:  Nephro ureteral stent and surgical clips unchanged in appearance.  Nonobstructive bowel gas pattern.  There is no evidence of intraperitoneal free air.  There is no evidence of organomegaly or pathologic calcification.  The visualized osseous structures demonstrate no acute pathology.    IMPRESSION:   Nonobstructive bowel gas pattern without evidence of free air.  < end of copied text >    MEDICATIONS  (STANDING):  atorvastatin 10 milliGRAM(s) Oral at bedtime  balsalazide 2250 milliGRAM(s) Oral three times a day  enoxaparin Injectable 40 milliGRAM(s) SubCutaneous every 24 hours  lactated ringers. 1000 milliLiter(s) (75 mL/Hr) IV Continuous <Continuous>  metoprolol tartrate 12.5 milliGRAM(s) Oral every 12 hours  metroNIDAZOLE  IVPB      metroNIDAZOLE  IVPB 500 milliGRAM(s) IV Intermittent every 8 hours  pantoprazole    Tablet 40 milliGRAM(s) Oral before breakfast  sertraline 25 milliGRAM(s) Oral two times a day  vancomycin    Solution 500 milliGRAM(s) Oral every 6 hours    MEDICATIONS  (PRN):  acetaminophen    Suspension .. 650 milliGRAM(s) Oral every 6 hours PRN Mild Pain (1 - 3), Moderate Pain (4 - 6)  acetaminophen   Tablet .. 650 milliGRAM(s) Oral every 6 hours PRN Temp greater or equal to 38C (100.4F)  acetaminophen   Tablet .. 650 milliGRAM(s) Oral every 6 hours PRN Moderate Pain (4 - 6)

## 2018-09-18 NOTE — PROGRESS NOTE ADULT - PROBLEM SELECTOR PLAN 3
Endometrial cancer w/ mets to abdominal wall & iliac nodes. Gets taxol through port weekly. Has had abdominal wall resection in past. Recently had 2nd opinion with Dr. Altaf Arteaga at [facility] as pt states she did not like the bad news she was given at [facility]. Jenni recommended to consider hormone therapy. Pt states she would want her HCP to be her daughter Vidya Tripp who lives in [place]. Discussed possible life sustaining measures given her current condition and pt states she would want all possible resuscitative efforts.  - Full Code  - Palliative consulted

## 2018-09-18 NOTE — PROGRESS NOTE ADULT - SUBJECTIVE AND OBJECTIVE BOX
SUBJECTIVE:  Back to mostly formed bowel movements, improved appetite, tolerated soft diet.  no nausea or vomiting.  still with occasional lower abdominal pain but now more concerned about pain from right back down right thigh/leg from "lymphedema".  _____________________________________________________  OBJECTIVE:    T(C): 36.6 (09-18-18 @ 04:43), Max: 36.8 (09-17-18 @ 22:00)  HR: 68 (09-18-18 @ 04:43)  BP: 147/78 (09-18-18 @ 04:43)  RR: 16 (09-18-18 @ 04:43)  SpO2: 95% (09-18-18 @ 04:43)  Wt(kg): --    09-17 @ 07:01  -  09-18 @ 07:00  --------------------------------------------------------  IN:    IV PiggyBack: 600 mL    Oral Fluid: 880 mL  Total IN: 1480 mL    OUT:  Total OUT: 0 mL    Total NET: 1480 mL      09-18 @ 07:01  -  09-18 @ 08:51  --------------------------------------------------------  IN:  Total IN: 0 mL    OUT:    Voided: 150 mL  Total OUT: 150 mL    Total NET: -150 mL        PHYSICAL EXAM:      Constitutional: NAD    Gastrointestinal: soft mild lower abd tenderness no r/g/hsm    _____________________________________________________  LABS:                        9.8    29.9  )-----------( 468      ( 17 Sep 2018 08:18 )             31.9     09-18    132<L>  |  99  |  14  ----------------------------<  116<H>  4.2   |  25  |  0.94    Ca    8.7      18 Sep 2018 07:04  Phos  2.4     09-18  Mg     1.6     09-18    TPro  4.8<L>  /  Alb  2.4<L>  /  TBili  0.3  /  DBili  x   /  AST  13  /  ALT  <5<L>  /  AlkPhos  174<H>  09-18    LIVER FUNCTIONS - ( 18 Sep 2018 07:04 )  Alb: 2.4 g/dL / Pro: 4.8 g/dL / ALK PHOS: 174 U/L / ALT: <5 U/L / AST: 13 U/L / GGT: x           _____________________________________________________  ACTIVE MEDS:  MEDICATIONS  (STANDING):  atorvastatin 10 milliGRAM(s) Oral at bedtime  balsalazide 2250 milliGRAM(s) Oral three times a day  enoxaparin Injectable 40 milliGRAM(s) SubCutaneous every 24 hours  lactated ringers. 1000 milliLiter(s) (75 mL/Hr) IV Continuous <Continuous>  metoprolol tartrate 12.5 milliGRAM(s) Oral every 12 hours  metroNIDAZOLE  IVPB      metroNIDAZOLE  IVPB 500 milliGRAM(s) IV Intermittent every 8 hours  pantoprazole    Tablet 40 milliGRAM(s) Oral before breakfast  sertraline 25 milliGRAM(s) Oral two times a day  vancomycin    Solution 500 milliGRAM(s) Oral every 6 hours    MEDICATIONS  (PRN):  acetaminophen    Suspension .. 650 milliGRAM(s) Oral every 6 hours PRN Mild Pain (1 - 3), Moderate Pain (4 - 6)  acetaminophen   Tablet .. 650 milliGRAM(s) Oral every 6 hours PRN Temp greater or equal to 38C (100.4F)  acetaminophen   Tablet .. 650 milliGRAM(s) Oral every 6 hours PRN Moderate Pain (4 - 6)    _____________________________________________________  ASSESSMENT:  78yFemale with severe C.diff colitis now improving both clinically and biochemically with decreased WBC    PLAN:  1.  Continue high dose vancocin 500 mg four times a day and IV Flagyl  2.  Diet as tolerated  3.  ID follow up noted/appreciated    Aryan Hercules M.D.  [facility]  (O) [phone]

## 2018-09-18 NOTE — DIETITIAN INITIAL EVALUATION ADULT. - ENERGY NEEDS
IBW= 110  lbs +/- 10%  Chart review: 78Fw/ hx of endometrial cancer w/ mets currently receiving weekly chemotherapy (which she has been on/off for years) with paclitaxel, ulcerative colitis, HTN, presenting with watery diarrhea in the setting of recent UTI s/p antibiotic therapy (2 months ago) and abdominal pain. Stool came back positive for C. difficile stool toxin (9/12). WBC improving as noted

## 2018-09-18 NOTE — CONSULT NOTE ADULT - ASSESSMENT
78F  hx of endometrial cancer w/ mets receiving weekly chemotherapy, being treated for c-diff.  UC by hx.  Has lymphedema of lower extremity which causes discomfort.
Emilee Belcher is a 78 y.o. woman with history of endometrial cancer w/ mets s/p KATH + radiation (2001), R abdominal wall resection with reconstruction (2013, Beg + Kajonathanon), and currently receiving chemotherapy (Paclitaxel), hydronephrosis requiring repeated cystoscopy, HTN, HLD, and UC who was admitted on 9/11 for watery diarrhea. Found to have C. diff with an increasing leukocytosis.     Plan:  - Upright KUB to evaluate for pneumoperitoneum   - Serial abdominal exams  - Will discuss with Dr. Chacko, PGY2  [phone]
Ms Belcher is 78F with mutliple comordities including  endometrial cancer w/ mets currently receiving weekly chemotherapy with paclitaxel which she seems to be tolerating, Ulcerative colitis?, chronic diarrhea on immodium, HTN, previous antibiotic exposure.  She has severe Cdiff with marked leukocytosis which appears just today to be responding to therapy.    Antibiotics:  Cipro 9/11  Flagyl 9/11; 9/13 -->  Vanco po 125 q 6h 9/12 -->14; 500 q 6 h 9/14-->    Suggest  Continue IV Flagyl for now  Continue high dose po Vanco  advance diet as tolerated  trend WBC    will follow    discussed with primary attending

## 2018-09-18 NOTE — PROGRESS NOTE ADULT - SUBJECTIVE AND OBJECTIVE BOX
Follow Up:  Cdiff    Interval History/ROS: improved po intake, no diarrhea this am ("not yet") no abd pain    Allergies  erythromycin (Unknown)  macrolide antibiotics (Unknown)  morphine (Diarrhea)  sulfa drugs (Unknown)    ANTIMICROBIALS:  metroNIDAZOLE  IVPB    metroNIDAZOLE  IVPB 500 every 8 hours  vancomycin    Solution 500 every 6 hours      OTHER MEDS:  MEDICATIONS  (STANDING):  acetaminophen    Suspension .. 650 every 6 hours PRN  acetaminophen   Tablet .. 650 every 6 hours PRN  acetaminophen   Tablet .. 650 every 6 hours PRN  atorvastatin 10 at bedtime  balsalazide 2250 three times a day  enoxaparin Injectable 40 every 24 hours  metoprolol tartrate 12.5 every 12 hours  pantoprazole    Tablet 40 before breakfast  sertraline 25 two times a day      Vital Signs Last 24 Hrs  T(C): 36.6 (18 Sep 2018 04:43), Max: 36.8 (17 Sep 2018 22:00)  T(F): 97.8 (18 Sep 2018 04:43), Max: 98.2 (17 Sep 2018 22:00)  HR: 68 (18 Sep 2018 04:43) (62 - 82)  BP: 147/78 (18 Sep 2018 04:43) (102/64 - 147/78)  BP(mean): --  RR: 16 (18 Sep 2018 04:43) (16 - 16)  SpO2: 95% (18 Sep 2018 04:43) (95% - 98%)    PHYSICAL EXAM:  General: WN/WD NAD, Non-toxic  Neurology: A&Ox3, nonfocal  Respiratory: Clear to auscultation bilaterally  CV: RRR, S1S2, no murmurs, rubs or gallops  Abdominal: Soft, Non-tender, non-distended, normal bowel sounds  Extremities: No edema  Line Sites: Clear  Skin: No rash                        9.8    29.9  )-----------( 468      ( 17 Sep 2018 08:18 )             31.9   WBC Count: 29.9 (09-17 @ 08:18)  WBC Count: 36.1 (09-16 @ 11:16)  WBC Count: 30.63 (09-15 @ 08:27)  WBC Count: 36.2 (09-14 @ 07:05)    09-18    132<L>  |  99  |  14  ----------------------------<  116<H>  4.2   |  25  |  0.94    Ca    8.7      18 Sep 2018 07:04  Phos  2.4     09-18  Mg     1.6     09-18    TPro  4.8<L>  /  Alb  2.4<L>  /  TBili  0.3  /  DBili  x   /  AST  13  /  ALT  <5<L>  /  AlkPhos  174<H>  09-18      MICROBIOLOGY:  .Blood Blood-Peripheral  09-12-18   No growth at 5 days.  --  --      .Stool Feces  09-12-18   No enteric pathogens isolated.  (Stool culture examined for Salmonella,  Shigella, Campylobacter, Aeromonas, Plesiomonas,  Vibrio, E.coli O157 and Yersinia)  --  --      .Urine Clean Catch (Midstream)  09-11-18   Culture grew 3 or more types of organisms which indicate  collection contamination; consider recollection only if clinically  indicated.  --  --      .Blood Blood-Peripheral  09-11-18   No growth at 5 days.  --  --      .Urine Clean Catch (Midstream)  08-27-18   <10,000 CFU/ml Normal Urogenital long present  --  --      .Blood Blood-Peripheral  08-26-18   No growth at 5 days.  --  --    Clostridium difficile GDH Toxins A&amp;B, EIA:   Positive (09-12-18 @ 12:45)  Clostridium difficile GDH Interpretation: Positive for toxigenic C. Difficile.  This specimen is positive for C.  Difficile glutamate dehydrogenase (GDH) antigen and positive for C.  Difficile Toxins A & B, by EIA.       RADIOLOGY:  < from: Xray Abdomen 1 View PORTABLE -Urgent (09.16.18 @ 13:09) >  Nonobstructive bowel gas pattern without evidence of free air.    < end of copied text >      Richard Amato MD; Division of Infectious Disease; Pager: [phone]; nights and weekends: [phone]

## 2018-09-18 NOTE — PROGRESS NOTE ADULT - ASSESSMENT
Ms Belcher is 78F with mutliple comordities including  endometrial cancer w/ mets currently receiving weekly chemotherapy with paclitaxel which she seems to be tolerating, Ulcerative colitis?, chronic diarrhea on immodium, HTN, previous antibiotic exposure.  She has severe Cdiff on day #6 therapy  clinically improved  Decreasing leukocytosis    Antibiotics:  Cipro 9/11  Flagyl 9/11; 9/13 -->  Vanco po 125 q 6h 9/12 -->14; 500 q 6 h 9/14-->    Suggest  Discontinue IV Flagyl   Decrease high dose po Vanco to 125 mg 4 times daily  advance diet as tolerated      discussed with hospitalist  I will be out 9/19    Please call ID [phone] if needed

## 2018-09-18 NOTE — PROGRESS NOTE ADULT - PROBLEM SELECTOR PLAN 2
- 9/14, pt with -160. EKG with section of irregular rhythm concerning for Afib/flutter. Resolved following 1L LR bolus  - Episode likely d/t dehydration in setting of severe C. diff infection, as resolution with bolus  - Pt w/ known MVP & remote Hx of A-fib > 30 years ago. Pt & daughter had been resistant to AC or rate control medications, but with recent episode RVR, started metop 12.5mg BID  - cont to monitor

## 2018-09-18 NOTE — CONSULT NOTE ADULT - ATTENDING COMMENTS
Seen and examined.  The patient states that her abdominal pain waxes and wanes, but is improved since admission.  She is currently non-toxic appearing, normotensive, and afebrile.  Her abdominal exam is soft, mild to moderately tender in the upper abdomen with no rebound or guarding.    Upright chest Xray shows no free air.    I had an extensive discussion with the patient, her daughter and sister present regarding the possibility of her requiring emergent subtotal colectomy with end ileostomy. I explained that while she has no absolute indication for surgery at this time, her condition may worsen, and if so, she would require surgery in order to survive.    The patient and family understands this, and would like to speak with palliative care to establish goals of care and advanced directives clearly.    At this point the patient does not want any aggressive treatment.    Plan:  -serial abdominal exams  -palliative care consult  -supportive care  -Colorectal Surgery (red surgery team) will follow
I have personally seen and examined the patient and completed the note.

## 2018-09-18 NOTE — PROGRESS NOTE ADULT - ASSESSMENT
78Fw/ hx of endometrial cancer w/ mets currently receiving weekly chemotherapy (which she has been on/off for years) with paclitaxel which she seems to be tolerating, ulcerative colitis, HTN, presenting with watery diarrhea in the setting of recent UTI s/p antibiotic therapy (2 months ago) and abdominal pain. Stool came back positive for C. difficile stool toxin (9/12) and being treated with vancomycin PO and IV flagyl. Pt reports symptomatic improvement, with last episode of diarrhea occuring two days ago and yesterday having several partially-formed BM. Leukocytosis has continued but is down trending.

## 2018-09-18 NOTE — PROGRESS NOTE ADULT - SUBJECTIVE AND OBJECTIVE BOX
Red Team Surgery Consult - Daily Progress Note    SUBJECTIVE:  Doing well.   No overnight events.   Bowel movements continue to become more formed and less frequent.   Denies abdominal pain.     OBJECTIVE:     ** VITAL SIGNS / I&O's **  Vital Signs Last 24 Hrs  T(C): 36.6 (18 Sep 2018 04:43), Max: 36.8 (17 Sep 2018 22:00)  T(F): 97.8 (18 Sep 2018 04:43), Max: 98.2 (17 Sep 2018 22:00)  HR: 68 (18 Sep 2018 04:43) (62 - 82)  BP: 147/78 (18 Sep 2018 04:43) (102/64 - 147/78)  BP(mean): --  RR: 16 (18 Sep 2018 04:43) (16 - 16)  SpO2: 95% (18 Sep 2018 04:43) (95% - 98%)    09-16-18 @ 07:01  -  09-17-18 @ 07:00  --------------------------------------------------------  IN: 1440 mL / OUT: 0 mL / NET: 1440 mL    09-17-18 @ 07:01  -  09-18-18 @ 06:20  --------------------------------------------------------  IN: 1320 mL / OUT: 0 mL / NET: 1320 mL      MEDICATIONS  (STANDING):  atorvastatin 10 milliGRAM(s) Oral at bedtime  balsalazide 2250 milliGRAM(s) Oral three times a day  enoxaparin Injectable 40 milliGRAM(s) SubCutaneous every 24 hours  lactated ringers. 1000 milliLiter(s) (75 mL/Hr) IV Continuous <Continuous>  metoprolol tartrate 12.5 milliGRAM(s) Oral every 12 hours  metroNIDAZOLE  IVPB      metroNIDAZOLE  IVPB 500 milliGRAM(s) IV Intermittent every 8 hours  pantoprazole    Tablet 40 milliGRAM(s) Oral before breakfast  sertraline 25 milliGRAM(s) Oral two times a day  vancomycin    Solution 500 milliGRAM(s) Oral every 6 hours    MEDICATIONS  (PRN):  acetaminophen    Suspension .. 650 milliGRAM(s) Oral every 6 hours PRN Mild Pain (1 - 3), Moderate Pain (4 - 6)  acetaminophen   Tablet .. 650 milliGRAM(s) Oral every 6 hours PRN Temp greater or equal to 38C (100.4F)  acetaminophen   Tablet .. 650 milliGRAM(s) Oral every 6 hours PRN Moderate Pain (4 - 6)    ** PHYSICAL EXAM **    -- CONSTITUTIONAL: AOx3. NAD.   -- CARDIOVASCULAR: normotensive, regular rate   -- RESPIRATORY: breathing comfortably   -- ABDOMEN: soft, nontender, mildly distended    -- EXTREMITIES: warm, well perfused     ** LABS **  CBC (09-17 @ 08:18)                              9.8<L>                         29.9<H>  )----------------(  468<H>     86.2<H>% Neutrophils, 8.2<L>% Lymphocytes, ANC: 25.8<H>                              31.9<L>                BMP (09-17 @ 08:18)             131<L>  |  94<L>   |  14    		Ca++ --      Ca 8.6                ---------------------------------( 100<H>		Mg 1.8                3.4<L>  |  25      |  0.90  			Ph 2.6       LFTs (09-17 @ 08:18)      TPro 4.4<L> / Alb 2.3<L> / TBili 0.3 / DBili -- / AST 15 / ALT <5<L> / AlkPhos 161<H>          -> .Blood Blood-Peripheral Culture (09-12 @ 23:57)     NG    NG    No growth at 5 days.    -> .Stool Feces Culture (09-12 @ 17:14)     NG    NG    No enteric pathogens isolated.  (Stool culture examined for Salmonella,  Shigella, Campylobacter, Aeromonas, Plesiomonas,  Vibrio, E.coli O157 and Yersinia)    -> .Urine Clean Catch (Midstream) Culture (09-11 @ 22:23)     NG    NG    Culture grew 3 or more types of organisms which indicate  collection contamination; consider recollection only if clinically  indicated.    -> .Blood Blood-Peripheral Culture (09-11 @ 19:29)     NG    NG    No growth at 5 days.      **RADS**  < from: Xray Abdomen 1 View PORTABLE -Urgent (09.16.18 @ 13:09) >  Nephro ureteral stent and surgical clips unchanged in appearance.  Nonobstructive bowel gas pattern.  There is no evidence of intraperitoneal free air.  There is no evidence of organomegaly or pathologic calcification.  The visualized osseous structures demonstrate no acute pathology.    IMPRESSION:   Nonobstructive bowel gas pattern without evidence of free air.    < end of copied text >

## 2018-09-18 NOTE — PROGRESS NOTE ADULT - PROBLEM SELECTOR PLAN 1
Severe. Pt p/w several days watery diarrhea. Has Hx radiation colitis, UC. No diarrhea since yesterday, well-formed BM this morning.  -C. diff stool toxin positive 9/12  -monitor abdominal exams.  - repeat ABX on 9/16: Nonobstructive bowel gas pattern without evidence of free air.  -Colorectal surgery consulted - no current surgical intervention given status of endometrial cancer  - ID recs appreciated  - c/w IV flagyl, and PO vancomycin at 500 Q6  - IV tylenol PRN pain  - As per GI, c/w soft, bland diet, fiber restricted, and Ensure supplements  - Leukocytosis: 18.7>35.4>36.2>30.63>36.1>29.9

## 2018-09-18 NOTE — CONSULT NOTE ADULT - SUBJECTIVE AND OBJECTIVE BOX
HPI:  78F mediocre historian w/ hx of endometrial cancer w/ mets currently receiving weekly chemotherapy with paclitaxel which she seems to be tolerating, Ulcerative colitis?, chronic diarrhea on immodium, HTN, p/w not feeling well. Reportedly patient was in [place] 2 weeks ago for unclear reason. Pt states she was feeling better when starting roughly 2 days ago she developed weakness and nausea. Symptoms continued today without improvement so went for further evaluation. She had an outpatient CT abd/pelvis performed which indicated sigmoid colitis and came to the ER. Of note, pt's outpatient images have been uploaded into EMR. As per ER signout, pt's GI Dr. Hollis thinks radiation colitis? Endorses some LLQ pain and pain is worse with palpation. She endorses constipation over the last 2 days but had large solid BM today. Denies hematochezia or melena. No obvious source of bleeding as per patient. Pt has extensive paperwork with her including results of her CT abd/pelvis earlier today.     In ER: Given Cipro, flagyl IV, LR 1L, KCl 40mg PO, KCl 10mg IV (11 Sep 2018 22:31)    PERTINENT PM/SXH:   Malignant neoplasm of ovary, unspecified laterality  GERD (gastroesophageal reflux disease)  Trigeminal neuralgia  Fatty liver  Multiple thyroid nodules  Anemia  Cataracts, bilateral  History of chemotherapy  Hydronephrosis of right kidney  Lymphedema of leg  Uterine cancer  Hydronephrosis determined by ultrasound  Abdominal mass  Atrial fibrillation  Mitral valve prolapse  Endometrial carcinoma  Hyperlipidemia  Ulcerative colitis  Anxiety  Post-Herpetic Trigeminal Neuralgia  Benign Neoplasm of Breast  Benign Hypertension    H/O hydronephrosis  History of urethral stent  History of chemotherapy  Abdominal mass  Encounter for biopsy  History of tonsillectomy  H/O total hysterectomy  History of D&C    FAMILY HISTORY:  Hypertension  Family history of early CAD (Father)    ITEMS NOT CHECKED ARE NOT PRESENT    SOCIAL HISTORY:   Significant other/partner:  [ ]  Children:  [ ]  Confucianism/Spirituality:  Substance hx:  [ ]   Tobacco hx:  [ ]   Alcohol hx: [ ]   Home Opioid hx:  [ ] I-Stop Reference No:  Living Situation: [ ]Home  [ ]Long term care  [ ]Rehab [ ]Other    ADVANCE DIRECTIVES:    DNR  MOLST  [ ]  Living Will  [ ]   DECISION MAKER(s):  [ ] Health Care Proxy(s)  [ ] Surrogate(s)  [ ] Guardian           Name(s): Phone Number(s):    BASELINE (I)ADL(s) (prior to admission):  [place]: [ ]Total  [ ] Moderate [ ]Dependent    Allergies    erythromycin (Unknown)  macrolide antibiotics (Unknown)  morphine (Diarrhea)  sulfa drugs (Unknown)    Intolerances    MEDICATIONS  (STANDING):  atorvastatin 10 milliGRAM(s) Oral at bedtime  balsalazide 2250 milliGRAM(s) Oral three times a day  enoxaparin Injectable 40 milliGRAM(s) SubCutaneous every 24 hours  metoprolol tartrate 12.5 milliGRAM(s) Oral every 12 hours  pantoprazole    Tablet 40 milliGRAM(s) Oral before breakfast  sertraline 25 milliGRAM(s) Oral two times a day  vancomycin    Solution 500 milliGRAM(s) Oral every 6 hours    MEDICATIONS  (PRN):  acetaminophen    Suspension .. 650 milliGRAM(s) Oral every 6 hours PRN Mild Pain (1 - 3), Moderate Pain (4 - 6)  acetaminophen   Tablet .. 650 milliGRAM(s) Oral every 6 hours PRN Temp greater or equal to 38C (100.4F)  acetaminophen   Tablet .. 650 milliGRAM(s) Oral every 6 hours PRN Moderate Pain (4 - 6)      PRESENT SYMPTOMS: [ ]Unable to obtain due to poor mentation   Source if other than patient:  [ ]Family   [ ]Team     Pain (Impact on QOL):  Moderate impact  Location -     Left thigh secondary to lymphedema    Minimal acceptable level (0-10 scale): 3-4                   Aggravating factors - position, movement  Quality - aching,burning  Radiation - none  Severity (0-10 scale) -  varies  Timing -    PAIN AD Score:     [email or web address] (press ctrl +  left click to view)    Dyspnea:                           [ ]Mild [ ]Moderate [ ]Severe  Anxiety:                             [ ]Mild [ ]Moderate [ ]Severe  Fatigue:                             [ ]Mild [ ]Moderate [ ]Severe  Nausea:                             [ ]Mild [ ]Moderate [ ]Severe  Loss of appetite:              [ ]Mild [ ]Moderate [ ]Severe  Constipation:                    [ ]Mild [ ]Moderate [ ]Severe    Other Symptoms:  [x ]All other review of systems negative     Karnofsky Performance Score/Palliative Performance Status Version 2:  40       %    http://palliative.info/resource_material/PPSv2.pdf  PHYSICAL EXAM:  Vital Signs Last 24 Hrs  T(C): 36.8 (18 Sep 2018 14:19), Max: 36.8 (18 Sep 2018 14:19)  T(F): 98.3 (18 Sep 2018 14:19), Max: 98.3 (18 Sep 2018 14:19)  HR: 69 (18 Sep 2018 14:19) (62 - 76)  BP: 147/78 (18 Sep 2018 14:19) (102/64 - 147/82)  BP(mean): --  RR: 18 (18 Sep 2018 04:32) (16 - 18)  SpO2: 98% (18 Sep 2018 17:15) (95% - 98%) I&O's Summary    GENERAL:  [ x]Alert  [x ]Oriented x 3  [ ]Lethargic  [ ]Cachexia  [ ]Unarousable  [x ]Verbal  [ ]Non-Verbal  Behavioral:   [ ] Anxiety  [ ] Delirium [ ] Agitation [ ] Other  HEENT:  [x ]Normal   [ ]Dry mouth   [ ]ET Tube/Trach  [ ]Oral lesions  PULMONARY:   [x ]Clear [ ]Tachypnea  [ ]Audible excessive secretions   [ ]Rhonchi        [ ]Right [ ]Left [ ]Bilateral  [ ]Crackles        [ ]Right [ ]Left [ ]Bilateral  [ ]Wheezing     [ ]Right [ ]Left [ ]Bilateral  CARDIOVASCULAR:    x[ ]Regular [ ]Irregular [ ]Tachy  [ ]Mat [ ]Murmur [ ]Other +S1 +S2   GASTROINTESTINAL:  [x ]Soft  [ ]Distended   [x ]+BS  [x ]Non tender [ ]Tender  [ ]PEG [ ]OGT/ NGT  Last BM: See RN documentation in the EMR which I have reviewed.   GENITOURINARY:  [x ]Normal [ ] Incontinent   [ ]Oliguria/Anuria   [ ]Murary  MUSCULOSKELETAL:   [ ]Normal   [x ]Weakness  [ ]Bed/Wheelchair bound [ ]Edema  NEUROLOGIC:   [x ]No focal deficits  [ ] Cognitive impairment  [ ] Dysphagia [ ]Dysarthria [ ] Paresis [ ]Other   SKIN:   [ ]Normal   [ ]Pressure ulcer(s)  [ ]Rash    CRITICAL CARE:  [ ] Shock Present  [ ]Septic [ ]Cardiogenic [ ]Neurologic [ ]Hypovolemic  [ ]  Vasopressors [ ]  Inotropes   [ ] Respiratory failure present  [ ] Acute  [ ] Chronic [ ] Hypoxic  [ ] Hypercarbic [ ] Other  [ ] Other organ failure     LABS:                        9.8  25.22 )-----------( 452      (18 Sep 2018 07:43 )             33.2  09-18    132  |  99  |  14  ----------------------------<  116<H>  4.2   |  25  |  0.94    Ca    8.7      18 Sep 2018 07:07  Phos  2.4     09-18  Mg     1.8     09-18    TPro  5.8<L>  /  Alb  2.4<L>  /  TBili  0.3  /  DBili  x   /  AST  13  /  ALT  5<L>  /  AlkPhos  161<H>  09-18        RADIOLOGY & ADDITIONAL STUDIES:    PROTEIN CALORIE MALNUTRITION PRESENT: [ ] Yes [ ] No  [ ] PPSV2 < or = to 30% [ ] significant weight loss  [ ] poor nutritional intake [ ] catabolic state [ ] anasarca     Albumin, Serum: 2.6 g/dL (09-20-18 @ 07:07)  Artificial Nutrition [ ]     REFERRALS:   [ ]Chaplaincy  [ ] Hospice  [ ]Child Life  [ ]Social Work  [ ]Case management [ ]Holistic Therapy   Goals of Care Discussion Document: Goals of Care Conversation - Personal Advance Directive [MELO Wilson] (09-19-18 @ 16:46) HPI:  78F mediocre historian w/ hx of endometrial cancer w/ mets currently receiving weekly chemotherapy with paclitaxel which she seems to be tolerating, Ulcerative colitis?, chronic diarrhea on immodium, HTN, p/w not feeling well. Reportedly patient was in [place] 2 weeks ago for unclear reason. Pt states she was feeling better when starting roughly 2 days ago she developed weakness and nausea. Symptoms continued today without improvement so went for further evaluation. She had an outpatient CT abd/pelvis performed which indicated sigmoid colitis and came to the ER. Of note, pt's outpatient images have been uploaded into EMR. As per ER signout, pt's GI Dr. Hollis thinks radiation colitis? Endorses some LLQ pain and pain is worse with palpation. She endorses constipation over the last 2 days but had large solid BM today. Denies hematochezia or melena. No obvious source of bleeding as per patient. Pt has extensive paperwork with her including results of her CT abd/pelvis earlier today.     In ER: Given Cipro, flagyl IV, LR 1L, KCl 40mg PO, KCl 10mg IV (11 Sep 2018 22:31)    HPI entered unaltered from original H and P.  We are called to assist with GOC.  She is presently being treated for c-diff colitis.    PERTINENT PM/SXH:   Malignant neoplasm of ovary, unspecified laterality  GERD (gastroesophageal reflux disease)  Trigeminal neuralgia  Fatty liver  Multiple thyroid nodules  Anemia  Cataracts, bilateral  History of chemotherapy  Hydronephrosis of right kidney  Lymphedema of leg  Uterine cancer  Hydronephrosis determined by ultrasound  Abdominal mass  Atrial fibrillation  Mitral valve prolapse  Endometrial carcinoma  Hyperlipidemia  Ulcerative colitis  Anxiety  Post-Herpetic Trigeminal Neuralgia  Benign Neoplasm of Breast  Benign Hypertension    H/O hydronephrosis  History of urethral stent  History of chemotherapy  Abdominal mass  Encounter for biopsy  History of tonsillectomy  H/O total hysterectomy  History of D&C    FAMILY HISTORY:  Hypertension  Family history of early CAD (Father)    ITEMS NOT CHECKED ARE NOT PRESENT    SOCIAL HISTORY:   Significant other/partner:  [x ]  Children:  [x ]  Hinduism/Spirituality:  Substance hx:  [ ]   Tobacco hx:  [ ]   Alcohol hx: [ ]   Home Opioid hx:  [ ] I-Stop Reference No:  Living Situation: [x ]Home  [ ]Long term care  [ ]Rehab [ ]Other    ADVANCE DIRECTIVES:    DNR  MOLST  [ ]  Living Will  [ ]   DECISION MAKER(s):  [x ] Health Care Proxy(s)  [ ] Surrogate(s)  [ ] Guardian           Name(s): Vidya Tripp Phone Number(s): [phone]    BASELINE (I)ADL(s) (prior to admission):  [place]: [ ]Total  [ ] Moderate [ ]Dependent    Allergies    erythromycin (Unknown)  macrolide antibiotics (Unknown)  morphine (Diarrhea)  sulfa drugs (Unknown)    Intolerances    MEDICATIONS  (STANDING):  atorvastatin 10 milliGRAM(s) Oral at bedtime  balsalazide 2250 milliGRAM(s) Oral three times a day  enoxaparin Injectable 40 milliGRAM(s) SubCutaneous every 24 hours  metoprolol tartrate 12.5 milliGRAM(s) Oral every 12 hours  pantoprazole    Tablet 40 milliGRAM(s) Oral before breakfast  sertraline 25 milliGRAM(s) Oral two times a day  vancomycin    Solution 500 milliGRAM(s) Oral every 6 hours    MEDICATIONS  (PRN):  acetaminophen    Suspension .. 650 milliGRAM(s) Oral every 6 hours PRN Mild Pain (1 - 3), Moderate Pain (4 - 6)  acetaminophen   Tablet .. 650 milliGRAM(s) Oral every 6 hours PRN Temp greater or equal to 38C (100.4F)  acetaminophen   Tablet .. 650 milliGRAM(s) Oral every 6 hours PRN Moderate Pain (4 - 6)      PRESENT SYMPTOMS: [ ]Unable to obtain due to poor mentation   Source if other than patient:  [ ]Family   [ ]Team     Pain (Impact on QOL):  Moderate impact  Location -     Left thigh secondary to lymphedema    Minimal acceptable level (0-10 scale): 3-4                   Aggravating factors - position, movement  Quality - aching,burning  Radiation - none  Severity (0-10 scale) -  varies  Timing -    PAIN AD Score:     [email or web address]/[email or web address].pdf (press ctrl +  left click to view)    Dyspnea:                           [ ]Mild [ ]Moderate [ ]Severe  Anxiety:                             [ ]Mild [ ]Moderate [ ]Severe  Fatigue:                             [ ]Mild [x ]Moderate [ ]Severe  Nausea:                             [ ]Mild [ ]Moderate [ ]Severe  Loss of appetite:              [x ]Mild [ ]Moderate [ ]Severe  Constipation:                    [ ]Mild [ ]Moderate [ ]Severe    Other Symptoms:  [x ]All other review of systems negative     Karnofsky Performance Score/Palliative Performance Status Version 2:  40       %    http://palliative.info/resource_material/PPSv2.pdf  PHYSICAL EXAM:  Vital Signs Last 24 Hrs  T(C): 36.8 (18 Sep 2018 14:19), Max: 36.8 (18 Sep 2018 14:19)  T(F): 98.3 (18 Sep 2018 14:19), Max: 98.3 (18 Sep 2018 14:19)  HR: 69 (18 Sep 2018 14:19) (62 - 76)  BP: 147/78 (18 Sep 2018 14:19) (102/64 - 147/82)  BP(mean): --  RR: 18 (18 Sep 2018 04:32) (16 - 18)  SpO2: 98% (18 Sep 2018 17:15) (95% - 98%) I&O's Summary    GENERAL:  [ x]Alert  [x ]Oriented x 3  [ ]Lethargic  [ ]Cachexia  [ ]Unarousable  [x ]Verbal  [ ]Non-Verbal  Behavioral:   [ ] Anxiety  [ ] Delirium [ ] Agitation [ ] Other  HEENT:  [x ]Normal   [ ]Dry mouth   [ ]ET Tube/Trach  [ ]Oral lesions  PULMONARY:   [x ]Clear [ ]Tachypnea  [ ]Audible excessive secretions   [ ]Rhonchi        [ ]Right [ ]Left [ ]Bilateral  [ ]Crackles        [ ]Right [ ]Left [ ]Bilateral  [ ]Wheezing     [ ]Right [ ]Left [ ]Bilateral  CARDIOVASCULAR:    x[ ]Regular [ ]Irregular [ ]Tachy  [ ]Mat [ ]Murmur [ ]Other +S1 +S2   GASTROINTESTINAL:  [x ]Soft  [ ]Distended   [x ]+BS  [x ]Non tender [ ]Tender  [ ]PEG [ ]OGT/ NGT  Last BM: See RN documentation in the EMR which I have reviewed.   GENITOURINARY:  [x ]Normal [ ] Incontinent   [ ]Oliguria/Anuria   [ ]Murray  MUSCULOSKELETAL:   [ ]Normal   [x ]Weakness  [ ]Bed/Wheelchair bound [x ]Edema  NEUROLOGIC:   [x ]No focal deficits  [ ] Cognitive impairment  [ ] Dysphagia [ ]Dysarthria [ ] Paresis [ ]Other   SKIN:   [ ]Normal   [ ]Pressure ulcer(s)  [ ]Rash    CRITICAL CARE:  [ ] Shock Present  [ ]Septic [ ]Cardiogenic [ ]Neurologic [ ]Hypovolemic  [ ]  Vasopressors [ ]  Inotropes   [ ] Respiratory failure present  [ ] Acute  [ ] Chronic [ ] Hypoxic  [ ] Hypercarbic [ ] Other  [ ] Other organ failure     LABS:                        9.8  25.22 )-----------( 452      (18 Sep 2018 07:43 )             33.2  09-18    132  |  99  |  14  ----------------------------<  116<H>  4.2   |  25  |  0.94    Ca    8.7      18 Sep 2018 07:07  Phos  2.4     09-18  Mg     1.8     09-18    TPro  5.8<L>  /  Alb  2.4<L>  /  TBili  0.3  /  DBili  x   /  AST  13  /  ALT  5<L>  /  AlkPhos  161<H>  09-18        RADIOLOGY & ADDITIONAL STUDIES: reviewed    PROTEIN CALORIE MALNUTRITION PRESENT: [ ] Yes [ ] No  [ ] PPSV2 < or = to 30% [x ] significant weight loss  [x ] poor nutritional intake [ ] catabolic state [ ] anasarca     Albumin, Serum: 2.6 g/dL (09-20-18 @ 07:07)  Artificial Nutrition [ ]     REFERRALS:   [ ]Chaplaincy  [ ] Hospice  [ ]Child Life  [ ]Social Work  [ ]Case management [ ]Holistic Therapy   Goals of Care Discussion Document: Goals of Care Conversation - Personal Advance Directive [MELO Wilson] (09-19-18 @ 16:46)

## 2018-09-18 NOTE — DIETITIAN INITIAL EVALUATION ADULT. - OTHER INFO
patient seen for length of stay. case discussed with MD, currently low Fiber diet for colitis. Patient tolerating diet well, stating she "just started eating again yesterday". Ensure at bedside consumes 1x/day. Patient likes soft cooked vegetables, has a lactose intolerance but drinks lactaid milk for Ca. Patient lives alone as stated but has her significant other and her 2 children within 7 minutes away, sharing mealtime and grocery shopping. Diet education provided, PO intake encouraged.

## 2018-09-18 NOTE — PROGRESS NOTE ADULT - PROBLEM SELECTOR PLAN 4
Hypokalemia & hypomagnesemia likely 2/2 diarrhea  - K: 3.2>3.4>4.2, M.6>1.5>1.8>1.6, Na: 133>134>132>131>132, Cl: 95>96>95>94>99  - Replete lytes as needed  - Protein: (L) 4.7>4.4>4.8, albumin: (L) 2.5>2.3>2.4, alk phos: (H) 173> 161>174; ALT low at <5

## 2018-09-18 NOTE — CONSULT NOTE ADULT - PROBLEM SELECTOR RECOMMENDATION 4
Discussed options for care based on whether chemo to be continued or not, specifically hospice services.  Patient did not think she needed the service but was willing to accept an information packet from our hospice team for the future.  States her daughter is her HCP.

## 2018-09-19 LAB
ALBUMIN SERPL ELPH-MCNC: 2.4 G/DL — LOW (ref 3.3–5)
ALP SERPL-CCNC: 150 U/L — HIGH (ref 40–120)
ALT FLD-CCNC: <5 U/L — LOW (ref 10–45)
ANION GAP SERPL CALC-SCNC: 12 MMOL/L — SIGNIFICANT CHANGE UP (ref 5–17)
AST SERPL-CCNC: 17 U/L — SIGNIFICANT CHANGE UP (ref 10–40)
BASOPHILS # BLD AUTO: 0 K/UL — SIGNIFICANT CHANGE UP (ref 0–0.2)
BASOPHILS # BLD AUTO: 0 K/UL — SIGNIFICANT CHANGE UP (ref 0–0.2)
BASOPHILS NFR BLD AUTO: 0 % — SIGNIFICANT CHANGE UP (ref 0–2)
BASOPHILS NFR BLD AUTO: 0 % — SIGNIFICANT CHANGE UP (ref 0–2)
BILIRUB SERPL-MCNC: 0.2 MG/DL — SIGNIFICANT CHANGE UP (ref 0.2–1.2)
BUN SERPL-MCNC: 12 MG/DL — SIGNIFICANT CHANGE UP (ref 7–23)
CALCIUM SERPL-MCNC: 8.1 MG/DL — LOW (ref 8.4–10.5)
CHLORIDE SERPL-SCNC: 98 MMOL/L — SIGNIFICANT CHANGE UP (ref 96–108)
CO2 SERPL-SCNC: 26 MMOL/L — SIGNIFICANT CHANGE UP (ref 22–31)
CREAT SERPL-MCNC: 0.83 MG/DL — SIGNIFICANT CHANGE UP (ref 0.5–1.3)
EOSINOPHIL # BLD AUTO: 0 K/UL — SIGNIFICANT CHANGE UP (ref 0–0.5)
EOSINOPHIL # BLD AUTO: 0.9 K/UL — HIGH (ref 0–0.5)
EOSINOPHIL NFR BLD AUTO: 0 % — SIGNIFICANT CHANGE UP (ref 0–6)
EOSINOPHIL NFR BLD AUTO: 3.4 % — SIGNIFICANT CHANGE UP (ref 0–6)
GLUCOSE SERPL-MCNC: 107 MG/DL — HIGH (ref 70–99)
HCT VFR BLD CALC: 33.5 % — LOW (ref 34.5–45)
HGB BLD-MCNC: 10 G/DL — LOW (ref 11.5–15.5)
LYMPHOCYTES # BLD AUTO: 2.26 K/UL — SIGNIFICANT CHANGE UP (ref 1–3.3)
LYMPHOCYTES # BLD AUTO: 2.27 K/UL — SIGNIFICANT CHANGE UP (ref 1–3.3)
LYMPHOCYTES # BLD AUTO: 8.5 % — LOW (ref 13–44)
LYMPHOCYTES # BLD AUTO: 9 % — LOW (ref 13–44)
MAGNESIUM SERPL-MCNC: 1.5 MG/DL — LOW (ref 1.6–2.6)
MCHC RBC-ENTMCNC: 24.5 PG — LOW (ref 27–34)
MCHC RBC-ENTMCNC: 29.9 GM/DL — LOW (ref 32–36)
MCV RBC AUTO: 82.1 FL — SIGNIFICANT CHANGE UP (ref 80–100)
MONOCYTES # BLD AUTO: 0.76 K/UL — SIGNIFICANT CHANGE UP (ref 0–0.9)
MONOCYTES # BLD AUTO: 0.9 K/UL — SIGNIFICANT CHANGE UP (ref 0–0.9)
MONOCYTES NFR BLD AUTO: 3 % — SIGNIFICANT CHANGE UP (ref 2–14)
MONOCYTES NFR BLD AUTO: 3.4 % — SIGNIFICANT CHANGE UP (ref 2–14)
NEUTROPHILS # BLD AUTO: 18.67 K/UL — HIGH (ref 1.8–7.4)
NEUTROPHILS # BLD AUTO: 19.42 K/UL — HIGH (ref 1.8–7.4)
NEUTROPHILS NFR BLD AUTO: 66 % — SIGNIFICANT CHANGE UP (ref 43–77)
NEUTROPHILS NFR BLD AUTO: 70.2 % — SIGNIFICANT CHANGE UP (ref 43–77)
PHOSPHATE SERPL-MCNC: 2.6 MG/DL — SIGNIFICANT CHANGE UP (ref 2.5–4.5)
PLATELET # BLD AUTO: 481 K/UL — HIGH (ref 150–400)
POTASSIUM SERPL-MCNC: 3.7 MMOL/L — SIGNIFICANT CHANGE UP (ref 3.5–5.3)
POTASSIUM SERPL-SCNC: 3.7 MMOL/L — SIGNIFICANT CHANGE UP (ref 3.5–5.3)
PROT SERPL-MCNC: 4.6 G/DL — LOW (ref 6–8.3)
RBC # BLD: 4.08 M/UL — SIGNIFICANT CHANGE UP (ref 3.8–5.2)
RBC # FLD: 19.4 % — HIGH (ref 10.3–14.5)
SODIUM SERPL-SCNC: 136 MMOL/L — SIGNIFICANT CHANGE UP (ref 135–145)
WBC # BLD: 26.6 K/UL — HIGH (ref 3.8–10.5)
WBC # FLD AUTO: 26.6 K/UL — HIGH (ref 3.8–10.5)

## 2018-09-19 PROCEDURE — 99233 SBSQ HOSP IP/OBS HIGH 50: CPT | Mod: GC

## 2018-09-19 PROCEDURE — 93010 ELECTROCARDIOGRAM REPORT: CPT

## 2018-09-19 RX ORDER — TRAMADOL HYDROCHLORIDE 50 MG/1
25 TABLET ORAL EVERY 6 HOURS
Qty: 0 | Refills: 0 | Status: DISCONTINUED | OUTPATIENT
Start: 2018-09-19 | End: 2018-09-20

## 2018-09-19 RX ORDER — MAGNESIUM SULFATE 500 MG/ML
1 VIAL (ML) INJECTION ONCE
Qty: 0 | Refills: 0 | Status: COMPLETED | OUTPATIENT
Start: 2018-09-19 | End: 2018-09-19

## 2018-09-19 RX ORDER — OXYCODONE AND ACETAMINOPHEN 5; 325 MG/1; MG/1
1 TABLET ORAL EVERY 4 HOURS
Qty: 0 | Refills: 0 | Status: DISCONTINUED | OUTPATIENT
Start: 2018-09-19 | End: 2018-09-19

## 2018-09-19 RX ADMIN — SERTRALINE 25 MILLIGRAM(S): 25 TABLET, FILM COATED ORAL at 06:15

## 2018-09-19 RX ADMIN — BALSALAZIDE DISODIUM 2250 MILLIGRAM(S): 750 CAPSULE ORAL at 06:15

## 2018-09-19 RX ADMIN — Medication 125 MILLIGRAM(S): at 11:21

## 2018-09-19 RX ADMIN — ENOXAPARIN SODIUM 40 MILLIGRAM(S): 100 INJECTION SUBCUTANEOUS at 06:14

## 2018-09-19 RX ADMIN — Medication 100 GRAM(S): at 08:37

## 2018-09-19 RX ADMIN — TRAMADOL HYDROCHLORIDE 25 MILLIGRAM(S): 50 TABLET ORAL at 20:30

## 2018-09-19 RX ADMIN — BALSALAZIDE DISODIUM 2250 MILLIGRAM(S): 750 CAPSULE ORAL at 13:29

## 2018-09-19 RX ADMIN — Medication 125 MILLIGRAM(S): at 17:16

## 2018-09-19 RX ADMIN — ATORVASTATIN CALCIUM 10 MILLIGRAM(S): 80 TABLET, FILM COATED ORAL at 21:51

## 2018-09-19 RX ADMIN — Medication 12.5 MILLIGRAM(S): at 06:14

## 2018-09-19 RX ADMIN — Medication 125 MILLIGRAM(S): at 06:14

## 2018-09-19 RX ADMIN — Medication 650 MILLIGRAM(S): at 08:00

## 2018-09-19 RX ADMIN — TRAMADOL HYDROCHLORIDE 25 MILLIGRAM(S): 50 TABLET ORAL at 20:02

## 2018-09-19 RX ADMIN — Medication 650 MILLIGRAM(S): at 06:18

## 2018-09-19 RX ADMIN — BALSALAZIDE DISODIUM 2250 MILLIGRAM(S): 750 CAPSULE ORAL at 21:51

## 2018-09-19 RX ADMIN — SERTRALINE 25 MILLIGRAM(S): 25 TABLET, FILM COATED ORAL at 17:17

## 2018-09-19 RX ADMIN — Medication 12.5 MILLIGRAM(S): at 17:16

## 2018-09-19 RX ADMIN — PANTOPRAZOLE SODIUM 40 MILLIGRAM(S): 20 TABLET, DELAYED RELEASE ORAL at 06:15

## 2018-09-19 NOTE — PROGRESS NOTE ADULT - SUBJECTIVE AND OBJECTIVE BOX
Red Team Surgery Consult - Daily Progress Note    SUBJECTIVE:  Doing well.   No overnight events.   Continued symptomatic improvement.     OBJECTIVE:     ** VITAL SIGNS / I&O's **  Vital Signs Last 24 Hrs  T(C): 36.7 (19 Sep 2018 02:00), Max: 36.7 (18 Sep 2018 16:05)  T(F): 98 (19 Sep 2018 02:00), Max: 98.1 (18 Sep 2018 22:00)  HR: 62 (19 Sep 2018 02:00) (61 - 70)  BP: 142/81 (19 Sep 2018 02:00) (141/83 - 149/83)  BP(mean): --  RR: 17 (19 Sep 2018 02:00) (16 - 18)  SpO2: 98% (19 Sep 2018 02:00) (96% - 98%)    09-17-18 @ 07:01  -  09-18-18 @ 07:00  --------------------------------------------------------  IN: 1480 mL / OUT: 0 mL / NET: 1480 mL    09-18-18 @ 07:01  -  09-19-18 @ 06:01  --------------------------------------------------------  IN: 480 mL / OUT: 150 mL / NET: 330 mL      MEDICATIONS  (STANDING):  atorvastatin 10 milliGRAM(s) Oral at bedtime  balsalazide 2250 milliGRAM(s) Oral three times a day  enoxaparin Injectable 40 milliGRAM(s) SubCutaneous every 24 hours  metoprolol tartrate 12.5 milliGRAM(s) Oral every 12 hours  pantoprazole    Tablet 40 milliGRAM(s) Oral before breakfast  sertraline 25 milliGRAM(s) Oral two times a day  vancomycin    Solution 125 milliGRAM(s) Oral every 6 hours    MEDICATIONS  (PRN):  acetaminophen    Suspension .. 650 milliGRAM(s) Oral every 6 hours PRN Mild Pain (1 - 3), Moderate Pain (4 - 6)  acetaminophen   Tablet .. 650 milliGRAM(s) Oral every 6 hours PRN Temp greater or equal to 38C (100.4F)  acetaminophen   Tablet .. 650 milliGRAM(s) Oral every 6 hours PRN Moderate Pain (4 - 6)    ** PHYSICAL EXAM **    -- CONSTITUTIONAL: AOx3. NAD.   -- CARDIOVASCULAR: normotensive, regular rate   -- RESPIRATORY: breathing comfortably   -- ABDOMEN: soft, nontender, mildly distended    -- EXTREMITIES: warm, well perfused     ** LABS **  CBC (09-18 @ 09:04)                              9.8<L>                         25.22<H>  )----------------(  452<H>     --    % Neutrophils, --    % Lymphocytes, ANC: --                                  33.2<L>                BMP (09-18 @ 07:04)             132<L>  |  99      |  14    		Ca++ --      Ca 8.7                ---------------------------------( 116<H>		Mg 1.6                4.2     |  25      |  0.94  			Ph 2.4<L>    LFTs (09-18 @ 07:04)      TPro 4.8<L> / Alb 2.4<L> / TBili 0.3 / DBili -- / AST 13 / ALT <5<L> / AlkPhos 174<H>          -> .Blood Blood-Peripheral Culture (09-12 @ 23:57)     NG    NG    No growth at 5 days.    -> .Stool Feces Culture (09-12 @ 17:14)     NG    NG    No enteric pathogens isolated.  (Stool culture examined for Salmonella,  Shigella, Campylobacter, Aeromonas, Plesiomonas,  Vibrio, E.coli O157 and Yersinia)    -> .Urine Clean Catch (Midstream) Culture (09-11 @ 22:23)     NG    NG    Culture grew 3 or more types of organisms which indicate  collection contamination; consider recollection only if clinically  indicated.    -> .Blood Blood-Peripheral Culture (09-11 @ 19:29)     NG    NG    No growth at 5 days.    **RADS**  < from: Xray Abdomen 1 View PORTABLE -Urgent (09.16.18 @ 13:09) >  Nephro ureteral stent and surgical clips unchanged in appearance.  Nonobstructive bowel gas pattern.  There is no evidence of intraperitoneal free air.  There is no evidence of organomegaly or pathologic calcification.  The visualized osseous structures demonstrate no acute pathology.    IMPRESSION:   Nonobstructive bowel gas pattern without evidence of free air.    < end of copied text >

## 2018-09-19 NOTE — GOALS OF CARE CONVERSATION - PERSONAL ADVANCE DIRECTIVE - CONVERSATION DETAILS
On 9/18, pt was referred for informational visit from [facility] by Dr Gisela Neely of [facility].  Per Dr Neely, pt is not currently interested in hospice care but requested information for future reference.  Left [facility] brochure with [facility] contact information with pt.  SHWETHA Lopez made aware of the above today.

## 2018-09-19 NOTE — PROGRESS NOTE ADULT - SUBJECTIVE AND OBJECTIVE BOX
Patient is a 77yo female who presents with a chief complaint of Not feeling well (19 Sep 2018 05:59)     INTERVAL HPI/OVERNIGHT EVENTS:          REVIEW OF SYSTEMS:  Constitutional: Denies fever, weight loss, fatigue  Resp: Denies SOB, cough, hemoptysis  CV: Denies chest pain, palpitations, dizziness  GI: Denies abdominal pain, N/V/D/C, melena, hematochezia  : Denies dysuria, increased frequency, hematuria  Neuro: Denies HA, weakness, numbness  Skin: Denies rashes, lesions  Lymph Nodes: Denies enlarged glands  MSK: Denies joint pain, swelling    VITAL SIGNS:  T(C): 36.4 (09-19-18 @ 06:12), Max: 36.7 (09-18-18 @ 16:05)  HR: 64 (09-19-18 @ 06:12) (61 - 70)  BP: 153/89 (09-19-18 @ 06:12) (141/83 - 153/89)  RR: 16 (09-19-18 @ 06:12) (16 - 18)  SpO2: 96% (09-19-18 @ 06:12) (96% - 98%)    PHYSICAL EXAM:  General: NAD, well-groomed, well-developed  Eyes: Conjunctiva and sclera clear  ENMT: Moist mucous membranes  Neck: Supple  Chest: Clear to auscultation bilaterally; no rales, rhonchi, or wheezing  Heart: Regular rate and rhythm; no murmurs, rubs, or gallops  Abd: +BS, soft, nontender, nondistended  Nervous System: AAOX3  Psych: Appropriate affect and mood  Ext:  no LE edema    LABS:                        9.8    25.22 )-----------( 452      ( 18 Sep 2018 09:04 )             33.2       Ca    8.7        18 Sep 2018 07:04          MEDICATIONS  (STANDING):  atorvastatin 10 milliGRAM(s) Oral at bedtime  balsalazide 2250 milliGRAM(s) Oral three times a day  enoxaparin Injectable 40 milliGRAM(s) SubCutaneous every 24 hours  metoprolol tartrate 12.5 milliGRAM(s) Oral every 12 hours  pantoprazole    Tablet 40 milliGRAM(s) Oral before breakfast  sertraline 25 milliGRAM(s) Oral two times a day  vancomycin    Solution 125 milliGRAM(s) Oral every 6 hours    MEDICATIONS  (PRN):  acetaminophen    Suspension .. 650 milliGRAM(s) Oral every 6 hours PRN Mild Pain (1 - 3), Moderate Pain (4 - 6)  acetaminophen   Tablet .. 650 milliGRAM(s) Oral every 6 hours PRN Temp greater or equal to 38C (100.4F)  acetaminophen   Tablet .. 650 milliGRAM(s) Oral every 6 hours PRN Moderate Pain (4 - 6) Patient is a 79yo female who presents with a chief complaint of Not feeling well (19 Sep 2018 05:59)     INTERVAL HPI/OVERNIGHT EVENTS:  Patient has had improvement in GI symptoms, noting 3 BM yesterday that were loosely-formed but much less watery and profuse than prior days. She continues to have constant achy pain in her right upper thigh and her RLQ that radiates to her R flank and back. She rates the pain as a 7 in severity today. She is wearing a compression stocking on the RLE today and the upper thigh shows some swelling. She denies any CP, SOB, n/v/d/c, dizziness, or headache.  REVIEW OF SYSTEMS:  Constitutional: Denies fever, weight loss, fatigue  Resp: Denies SOB, cough, hemoptysis  CV: Denies chest pain, palpitations, dizziness  GI: + RLQ abdominal pain, denies N/V/D/C, melena, hematochezia  : Denies dysuria, increased frequency, hematuria  Neuro: Denies HA, weakness, numbness  Skin: Denies rashes, lesions  Lymph Nodes: Denies enlarged glands  MSK: Denies joint pain, + chronic RLE swelling    VITAL SIGNS:  T(C): 36.4 (09-19-18 @ 06:12), Max: 36.7 (09-18-18 @ 16:05)  HR: 64 (09-19-18 @ 06:12) (61 - 70)  BP: 153/89 (09-19-18 @ 06:12) (141/83 - 153/89)  RR: 16 (09-19-18 @ 06:12) (16 - 18)  SpO2: 96% (09-19-18 @ 06:12) (96% - 98%)    PHYSICAL EXAM:  General: NAD, well-groomed, well-developed  HEENT: NCAT, conjunctiva and sclera clear, moist mucous membranes  Neck: Supple  Chest: Clear to auscultation bilaterally; no rales, rhonchi, or wheezing  Heart: Regular rate and rhythm; no murmurs, rubs, or gallops  Abd: +BS, soft, nontender, nondistended  Nervous System: AAOX3  Psych: Appropriate affect and mood  Ext:  RLE edema in thigh    LABS:                        9.8    25.22 )-----------( 452      ( 18 Sep 2018 09:04 )             33.2     09-19    136  |  98  |  12  ----------------------------<  107<H>  3.7   |  26  |  0.83    Ca    8.1<L>      19 Sep 2018 07:13  Phos  2.6     09-19  Mg     1.5     09-19    TPro  4.6<L>  /  Alb  2.4<L>  /  TBili  0.2  /  DBili  x   /  AST  17  /  ALT  <5<L>  /  AlkPhos  150<H>  09-19      MEDICATIONS  (STANDING):  atorvastatin 10 milliGRAM(s) Oral at bedtime  balsalazide 2250 milliGRAM(s) Oral three times a day  enoxaparin Injectable 40 milliGRAM(s) SubCutaneous every 24 hours  metoprolol tartrate 12.5 milliGRAM(s) Oral every 12 hours  pantoprazole    Tablet 40 milliGRAM(s) Oral before breakfast  sertraline 25 milliGRAM(s) Oral two times a day  vancomycin    Solution 125 milliGRAM(s) Oral every 6 hours    MEDICATIONS  (PRN):  acetaminophen    Suspension .. 650 milliGRAM(s) Oral every 6 hours PRN Mild Pain (1 - 3), Moderate Pain (4 - 6)  acetaminophen   Tablet .. 650 milliGRAM(s) Oral every 6 hours PRN Temp greater or equal to 38C (100.4F)  acetaminophen   Tablet .. 650 milliGRAM(s) Oral every 6 hours PRN Moderate Pain (4 - 6)

## 2018-09-19 NOTE — PROGRESS NOTE ADULT - PROBLEM SELECTOR PLAN 1
Severe. Pt p/w several days watery diarrhea. Has Hx radiation colitis, UC. No diarrhea since 9/16, partially-formed BM yesterday.  -C. diff stool toxin positive 9/12  -monitor abdominal exams.  -repeat ABX on 9/16: Nonobstructive bowel gas pattern without evidence of free air.  -Colorectal surgery consulted - no current surgical intervention given status of endometrial cancer  -ID recs appreciated  -discontinued flagyl  -continue vancomycin 125mg PO Q6  -IV tylenol PRN pain  -consider additional medication for pain control (allergy to morphine - reports vomiting in past).  - As per GI, c/w soft, mechanical diet with Ensure supplement once daily.  - Leukocytosis: 18.7>35.4>36.2>30.63>36.1>29.9>25.22

## 2018-09-19 NOTE — PROGRESS NOTE ADULT - ASSESSMENT
78Fw/ hx of endometrial cancer w/ mets currently receiving weekly chemotherapy (which she has been on/off for years) with paclitaxel which she seems to be tolerating, ulcerative colitis, HTN, presenting with watery diarrhea in the setting of recent UTI s/p antibiotic therapy (2 months ago) and abdominal pain. Stool came back positive for C. difficile stool toxin (9/12) and has treated with vancomycin PO and IV flagyl. Pt reports symptomatic improvement, with last episode of diarrhea 9/16 and yesterday having several partially-formed BM. Leukocytosis has continued but is down trending, and her vancomycin has been decreased back to 125mg PO Q6, and flagyl has been discontinued. Pt has had worsening pain from a combination of RLE lymphedema, colitis, cancer that requires further pain control.

## 2018-09-19 NOTE — PROGRESS NOTE ADULT - PROBLEM SELECTOR PLAN 3
Endometrial cancer w/ mets to abdominal wall & iliac nodes. Gets taxol through port weekly. Has had abdominal wall resection in past. Recently had 2nd opinion with Dr. Altaf Arteaga at [facility] as pt states she did not like the bad news she was given at [facility] [place]. Jenni recommended to consider hormone therapy. Pt states she would want her HCP to be her daughter Vidya Tripp who lives in [place]. Discussed possible life sustaining measures given her current condition and pt states she would want all possible resuscitative efforts.  - Full Code  - Palliative consulted

## 2018-09-19 NOTE — PROGRESS NOTE ADULT - SUBJECTIVE AND OBJECTIVE BOX
SUBJECTIVE:  States that she is "not doing well" today - complains about pain in the right leg (due to lymphedema), but not sure if her colitis pain or her cancer pain is contributing as well.  Took liquid Tylenol a short while ago, "has not helped yet".  Not written for any narcotic pain medication.    _____________________________________________________  OBJECTIVE:    T(C): 36.4 (09-19-18 @ 06:12), Max: 36.7 (09-18-18 @ 16:05)  HR: 64 (09-19-18 @ 06:12)  BP: 153/89 (09-19-18 @ 06:12)  RR: 16 (09-19-18 @ 06:12)  SpO2: 96% (09-19-18 @ 06:12)  Wt(kg): --    General = Chronically ill-appearing, tired, no acute distress  Abdomen = Thin, non-distended, soft, RLQ tender, no palpable mass or organomegaly  LE:  RLE swelling is appreciated; RLE is in a compression stocking.  _____________________________________________________  LABS:                        9.8    25.22 )-----------( 452      ( 18 Sep 2018 09:04 )             33.2     09-18    132<L>  |  99  |  14  ----------------------------<  116<H>  4.2   |  25  |  0.94    Ca    8.7      18 Sep 2018 07:04  Phos  2.4     09-18  Mg     1.6     09-18    TPro  4.8<L>  /  Alb  2.4<L>  /  TBili  0.3  /  DBili  x   /  AST  13  /  ALT  <5<L>  /  AlkPhos  174<H>  09-18    LIVER FUNCTIONS - ( 18 Sep 2018 07:04 )  Alb: 2.4 g/dL / Pro: 4.8 g/dL / ALK PHOS: 174 U/L / ALT: <5 U/L / AST: 13 U/L / GGT: x           _____________________________________________________  ACTIVE MEDS:  MEDICATIONS  (STANDING):  atorvastatin 10 milliGRAM(s) Oral at bedtime  balsalazide 2250 milliGRAM(s) Oral three times a day  enoxaparin Injectable 40 milliGRAM(s) SubCutaneous every 24 hours  metoprolol tartrate 12.5 milliGRAM(s) Oral every 12 hours  pantoprazole    Tablet 40 milliGRAM(s) Oral before breakfast  sertraline 25 milliGRAM(s) Oral two times a day  vancomycin    Solution 125 milliGRAM(s) Oral every 6 hours    MEDICATIONS  (PRN):  acetaminophen    Suspension .. 650 milliGRAM(s) Oral every 6 hours PRN Mild Pain (1 - 3), Moderate Pain (4 - 6)  acetaminophen   Tablet .. 650 milliGRAM(s) Oral every 6 hours PRN Temp greater or equal to 38C (100.4F)  acetaminophen   Tablet .. 650 milliGRAM(s) Oral every 6 hours PRN Moderate Pain (4 - 6)    _____________________________________________________  ASSESSMENT/PLAN:  78yFemale  - C. difficile colitis, symptoms are improving.  On vancomycin.  Tolerating soft mechanical diet with Ensure supplement one can daily.  Continue these.  - Pain from a combination of RLE lymphedema, colitis, cancer.  I spoke to Magy, the sub-I.  She will discuss pain management with the medical team.    Kathleen Parkinson M.D.  (o) [phone]

## 2018-09-19 NOTE — PROGRESS NOTE ADULT - PROBLEM SELECTOR PLAN 4
Hypokalemia & hypomagnesemia likely 2/2 diarrhea  - K: 3.2>3.4>4.2>3.7, M.6>1.5>1.8>1.6>1.5, Na: 133>134>132>131>132>136, Cl: 95>96>95>94>99>98  - Replete lytes as needed  - Protein: (L) 4.7>4.4>4.8>4.6, albumin: (L) 2.5>2.3>2.4>2.4, alk phos: (H) 173> 161>174>150; ALT low at <5, AST 17

## 2018-09-20 DIAGNOSIS — R52 PAIN, UNSPECIFIED: ICD-10-CM

## 2018-09-20 DIAGNOSIS — Z51.5 ENCOUNTER FOR PALLIATIVE CARE: ICD-10-CM

## 2018-09-20 DIAGNOSIS — C54.1 MALIGNANT NEOPLASM OF ENDOMETRIUM: ICD-10-CM

## 2018-09-20 LAB
ALBUMIN SERPL ELPH-MCNC: 2.6 G/DL — LOW (ref 3.3–5)
ALP SERPL-CCNC: 131 U/L — HIGH (ref 40–120)
ALT FLD-CCNC: 5 U/L — LOW (ref 10–45)
ANION GAP SERPL CALC-SCNC: 11 MMOL/L — SIGNIFICANT CHANGE UP (ref 5–17)
AST SERPL-CCNC: 15 U/L — SIGNIFICANT CHANGE UP (ref 10–40)
BASOPHILS # BLD AUTO: 0 K/UL — SIGNIFICANT CHANGE UP (ref 0–0.2)
BASOPHILS NFR BLD AUTO: 0 % — SIGNIFICANT CHANGE UP (ref 0–2)
BILIRUB SERPL-MCNC: 0.2 MG/DL — SIGNIFICANT CHANGE UP (ref 0.2–1.2)
BUN SERPL-MCNC: 12 MG/DL — SIGNIFICANT CHANGE UP (ref 7–23)
CALCIUM SERPL-MCNC: 8.5 MG/DL — SIGNIFICANT CHANGE UP (ref 8.4–10.5)
CHLORIDE SERPL-SCNC: 98 MMOL/L — SIGNIFICANT CHANGE UP (ref 96–108)
CO2 SERPL-SCNC: 26 MMOL/L — SIGNIFICANT CHANGE UP (ref 22–31)
CREAT SERPL-MCNC: 0.81 MG/DL — SIGNIFICANT CHANGE UP (ref 0.5–1.3)
EOSINOPHIL # BLD AUTO: 1.41 K/UL — HIGH (ref 0–0.5)
EOSINOPHIL NFR BLD AUTO: 4.1 % — SIGNIFICANT CHANGE UP (ref 0–6)
GLUCOSE SERPL-MCNC: 131 MG/DL — HIGH (ref 70–99)
HCT VFR BLD CALC: 36.9 % — SIGNIFICANT CHANGE UP (ref 34.5–45)
HGB BLD-MCNC: 11.3 G/DL — LOW (ref 11.5–15.5)
LYMPHOCYTES # BLD AUTO: 3.41 K/UL — HIGH (ref 1–3.3)
LYMPHOCYTES # BLD AUTO: 9.9 % — LOW (ref 13–44)
MAGNESIUM SERPL-MCNC: 1.8 MG/DL — SIGNIFICANT CHANGE UP (ref 1.6–2.6)
MCHC RBC-ENTMCNC: 25.5 PG — LOW (ref 27–34)
MCHC RBC-ENTMCNC: 30.6 GM/DL — LOW (ref 32–36)
MCV RBC AUTO: 83.1 FL — SIGNIFICANT CHANGE UP (ref 80–100)
MONOCYTES # BLD AUTO: 1.14 K/UL — HIGH (ref 0–0.9)
MONOCYTES NFR BLD AUTO: 3.3 % — SIGNIFICANT CHANGE UP (ref 2–14)
NEUTROPHILS # BLD AUTO: 25.38 K/UL — HIGH (ref 1.8–7.4)
NEUTROPHILS NFR BLD AUTO: 71.1 % — SIGNIFICANT CHANGE UP (ref 43–77)
PHOSPHATE SERPL-MCNC: 2.9 MG/DL — SIGNIFICANT CHANGE UP (ref 2.5–4.5)
PLATELET # BLD AUTO: 595 K/UL — HIGH (ref 150–400)
POTASSIUM SERPL-MCNC: 3.7 MMOL/L — SIGNIFICANT CHANGE UP (ref 3.5–5.3)
POTASSIUM SERPL-SCNC: 3.7 MMOL/L — SIGNIFICANT CHANGE UP (ref 3.5–5.3)
PROT SERPL-MCNC: 5 G/DL — LOW (ref 6–8.3)
RBC # BLD: 4.44 M/UL — SIGNIFICANT CHANGE UP (ref 3.8–5.2)
RBC # FLD: 19.9 % — HIGH (ref 10.3–14.5)
SODIUM SERPL-SCNC: 135 MMOL/L — SIGNIFICANT CHANGE UP (ref 135–145)
WBC # BLD: 34.49 K/UL — HIGH (ref 3.8–10.5)
WBC # FLD AUTO: 34.49 K/UL — HIGH (ref 3.8–10.5)

## 2018-09-20 PROCEDURE — 99232 SBSQ HOSP IP/OBS MODERATE 35: CPT

## 2018-09-20 PROCEDURE — 99233 SBSQ HOSP IP/OBS HIGH 50: CPT | Mod: GC

## 2018-09-20 RX ORDER — TRAMADOL HYDROCHLORIDE 50 MG/1
50 TABLET ORAL EVERY 12 HOURS
Qty: 0 | Refills: 0 | Status: DISCONTINUED | OUTPATIENT
Start: 2018-09-20 | End: 2018-09-23

## 2018-09-20 RX ORDER — TRAMADOL HYDROCHLORIDE 50 MG/1
50 TABLET ORAL EVERY 6 HOURS
Qty: 0 | Refills: 0 | Status: DISCONTINUED | OUTPATIENT
Start: 2018-09-20 | End: 2018-09-20

## 2018-09-20 RX ORDER — VANCOMYCIN HCL 1 G
500 VIAL (EA) INTRAVENOUS EVERY 6 HOURS
Qty: 0 | Refills: 0 | Status: DISCONTINUED | OUTPATIENT
Start: 2018-09-20 | End: 2018-09-26

## 2018-09-20 RX ORDER — TRAMADOL HYDROCHLORIDE 50 MG/1
25 TABLET ORAL EVERY 6 HOURS
Qty: 0 | Refills: 0 | Status: DISCONTINUED | OUTPATIENT
Start: 2018-09-20 | End: 2018-09-23

## 2018-09-20 RX ORDER — TRAMADOL HYDROCHLORIDE 50 MG/1
25 TABLET ORAL ONCE
Qty: 0 | Refills: 0 | Status: DISCONTINUED | OUTPATIENT
Start: 2018-09-20 | End: 2018-09-20

## 2018-09-20 RX ADMIN — SERTRALINE 25 MILLIGRAM(S): 25 TABLET, FILM COATED ORAL at 18:16

## 2018-09-20 RX ADMIN — BALSALAZIDE DISODIUM 2250 MILLIGRAM(S): 750 CAPSULE ORAL at 22:30

## 2018-09-20 RX ADMIN — BALSALAZIDE DISODIUM 2250 MILLIGRAM(S): 750 CAPSULE ORAL at 13:53

## 2018-09-20 RX ADMIN — PANTOPRAZOLE SODIUM 40 MILLIGRAM(S): 20 TABLET, DELAYED RELEASE ORAL at 06:47

## 2018-09-20 RX ADMIN — ENOXAPARIN SODIUM 40 MILLIGRAM(S): 100 INJECTION SUBCUTANEOUS at 05:27

## 2018-09-20 RX ADMIN — Medication 500 MILLIGRAM(S): at 12:41

## 2018-09-20 RX ADMIN — TRAMADOL HYDROCHLORIDE 50 MILLIGRAM(S): 50 TABLET ORAL at 18:16

## 2018-09-20 RX ADMIN — Medication 500 MILLIGRAM(S): at 23:18

## 2018-09-20 RX ADMIN — Medication 125 MILLIGRAM(S): at 01:00

## 2018-09-20 RX ADMIN — Medication 500 MILLIGRAM(S): at 18:54

## 2018-09-20 RX ADMIN — BALSALAZIDE DISODIUM 2250 MILLIGRAM(S): 750 CAPSULE ORAL at 05:26

## 2018-09-20 RX ADMIN — TRAMADOL HYDROCHLORIDE 25 MILLIGRAM(S): 50 TABLET ORAL at 01:00

## 2018-09-20 RX ADMIN — Medication 125 MILLIGRAM(S): at 05:28

## 2018-09-20 RX ADMIN — Medication 12.5 MILLIGRAM(S): at 05:26

## 2018-09-20 RX ADMIN — Medication 12.5 MILLIGRAM(S): at 18:16

## 2018-09-20 RX ADMIN — ATORVASTATIN CALCIUM 10 MILLIGRAM(S): 80 TABLET, FILM COATED ORAL at 22:32

## 2018-09-20 RX ADMIN — SERTRALINE 25 MILLIGRAM(S): 25 TABLET, FILM COATED ORAL at 05:27

## 2018-09-20 NOTE — PROGRESS NOTE ADULT - PROBLEM SELECTOR PLAN 3
Patient started on Tramadol today by medical team.  Patient is willing to speak with holistic RN.  Will place consult.

## 2018-09-20 NOTE — PROGRESS NOTE ADULT - SUBJECTIVE AND OBJECTIVE BOX
Follow Up:  C diff    Interval History/ROS: feeling better - despite 7 BMs last night and 2 this AM with increased po intake, she notes stools are beginning to become formed, abd pain improved, subjectively better    Allergies  erythromycin (Unknown)  macrolide antibiotics (Unknown)  morphine (Diarrhea)  sulfa drugs (Unknown)    ANTIMICROBIALS:  vancomycin    Solution 500 every 6 hours      OTHER MEDS:  MEDICATIONS  (STANDING):  acetaminophen    Suspension .. 650 every 6 hours PRN  acetaminophen   Tablet .. 650 every 6 hours PRN  acetaminophen   Tablet .. 650 every 6 hours PRN  atorvastatin 10 at bedtime  balsalazide 2250 three times a day  enoxaparin Injectable 40 every 24 hours  metoprolol tartrate 12.5 every 12 hours  pantoprazole    Tablet 40 before breakfast  sertraline 25 two times a day  traMADol 50 every 12 hours  traMADol 25 every 6 hours PRN      Vital Signs Last 24 Hrs  T(C): 36.8 (20 Sep 2018 14:19), Max: 36.8 (20 Sep 2018 14:19)  T(F): 98.3 (20 Sep 2018 14:19), Max: 98.3 (20 Sep 2018 14:19)  HR: 69 (20 Sep 2018 14:19) (62 - 76)  BP: 130/78 (20 Sep 2018 14:19) (130/78 - 159/82)  BP(mean): --  RR: 18 (20 Sep 2018 04:32) (16 - 18)  SpO2: 98% (19 Sep 2018 17:15) (98% - 98%)    PHYSICAL EXAM:  General: WN/WD NAD, Non-toxic  Neurology: A&Ox3, nonfocal  Respiratory: Clear to auscultation bilaterally  CV: RRR, S1S2, no murmurs, rubs or gallops  Abdominal: Soft, Non-tender, non-distended,   Extremities: No edema  Line Sites: Clear  Skin: No rash                          11.3   34.49 )-----------( 595      ( 20 Sep 2018 07:43 )             36.9   WBC Count: 34.49 (09-20 @ 07:43)  WBC Count: 26.60 (09-19 @ 08:01)  WBC Count: 25.22 (09-18 @ 09:04)  WBC Count: 29.9 (09-17 @ 08:18)  WBC Count: 36.1 (09-16 @ 11:16)    09-20    135  |  98  |  12  ----------------------------<  131<H>  3.7   |  26  |  0.81    Ca    8.5      20 Sep 2018 07:07  Phos  2.9     09-20  Mg     1.8     09-20    TPro  5.0<L>  /  Alb  2.6<L>  /  TBili  0.2  /  DBili  x   /  AST  15  /  ALT  5<L>  /  AlkPhos  131<H>  09-20          MICROBIOLOGY:  .Blood Blood-Peripheral  09-12-18   No growth at 5 days.  --  --      .Stool Feces  09-12-18   No enteric pathogens isolated.  (Stool culture examined for Salmonella,  Shigella, Campylobacter, Aeromonas, Plesiomonas,  Vibrio, E.coli O157 and Yersinia)  --  --      .Urine Clean Catch (Midstream)  09-11-18   Culture grew 3 or more types of organisms which indicate  collection contamination; consider recollection only if clinically  indicated.  --  --      .Blood Blood-Peripheral  09-11-18   No growth at 5 days.  --  --      .Urine Clean Catch (Midstream)  08-27-18   <10,000 CFU/ml Normal Urogenital long present  --  --      .Blood Blood-Peripheral  08-26-18   No growth at 5 days.  --  --    RADIOLOGY:  < from: Xray Abdomen 1 View PORTABLE -Urgent (09.16.18 @ 13:09) >  Nonobstructive bowel gas pattern without evidence of free air.    < end of copied text >      Richard Amato MD; Division of Infectious Disease; Pager: [phone]; nights and weekends: [phone]

## 2018-09-20 NOTE — PROGRESS NOTE ADULT - PROBLEM SELECTOR PLAN 3
Endometrial cancer w/ mets to abdominal wall & iliac nodes. Gets taxol through port weekly. Has had abdominal wall resection in past. Recently had 2nd opinion with Dr. Altaf Arteaga at [facility] as pt states she did not like the bad news she was given at [facility]. Jenni recommended to consider hormone therapy. Pt states she would want her HCP to be her daughter Vidya Tripp who lives in [place]. Discussed possible life sustaining measures given her current condition and pt states she would want all possible resuscitative efforts.  - Full Code  - Palliative consulted Endometrial cancer w/ mets to abdominal wall & iliac nodes. Gets taxol through port weekly. Has had abdominal wall resection in past. Recently had 2nd opinion with Dr. lAtaf Arteaga at [facility] as pt states she did not like the bad news she was given at [facility] [place]. Jenni recommended to consider hormone therapy. Pt states she would want her HCP to be her daughter Vidya Tripp who lives in [place]. Discussed possible life sustaining measures given her current condition and pt states she would want all possible resuscitative efforts.  - Full Code  - Palliative consulted, goals of care discussed - patient not interested in hospice at this time Endometrial cancer w/ mets to abdominal wall & iliac nodes. Gets taxol through port weekly. Has had abdominal wall resection in past. Recently had 2nd opinion with Dr. Altaf Arteaga at [facility] as pt states she did not like the bad news she was given at [facility]. Jenni recommended to consider hormone therapy. Pt states she would want her HCP to be her daughter Vidya Tripp who lives in [place]. Discussed possible life sustaining measures given her current condition and pt states she would want all possible resuscitative efforts.  - Full Code  - Palliative consulted, goals of care discussed - patient not interested in hospice at this time  - as per daughter, patient still sees Dr. Hylton at [facility] for trx

## 2018-09-20 NOTE — PROGRESS NOTE ADULT - PROBLEM SELECTOR PLAN 4
Occupational Therapy  Re-evaluation/ Discharge Summary    Elpidio Haskins   MRN: [id number]   Admitting Diagnosis: Osteomyelitis of right tibia    OT Date of Treatment: 07/07/17   OT Start Time: 1259  OT Stop Time: 1308  OT Total Time (min): 9 min    Billable Minutes:  Re-eval 9 minutes    Diagnosis: Osteomyelitis of right tibia   Decreased RLE function, orthopedic precautions, gait instability    Past Medical History:   Diagnosis Date    Heroin abuse       Past Surgical History:   Procedure Laterality Date    TONSILLECTOMY         Referring physician: MELLISSA Ugarte  Date referred to OT: 7/7/2017    General Precautions: Standard, fall  Orthopedic Precautions: RLE non weight bearing (per pt and his mother, the plastic surgeon and resident told them he could partial WB)  Braces:  (R PRAFO)    Do you have any cultural, spiritual, Adventism conflicts, given your current situation?: none     Patient History:  Living Environment  Lives With: parent(s)  Living Arrangements: house  Home Accessibility: stairs to enter home  Home Layout: Able to live on 1st floor  Number of Stairs to Enter Home: 1  Stair Railings at Home: none  Transportation Available: family or friend will provide, car  Living Environment Comment: Pt lives with parents in 2 story house with TH to enter.  He will be able to reside on the first floor.  Pt was (I) PTA and using no DME.  In past few weeks, pt's wound has been worse causing pain with walking.  He used crutches for 2 days prior to presenting to hospital.  Parents to assist at D/C.   Equipment Currently Used at Home: cane, straight, crutches    Prior level of function:   Bed Mobility/Transfers: independent  Grooming: independent  Bathing: independent  Upper Body Dressing: independent  Lower Body Dressing: independent  Toileting: independent  Home Management Skills: independent  Homemaking Responsibilities: Yes (shared)  Mode of Transportation: Car, Family, Friends     Dominant hand:  "right    Subjective:  Communicated with RN prior to session.  "I'm doing fine now, I've been able to do everything. My mom just helps me wash off."  Chief Complaint: none  Patient/Family stated goals: go home    Pain/Comfort  Pain Rating 1: 0/10  Pain Rating Post-Intervention 1: 0/10    Objective:  Patient found with: wound vac, pt found supine in bed and agreeable to OT session    Cognitive Exam:  Oriented to: Person, Place, Time and Situation  Follows Commands/attention: Follows multistep  commands  Communication: clear/fluent  Memory:  No Deficits noted  Safety awareness/insight to disability: intact  Coping skills/emotional control: Appropriate to situation    Visual/perceptual:  Intact    Physical Exam:  Postural examination/scapula alignment: No postural abnormalities identified  Skin integrity: intact; wound vac intact on R LE  Edema: Mild RLE    Sensation:   Intact    Upper Extremity Range of Motion:  Right Upper Extremity: WNL  Left Upper Extremity: WNL    Upper Extremity Strength:  Right Upper Extremity: WNL  Left Upper Extremity: WNL   Strength: Good    Fine motor coordination:   Intact    Gross motor coordination: WFL    Functional Mobility:  Bed Mobility:  Rolling/Turning to Left: Independent  Rolling/Turning Right: Independent  Scooting/Bridging: Independent  Supine to Sit: Independent  Sit to Supine: Independent    Transfers:  Sit <> Stand Assistance: Modified Independent  Sit <> Stand Assistive Device: No Assistive Device  Toilet Transfer Technique: Stand Pivot  Toilet Transfer Assistance: Modified Independent, Supervision (pt requires A with wound vac, but will have a smaller portable one at d/c)  Toilet Transfer Assistive Device: Axillary Crutches    Functional Ambulation: Mod (I) with axillary crutches    Activities of Daily Living:  Feeding Level of Assistance: Independent, Set-up Assistance  UE Dressing Level of Assistance: Independent (gown as robe)  NICHOLAS Dressing Level of Assistance: " "Independent (L sock, underwear)  Toileting Where Assessed: Toilet  Toileting Level of Assistance: Independent    Balance:   Static Sit: NORMAL: No deviations seen in posture held statically  Dynamic Sit: GOOD+: Maintains balance through MAXIMAL excursions of active trunk motion  Static Stand: GOOD: Takes MODERATE challenges from all directions if WB precautions remain NWB, if PWB, Good+  Dynamic stand: GOOD+: Independent gait (with or without assistive device)    Therapeutic Activities and Exercises:  Pt ed re OT role and POC. Pt performed bed mobility, transfers, ambulation, dressing and toileting tasks with (I) to Mod (I).    AM-PAC 6 CLICK ADL  How much help from another person does this patient currently need?  1 = Unable, Total/Dependent Assistance  2 = A lot, Maximum/Moderate Assistance  3 = A little, Minimum/Contact Guard/Supervision  4 = None, Modified [place]/Independent    Putting on and taking off regular lower body clothing? : 4  Bathing (including washing, rinsing, drying)?: 3  Toileting, which includes using toilet, bedpan, or urinal? : 4  Putting on and taking off regular upper body clothing?: 4  Taking care of personal grooming such as brushing teeth?: 4  Eating meals?: 4  Total Score: 23    AM-PAC Raw Score CMS "G-Code Modifier Level of Impairment Assistance   6 % Total / Unable   7 - 9 CM 80 - 100% Maximal Assist   10 - 14 CL 60 - 80% Moderate Assist   15 - 19 CK 40 - 60% Moderate Assist   20 - 22 CJ 20 - 40% Minimal Assist   23 CI 1-20% SBA / CGA   24 CH 0% Independent/ Mod I       Patient left HOB elevated with all lines intact, call button in reach and mother present    Assessment:  Elpidio Haskins is a 34 y.o. male with a medical diagnosis of Osteomyelitis of right tibia and presents with the impairments listed below. Pt demo improved mobility and (I) in necessary self care tasks. Pt has good support at home for assistance as needed. Pt no longer has acute OT needs, and would " benefit from OPPT to rehabilitate RLE when medically appropriate. OT to d/c.    Rehab identified problem list/impairments: Rehab identified problem list/impairments: gait instability, decreased lower extremity function, orthopedic precautions    Rehab potential is excellent.    Activity tolerance: Good    Discharge recommendations: Discharge Facility/Level Of Care Needs: outpatient PT     Barriers to discharge: Barriers to Discharge: None    Equipment recommendations: shower chair     GOALS:    Occupational Therapy Goals     Not on file          Multidisciplinary Problems (Resolved)        Problem: Occupational Therapy Goal    Goal Priority Disciplines Outcome Interventions   Occupational Therapy Goal   (Resolved)     OT, PT/OT Outcome(s) achieved    Description:  Goals to be met by: 7/11/17     Patient will increase functional independence with ADLs by performing:    UE Dressing with Supervision.  LE Dressing with Stand-by Assistance.  Grooming while standing at sink with Supervision.  Toileting from toilet with Supervision for hygiene and clothing management.   Toilet transfer to toilet with Supervision.                      PLAN:  Pt has exceeded goals and is (I) to Mod (I), except for requiring minimal to setup A with bathing 2/2 wound vac. OT to d/c.    AKASH France  7/7/2017  Pager: [phone]     Met with daughter Vidya and pt at bedside.  Discussed hospice as an option for care if chemotherapy on hold indefinitely or permanently.  Can revoke if further treatment becomes available.  Daughter understood that no further treatment could be an option and asked about prognosis.  Gave weeks to months, maybe.  Did not appreciate years.  Patient lives in a house with bedroom, bath  by stairs to kitchen and daughter concerned about her living alone.  Did not get to address code status at this meeting.

## 2018-09-20 NOTE — PROGRESS NOTE ADULT - ASSESSMENT
78Fw/ hx of endometrial cancer w/ mets currently receiving weekly chemotherapy (which she has been on/off for years) with paclitaxel which she seems to be tolerating, ulcerative colitis, HTN, presenting with watery diarrhea in the setting of recent UTI s/p antibiotic therapy (2 months ago) and abdominal pain. Stool came back positive for C. difficile stool toxin (9/12) and has treated with vancomycin PO and IV flagyl. Pt reports symptomatic improvement, with last episode of diarrhea 9/16 and yesterday having several partially-formed BM. Leukocytosis has continued but is down trending, and her vancomycin has been decreased back to 125mg PO Q6, and flagyl has been discontinued. Pt has had worsening pain from a combination of RLE lymphedema, colitis, cancer that requires further pain control. 78Fw/ hx of endometrial cancer w/ mets currently receiving weekly chemotherapy (which she has been on/off for years) with paclitaxel which she seems to be tolerating, ulcerative colitis, HTN, presenting with watery diarrhea in the setting of recent UTI s/p antibiotic therapy (2 months ago) and abdominal pain. Stool came back positive for C. difficile stool toxin (9/12) and has treated with vancomycin PO and IV flagyl. Pt reports symptomatic improvement, with last episode of diarrhea 9/16 and yesterday having several partially-formed BM. Leukocytosis has continued but is down trending, and her vancomycin has been decreased back to 125mg PO Q6, and flagyl has been discontinued. Pt has had worsening pain from a combination of RLE lymphedema, colitis, cancer that requires further pain control. Electrolyte imbalances are improving. 78Fw/ hx of endometrial cancer w/ mets currently receiving weekly chemotherapy (which she has been on/off for years) with paclitaxel which she seems to be tolerating, ulcerative colitis, HTN, presenting with watery diarrhea in the setting of recent UTI s/p antibiotic therapy (2 months ago) and abdominal pain. Stool came back positive for C. difficile stool toxin (9/12) and has treated with vancomycin PO and IV flagyl. Pt reports symptomatic improvement, with last episode of diarrhea 9/16 and yesterday having several partially-formed BM. Leukocytosis has continued and went up again today to 34.49, for which we will go back up on the vancomycin to 500mg PO q6. Pt has had worsening pain from a combination of RLE lymphedema, colitis, cancer that requires further pain control. Electrolyte imbalances are improving.

## 2018-09-20 NOTE — PROGRESS NOTE ADULT - SUBJECTIVE AND OBJECTIVE BOX
SUBJECTIVE:  Feels better. Had approximately 5 BMs over past 24 hours, including semi-formed BM this AM. Decreased abdominal pain, increased appetite. Main complaint is her RLE pain.  _____________________________________________________  OBJECTIVE:    T(C): 36.7 (09-20-18 @ 04:32), Max: 36.7 (09-20-18 @ 04:32)  HR: 76 (09-20-18 @ 04:32)  BP: 159/82 (09-20-18 @ 04:32)  RR: 18 (09-20-18 @ 04:32)  SpO2: 98% (09-19-18 @ 17:15)  Wt(kg): --    General = Comfortable-appearing, no acute distress  Abdomen = Mildly distended, hypoactive bowel sounds, soft, slight tenderness in epigastrium and RLQ  _____________________________________________________  LABS:                        10.0   26.60 )-----------( 481      ( 19 Sep 2018 08:01 )             33.5     09-20    135  |  98  |  12  ----------------------------<  131<H>  3.7   |  26  |  0.81    Ca    8.5      20 Sep 2018 07:07  Phos  2.9     09-20  Mg     1.8     09-20    TPro  5.0<L>  /  Alb  2.6<L>  /  TBili  0.2  /  DBili  x   /  AST  15  /  ALT  5<L>  /  AlkPhos  131<H>  09-20    LIVER FUNCTIONS - ( 20 Sep 2018 07:07 )  Alb: 2.6 g/dL / Pro: 5.0 g/dL / ALK PHOS: 131 U/L / ALT: 5 U/L / AST: 15 U/L / GGT: x             _____________________________________________________  ACTIVE MEDS:  MEDICATIONS  (STANDING):  atorvastatin 10 milliGRAM(s) Oral at bedtime  balsalazide 2250 milliGRAM(s) Oral three times a day  enoxaparin Injectable 40 milliGRAM(s) SubCutaneous every 24 hours  metoprolol tartrate 12.5 milliGRAM(s) Oral every 12 hours  pantoprazole    Tablet 40 milliGRAM(s) Oral before breakfast  sertraline 25 milliGRAM(s) Oral two times a day  vancomycin    Solution 125 milliGRAM(s) Oral every 6 hours    MEDICATIONS  (PRN):  acetaminophen    Suspension .. 650 milliGRAM(s) Oral every 6 hours PRN Mild Pain (1 - 3), Moderate Pain (4 - 6)  acetaminophen   Tablet .. 650 milliGRAM(s) Oral every 6 hours PRN Temp greater or equal to 38C (100.4F)  acetaminophen   Tablet .. 650 milliGRAM(s) Oral every 6 hours PRN Moderate Pain (4 - 6)  traMADol 25 milliGRAM(s) Oral every 6 hours PRN moderate to severe pain    _____________________________________________________  ASSESSMENT:  OlgayFemale continues her gradual improvement after being admitted with severe C. difficile colitis superimposed on background of radiation colitis, chemotherapy, and a remote history of ulcerative colitis.    PLAN:  Continue oral vanco and present diet  Continue pantoprazole and balsalazide    Karl Michel M.D.  (O) [phone]  (C) [phone]

## 2018-09-20 NOTE — PROGRESS NOTE ADULT - SUBJECTIVE AND OBJECTIVE BOX
Patient is a 77yo female who presents with a chief complaint of Not feeling well (20 Sep 2018 06:42)  INTERVAL HPI/OVERNIGHT EVENTS:            REVIEW OF SYSTEMS:  Constitutional: Denies fever, weight loss, fatigue  Resp: Denies SOB, cough, hemoptysis  CV: Denies chest pain, palpitations, dizziness  GI: Denies abdominal pain, N/V/D/C, melena, hematochezia  : Denies dysuria, increased frequency, hematuria  Neuro: Denies HA, weakness, numbness  Skin: Denies rashes, lesions  Lymph Nodes: Denies enlarged glands  MSK: Denies joint pain, swelling    VITAL SIGNS:  T(C): 36.7 (09-20-18 @ 04:32), Max: 36.7 (09-20-18 @ 04:32)  HR: 76 (09-20-18 @ 04:32) (62 - 76)  BP: 159/82 (09-20-18 @ 04:32) (133/77 - 159/82)  RR: 18 (09-20-18 @ 04:32) (16 - 18)  SpO2: 98% (09-19-18 @ 17:15) (98% - 98%)    PHYSICAL EXAM:  General: NAD, well-groomed, well-developed  Eyes: Conjunctiva and sclera clear  ENMT: Moist mucous membranes  Neck: Supple  Chest: Clear to auscultation bilaterally; no rales, rhonchi, or wheezing  Heart: Regular rate and rhythm; no murmurs, rubs, or gallops  Abd: +BS, soft, nontender, nondistended  Nervous System: AAOX3  Psych: Appropriate affect and mood  Ext:  no LE edema    LABS:                        10.0   26.60 )-----------( 481      ( 19 Sep 2018 08:01 )             33.5       Ca    8.1        19 Sep 2018 07:13    MEDICATIONS  (STANDING):  atorvastatin 10 milliGRAM(s) Oral at bedtime  balsalazide 2250 milliGRAM(s) Oral three times a day  enoxaparin Injectable 40 milliGRAM(s) SubCutaneous every 24 hours  metoprolol tartrate 12.5 milliGRAM(s) Oral every 12 hours  pantoprazole    Tablet 40 milliGRAM(s) Oral before breakfast  sertraline 25 milliGRAM(s) Oral two times a day  vancomycin    Solution 125 milliGRAM(s) Oral every 6 hours    MEDICATIONS  (PRN):  acetaminophen    Suspension .. 650 milliGRAM(s) Oral every 6 hours PRN Mild Pain (1 - 3), Moderate Pain (4 - 6)  acetaminophen   Tablet .. 650 milliGRAM(s) Oral every 6 hours PRN Temp greater or equal to 38C (100.4F)  acetaminophen   Tablet .. 650 milliGRAM(s) Oral every 6 hours PRN Moderate Pain (4 - 6)  traMADol 25 milliGRAM(s) Oral every 6 hours PRN moderate to severe pain Patient is a 79yo female who presents with a chief complaint of Not feeling well (20 Sep 2018 06:42)  INTERVAL HPI/OVERNIGHT EVENTS:  Yesterday/overnight, she had 5 BM - mostly diarrhea according to patient. As per nurse, 3 were diarrhea, 2 were loosely-formed. This morning patient endorses 1 Bm that was better-formed. She has not been sleeping well due to the pain in her R thigh, which she rates as a 3-4 in severity this morning. She does like to walk and says that brings some relief to the thigh pain. As per daughter yesterday, patient was experiencing some signs of delirium, including some confusion/disorientation and VH/AH. Patient denied feeling confused at all. Patient denied CP, SOB, dizziness, or headache.    REVIEW OF SYSTEMS:  Constitutional: Denies fever, weight loss, fatigue  Resp: Denies SOB, cough, hemoptysis  CV: Denies chest pain, palpitations, dizziness  GI: Denies abdominal pain, N/V/D/C, melena, hematochezia  : Denies dysuria, increased frequency, hematuria  Neuro: Denies HA, weakness, numbness  Skin: Denies rashes, lesions  Lymph Nodes: Denies enlarged glands  MSK: Denies joint pain, + swelling R thigh    VITAL SIGNS:  T(C): 36.7 (09-20-18 @ 04:32), Max: 36.7 (09-20-18 @ 04:32)  HR: 76 (09-20-18 @ 04:32) (62 - 76)  BP: 159/82 (09-20-18 @ 04:32) (133/77 - 159/82)  RR: 18 (09-20-18 @ 04:32) (16 - 18)  SpO2: 98% (09-19-18 @ 17:15) (98% - 98%)    PHYSICAL EXAM:  General: NAD  HEENT: Conjunctiva and sclera clear, moist mucous membranes  Neck: Supple  Chest: Clear to auscultation bilaterally; no rales, rhonchi, or wheezing  Heart: Regular rate and rhythm; no murmurs, rubs, or gallops  Abd: +BS, soft, nondistended, slight tenderness in RLQ to palpation  Nervous System: AAOX2, unaware of date  Psych: Appropriate affect and mood  Ext:  R thigh swelling    LABS:                        10.0   26.60 )-----------( 481      ( 19 Sep 2018 08:01 )             33.5   09-20    135  |  98  |  12  ----------------------------<  131<H>  3.7   |  26  |  0.81    Ca    8.5      20 Sep 2018 07:07  Phos  2.9     09-20  Mg     1.8     09-20    TPro  5.0<L>  /  Alb  2.6<L>  /  TBili  0.2  /  DBili  x   /  AST  15  /  ALT  5<L>  /  AlkPhos  131<H>  09-20    MEDICATIONS  (STANDING):  atorvastatin 10 milliGRAM(s) Oral at bedtime  balsalazide 2250 milliGRAM(s) Oral three times a day  enoxaparin Injectable 40 milliGRAM(s) SubCutaneous every 24 hours  metoprolol tartrate 12.5 milliGRAM(s) Oral every 12 hours  pantoprazole    Tablet 40 milliGRAM(s) Oral before breakfast  sertraline 25 milliGRAM(s) Oral two times a day  vancomycin    Solution 125 milliGRAM(s) Oral every 6 hours    MEDICATIONS  (PRN):  acetaminophen    Suspension .. 650 milliGRAM(s) Oral every 6 hours PRN Mild Pain (1 - 3), Moderate Pain (4 - 6)  acetaminophen   Tablet .. 650 milliGRAM(s) Oral every 6 hours PRN Temp greater or equal to 38C (100.4F)  acetaminophen   Tablet .. 650 milliGRAM(s) Oral every 6 hours PRN Moderate Pain (4 - 6)  traMADol 25 milliGRAM(s) Oral every 6 hours PRN moderate to severe pain Patient is a 79yo female who presents with a chief complaint of Not feeling well (20 Sep 2018 06:42)  INTERVAL HPI/OVERNIGHT EVENTS:  Yesterday/overnight, she had 5 BM - mostly diarrhea according to patient. As per nurse, 3 were diarrhea, 2 were loosely-formed. This morning patient endorses 1 Bm that was better-formed. She has not been sleeping well due to the pain in her R thigh, which she rates as a 3-4 in severity this morning. She does like to walk and says that brings some relief to the thigh pain. As per daughter yesterday, patient was experiencing some signs of delirium, including some confusion/disorientation and VH/AH. Patient denied feeling confused at all. Patient denied CP, SOB, dizziness, or headache.    REVIEW OF SYSTEMS:  Constitutional: Denies fever, weight loss, fatigue  Resp: Denies SOB, cough, hemoptysis  CV: Denies chest pain, palpitations, dizziness  GI: Denies abdominal pain, N/V/D/C, melena, hematochezia  : Denies dysuria, increased frequency, hematuria  Neuro: Denies HA, weakness, numbness  Skin: Denies rashes, lesions  Lymph Nodes: Denies enlarged glands  MSK: Denies joint pain, + swelling R thigh    VITAL SIGNS:  T(C): 36.7 (09-20-18 @ 04:32), Max: 36.7 (09-20-18 @ 04:32)  HR: 76 (09-20-18 @ 04:32) (62 - 76)  BP: 159/82 (09-20-18 @ 04:32) (133/77 - 159/82)  RR: 18 (09-20-18 @ 04:32) (16 - 18)  SpO2: 98% (09-19-18 @ 17:15) (98% - 98%)    PHYSICAL EXAM:  General: NAD  HEENT: Conjunctiva and sclera clear, moist mucous membranes  Neck: Supple  Chest: Clear to auscultation bilaterally; no rales, rhonchi, or wheezing  Heart: Regular rate and rhythm; no murmurs, rubs, or gallops  Abd: +BS, soft, nondistended, slight tenderness in RLQ to palpation  Nervous System: AAOX2, unaware of date  Psych: Appropriate affect and mood  Ext:  R thigh swelling    LABS:                          11.3   34.49 )-----------( 595      ( 20 Sep 2018 07:43 )             36.9       135  |  98  |  12  ----------------------------<  131<H>  3.7   |  26  |  0.81    Ca    8.5      20 Sep 2018 07:07  Phos  2.9     09-20  Mg     1.8     09-20    TPro  5.0<L>  /  Alb  2.6<L>  /  TBili  0.2  /  DBili  x   /  AST  15  /  ALT  5<L>  /  AlkPhos  131<H>  09-20    MEDICATIONS  (STANDING):  atorvastatin 10 milliGRAM(s) Oral at bedtime  balsalazide 2250 milliGRAM(s) Oral three times a day  enoxaparin Injectable 40 milliGRAM(s) SubCutaneous every 24 hours  metoprolol tartrate 12.5 milliGRAM(s) Oral every 12 hours  pantoprazole    Tablet 40 milliGRAM(s) Oral before breakfast  sertraline 25 milliGRAM(s) Oral two times a day  vancomycin    Solution 125 milliGRAM(s) Oral every 6 hours    MEDICATIONS  (PRN):  acetaminophen    Suspension .. 650 milliGRAM(s) Oral every 6 hours PRN Mild Pain (1 - 3), Moderate Pain (4 - 6)  acetaminophen   Tablet .. 650 milliGRAM(s) Oral every 6 hours PRN Temp greater or equal to 38C (100.4F)  acetaminophen   Tablet .. 650 milliGRAM(s) Oral every 6 hours PRN Moderate Pain (4 - 6)  traMADol 25 milliGRAM(s) Oral every 6 hours PRN moderate to severe pain

## 2018-09-20 NOTE — PROGRESS NOTE ADULT - ASSESSMENT
78F  hx of endometrial cancer w/ mets receiving weekly chemotherapy, being treated for c-diff.  UC by hx.  Has lymphedema of lower extremity which causes discomfort.

## 2018-09-20 NOTE — PROGRESS NOTE ADULT - ASSESSMENT
Ms Belcher is 78F with mutliple comordities including  endometrial cancer w/ mets currently receiving weekly chemotherapy with paclitaxel which she seems to be tolerating, Ulcerative colitis?, chronic diarrhea on immodium, HTN, previous antibiotic exposure.  She has severe Cdiff on day #8 therapy  clinically improved  leukocytosis increased despite relatively good clinical appearance    We now have material available for in-patient Fecal transplant (as of just this afternoon)  by NasoGastric Tube for severe Cdiff - This option was discussed with patient and her daughter and can be considered if she has worsening clinical status.    Antibiotics:  Cipro 9/11  Flagyl 9/11; 9/13 -->  Vanco po 125 q 6h 9/12 -->14; 500 q 6 h 9/14-->    Suggest  Continue po Vanco   advance diet as tolerated  rend WBC

## 2018-09-20 NOTE — PROGRESS NOTE ADULT - PROBLEM SELECTOR PLAN 4
Hypokalemia & hypomagnesemia likely 2/2 diarrhea  - K: 3.2>3.4>4.2>3.7, M.6>1.5>1.8>1.6>1.5, Na: 133>134>132>131>132>136, Cl: 95>96>95>94>99>98  - Replete lytes as needed  - Protein: (L) 4.7>4.4>4.8>4.6, albumin: (L) 2.5>2.3>2.4>2.4, alk phos: (H) 173> 161>174>150; ALT low at <5, AST 17 Hypokalemia & hypomagnesemia likely 2/2 diarrhea  - K: 3.2>3.4>4.2>3.7, M.6>1.5>1.8>1.6>1.5>1.8, Na: 133>134>132>131>132>136>135, Cl: 95>96>95>94>99>98  - Replete lytes as needed  - Protein: (L) 4.7>4.4>4.8>4.6>5, albumin: (L) 2.5>2.3>2.4>2.4>2.6, alk phos: (H) 173> 161>174>150>131; ALT low at <5, AST 17>15

## 2018-09-20 NOTE — PROGRESS NOTE ADULT - SUBJECTIVE AND OBJECTIVE BOX
SUBJECTIVE AND OBJECTIVE:  INTERVAL HPI/OVERNIGHT EVENTS: Patient comfortable, daughter (HCP) at bedside.      DNR on chart:   Allergies    erythromycin (Unknown)  macrolide antibiotics (Unknown)  morphine (Diarrhea)  sulfa drugs (Unknown)    Intolerances    MEDICATIONS  (STANDING):  atorvastatin 10 milliGRAM(s) Oral at bedtime  balsalazide 2250 milliGRAM(s) Oral three times a day  enoxaparin Injectable 40 milliGRAM(s) SubCutaneous every 24 hours  metoprolol tartrate 12.5 milliGRAM(s) Oral every 12 hours  pantoprazole    Tablet 40 milliGRAM(s) Oral before breakfast  sertraline 25 milliGRAM(s) Oral two times a day  traMADol 50 milliGRAM(s) Oral every 12 hours  vancomycin    Solution 500 milliGRAM(s) Oral every 6 hours    MEDICATIONS  (PRN):  acetaminophen    Suspension .. 650 milliGRAM(s) Oral every 6 hours PRN Mild Pain (1 - 3), Moderate Pain (4 - 6)  acetaminophen   Tablet .. 650 milliGRAM(s) Oral every 6 hours PRN Temp greater or equal to 38C (100.4F)  acetaminophen   Tablet .. 650 milliGRAM(s) Oral every 6 hours PRN Moderate Pain (4 - 6)  traMADol 25 milliGRAM(s) Oral every 6 hours PRN breakthru pain      ITEMS UNCHECKED ARE NOT PRESENT    PRESENT SYMPTOMS: [ ]Unable to obtain due to poor mentation   Source if other than patient:  [ ]Family   [ ]Team     Pain (Impact on QOL):  Moderate impact  Location -     Left thigh secondary to lymphedema    Minimal acceptable level (0-10 scale): 3-4                   Aggravating factors - position, movement  Quality - aching,burning  Radiation - none  Severity (0-10 scale) -  varies  Timing -      Dyspnea:                           [ ]Mild [ ]Moderate [ ]Severe  Anxiety:                             [ ]Mild [ ]Moderate [ ]Severe  Fatigue:                             [ ]Mild [x ]Moderate [ ]Severe  Nausea:                             [ ]Mild [ ]Moderate [ ]Severe  Loss of appetite:              [x ]Mild [ ]Moderate [ ]Severe  Constipation:                    [ ]Mild [ ]Moderate [ ]Severe    PAIN AD Score:	  http://geriatrictoolkit.[place].[email or web address]/cog/painad.pdf (Ctrl + left click to view)    Other Symptoms:  [ ]All other review of systems negative     Karnofsky Performance Score/Palliative Performance Status Version 2:   40-50      %    http://palliative.info/resource_material/[email or web address].pdf  PHYSICAL EXAM:  Vital Signs Last 24 Hrs  T(C): 36.8 (20 Sep 2018 14:19), Max: 36.8 (20 Sep 2018 14:19)  T(F): 98.3 (20 Sep 2018 14:19), Max: 98.3 (20 Sep 2018 14:19)  HR: 86 (20 Sep 2018 17:32) (62 - 86)  BP: 143/84 (20 Sep 2018 17:32) (130/78 - 159/82)  BP(mean): --  RR: 18 (20 Sep 2018 04:32) (16 - 18)  SpO2: -- I&O's Summary    19 Sep 2018 07:01  -  20 Sep 2018 07:00  --------------------------------------------------------  IN: 100 mL / OUT: 203 mL / NET: -103 mL     GENERAL:  [ x]Alert  [x ]Oriented x   [ ]Lethargic  [ ]Cachexia  [ ]Unarousable  [x ]Verbal  [ ]Non-Verbal  Behavioral:   [ ] Anxiety  [ ] Delirium [ ] Agitation [ ] Other  HEENT:  [x ]Normal   [ ]Dry mouth   [ ]ET Tube/Trach  [ ]Oral lesions  PULMONARY:   [x ]Clear [ ]Tachypnea  [ ]Audible excessive secretions   [ ]Rhonchi        [ ]Right [ ]Left [ ]Bilateral  [ ]Crackles        [ ]Right [ ]Left [ ]Bilateral  [ ]Wheezing     [ ]Right [ ]Left [ ]Bilateral  CARDIOVASCULAR:    [x ]Regular [ ]Irregular [ ]Tachy  [ ]Mat [ ]Murmur [ ]Other +S1 +S2   GASTROINTESTINAL:  [x ]Soft  [ ]Distended   [x ]+BS  [ x]Non tender [ ]Tender  [ ]PEG [ ]OGT/ NGT   Last BM: See RN documentation in the EMR which I have reviewed.    GENITOURINARY:  [ ]Normal [x ] Incontinent   [ ]Oliguria/Anuria   [ ]Murray  MUSCULOSKELETAL:   [ ]Normal   [ x]Weakness  [ ]Bed/Wheelchair bound [ ]Edema  NEUROLOGIC:   [x ]No focal deficits  [ ] Cognitive impairment  [ ] Dysphagia [ ]Dysarthria [ ] Paresis [ ]Other   SKIN:   [x ]Normal   [ ]Pressure ulcer(s)  [ ]Rash    CRITICAL CARE:  [ ] Shock Present  [ ]Septic [ ]Cardiogenic [ ]Neurologic [ ]Hypovolemic  [ ]  Vasopressors [ ]  Inotropes   [ ] Respiratory failure present  [ ] Acute  [ ] Chronic [ ] Hypoxic  [ ] Hypercarbic [ ] Other  [ ] Other organ failure     LABS:                        11.3   34.49 )-----------( 595      ( 20 Sep 2018 07:43 )             36.9   09-20    135  |  98  |  12  ----------------------------<  131<H>  3.7   |  26  |  0.81    Ca    8.5      20 Sep 2018 07:07  Phos  2.9     09-20  Mg     1.8     09-20    TPro  5.0<L>  /  Alb  2.6<L>  /  TBili  0.2  /  DBili  x   /  AST  15  /  ALT  5<L>  /  AlkPhos  131<H>  09-20        RADIOLOGY & ADDITIONAL STUDIES:    Protein Calorie Malnutrition Present: [ ] yes [ ] no  [ ] PPSV2 < or = 30%  [ ] significant weight loss [x ] poor nutritional intake [ ] anasarca [x ] catabolic state Albumin, Serum: 2.6 g/dL (09-20-18 @ 07:07)  Artificial Nutrition [ ]     REFERRALS:   [ ]Chaplaincy  [ ] Hospice  [ ]Child Life  [x ]Social Work  [ ]Case management [ ]Holistic Therapy   Goals of Care Document: Goals of Care Conversation - Personal Advance Directive [MELO Wilson] (09-19-18 @ 16:46)

## 2018-09-20 NOTE — PROGRESS NOTE ADULT - SUBJECTIVE AND OBJECTIVE BOX
Red Team Surgery Consult - Daily Progress Note    SUBJECTIVE:  Doing well.   No overnight events.   Continued symptomatic improvement.     OBJECTIVE:     ** VITAL SIGNS / I&O's **  Vital Signs Last 24 Hrs  T(C): 36.7 (20 Sep 2018 04:32), Max: 36.7 (20 Sep 2018 04:32)  T(F): 98.1 (20 Sep 2018 04:32), Max: 98.1 (20 Sep 2018 04:32)  HR: 76 (20 Sep 2018 04:32) (62 - 76)  BP: 159/82 (20 Sep 2018 04:32) (133/77 - 159/82)  BP(mean): --  RR: 18 (20 Sep 2018 04:32) (16 - 18)  SpO2: 98% (19 Sep 2018 17:15) (98% - 98%)    09-18-18 @ 07:01  -  09-19-18 @ 07:00  --------------------------------------------------------  IN: 480 mL / OUT: 150 mL / NET: 330 mL    09-19-18 @ 07:01  -  09-20-18 @ 06:43  --------------------------------------------------------  IN: 100 mL / OUT: 203 mL / NET: -103 mL      MEDICATIONS  (STANDING):  atorvastatin 10 milliGRAM(s) Oral at bedtime  balsalazide 2250 milliGRAM(s) Oral three times a day  enoxaparin Injectable 40 milliGRAM(s) SubCutaneous every 24 hours  metoprolol tartrate 12.5 milliGRAM(s) Oral every 12 hours  pantoprazole    Tablet 40 milliGRAM(s) Oral before breakfast  sertraline 25 milliGRAM(s) Oral two times a day  vancomycin    Solution 125 milliGRAM(s) Oral every 6 hours    MEDICATIONS  (PRN):  acetaminophen    Suspension .. 650 milliGRAM(s) Oral every 6 hours PRN Mild Pain (1 - 3), Moderate Pain (4 - 6)  acetaminophen   Tablet .. 650 milliGRAM(s) Oral every 6 hours PRN Temp greater or equal to 38C (100.4F)  acetaminophen   Tablet .. 650 milliGRAM(s) Oral every 6 hours PRN Moderate Pain (4 - 6)  traMADol 25 milliGRAM(s) Oral every 6 hours PRN moderate to severe pain    ** PHYSICAL EXAM **    -- CONSTITUTIONAL: AOx3. NAD.   -- CARDIOVASCULAR: normotensive, regular rate   -- RESPIRATORY: breathing comfortably   -- ABDOMEN: soft, nontender, mildly distended    -- EXTREMITIES: warm, well perfused     ** LABS **  CBC (09-19 @ 08:01)                              10.0<L>                         26.60<H>  )----------------(  481<H>     70.2  % Neutrophils, 8.5<L>% Lymphocytes, ANC: 18.67<H>                              33.5<L>                BMP (09-19 @ 07:13)             136     |  98      |  12    		Ca++ --      Ca 8.1<L>             ---------------------------------( 107<H>		Mg 1.5<L>             3.7     |  26      |  0.83  			Ph 2.6       LFTs (09-19 @ 07:13)      TPro 4.6<L> / Alb 2.4<L> / TBili 0.2 / DBili -- / AST 17 / ALT <5<L> / AlkPhos 150<H>          -> .Blood Blood-Peripheral Culture (09-12 @ 23:57)     NG    NG    No growth at 5 days.    -> .Stool Feces Culture (09-12 @ 17:14)     NG    NG    No enteric pathogens isolated.  (Stool culture examined for Salmonella,  Shigella, Campylobacter, Aeromonas, Plesiomonas,  Vibrio, E.coli O157 and Yersinia)    -> .Urine Clean Catch (Midstream) Culture (09-11 @ 22:23)     NG    NG    Culture grew 3 or more types of organisms which indicate  collection contamination; consider recollection only if clinically  indicated.    -> .Blood Blood-Peripheral Culture (09-11 @ 19:29)     NG    NG    No growth at 5 days.      **RADS**  < from: Xray Abdomen 1 View PORTABLE -Urgent (09.16.18 @ 13:09) >  Nephro ureteral stent and surgical clips unchanged in appearance.  Nonobstructive bowel gas pattern.  There is no evidence of intraperitoneal free air.  There is no evidence of organomegaly or pathologic calcification.  The visualized osseous structures demonstrate no acute pathology.    IMPRESSION:   Nonobstructive bowel gas pattern without evidence of free air.    < end of copied text >

## 2018-09-20 NOTE — PROGRESS NOTE ADULT - PROBLEM SELECTOR PLAN 1
Severe. Pt p/w several days watery diarrhea. Has Hx radiation colitis, UC. No diarrhea since 9/16, partially-formed BM yesterday.  -C. diff stool toxin positive 9/12  -monitor abdominal exams.  -repeat ABX on 9/16: Nonobstructive bowel gas pattern without evidence of free air.  -Colorectal surgery consulted - no current surgical intervention given status of endometrial cancer  -ID recs appreciated  -discontinued flagyl  -continue vancomycin 125mg PO Q6  -IV tylenol PRN pain  -consider additional medication for pain control (allergy to morphine - reports vomiting in past).  - As per GI, c/w soft, mechanical diet with Ensure supplement once daily.  - Leukocytosis: 18.7>35.4>36.2>30.63>36.1>29.9>25.22 Severe. Pt p/w several days watery diarrhea. Has Hx radiation colitis, UC. 5 Bm yesterday - diarrhea, 1 BM today better-formed as per pt  -C. diff stool toxin positive 9/12  -monitor abdominal exams.  -repeat ABX on 9/16: Nonobstructive bowel gas pattern without evidence of free air.  -Colorectal surgery consulted - no current surgical intervention given status of endometrial cancer  -ID recs appreciated  -discontinued flagyl  -continue vancomycin 125mg PO Q6  -IV tylenol PRN pain, added Tramadol 25mg RPN  - As per GI, c/w soft, mechanical diet with Ensure supplement once daily.  - Leukocytosis: 18.7>35.4>36.2>30.63>36.1>29.9>25.22>26.6 Severe. Pt p/w several days watery diarrhea. Has Hx radiation colitis, UC. 5 Bm yesterday - diarrhea, 1 BM today better-formed as per pt  -C. diff stool toxin positive 9/12  -monitor abdominal exams.  -repeat ABX on 9/16: Nonobstructive bowel gas pattern without evidence of free air.  -Colorectal surgery consulted - no current surgical intervention given status of endometrial cancer  -ID recs appreciated  -discontinued flagyl  -increase back to vancomycin 500mg PO Q6  -IV tylenol PRN pain, added Tramadol 25mg RPN  -add standing Tramadol 25 Q12 for pain  - As per GI, c/w soft, mechanical diet with Ensure supplement once daily.  - Leukocytosis: 18.7>35.4>36.2>30.63>36.1>29.9>25.22>26.6

## 2018-09-21 LAB
ALBUMIN SERPL ELPH-MCNC: 2.5 G/DL — LOW (ref 3.3–5)
ALP SERPL-CCNC: 102 U/L — SIGNIFICANT CHANGE UP (ref 40–120)
ALT FLD-CCNC: <5 U/L — LOW (ref 10–45)
ANION GAP SERPL CALC-SCNC: 8 MMOL/L — SIGNIFICANT CHANGE UP (ref 5–17)
AST SERPL-CCNC: 11 U/L — SIGNIFICANT CHANGE UP (ref 10–40)
BASOPHILS # BLD AUTO: 0.18 K/UL — SIGNIFICANT CHANGE UP (ref 0–0.2)
BASOPHILS NFR BLD AUTO: 0.8 % — SIGNIFICANT CHANGE UP (ref 0–2)
BILIRUB SERPL-MCNC: 0.2 MG/DL — SIGNIFICANT CHANGE UP (ref 0.2–1.2)
BUN SERPL-MCNC: 12 MG/DL — SIGNIFICANT CHANGE UP (ref 7–23)
CALCIUM SERPL-MCNC: 8.3 MG/DL — LOW (ref 8.4–10.5)
CHLORIDE SERPL-SCNC: 98 MMOL/L — SIGNIFICANT CHANGE UP (ref 96–108)
CO2 SERPL-SCNC: 27 MMOL/L — SIGNIFICANT CHANGE UP (ref 22–31)
CREAT SERPL-MCNC: 0.78 MG/DL — SIGNIFICANT CHANGE UP (ref 0.5–1.3)
EOSINOPHIL # BLD AUTO: 0.35 K/UL — SIGNIFICANT CHANGE UP (ref 0–0.5)
EOSINOPHIL NFR BLD AUTO: 1.6 % — SIGNIFICANT CHANGE UP (ref 0–6)
GLUCOSE SERPL-MCNC: 161 MG/DL — HIGH (ref 70–99)
HCT VFR BLD CALC: 33.1 % — LOW (ref 34.5–45)
HGB BLD-MCNC: 10.4 G/DL — LOW (ref 11.5–15.5)
LYMPHOCYTES # BLD AUTO: 11.1 % — LOW (ref 13–44)
LYMPHOCYTES # BLD AUTO: 2.45 K/UL — SIGNIFICANT CHANGE UP (ref 1–3.3)
MAGNESIUM SERPL-MCNC: 1.6 MG/DL — SIGNIFICANT CHANGE UP (ref 1.6–2.6)
MCHC RBC-ENTMCNC: 25.9 PG — LOW (ref 27–34)
MCHC RBC-ENTMCNC: 31.4 GM/DL — LOW (ref 32–36)
MCV RBC AUTO: 82.3 FL — SIGNIFICANT CHANGE UP (ref 80–100)
MONOCYTES # BLD AUTO: 0.71 K/UL — SIGNIFICANT CHANGE UP (ref 0–0.9)
MONOCYTES NFR BLD AUTO: 3.2 % — SIGNIFICANT CHANGE UP (ref 2–14)
NEUTROPHILS # BLD AUTO: 16.97 K/UL — HIGH (ref 1.8–7.4)
NEUTROPHILS NFR BLD AUTO: 75.3 % — SIGNIFICANT CHANGE UP (ref 43–77)
PHOSPHATE SERPL-MCNC: 3 MG/DL — SIGNIFICANT CHANGE UP (ref 2.5–4.5)
PLATELET # BLD AUTO: 390 K/UL — SIGNIFICANT CHANGE UP (ref 150–400)
POTASSIUM SERPL-MCNC: 3.1 MMOL/L — LOW (ref 3.5–5.3)
POTASSIUM SERPL-SCNC: 3.1 MMOL/L — LOW (ref 3.5–5.3)
PROT SERPL-MCNC: 4.6 G/DL — LOW (ref 6–8.3)
RBC # BLD: 4.02 M/UL — SIGNIFICANT CHANGE UP (ref 3.8–5.2)
RBC # FLD: 19.8 % — HIGH (ref 10.3–14.5)
SODIUM SERPL-SCNC: 133 MMOL/L — LOW (ref 135–145)
WBC # BLD: 22.07 K/UL — HIGH (ref 3.8–10.5)
WBC # FLD AUTO: 22.07 K/UL — HIGH (ref 3.8–10.5)

## 2018-09-21 PROCEDURE — 99233 SBSQ HOSP IP/OBS HIGH 50: CPT | Mod: GC

## 2018-09-21 PROCEDURE — 99232 SBSQ HOSP IP/OBS MODERATE 35: CPT

## 2018-09-21 RX ORDER — POTASSIUM CHLORIDE 20 MEQ
40 PACKET (EA) ORAL EVERY 4 HOURS
Qty: 0 | Refills: 0 | Status: COMPLETED | OUTPATIENT
Start: 2018-09-21 | End: 2018-09-21

## 2018-09-21 RX ADMIN — Medication 40 MILLIEQUIVALENT(S): at 14:59

## 2018-09-21 RX ADMIN — BALSALAZIDE DISODIUM 2250 MILLIGRAM(S): 750 CAPSULE ORAL at 22:11

## 2018-09-21 RX ADMIN — Medication 40 MILLIEQUIVALENT(S): at 11:17

## 2018-09-21 RX ADMIN — Medication 500 MILLIGRAM(S): at 05:43

## 2018-09-21 RX ADMIN — BALSALAZIDE DISODIUM 2250 MILLIGRAM(S): 750 CAPSULE ORAL at 15:00

## 2018-09-21 RX ADMIN — Medication 500 MILLIGRAM(S): at 17:33

## 2018-09-21 RX ADMIN — Medication 12.5 MILLIGRAM(S): at 17:33

## 2018-09-21 RX ADMIN — PANTOPRAZOLE SODIUM 40 MILLIGRAM(S): 20 TABLET, DELAYED RELEASE ORAL at 09:24

## 2018-09-21 RX ADMIN — BALSALAZIDE DISODIUM 2250 MILLIGRAM(S): 750 CAPSULE ORAL at 05:43

## 2018-09-21 RX ADMIN — Medication 12.5 MILLIGRAM(S): at 05:49

## 2018-09-21 RX ADMIN — SERTRALINE 25 MILLIGRAM(S): 25 TABLET, FILM COATED ORAL at 17:33

## 2018-09-21 RX ADMIN — SERTRALINE 25 MILLIGRAM(S): 25 TABLET, FILM COATED ORAL at 05:43

## 2018-09-21 RX ADMIN — Medication 500 MILLIGRAM(S): at 11:18

## 2018-09-21 RX ADMIN — ATORVASTATIN CALCIUM 10 MILLIGRAM(S): 80 TABLET, FILM COATED ORAL at 22:11

## 2018-09-21 RX ADMIN — ENOXAPARIN SODIUM 40 MILLIGRAM(S): 100 INJECTION SUBCUTANEOUS at 05:49

## 2018-09-21 RX ADMIN — TRAMADOL HYDROCHLORIDE 50 MILLIGRAM(S): 50 TABLET ORAL at 05:48

## 2018-09-21 RX ADMIN — TRAMADOL HYDROCHLORIDE 50 MILLIGRAM(S): 50 TABLET ORAL at 17:32

## 2018-09-21 NOTE — PROGRESS NOTE ADULT - SUBJECTIVE AND OBJECTIVE BOX
SUBJECTIVE:    This morning, the patient had her first normal BM in quite some time. She denies abdominal pain. Her appetite and oral intake have improved.  _____________________________________________________  OBJECTIVE:    T(C): 36.7 (09-21-18 @ 03:00), Max: 36.8 (09-20-18 @ 14:19)  HR: 77 (09-21-18 @ 03:00)  BP: 151/84 (09-21-18 @ 03:00)  RR: 18 (09-21-18 @ 03:00)  SpO2: 99% (09-21-18 @ 03:00)  Wt(kg): --    General = Comfortable-appearing, no acute distress  Abdomen = Mildly distended, active bowel sounds, soft, mildly tender across upper abdomen, chronic RLQ tenderness and fullness, no palpable mass or organomegaly, no bruit  _____________________________________________________  LABS:                        10.4   22.07 )-----------( 390      ( 21 Sep 2018 07:53 )             33.1     09-21    133<L>  |  98  |  12  ----------------------------<  161<H>  3.1<L>   |  27  |  0.78    Ca    8.3<L>      21 Sep 2018 06:46  Phos  3.0     09-21  Mg     1.6     09-21    TPro  4.6<L>  /  Alb  2.5<L>  /  TBili  0.2  /  DBili  x   /  AST  11  /  ALT  <5<L>  /  AlkPhos  102  09-21    LIVER FUNCTIONS - ( 21 Sep 2018 06:46 )  Alb: 2.5 g/dL / Pro: 4.6 g/dL / ALK PHOS: 102 U/L / ALT: <5 U/L / AST: 11 U/L / GGT: x             _____________________________________________________  ACTIVE MEDS:  MEDICATIONS  (STANDING):  atorvastatin 10 milliGRAM(s) Oral at bedtime  balsalazide 2250 milliGRAM(s) Oral three times a day  enoxaparin Injectable 40 milliGRAM(s) SubCutaneous every 24 hours  metoprolol tartrate 12.5 milliGRAM(s) Oral every 12 hours  pantoprazole    Tablet 40 milliGRAM(s) Oral before breakfast  sertraline 25 milliGRAM(s) Oral two times a day  traMADol 50 milliGRAM(s) Oral every 12 hours  vancomycin    Solution 500 milliGRAM(s) Oral every 6 hours    MEDICATIONS  (PRN):  acetaminophen    Suspension .. 650 milliGRAM(s) Oral every 6 hours PRN Mild Pain (1 - 3), Moderate Pain (4 - 6)  acetaminophen   Tablet .. 650 milliGRAM(s) Oral every 6 hours PRN Temp greater or equal to 38C (100.4F)  acetaminophen   Tablet .. 650 milliGRAM(s) Oral every 6 hours PRN Moderate Pain (4 - 6)  traMADol 25 milliGRAM(s) Oral every 6 hours PRN breakthru pain    _____________________________________________________  ASSESSMENT:  78yFemale with severe C. difficile and symptomatic improvement despite the persistent leukocytosis (though decreased again today after increasing back to 34 yesterday, perhaps due to the increase in the vanco dose).    PLAN:  Continue vancomycin, dosing and duration as per ID  Agree with ID that if the C. diff colitis does not completely resolve on the vanco, then FMT would be the next option  Continue low residue diet, pantoprazole, balsalazide    Karl Michel M.D.  (O) [phone]  (C) [phone]

## 2018-09-21 NOTE — PROGRESS NOTE ADULT - PROBLEM SELECTOR PLAN 3
Endometrial cancer w/ mets to abdominal wall & iliac nodes. Gets taxol through port weekly. Has had abdominal wall resection in past. Recently had 2nd opinion with Dr. Altaf Arteaga at [facility] as pt states she did not like the bad news she was given at [facility] [place]. Jenni recommended to consider hormone therapy. Pt states she would want her HCP to be her daughter Vidya Tripp who lives in [place]. Discussed possible life sustaining measures given her current condition and pt states she would want all possible resuscitative efforts.  - Full Code  - Palliative consulted, goals of care discussed - patient not interested in hospice at this time  - as per daughter, patient still sees Dr. Hylton at [facility] [place] for trx

## 2018-09-21 NOTE — PROGRESS NOTE ADULT - PROBLEM SELECTOR PLAN 7
DVT PPx  -Lovenox  PT recs: home with home PT DVT PPx  -Lovenox  PT recs: home with home PT to improve functional mobility and safely negotiate the home environment. Pt will require a RW for safe amb.  - informed PT about daughter's concern for discharge home. PT to f/up with team and daughter.

## 2018-09-21 NOTE — PROGRESS NOTE ADULT - PROBLEM SELECTOR PLAN 4
Hypokalemia & hypomagnesemia likely 2/2 diarrhea  - K: 3.2>3.4>4.2>3.7>3.1, M.6>1.5>1.8>1.6>1.5>1.8>1.6, Na: 133>134>132>131>132>136>135>133, Cl: 95>96>95>94>99>98  - Replete lytes as needed  - Protein: (L) 4.7>4.4>4.8>4.6>5>4.6, albumin: (L) 2.5>2.3>2.4>2.4>2.6>2.5, alk phos: (H) 173> 161>174>150>131>102; ALT low at <5, AST 17>15>11

## 2018-09-21 NOTE — PROGRESS NOTE ADULT - ASSESSMENT
78Fw/ hx of endometrial cancer w/ mets currently receiving weekly chemotherapy (which she has been on/off for years) with paclitaxel which she seems to be tolerating, ulcerative colitis, HTN, presenting with watery diarrhea in the setting of recent UTI s/p antibiotic therapy (2 months ago) and abdominal pain. Stool came back positive for C. difficile stool toxin (9/12) and has treated with vancomycin PO and IV flagyl. Leukocytosis has improved since going back up on the vancomycin to 500 with WBC at 22.07 from 34.49. Pt has had worsening pain from a combination of RLE lymphedema, colitis, cancer that requires further pain control. Electrolyte imbalances are improving.

## 2018-09-21 NOTE — PROGRESS NOTE ADULT - PROBLEM SELECTOR PLAN 1
Severe. Pt p/w several days watery diarrhea. Has Hx radiation colitis, UC. ABX on 9/16: Nonobstructive bowel gas pattern without evidence of free air. Bm yesterday was diarrhea, 1 BM today well-formed as per pt; WBC improved to 22.07  -C. diff stool toxin positive 9/12  -monitor abdominal exams  -Colorectal surgery consulted - no current surgical intervention given status of endometrial cancer  -ID recs appreciated  -discontinued flagyl  -increase back to vancomycin 500mg PO Q6  -c/w Tramadol 25mg PRN for breakthrough pain  -c/w Tramadol 50 Q12 for pain  - As per GI, c/w soft, mechanical diet with Ensure supplement once daily.  - Leukocytosis: 18.7>35.4>36.2>30.63>36.1>29.9>25.22>26.6>34.49>22.07  - ID: if worsens, consider fecal transplant Severe. Pt p/w several days watery diarrhea. Has Hx radiation colitis, UC. ABX on 9/16: Nonobstructive bowel gas pattern without evidence of free air. Bm yesterday was diarrhea, 1 BM today well-formed as per pt; WBC improved to 22.07  -C. diff stool toxin positive 9/12  -monitor abdominal exams  -Colorectal surgery consulted - no current surgical intervention given status of endometrial cancer  -ID recs appreciated  -discontinued flagyl  -c/w vancomycin 500mg PO Q6  -c/w Tramadol 25mg PRN for breakthrough pain  -c/w Tramadol 50 Q12 for pain  - As per GI, c/w soft, mechanical diet with Ensure supplement once daily.  - Leukocytosis: 18.7>35.4>36.2>30.63>36.1>29.9>25.22>26.6>34.49>22.07  - ID: if worsens, consider fecal transplant

## 2018-09-21 NOTE — PROGRESS NOTE ADULT - SUBJECTIVE AND OBJECTIVE BOX
Follow Up:  Cidff    Interval History/ROS:  improved po intake, sustained improvement, bowel movements beginning to form    Allergies  erythromycin (Unknown)  macrolide antibiotics (Unknown)  morphine (Diarrhea)  sulfa drugs (Unknown)    ANTIMICROBIALS:  vancomycin    Solution 500 every 6 hours    OTHER MEDS:  MEDICATIONS  (STANDING):  acetaminophen    Suspension .. 650 every 6 hours PRN  acetaminophen   Tablet .. 650 every 6 hours PRN  acetaminophen   Tablet .. 650 every 6 hours PRN  atorvastatin 10 at bedtime  balsalazide 2250 three times a day  enoxaparin Injectable 40 every 24 hours  metoprolol tartrate 12.5 every 12 hours  pantoprazole    Tablet 40 before breakfast  sertraline 25 two times a day  traMADol 50 every 12 hours  traMADol 25 every 6 hours PRN      Vital Signs Last 24 Hrs  T(C): 36.8 (21 Sep 2018 14:30), Max: 36.8 (21 Sep 2018 00:30)  T(F): 98.2 (21 Sep 2018 14:30), Max: 98.3 (21 Sep 2018 00:30)  HR: 76 (21 Sep 2018 14:30) (76 - 78)  BP: 117/74 (21 Sep 2018 14:30) (117/74 - 155/79)  BP(mean): --  RR: 18 (21 Sep 2018 03:00) (18 - 20)  SpO2: 99% (21 Sep 2018 03:00) (98% - 99%)    PHYSICAL EXAM:  General: fatigued NAD, Non-toxic  Neurology: A&Ox3, nonfocal  Respiratory: no resp distress  Abdominal: Soft, Non-tender, non-distended  Line Sites: Clear  Skin: No rash                      10.4   22.07 )-----------( 390      ( 21 Sep 2018 07:53 )             33.1   WBC Count: 22.07 (09-21 @ 07:53)  WBC Count: 34.49 (09-20 @ 07:43)  WBC Count: 26.60 (09-19 @ 08:01)  WBC Count: 25.22 (09-18 @ 09:04)  WBC Count: 29.9 (09-17 @ 08:18)      09-21    133<L>  |  98  |  12  ----------------------------<  161<H>  3.1<L>   |  27  |  0.78    Ca    8.3<L>      21 Sep 2018 06:46  Phos  3.0     09-21  Mg     1.6     09-21    TPro  4.6<L>  /  Alb  2.5<L>  /  TBili  0.2  /  DBili  x   /  AST  11  /  ALT  <5<L>  /  AlkPhos  102  09-21          MICROBIOLOGY:  .Blood Blood-Peripheral  09-12-18   No growth at 5 days.  --  --      .Stool Feces  09-12-18   No enteric pathogens isolated.  (Stool culture examined for Salmonella,  Shigella, Campylobacter, Aeromonas, Plesiomonas,  Vibrio, E.coli O157 and Yersinia)  --  --      .Urine Clean Catch (Midstream)  09-11-18   Culture grew 3 or more types of organisms which indicate  collection contamination; consider recollection only if clinically  indicated.  --  --      .Blood Blood-Peripheral  09-11-18   No growth at 5 days.  --  --      .Urine Clean Catch (Midstream)  08-27-18   <10,000 CFU/ml Normal Urogenital long present  --  --      .Blood Blood-Peripheral  08-26-18   No growth at 5 days.  --  --    RADIOLOGY:  < from: Xray Abdomen 1 View PORTABLE -Urgent (09.16.18 @ 13:09) >  Nonobstructive bowel gas pattern without evidence of free air.    < end of copied text >      Richard Amato MD; Division of Infectious Disease; Pager: [phone]; nights and weekends: [phone]

## 2018-09-21 NOTE — PROGRESS NOTE ADULT - SUBJECTIVE AND OBJECTIVE BOX
SUBJECTIVE AND OBJECTIVE:  INTERVAL HPI/OVERNIGHT EVENTS: Patient comfortable, daughter (HCP) at bedside.      DNR on chart:   Allergies    erythromycin (Unknown)  macrolide antibiotics (Unknown)  morphine (Diarrhea)  sulfa drugs (Unknown)    Intolerances    MEDICATIONS  (STANDING):  atorvastatin 10 milliGRAM(s) Oral at bedtime  balsalazide 2250 milliGRAM(s) Oral three times a day  enoxaparin Injectable 40 milliGRAM(s) SubCutaneous every 24 hours  metoprolol tartrate 12.5 milliGRAM(s) Oral every 12 hours  pantoprazole    Tablet 40 milliGRAM(s) Oral before breakfast  sertraline 25 milliGRAM(s) Oral two times a day  traMADol 50 milliGRAM(s) Oral every 12 hours  vancomycin    Solution 500 milliGRAM(s) Oral every 6 hours    MEDICATIONS  (PRN):  acetaminophen    Suspension .. 650 milliGRAM(s) Oral every 6 hours PRN Mild Pain (1 - 3), Moderate Pain (4 - 6)  acetaminophen   Tablet .. 650 milliGRAM(s) Oral every 6 hours PRN Temp greater or equal to 38C (100.4F)  acetaminophen   Tablet .. 650 milliGRAM(s) Oral every 6 hours PRN Moderate Pain (4 - 6)  traMADol 25 milliGRAM(s) Oral every 6 hours PRN breakthru pain      ITEMS UNCHECKED ARE NOT PRESENT    PRESENT SYMPTOMS: [ ]Unable to obtain due to poor mentation   Source if other than patient:  [ ]Family   [ ]Team     Pain (Impact on QOL):  Moderate impact  Location -     Left thigh secondary to lymphedema    Minimal acceptable level (0-10 scale): 3-4                   Aggravating factors - position, movement  Quality - aching,burning  Radiation - none  Severity (0-10 scale) -  varies  Timing -      Dyspnea:                           [ ]Mild [ ]Moderate [ ]Severe  Anxiety:                             [ ]Mild [ ]Moderate [ ]Severe  Fatigue:                             [ ]Mild [x ]Moderate [ ]Severe  Nausea:                             [ ]Mild [ ]Moderate [ ]Severe  Loss of appetite:              [x ]Mild [ ]Moderate [ ]Severe  Constipation:                    [ ]Mild [ ]Moderate [ ]Severe    PAIN AD Score:	  http://geriatrictoolkit.[place].[facility]/cog/painad.pdf (Ctrl + left click to view)    Other Symptoms:  [ ]All other review of systems negative     Karnofsky Performance Score/Palliative Performance Status Version 2:   40-50      %    http://palliative.info/resource_material/[email or web address].pdf  PHYSICAL EXAM:  Vital Signs Last 24 Hrs  T(C): 36.8 (21 Sep 2018 14:30), Max: 36.8 (21 Sep 2018 00:30)  T(F): 98.2 (21 Sep 2018 14:30), Max: 98.3 (21 Sep 2018 00:30)  HR: 76 (21 Sep 2018 14:30) (76 - 86)  BP: 117/74 (21 Sep 2018 14:30) (117/74 - 155/79)  BP(mean): --  RR: 18 (21 Sep 2018 03:00) (18 - 20)  SpO2: 99% (21 Sep 2018 03:00) (98% - 99%)       GENERAL:  [ x]Alert  [x ]Oriented x   [ ]Lethargic  [ ]Cachexia  [ ]Unarousable  [x ]Verbal  [ ]Non-Verbal  Behavioral:   [ ] Anxiety  [ ] Delirium [ ] Agitation [ ] Other  HEENT:  [x ]Normal   [ ]Dry mouth   [ ]ET Tube/Trach  [ ]Oral lesions  PULMONARY:   [ ]Clear [ ]Tachypnea  [ ]Audible excessive secretions   [ ]Rhonchi        [ ]Right [ ]Left [ ]Bilateral  [ ]Crackles        [ ]Right [ ]Left [ ]Bilateral  [ ]Wheezing     [ ]Right [ ]Left [ ]Bilateral  CARDIOVASCULAR:    [ ]Regular [ ]Irregular [ ]Tachy  [ ]Mat [ ]Murmur [ ]Other +S1 +S2   GASTROINTESTINAL:  [ ]Soft  [ ]Distended   [ ]+BS  [ ]Non tender [ ]Tender  [ ]PEG [ ]OGT/ NGT   Last BM:    GENITOURINARY:  [ ]Normal [x ] Incontinent   [ ]Oliguria/Anuria   [ ]Murray  MUSCULOSKELETAL:   [ ]Normal   [ x]Weakness  [ ]Bed/Wheelchair bound [ ]Edema  NEUROLOGIC:   [x ]No focal deficits  [ ] Cognitive impairment  [ ] Dysphagia [ ]Dysarthria [ ] Paresis [ ]Other   SKIN:   [x ]Normal   [ ]Pressure ulcer(s)  [ ]Rash    CRITICAL CARE:  [ ] Shock Present  [ ]Septic [ ]Cardiogenic [ ]Neurologic [ ]Hypovolemic  [ ]  Vasopressors [ ]  Inotropes   [ ] Respiratory failure present  [ ] Acute  [ ] Chronic [ ] Hypoxic  [ ] Hypercarbic [ ] Other  [ ] Other organ failure     LABS:                                   10.4   22.07 )-----------( 390      ( 21 Sep 2018 07:53 )             33.1   09-21    133<L>  |  98  |  12  ----------------------------<  161<H>  3.1<L>   |  27  |  0.78    Ca    8.3<L>      21 Sep 2018 06:46  Phos  3.0     09-21  Mg     1.6     09-21    RADIOLOGY & ADDITIONAL STUDIES:    Protein Calorie Malnutrition Present: [ ] yes [ ] no  [ ] PPSV2 < or = 30%  [ ] significant weight loss [x ] poor nutritional intake [ ] anasarca [x ] catabolic state Albumin, Serum: 2.6 g/dL (09-20-18 @ 07:07)  Artificial Nutrition [ ]     REFERRALS:   [ ]Chaplaincy  [ ] Hospice  [ ]Child Life  [x ]Social Work  [ ]Case management [ ]Holistic Therapy   Goals of Care Document: Goals of Care Conversation - Personal Advance Directive [MELO Wilson] (09-19-18 @ 16:46)

## 2018-09-21 NOTE — PROGRESS NOTE ADULT - ASSESSMENT
Ms Belcher is 78F with mutliple comordities including  endometrial cancer w/ mets currently receiving weekly chemotherapy with paclitaxel which she seems to be tolerating, Ulcerative colitis?, chronic diarrhea on immodium, HTN, previous antibiotic exposure.  She has severe Cdiff on day #9 therapy  clinically improved  leukocytosis decreased      Antibiotics:  Cipro 9/11  Flagyl 9/11; 9/13 -->  Vanco po 125 q 6h 9/12 -->14; 500 q 6 h 9/14-->    Suggest  Continue po Vanco   advance diet as tolerated  trend WBC    Please call ID  if needed over weekend

## 2018-09-21 NOTE — PROGRESS NOTE ADULT - PROBLEM SELECTOR PLAN 4
Palliative met previously with daughter Vidya and informed her of possible disposition options, including home hospice, based on plan for chemotherapy. Will plan to discuss code status with daughter (not present today at bedside) next week.

## 2018-09-22 LAB
ALBUMIN SERPL ELPH-MCNC: 2.6 G/DL — LOW (ref 3.3–5)
ALP SERPL-CCNC: 90 U/L — SIGNIFICANT CHANGE UP (ref 40–120)
ALT FLD-CCNC: 6 U/L — LOW (ref 10–45)
ANION GAP SERPL CALC-SCNC: 8 MMOL/L — SIGNIFICANT CHANGE UP (ref 5–17)
AST SERPL-CCNC: 13 U/L — SIGNIFICANT CHANGE UP (ref 10–40)
BASOPHILS # BLD AUTO: 0 K/UL — SIGNIFICANT CHANGE UP (ref 0–0.2)
BASOPHILS NFR BLD AUTO: 0 % — SIGNIFICANT CHANGE UP (ref 0–2)
BILIRUB SERPL-MCNC: 0.2 MG/DL — SIGNIFICANT CHANGE UP (ref 0.2–1.2)
BUN SERPL-MCNC: 12 MG/DL — SIGNIFICANT CHANGE UP (ref 7–23)
CALCIUM SERPL-MCNC: 8.4 MG/DL — SIGNIFICANT CHANGE UP (ref 8.4–10.5)
CHLORIDE SERPL-SCNC: 100 MMOL/L — SIGNIFICANT CHANGE UP (ref 96–108)
CO2 SERPL-SCNC: 27 MMOL/L — SIGNIFICANT CHANGE UP (ref 22–31)
CREAT SERPL-MCNC: 0.76 MG/DL — SIGNIFICANT CHANGE UP (ref 0.5–1.3)
EOSINOPHIL # BLD AUTO: 0.18 K/UL — SIGNIFICANT CHANGE UP (ref 0–0.5)
EOSINOPHIL NFR BLD AUTO: 0.8 % — SIGNIFICANT CHANGE UP (ref 0–6)
GLUCOSE SERPL-MCNC: 113 MG/DL — HIGH (ref 70–99)
HCT VFR BLD CALC: 31.6 % — LOW (ref 34.5–45)
HGB BLD-MCNC: 9.6 G/DL — LOW (ref 11.5–15.5)
LYMPHOCYTES # BLD AUTO: 1.09 K/UL — SIGNIFICANT CHANGE UP (ref 1–3.3)
LYMPHOCYTES # BLD AUTO: 5 % — LOW (ref 13–44)
MAGNESIUM SERPL-MCNC: 1.5 MG/DL — LOW (ref 1.6–2.6)
MCHC RBC-ENTMCNC: 25.8 PG — LOW (ref 27–34)
MCHC RBC-ENTMCNC: 30.4 GM/DL — LOW (ref 32–36)
MCV RBC AUTO: 84.9 FL — SIGNIFICANT CHANGE UP (ref 80–100)
MONOCYTES # BLD AUTO: 1.09 K/UL — HIGH (ref 0–0.9)
MONOCYTES NFR BLD AUTO: 5 % — SIGNIFICANT CHANGE UP (ref 2–14)
NEUTROPHILS # BLD AUTO: 18.6 K/UL — HIGH (ref 1.8–7.4)
NEUTROPHILS NFR BLD AUTO: 85 % — HIGH (ref 43–77)
PHOSPHATE SERPL-MCNC: 2.9 MG/DL — SIGNIFICANT CHANGE UP (ref 2.5–4.5)
PLATELET # BLD AUTO: 419 K/UL — HIGH (ref 150–400)
POTASSIUM SERPL-MCNC: 4.2 MMOL/L — SIGNIFICANT CHANGE UP (ref 3.5–5.3)
POTASSIUM SERPL-SCNC: 4.2 MMOL/L — SIGNIFICANT CHANGE UP (ref 3.5–5.3)
PROT SERPL-MCNC: 4.8 G/DL — LOW (ref 6–8.3)
RBC # BLD: 3.72 M/UL — LOW (ref 3.8–5.2)
RBC # FLD: 20 % — HIGH (ref 10.3–14.5)
SODIUM SERPL-SCNC: 135 MMOL/L — SIGNIFICANT CHANGE UP (ref 135–145)
WBC # BLD: 21.88 K/UL — HIGH (ref 3.8–10.5)
WBC # FLD AUTO: 21.88 K/UL — HIGH (ref 3.8–10.5)

## 2018-09-22 PROCEDURE — 99232 SBSQ HOSP IP/OBS MODERATE 35: CPT | Mod: GC

## 2018-09-22 RX ORDER — MAGNESIUM SULFATE 500 MG/ML
1 VIAL (ML) INJECTION ONCE
Qty: 0 | Refills: 0 | Status: COMPLETED | OUTPATIENT
Start: 2018-09-22 | End: 2018-09-22

## 2018-09-22 RX ADMIN — Medication 500 MILLIGRAM(S): at 23:56

## 2018-09-22 RX ADMIN — Medication 500 MILLIGRAM(S): at 00:49

## 2018-09-22 RX ADMIN — BALSALAZIDE DISODIUM 2250 MILLIGRAM(S): 750 CAPSULE ORAL at 05:53

## 2018-09-22 RX ADMIN — TRAMADOL HYDROCHLORIDE 50 MILLIGRAM(S): 50 TABLET ORAL at 05:52

## 2018-09-22 RX ADMIN — TRAMADOL HYDROCHLORIDE 50 MILLIGRAM(S): 50 TABLET ORAL at 06:22

## 2018-09-22 RX ADMIN — TRAMADOL HYDROCHLORIDE 50 MILLIGRAM(S): 50 TABLET ORAL at 18:29

## 2018-09-22 RX ADMIN — Medication 650 MILLIGRAM(S): at 21:00

## 2018-09-22 RX ADMIN — Medication 12.5 MILLIGRAM(S): at 06:02

## 2018-09-22 RX ADMIN — PANTOPRAZOLE SODIUM 40 MILLIGRAM(S): 20 TABLET, DELAYED RELEASE ORAL at 06:02

## 2018-09-22 RX ADMIN — ATORVASTATIN CALCIUM 10 MILLIGRAM(S): 80 TABLET, FILM COATED ORAL at 21:00

## 2018-09-22 RX ADMIN — Medication 100 GRAM(S): at 10:59

## 2018-09-22 RX ADMIN — Medication 650 MILLIGRAM(S): at 21:30

## 2018-09-22 RX ADMIN — Medication 12.5 MILLIGRAM(S): at 18:29

## 2018-09-22 RX ADMIN — BALSALAZIDE DISODIUM 2250 MILLIGRAM(S): 750 CAPSULE ORAL at 21:00

## 2018-09-22 RX ADMIN — SERTRALINE 25 MILLIGRAM(S): 25 TABLET, FILM COATED ORAL at 18:29

## 2018-09-22 RX ADMIN — ENOXAPARIN SODIUM 40 MILLIGRAM(S): 100 INJECTION SUBCUTANEOUS at 05:54

## 2018-09-22 RX ADMIN — Medication 500 MILLIGRAM(S): at 05:53

## 2018-09-22 RX ADMIN — BALSALAZIDE DISODIUM 2250 MILLIGRAM(S): 750 CAPSULE ORAL at 15:30

## 2018-09-22 RX ADMIN — Medication 500 MILLIGRAM(S): at 18:30

## 2018-09-22 RX ADMIN — Medication 500 MILLIGRAM(S): at 11:32

## 2018-09-22 RX ADMIN — SERTRALINE 25 MILLIGRAM(S): 25 TABLET, FILM COATED ORAL at 05:54

## 2018-09-22 NOTE — PROGRESS NOTE ADULT - PROBLEM SELECTOR PLAN 3
Endometrial cancer w/ mets to abdominal wall & iliac nodes. Gets taxol through port weekly. Has had abdominal wall resection in past. Recently had 2nd opinion with Dr. Altaf Arteaga at [facility] as pt states she did not like the bad news she was given at [facility] [place]. Jenni recommended to consider hormone therapy. Pt states she would want her HCP to be her daughter Vidya Tripp who lives in [place]. Discussed possible life sustaining measures given her current condition and pt states she would want all possible resuscitative efforts.  - Full Code  - Palliative consulted, goals of care discussed - patient not interested in hospice at this time  - as per daughter, patient still sees Dr. Hylton at [facility] [place] for trx

## 2018-09-22 NOTE — PROGRESS NOTE ADULT - SUBJECTIVE AND OBJECTIVE BOX
SUBJECTIVE:  Feels much better. Only a few BMs yesterday and one so far today. The only abdominal pain which she has is the chronic RLQ which radiates to her right leg. The diffuse pain associated with her C. diff colitis has completely resolved. She has been able to eat.  _____________________________________________________  OBJECTIVE:    T(C): 36.9 (09-22-18 @ 04:36), Max: 36.9 (09-22-18 @ 04:36)  HR: 78 (09-22-18 @ 04:36)  BP: 142/84 (09-22-18 @ 04:36)  RR: 20 (09-22-18 @ 04:36)  SpO2: 95% (09-22-18 @ 04:36)  Wt(kg): --    General = Comfortable-appearing, no acute distress  Abdomen = Non-distended, normal active bowel sounds, soft, nontender (except chronic RLQT with fullness)  _____________________________________________________  LABS:                        9.6    21.88 )-----------( 419      ( 22 Sep 2018 08:10 )             31.6     09-22    135  |  100  |  12  ----------------------------<  113<H>  4.2   |  27  |  0.76    Ca    8.4      22 Sep 2018 06:47  Phos  2.9     09-22  Mg     1.5     09-22    TPro  4.8<L>  /  Alb  2.6<L>  /  TBili  0.2  /  DBili  x   /  AST  13  /  ALT  6<L>  /  AlkPhos  90  09-22    LIVER FUNCTIONS - ( 22 Sep 2018 06:47 )  Alb: 2.6 g/dL / Pro: 4.8 g/dL / ALK PHOS: 90 U/L / ALT: 6 U/L / AST: 13 U/L / GGT: x             _____________________________________________________  ACTIVE MEDS:  MEDICATIONS  (STANDING):  atorvastatin 10 milliGRAM(s) Oral at bedtime  balsalazide 2250 milliGRAM(s) Oral three times a day  enoxaparin Injectable 40 milliGRAM(s) SubCutaneous every 24 hours  metoprolol tartrate 12.5 milliGRAM(s) Oral every 12 hours  pantoprazole    Tablet 40 milliGRAM(s) Oral before breakfast  sertraline 25 milliGRAM(s) Oral two times a day  traMADol 50 milliGRAM(s) Oral every 12 hours  vancomycin    Solution 500 milliGRAM(s) Oral every 6 hours    MEDICATIONS  (PRN):  acetaminophen    Suspension .. 650 milliGRAM(s) Oral every 6 hours PRN Mild Pain (1 - 3), Moderate Pain (4 - 6)  acetaminophen   Tablet .. 650 milliGRAM(s) Oral every 6 hours PRN Temp greater or equal to 38C (100.4F)  acetaminophen   Tablet .. 650 milliGRAM(s) Oral every 6 hours PRN Moderate Pain (4 - 6)  traMADol 25 milliGRAM(s) Oral every 6 hours PRN breakthru pain    _____________________________________________________  ASSESSMENT:  78yFemale with gradually improving C. diff colitis which has been severe on presentation.    PLAN:  Continue oral vanco (dose and length of course as per ID)  Continue present diet, pantoprazole, balsalazide    Karl Michel M.D.  (O) [phone]  (C) [phone]

## 2018-09-22 NOTE — PROGRESS NOTE ADULT - PROBLEM SELECTOR PLAN 1
Severe. Pt p/w several days watery diarrhea. Has Hx radiation colitis, UC. ABX on 9/16: Nonobstructive bowel gas pattern without evidence of free air. Bm yesterday was diarrhea, 1 BM today well-formed as per pt; WBC improved to 22.07  -C. diff stool toxin positive 9/12  -monitor abdominal exams  -Colorectal surgery consulted - no current surgical intervention given status of endometrial cancer  -ID recs appreciated  -discontinued flagyl  -c/w vancomycin 500mg PO Q6  -c/w Tramadol 25mg PRN for breakthrough pain  -c/w Tramadol 50 Q12 for pain  - As per GI, c/w soft, mechanical diet with Ensure supplement once daily.  - Leukocytosis: 18.7>35.4>36.2>30.63>36.1>29.9>25.22>26.6>34.49>22.07  - ID: if worsens, consider fecal transplant Severe. Pt p/w several days watery diarrhea. Has Hx radiation colitis, UC. ABX on 9/16: Nonobstructive bowel gas pattern without evidence of free air. 4 BM's yesterday to today, well-formed as per pt; WBC improved to 22.07  - C. diff stool toxin positive 9/12  - monitor abdominal exams  - Colorectal surgery consulted - no current surgical intervention given status of endometrial cancer  - ID recs appreciated  - discontinued flagyl  - c/w vancomycin 500mg PO Q6  - c/w Tramadol 25mg PRN for breakthrough pain  - c/w Tramadol 50 Q12 for pain  - As per GI, c/w soft, mechanical diet with Ensure supplement once daily.  - ID: if worsens, consider fecal transplant

## 2018-09-22 NOTE — PROGRESS NOTE ADULT - PROBLEM SELECTOR PLAN 7
DVT PPx  -Lovenox  PT recs: home with home PT to improve functional mobility and safely negotiate the home environment. Pt will require a RW for safe amb.  - informed PT about daughter's concern for discharge home. PT to f/up with team and daughter.

## 2018-09-22 NOTE — PROGRESS NOTE ADULT - SUBJECTIVE AND OBJECTIVE BOX
Note Author: Korey Marc, PGY 3  [facility] Pager: [phone]  [facility] Pager: [phone]  Spectra: [phone]      Patient is a 78y old  Female who presents with a chief complaint of Not feeling well (21 Sep 2018 19:23)      SUBJECTIVE / OVERNIGHT EVENTS:  No acute events overnight. Patient seen and examined in AM.     Patient denied fevers, CP, SOB, lower extremity pain.     MEDICATIONS  (STANDING):  atorvastatin 10 milliGRAM(s) Oral at bedtime  balsalazide 2250 milliGRAM(s) Oral three times a day  enoxaparin Injectable 40 milliGRAM(s) SubCutaneous every 24 hours  metoprolol tartrate 12.5 milliGRAM(s) Oral every 12 hours  pantoprazole    Tablet 40 milliGRAM(s) Oral before breakfast  sertraline 25 milliGRAM(s) Oral two times a day  traMADol 50 milliGRAM(s) Oral every 12 hours  vancomycin    Solution 500 milliGRAM(s) Oral every 6 hours    MEDICATIONS  (PRN):  acetaminophen    Suspension .. 650 milliGRAM(s) Oral every 6 hours PRN Mild Pain (1 - 3), Moderate Pain (4 - 6)  acetaminophen   Tablet .. 650 milliGRAM(s) Oral every 6 hours PRN Temp greater or equal to 38C (100.4F)  acetaminophen   Tablet .. 650 milliGRAM(s) Oral every 6 hours PRN Moderate Pain (4 - 6)  traMADol 25 milliGRAM(s) Oral every 6 hours PRN breakthru pain      CAPILLARY BLOOD GLUCOSE        I&O's Summary      Vital Signs Last 24 Hrs  T(C): 36.9 (22 Sep 2018 04:36), Max: 36.9 (22 Sep 2018 04:36)  T(F): 98.4 (22 Sep 2018 04:36), Max: 98.4 (22 Sep 2018 04:36)  HR: 78 (22 Sep 2018 04:36) (76 - 89)  BP: 142/84 (22 Sep 2018 04:36) (117/74 - 154/82)  BP(mean): --  RR: 20 (22 Sep 2018 04:36) (19 - 20)  SpO2: 95% (22 Sep 2018 04:36) (95% - 99%)    PHYSICAL EXAM:  General: NAD, well-developed  Eyes: EOMI  Neck: Supple, No JVD  Chest/Lung: Clear to auscultation bilaterally; No wheezes  Heart: Regular rate and rhythm; No murmurs, rubs, or gallops. Capillary refill WNL  Abdome: Soft, Nontender, Nondistended; Bowel sounds present  Extremities: No lower extremity edema.   Psych: AAOx3  Neurology: non-focal, strength and sensation grossly intact UE and LE BL. No spinal TTP  Skin: No rashes or lesions    LABS:                        10.4   22.07 )-----------( 390      ( 21 Sep 2018 07:53 )             33.1       09-21    133<L>  |  98  |  12  ----------------------------<  161<H>  3.1<L>   |  27  |  0.78    Ca    8.3<L>      21 Sep 2018 06:46  Phos  3.0     09-21  Mg     1.6     09-21    TPro  4.6<L>  /  Alb  2.5<L>  /  TBili  0.2  /  DBili  x   /  AST  11  /  ALT  <5<L>  /  AlkPhos  102  09-21                        EKG:   CXR:       RADIOLOGY & ADDITIONAL TESTS:    Imaging Personally Reviewed:    Consultant(s) Notes Reviewed:      Care Discussed with Consultants/Other Providers: Note Author: Korey Marc, PGY 3  [facility] Pager: [phone]  [facility] Pager: [phone]  Spectra: [phone]      Patient is a 78y old  Female who presents with a chief complaint of Not feeling well (21 Sep 2018 19:23)      SUBJECTIVE / OVERNIGHT EVENTS:  No acute events overnight. Patient seen and examined in AM. Has RLQ pain that is chronic, controlled with tramadol. Also w/ RLE pain also chronic. 4 BMs yesterday, moderately formed. No fevers.     MEDICATIONS  (STANDING):  atorvastatin 10 milliGRAM(s) Oral at bedtime  balsalazide 2250 milliGRAM(s) Oral three times a day  enoxaparin Injectable 40 milliGRAM(s) SubCutaneous every 24 hours  metoprolol tartrate 12.5 milliGRAM(s) Oral every 12 hours  pantoprazole    Tablet 40 milliGRAM(s) Oral before breakfast  sertraline 25 milliGRAM(s) Oral two times a day  traMADol 50 milliGRAM(s) Oral every 12 hours  vancomycin    Solution 500 milliGRAM(s) Oral every 6 hours    MEDICATIONS  (PRN):  acetaminophen    Suspension .. 650 milliGRAM(s) Oral every 6 hours PRN Mild Pain (1 - 3), Moderate Pain (4 - 6)  acetaminophen   Tablet .. 650 milliGRAM(s) Oral every 6 hours PRN Temp greater or equal to 38C (100.4F)  acetaminophen   Tablet .. 650 milliGRAM(s) Oral every 6 hours PRN Moderate Pain (4 - 6)  traMADol 25 milliGRAM(s) Oral every 6 hours PRN breakthru pain      CAPILLARY BLOOD GLUCOSE        I&O's Summary      Vital Signs Last 24 Hrs  T(C): 36.9 (22 Sep 2018 04:36), Max: 36.9 (22 Sep 2018 04:36)  T(F): 98.4 (22 Sep 2018 04:36), Max: 98.4 (22 Sep 2018 04:36)  HR: 78 (22 Sep 2018 04:36) (76 - 89)  BP: 142/84 (22 Sep 2018 04:36) (117/74 - 154/82)  BP(mean): --  RR: 20 (22 Sep 2018 04:36) (19 - 20)  SpO2: 95% (22 Sep 2018 04:36) (95% - 99%)    PHYSICAL EXAM:  General: NAD, well-developed  Eyes: EOMI  Neck: Supple, No JVD  Chest/Lung: Clear to auscultation bilaterally; No wheezes  Heart: Regular rate and rhythm; No murmurs, rubs, or gallops. Capillary refill WNL  Abdome: Soft, Nontender, Nondistended; Bowel sounds present  Extremities: No lower extremity edema.   Psych: AAOx3  Neurology: non-focal, strength and sensation grossly intact UE and LE BL. No spinal TTP  Skin: No rashes or lesions    LABS:                        10.4   22.07 )-----------( 390      ( 21 Sep 2018 07:53 )             33.1       09-21    133<L>  |  98  |  12  ----------------------------<  161<H>  3.1<L>   |  27  |  0.78    Ca    8.3<L>      21 Sep 2018 06:46  Phos  3.0     09-21  Mg     1.6     09-21    TPro  4.6<L>  /  Alb  2.5<L>  /  TBili  0.2  /  DBili  x   /  AST  11  /  ALT  <5<L>  /  AlkPhos  102  09-21                        EKG:   CXR:       RADIOLOGY & ADDITIONAL TESTS:    Imaging Personally Reviewed:    Consultant(s) Notes Reviewed:      Care Discussed with Consultants/Other Providers: Note Author: Korey Marc, PGY 3  [facility] Pager: [phone]  [facility] Pager: [phone]  Spectra: [phone]      Patient is a 78y old  Female who presents with a chief complaint of Not feeling well (21 Sep 2018 19:23)      SUBJECTIVE / OVERNIGHT EVENTS:  No acute events overnight. Patient seen and examined in AM. Has RLQ pain that is chronic, controlled with tramadol. Also w/ RLE pain also chronic. 4 BMs yesterday, moderately formed. No fevers.     MEDICATIONS  (STANDING):  atorvastatin 10 milliGRAM(s) Oral at bedtime  balsalazide 2250 milliGRAM(s) Oral three times a day  enoxaparin Injectable 40 milliGRAM(s) SubCutaneous every 24 hours  metoprolol tartrate 12.5 milliGRAM(s) Oral every 12 hours  pantoprazole    Tablet 40 milliGRAM(s) Oral before breakfast  sertraline 25 milliGRAM(s) Oral two times a day  traMADol 50 milliGRAM(s) Oral every 12 hours  vancomycin    Solution 500 milliGRAM(s) Oral every 6 hours    MEDICATIONS  (PRN):  acetaminophen    Suspension .. 650 milliGRAM(s) Oral every 6 hours PRN Mild Pain (1 - 3), Moderate Pain (4 - 6)  acetaminophen   Tablet .. 650 milliGRAM(s) Oral every 6 hours PRN Temp greater or equal to 38C (100.4F)  acetaminophen   Tablet .. 650 milliGRAM(s) Oral every 6 hours PRN Moderate Pain (4 - 6)  traMADol 25 milliGRAM(s) Oral every 6 hours PRN breakthru pain      CAPILLARY BLOOD GLUCOSE        I&O's Summary      Vital Signs Last 24 Hrs  T(C): 36.9 (22 Sep 2018 04:36), Max: 36.9 (22 Sep 2018 04:36)  T(F): 98.4 (22 Sep 2018 04:36), Max: 98.4 (22 Sep 2018 04:36)  HR: 78 (22 Sep 2018 04:36) (76 - 89)  BP: 142/84 (22 Sep 2018 04:36) (117/74 - 154/82)  BP(mean): --  RR: 20 (22 Sep 2018 04:36) (19 - 20)  SpO2: 95% (22 Sep 2018 04:36) (95% - 99%)    PHYSICAL EXAM:  General: NAD, well-developed  Eyes: EOMI  Neck: Supple, No JVD  Chest/Lung: Clear to auscultation bilaterally; No wheezes  Heart: Regular rate and rhythm; No murmurs, rubs, or gallops. Capillary refill WNL  Abdom: Soft, Nondistended; Bowel sounds present. (+) R abdom TTP  Extremities: (+) mild RLE edema.   Psych: AAOx3  Neurology: non-focal, strength and sensation grossly intact UE and LE BL. No spinal TTP  Skin: No rashes or lesions    LABS:                        10.4   22.07 )-----------( 390      ( 21 Sep 2018 07:53 )             33.1       09-21    133<L>  |  98  |  12  ----------------------------<  161<H>  3.1<L>   |  27  |  0.78    Ca    8.3<L>      21 Sep 2018 06:46  Phos  3.0     09-21  Mg     1.6     09-21    TPro  4.6<L>  /  Alb  2.5<L>  /  TBili  0.2  /  DBili  x   /  AST  11  /  ALT  <5<L>  /  AlkPhos  102  09-21      RADIOLOGY & ADDITIONAL TESTS:    Imaging Personally Reviewed:    Consultant(s) Notes Reviewed:  GI    Care Discussed with Consultants/Other Providers:

## 2018-09-22 NOTE — PROGRESS NOTE ADULT - PROBLEM SELECTOR PLAN 4
Hypokalemia & hypomagnesemia likely 2/2 diarrhea  - K: 3.2>3.4>4.2>3.7>3.1, M.6>1.5>1.8>1.6>1.5>1.8>1.6, Na: 133>134>132>131>132>136>135>133, Cl: 95>96>95>94>99>98  - Replete lytes as needed  - Protein: (L) 4.7>4.4>4.8>4.6>5>4.6, albumin: (L) 2.5>2.3>2.4>2.4>2.6>2.5, alk phos: (H) 173> 161>174>150>131>102; ALT low at <5, AST 17>15>11 resolved. will cont to monitor elytes

## 2018-09-23 DIAGNOSIS — I48.2 CHRONIC ATRIAL FIBRILLATION: ICD-10-CM

## 2018-09-23 LAB
ALBUMIN SERPL ELPH-MCNC: 2.4 G/DL — LOW (ref 3.3–5)
ALP SERPL-CCNC: 87 U/L — SIGNIFICANT CHANGE UP (ref 40–120)
ALT FLD-CCNC: 7 U/L — LOW (ref 10–45)
ANION GAP SERPL CALC-SCNC: 9 MMOL/L — SIGNIFICANT CHANGE UP (ref 5–17)
AST SERPL-CCNC: 26 U/L — SIGNIFICANT CHANGE UP (ref 10–40)
BASOPHILS # BLD AUTO: 0.19 K/UL — SIGNIFICANT CHANGE UP (ref 0–0.2)
BASOPHILS NFR BLD AUTO: 1 % — SIGNIFICANT CHANGE UP (ref 0–2)
BILIRUB SERPL-MCNC: 0.2 MG/DL — SIGNIFICANT CHANGE UP (ref 0.2–1.2)
BUN SERPL-MCNC: 10 MG/DL — SIGNIFICANT CHANGE UP (ref 7–23)
CALCIUM SERPL-MCNC: 8.2 MG/DL — LOW (ref 8.4–10.5)
CHLORIDE SERPL-SCNC: 99 MMOL/L — SIGNIFICANT CHANGE UP (ref 96–108)
CO2 SERPL-SCNC: 27 MMOL/L — SIGNIFICANT CHANGE UP (ref 22–31)
CREAT SERPL-MCNC: 0.77 MG/DL — SIGNIFICANT CHANGE UP (ref 0.5–1.3)
EOSINOPHIL # BLD AUTO: 0.19 K/UL — SIGNIFICANT CHANGE UP (ref 0–0.5)
EOSINOPHIL NFR BLD AUTO: 1 % — SIGNIFICANT CHANGE UP (ref 0–6)
GLUCOSE SERPL-MCNC: 105 MG/DL — HIGH (ref 70–99)
HCT VFR BLD CALC: 31.1 % — LOW (ref 34.5–45)
HGB BLD-MCNC: 9.5 G/DL — LOW (ref 11.5–15.5)
LYMPHOCYTES # BLD AUTO: 15 % — SIGNIFICANT CHANGE UP (ref 13–44)
LYMPHOCYTES # BLD AUTO: 2.83 K/UL — SIGNIFICANT CHANGE UP (ref 1–3.3)
MAGNESIUM SERPL-MCNC: 1.5 MG/DL — LOW (ref 1.6–2.6)
MCHC RBC-ENTMCNC: 25.7 PG — LOW (ref 27–34)
MCHC RBC-ENTMCNC: 30.5 GM/DL — LOW (ref 32–36)
MCV RBC AUTO: 84.1 FL — SIGNIFICANT CHANGE UP (ref 80–100)
MONOCYTES # BLD AUTO: 0.94 K/UL — HIGH (ref 0–0.9)
MONOCYTES NFR BLD AUTO: 5 % — SIGNIFICANT CHANGE UP (ref 2–14)
NEUTROPHILS # BLD AUTO: 14.55 K/UL — HIGH (ref 1.8–7.4)
NEUTROPHILS NFR BLD AUTO: 77 % — SIGNIFICANT CHANGE UP (ref 43–77)
PHOSPHATE SERPL-MCNC: 3.4 MG/DL — SIGNIFICANT CHANGE UP (ref 2.5–4.5)
PLATELET # BLD AUTO: 402 K/UL — HIGH (ref 150–400)
POTASSIUM SERPL-MCNC: 3.6 MMOL/L — SIGNIFICANT CHANGE UP (ref 3.5–5.3)
POTASSIUM SERPL-SCNC: 3.6 MMOL/L — SIGNIFICANT CHANGE UP (ref 3.5–5.3)
PROT SERPL-MCNC: 4.7 G/DL — LOW (ref 6–8.3)
RBC # BLD: 3.7 M/UL — LOW (ref 3.8–5.2)
RBC # FLD: 20.3 % — HIGH (ref 10.3–14.5)
SODIUM SERPL-SCNC: 135 MMOL/L — SIGNIFICANT CHANGE UP (ref 135–145)
WBC # BLD: 18.89 K/UL — HIGH (ref 3.8–10.5)
WBC # FLD AUTO: 18.89 K/UL — HIGH (ref 3.8–10.5)

## 2018-09-23 PROCEDURE — 99233 SBSQ HOSP IP/OBS HIGH 50: CPT

## 2018-09-23 RX ORDER — METOPROLOL TARTRATE 50 MG
25 TABLET ORAL ONCE
Qty: 0 | Refills: 0 | Status: COMPLETED | OUTPATIENT
Start: 2018-09-23 | End: 2018-09-23

## 2018-09-23 RX ORDER — OXYCODONE HYDROCHLORIDE 5 MG/1
5 TABLET ORAL EVERY 4 HOURS
Qty: 0 | Refills: 0 | Status: DISCONTINUED | OUTPATIENT
Start: 2018-09-23 | End: 2018-09-25

## 2018-09-23 RX ORDER — MAGNESIUM SULFATE 500 MG/ML
2 VIAL (ML) INJECTION ONCE
Qty: 0 | Refills: 0 | Status: COMPLETED | OUTPATIENT
Start: 2018-09-23 | End: 2018-09-23

## 2018-09-23 RX ADMIN — ENOXAPARIN SODIUM 40 MILLIGRAM(S): 100 INJECTION SUBCUTANEOUS at 06:12

## 2018-09-23 RX ADMIN — SERTRALINE 25 MILLIGRAM(S): 25 TABLET, FILM COATED ORAL at 06:11

## 2018-09-23 RX ADMIN — SERTRALINE 25 MILLIGRAM(S): 25 TABLET, FILM COATED ORAL at 17:41

## 2018-09-23 RX ADMIN — Medication 12.5 MILLIGRAM(S): at 06:11

## 2018-09-23 RX ADMIN — Medication 500 MILLIGRAM(S): at 13:46

## 2018-09-23 RX ADMIN — BALSALAZIDE DISODIUM 2250 MILLIGRAM(S): 750 CAPSULE ORAL at 21:28

## 2018-09-23 RX ADMIN — TRAMADOL HYDROCHLORIDE 25 MILLIGRAM(S): 50 TABLET ORAL at 04:01

## 2018-09-23 RX ADMIN — ATORVASTATIN CALCIUM 10 MILLIGRAM(S): 80 TABLET, FILM COATED ORAL at 21:29

## 2018-09-23 RX ADMIN — PANTOPRAZOLE SODIUM 40 MILLIGRAM(S): 20 TABLET, DELAYED RELEASE ORAL at 06:11

## 2018-09-23 RX ADMIN — Medication 650 MILLIGRAM(S): at 18:59

## 2018-09-23 RX ADMIN — TRAMADOL HYDROCHLORIDE 50 MILLIGRAM(S): 50 TABLET ORAL at 06:11

## 2018-09-23 RX ADMIN — Medication 650 MILLIGRAM(S): at 08:38

## 2018-09-23 RX ADMIN — Medication 500 MILLIGRAM(S): at 06:12

## 2018-09-23 RX ADMIN — Medication 500 MILLIGRAM(S): at 17:41

## 2018-09-23 RX ADMIN — Medication 25 MILLIGRAM(S): at 17:41

## 2018-09-23 RX ADMIN — Medication 650 MILLIGRAM(S): at 09:30

## 2018-09-23 RX ADMIN — Medication 650 MILLIGRAM(S): at 17:49

## 2018-09-23 RX ADMIN — BALSALAZIDE DISODIUM 2250 MILLIGRAM(S): 750 CAPSULE ORAL at 13:46

## 2018-09-23 RX ADMIN — BALSALAZIDE DISODIUM 2250 MILLIGRAM(S): 750 CAPSULE ORAL at 06:11

## 2018-09-23 RX ADMIN — Medication 50 GRAM(S): at 13:45

## 2018-09-23 RX ADMIN — TRAMADOL HYDROCHLORIDE 25 MILLIGRAM(S): 50 TABLET ORAL at 04:31

## 2018-09-23 NOTE — PROGRESS NOTE ADULT - SUBJECTIVE AND OBJECTIVE BOX
SUBJECTIVE:  Had approximately 4 BMs yesterday including small amounts of diarrhea. She denies abdominal pain (aside from her baseline chronic RLQ pain radiating down her right leg). She is able to eat and ambulate with assistance.  _____________________________________________________  OBJECTIVE:    T(C): 36.6 (09-23-18 @ 04:30), Max: 36.8 (09-22-18 @ 20:36)  HR: 59 (09-23-18 @ 04:30)  BP: 158/79 (09-23-18 @ 04:30)  RR: 16 (09-23-18 @ 04:30)  SpO2: 96% (09-23-18 @ 04:30)  Wt(kg): --    General = Comfortable-appearing, no acute distress  Abdomen = Non-distended, normal active bowel sounds, soft, nontender (except RLQ>RUQ)  _____________________________________________________  LABS:                        9.6    21.88 )-----------( 419      ( 22 Sep 2018 08:10 )             31.6     09-23    135  |  99  |  10  ----------------------------<  105<H>  3.6   |  27  |  0.77    Ca    8.2<L>      23 Sep 2018 06:56  Phos  3.4     09-23  Mg     1.5     09-23    TPro  4.7<L>  /  Alb  2.4<L>  /  TBili  0.2  /  DBili  x   /  AST  26  /  ALT  7<L>  /  AlkPhos  87  09-23    LIVER FUNCTIONS - ( 23 Sep 2018 06:56 )  Alb: 2.4 g/dL / Pro: 4.7 g/dL / ALK PHOS: 87 U/L / ALT: 7 U/L / AST: 26 U/L / GGT: x             _____________________________________________________  ACTIVE MEDS:  MEDICATIONS  (STANDING):  atorvastatin 10 milliGRAM(s) Oral at bedtime  balsalazide 2250 milliGRAM(s) Oral three times a day  enoxaparin Injectable 40 milliGRAM(s) SubCutaneous every 24 hours  magnesium sulfate  IVPB 2 Gram(s) IV Intermittent once  metoprolol tartrate 12.5 milliGRAM(s) Oral every 12 hours  pantoprazole    Tablet 40 milliGRAM(s) Oral before breakfast  sertraline 25 milliGRAM(s) Oral two times a day  traMADol 50 milliGRAM(s) Oral every 12 hours  vancomycin    Solution 500 milliGRAM(s) Oral every 6 hours    MEDICATIONS  (PRN):  acetaminophen    Suspension .. 650 milliGRAM(s) Oral every 6 hours PRN Mild Pain (1 - 3), Moderate Pain (4 - 6)  acetaminophen   Tablet .. 650 milliGRAM(s) Oral every 6 hours PRN Temp greater or equal to 38C (100.4F)  acetaminophen   Tablet .. 650 milliGRAM(s) Oral every 6 hours PRN Moderate Pain (4 - 6)  traMADol 25 milliGRAM(s) Oral every 6 hours PRN breakthru pain    _____________________________________________________  ASSESSMENT:  78yFemale with severe C. difficile colitis in setting of chemotherapy for advanced endometrial cancer, radiation colitis, and a remote history of ulcerative colitis. Overall improvement but still has significant leukocytosis, and the BM which had been formed is now loose again.    PLAN:  Continue vancomycin (duration as per ID)  Reconsider fecal microbial transplant if the resolution of the C. diff colitis does not continue to progress  Continue present diet, balsalazide, pantoprazole    Karl Michel M.D.  (O) [phone]  (C) [phone]

## 2018-09-23 NOTE — PROGRESS NOTE ADULT - PROBLEM SELECTOR PLAN 3
Endometrial cancer w/ mets to abdominal wall & iliac nodes. Gets taxol through port weekly. Has had abdominal wall resection in past. Recently had 2nd opinion with Dr. Altaf Arteaga at [facility] as pt states she did not like the bad news she was given at [facility] [place] [place]. Jenni recommended to consider hormone therapy. Pt states she would want her HCP to be her daughter Vidya Tripp who lives in [place]. Discussed possible life sustaining measures given her current condition and pt states she would want all possible resuscitative efforts.  - Full Code  - Palliative consulted, goals of care discussed - patient not interested in hospice at this time  - as per daughter, patient still sees Dr. Hylton at [facility] [place] [place] for trx

## 2018-09-23 NOTE — PROGRESS NOTE ADULT - PROBLEM SELECTOR PLAN 2
- Pt w/ known MVP & remote Hx of A-fib > 30 years ago. Pt & daughter had been resistant to AC or rate control medications, now on eliquis and metop with controlled rates.

## 2018-09-23 NOTE — PROGRESS NOTE ADULT - PROBLEM SELECTOR PLAN 1
Severe w/ WBC peak at 36, remains on vanco 500mg PO, now w/ formed BMs and downtrending WBC. will cont w/ current vanco dose, today day 12, can likely DC after 14 days total tx, would discuss with ID on monday regarding decr'ing dose back to 125

## 2018-09-23 NOTE — PROGRESS NOTE ADULT - SUBJECTIVE AND OBJECTIVE BOX
Note Author: Korey Marc, PGY 3  [facility] Pager: [phone]  [facility] Pager: [phone]  Spectra: [phone]      Patient is a 78y old  Female who presents with a chief complaint of Not feeling well (23 Sep 2018 09:15)      SUBJECTIVE / OVERNIGHT EVENTS:  No acute events overnight. Patient seen and examined in AM. BMs more formed, still w? R abdom pain, RLE pain, both of which chronic. Patient denied fevers, CP, SOB,     MEDICATIONS  (STANDING):  atorvastatin 10 milliGRAM(s) Oral at bedtime  balsalazide 2250 milliGRAM(s) Oral three times a day  enoxaparin Injectable 40 milliGRAM(s) SubCutaneous every 24 hours  magnesium sulfate  IVPB 2 Gram(s) IV Intermittent once  metoprolol tartrate 12.5 milliGRAM(s) Oral every 12 hours  pantoprazole    Tablet 40 milliGRAM(s) Oral before breakfast  sertraline 25 milliGRAM(s) Oral two times a day  traMADol 50 milliGRAM(s) Oral every 12 hours  vancomycin    Solution 500 milliGRAM(s) Oral every 6 hours    MEDICATIONS  (PRN):  acetaminophen    Suspension .. 650 milliGRAM(s) Oral every 6 hours PRN Mild Pain (1 - 3), Moderate Pain (4 - 6)  acetaminophen   Tablet .. 650 milliGRAM(s) Oral every 6 hours PRN Temp greater or equal to 38C (100.4F)  acetaminophen   Tablet .. 650 milliGRAM(s) Oral every 6 hours PRN Moderate Pain (4 - 6)  traMADol 25 milliGRAM(s) Oral every 6 hours PRN breakthru pain      CAPILLARY BLOOD GLUCOSE        I&O's Summary    22 Sep 2018 07:01  -  23 Sep 2018 07:00  --------------------------------------------------------  IN: 200 mL / OUT: 3 mL / NET: 197 mL        Vital Signs Last 24 Hrs  T(C): 36.6 (23 Sep 2018 04:30), Max: 36.8 (22 Sep 2018 20:36)  T(F): 97.9 (23 Sep 2018 04:30), Max: 98.2 (22 Sep 2018 20:36)  HR: 59 (23 Sep 2018 04:30) (59 - 67)  BP: 158/79 (23 Sep 2018 04:30) (142/81 - 158/79)  BP(mean): --  RR: 16 (23 Sep 2018 04:30) (16 - 18)  SpO2: 96% (23 Sep 2018 04:30) (95% - 96%)    PHYSICAL EXAM:  General: NAD, well-developed  Eyes: EOMI  Neck: Supple, No JVD  Chest/Lung: Clear to auscultation bilaterally; No wheezes  Heart: Regular rate and rhythm; No murmurs, rubs, or gallops. Capillary refill WNL  Abdome: Soft, Nontender, Nondistended; Bowel sounds present  Extremities: mild RLE   Psych: AAOx3  Neurology: non-focal, strength and sensation grossly intact UE and LE BL. No spinal TTP  Skin: No rashes or lesions    LABS:                        9.5    18.89 )-----------( 402      ( 23 Sep 2018 08:47 )             31.1       09-23    135  |  99  |  10  ----------------------------<  105<H>  3.6   |  27  |  0.77    Ca    8.2<L>      23 Sep 2018 06:56  Phos  3.4     09-23  Mg     1.5     09-23    TPro  4.7<L>  /  Alb  2.4<L>  /  TBili  0.2  /  DBili  x   /  AST  26  /  ALT  7<L>  /  AlkPhos  87  09-23                        EKG:   CXR:       RADIOLOGY & ADDITIONAL TESTS:    Imaging Personally Reviewed:    Consultant(s) Notes Reviewed:      Care Discussed with Consultants/Other Providers:

## 2018-09-24 LAB
ALBUMIN SERPL ELPH-MCNC: 2.6 G/DL — LOW (ref 3.3–5)
ALP SERPL-CCNC: 91 U/L — SIGNIFICANT CHANGE UP (ref 40–120)
ALT FLD-CCNC: 7 U/L — LOW (ref 10–45)
ANION GAP SERPL CALC-SCNC: 7 MMOL/L — SIGNIFICANT CHANGE UP (ref 5–17)
AST SERPL-CCNC: 26 U/L — SIGNIFICANT CHANGE UP (ref 10–40)
BASOPHILS # BLD AUTO: 0.07 K/UL — SIGNIFICANT CHANGE UP (ref 0–0.2)
BASOPHILS NFR BLD AUTO: 0.5 % — SIGNIFICANT CHANGE UP (ref 0–2)
BILIRUB SERPL-MCNC: 0.2 MG/DL — SIGNIFICANT CHANGE UP (ref 0.2–1.2)
BUN SERPL-MCNC: 10 MG/DL — SIGNIFICANT CHANGE UP (ref 7–23)
CALCIUM SERPL-MCNC: 8.6 MG/DL — SIGNIFICANT CHANGE UP (ref 8.4–10.5)
CHLORIDE SERPL-SCNC: 98 MMOL/L — SIGNIFICANT CHANGE UP (ref 96–108)
CO2 SERPL-SCNC: 31 MMOL/L — SIGNIFICANT CHANGE UP (ref 22–31)
CREAT SERPL-MCNC: 0.78 MG/DL — SIGNIFICANT CHANGE UP (ref 0.5–1.3)
EOSINOPHIL # BLD AUTO: 0.32 K/UL — SIGNIFICANT CHANGE UP (ref 0–0.5)
EOSINOPHIL NFR BLD AUTO: 2.1 % — SIGNIFICANT CHANGE UP (ref 0–6)
GLUCOSE SERPL-MCNC: 104 MG/DL — HIGH (ref 70–99)
HCT VFR BLD CALC: 29.3 % — LOW (ref 34.5–45)
HGB BLD-MCNC: 8.7 G/DL — LOW (ref 11.5–15.5)
IMM GRANULOCYTES NFR BLD AUTO: 2.2 % — HIGH (ref 0–1.5)
LYMPHOCYTES # BLD AUTO: 1.67 K/UL — SIGNIFICANT CHANGE UP (ref 1–3.3)
LYMPHOCYTES # BLD AUTO: 10.9 % — LOW (ref 13–44)
MAGNESIUM SERPL-MCNC: 1.8 MG/DL — SIGNIFICANT CHANGE UP (ref 1.6–2.6)
MCHC RBC-ENTMCNC: 24.9 PG — LOW (ref 27–34)
MCHC RBC-ENTMCNC: 29.7 GM/DL — LOW (ref 32–36)
MCV RBC AUTO: 84 FL — SIGNIFICANT CHANGE UP (ref 80–100)
MONOCYTES # BLD AUTO: 0.83 K/UL — SIGNIFICANT CHANGE UP (ref 0–0.9)
MONOCYTES NFR BLD AUTO: 5.4 % — SIGNIFICANT CHANGE UP (ref 2–14)
NEUTROPHILS # BLD AUTO: 12.12 K/UL — HIGH (ref 1.8–7.4)
NEUTROPHILS NFR BLD AUTO: 78.9 % — HIGH (ref 43–77)
PHOSPHATE SERPL-MCNC: 3.6 MG/DL — SIGNIFICANT CHANGE UP (ref 2.5–4.5)
PLATELET # BLD AUTO: 412 K/UL — HIGH (ref 150–400)
POTASSIUM SERPL-MCNC: 4.1 MMOL/L — SIGNIFICANT CHANGE UP (ref 3.5–5.3)
POTASSIUM SERPL-SCNC: 4.1 MMOL/L — SIGNIFICANT CHANGE UP (ref 3.5–5.3)
PROT SERPL-MCNC: 4.9 G/DL — LOW (ref 6–8.3)
RBC # BLD: 3.49 M/UL — LOW (ref 3.8–5.2)
RBC # FLD: 20.6 % — HIGH (ref 10.3–14.5)
SODIUM SERPL-SCNC: 136 MMOL/L — SIGNIFICANT CHANGE UP (ref 135–145)
WBC # BLD: 15.35 K/UL — HIGH (ref 3.8–10.5)
WBC # FLD AUTO: 15.35 K/UL — HIGH (ref 3.8–10.5)

## 2018-09-24 PROCEDURE — 99232 SBSQ HOSP IP/OBS MODERATE 35: CPT

## 2018-09-24 PROCEDURE — 99233 SBSQ HOSP IP/OBS HIGH 50: CPT | Mod: GC

## 2018-09-24 RX ORDER — AMLODIPINE BESYLATE 2.5 MG/1
2.5 TABLET ORAL DAILY
Qty: 0 | Refills: 0 | Status: DISCONTINUED | OUTPATIENT
Start: 2018-09-24 | End: 2018-09-26

## 2018-09-24 RX ORDER — LOSARTAN POTASSIUM 100 MG/1
100 TABLET, FILM COATED ORAL DAILY
Qty: 0 | Refills: 0 | Status: DISCONTINUED | OUTPATIENT
Start: 2018-09-24 | End: 2018-09-27

## 2018-09-24 RX ADMIN — Medication 500 MILLIGRAM(S): at 13:19

## 2018-09-24 RX ADMIN — Medication 500 MILLIGRAM(S): at 00:11

## 2018-09-24 RX ADMIN — PANTOPRAZOLE SODIUM 40 MILLIGRAM(S): 20 TABLET, DELAYED RELEASE ORAL at 06:36

## 2018-09-24 RX ADMIN — BALSALAZIDE DISODIUM 2250 MILLIGRAM(S): 750 CAPSULE ORAL at 21:52

## 2018-09-24 RX ADMIN — Medication 650 MILLIGRAM(S): at 22:15

## 2018-09-24 RX ADMIN — Medication 650 MILLIGRAM(S): at 00:10

## 2018-09-24 RX ADMIN — OXYCODONE HYDROCHLORIDE 5 MILLIGRAM(S): 5 TABLET ORAL at 01:10

## 2018-09-24 RX ADMIN — ATORVASTATIN CALCIUM 10 MILLIGRAM(S): 80 TABLET, FILM COATED ORAL at 21:52

## 2018-09-24 RX ADMIN — ENOXAPARIN SODIUM 40 MILLIGRAM(S): 100 INJECTION SUBCUTANEOUS at 06:36

## 2018-09-24 RX ADMIN — OXYCODONE HYDROCHLORIDE 5 MILLIGRAM(S): 5 TABLET ORAL at 08:26

## 2018-09-24 RX ADMIN — Medication 650 MILLIGRAM(S): at 19:08

## 2018-09-24 RX ADMIN — Medication 650 MILLIGRAM(S): at 00:40

## 2018-09-24 RX ADMIN — Medication 500 MILLIGRAM(S): at 23:16

## 2018-09-24 RX ADMIN — OXYCODONE HYDROCHLORIDE 5 MILLIGRAM(S): 5 TABLET ORAL at 01:40

## 2018-09-24 RX ADMIN — LOSARTAN POTASSIUM 100 MILLIGRAM(S): 100 TABLET, FILM COATED ORAL at 17:57

## 2018-09-24 RX ADMIN — SERTRALINE 25 MILLIGRAM(S): 25 TABLET, FILM COATED ORAL at 06:36

## 2018-09-24 RX ADMIN — Medication 500 MILLIGRAM(S): at 06:36

## 2018-09-24 RX ADMIN — BALSALAZIDE DISODIUM 2250 MILLIGRAM(S): 750 CAPSULE ORAL at 06:36

## 2018-09-24 RX ADMIN — SERTRALINE 25 MILLIGRAM(S): 25 TABLET, FILM COATED ORAL at 17:57

## 2018-09-24 RX ADMIN — BALSALAZIDE DISODIUM 2250 MILLIGRAM(S): 750 CAPSULE ORAL at 13:19

## 2018-09-24 RX ADMIN — Medication 650 MILLIGRAM(S): at 21:52

## 2018-09-24 RX ADMIN — Medication 650 MILLIGRAM(S): at 17:58

## 2018-09-24 RX ADMIN — OXYCODONE HYDROCHLORIDE 5 MILLIGRAM(S): 5 TABLET ORAL at 09:20

## 2018-09-24 RX ADMIN — Medication 500 MILLIGRAM(S): at 17:57

## 2018-09-24 RX ADMIN — AMLODIPINE BESYLATE 2.5 MILLIGRAM(S): 2.5 TABLET ORAL at 17:57

## 2018-09-24 NOTE — PROGRESS NOTE ADULT - PROBLEM SELECTOR PLAN 6
DVT PPx  -Lovenox  PT recs: home with home PT to improve functional mobility and safely negotiate the home environment. Pt will require a RW for safe amb.  Pt today states that she wants to go to [facility]. Need to follow-up with oncologist regarding her chemotherapy regimen, whether weekly or not. Will influence d/c planning. Hypertensive today, meds were on hold due to low BPs. Restart home medications.  -Home meds: amlodipine 2.5mg daily, losartan 100mg daily

## 2018-09-24 NOTE — PROGRESS NOTE ADULT - PROBLEM SELECTOR PLAN 1
Episode of bloody diarrhea this morning. WBC peak at 36, remains on vanco 500mg PO, and downtrending WBC. C/w vancomycin, today day 13. F/u CBC and BMP

## 2018-09-24 NOTE — PROGRESS NOTE ADULT - SUBJECTIVE AND OBJECTIVE BOX
Follow Up:  Cidff    Interval History/ROS: eating better, forming stools    Allergies  erythromycin (Unknown)  macrolide antibiotics (Unknown)  morphine (Diarrhea)  sulfa drugs (Unknown)    ANTIMICROBIALS:  vancomycin    Solution 500 every 6 hours      OTHER MEDS:  MEDICATIONS  (STANDING):  acetaminophen    Suspension .. 650 every 6 hours PRN  acetaminophen   Tablet .. 650 every 6 hours PRN  acetaminophen   Tablet .. 650 every 6 hours PRN  amLODIPine   Tablet 2.5 daily  atorvastatin 10 at bedtime  balsalazide 2250 three times a day  enoxaparin Injectable 40 every 24 hours  losartan 100 daily  oxyCODONE    IR 5 every 4 hours PRN  pantoprazole    Tablet 40 before breakfast  sertraline 25 two times a day    Vital Signs Last 24 Hrs  T(C): 36.4 (24 Sep 2018 14:16), Max: 36.8 (24 Sep 2018 04:37)  T(F): 97.5 (24 Sep 2018 14:16), Max: 98.2 (24 Sep 2018 04:37)  HR: 86 (24 Sep 2018 17:41) (69 - 86)  BP: 134/80 (24 Sep 2018 17:41) (134/80 - 158/83)  BP(mean): --  RR: 18 (24 Sep 2018 17:41) (18 - 18)  SpO2: 97% (24 Sep 2018 15:27) (96% - 98%)    PHYSICAL EXAM:  General: WN/WD NAD, Non-toxic  Neurology: A&Ox3, nonfocal  Respiratory: Clear to auscultation bilaterally  CV: RRR, S1S2, no murmurs, rubs or gallops  Abdominal: Soft, Non-tender, non-distended, normal bowel sounds  Extremities: No edema,   Line Sites: Clear  Skin: No rash                        8.7    15.35 )-----------( 412      ( 24 Sep 2018 09:21 )             29.3  WBC Count: 15.35 (09-24 @ 09:21)  WBC Count: 18.89 (09-23 @ 08:47)  WBC Count: 21.88 (09-22 @ 08:10)  WBC Count: 22.07 (09-21 @ 07:53)  WBC Count: 34.49 (09-20 @ 07:43)         09-24    136  |  98  |  10  ----------------------------<  104<H>  4.1   |  31  |  0.78    Ca    8.6      24 Sep 2018 07:13  Phos  3.6     09-24  Mg     1.8     09-24    TPro  4.9<L>  /  Alb  2.6<L>  /  TBili  0.2  /  DBili  x   /  AST  26  /  ALT  7<L>  /  AlkPhos  91  09-24          MICROBIOLOGY:  .Blood Blood-Peripheral  09-12-18   No growth at 5 days.  --  --      .Stool Feces  09-12-18   No enteric pathogens isolated.  (Stool culture examined for Salmonella,  Shigella, Campylobacter, Aeromonas, Plesiomonas,  Vibrio, E.coli O157 and Yersinia)  --  --      .Urine Clean Catch (Midstream)  09-11-18   Culture grew 3 or more types of organisms which indicate  collection contamination; consider recollection only if clinically  indicated.  --  --      .Blood Blood-Peripheral  09-11-18   No growth at 5 days.  --  --      .Urine Clean Catch (Midstream)  08-27-18   <10,000 CFU/ml Normal Urogenital long present  --  --      .Blood Blood-Peripheral  08-26-18   No growth at 5 days.  --  --    RADIOLOGY:    Richard Amato MD; Division of Infectious Disease; Pager: [phone]; nights and weekends: [phone]

## 2018-09-24 NOTE — PROGRESS NOTE ADULT - SUBJECTIVE AND OBJECTIVE BOX
FRIDA ROMANO  78y  Female      Patient is a 78y old  Female who presents with a chief complaint of Not feeling well (23 Sep 2018 12:53)      INTERVAL HPI/OVERNIGHT EVENTS:    Pt reports no acute events overnight. She had an episode of diarrhea with some blood this morning. She continues to endorse RLE pain and some swelling, but states that this has been present since her admission. She denies abdominal pain, shortness of breath, or nausea.     REVIEW OF SYSTEMS:  CONSTITUTIONAL: No fever, fatigue  EYES: No eye pain, visual disturbances, or discharge  ENMT:  No difficulty hearing; No sinus or throat pain  NECK: No pain or stiffness  RESPIRATORY: No cough, wheezing; No shortness of breath  CARDIOVASCULAR: No chest pain, palpitations. +RLE edema  GASTROINTESTINAL: No abdominal or epigastric pain. No nausea, vomiting. +bloody diarrhea  GENITOURINARY: No dysuria, frequency, hematuria, or incontinence  NEUROLOGICAL: No headaches, memory loss, loss of strength, numbness  SKIN: No itching, burning, rashes, or lesions   MUSCULOSKELETAL: No joint pain or swelling; No muscle, back, or extremity pain  PSYCHIATRIC: No depression, anxiety, difficulty sleeping  ALLERGY AND IMMUNOLOGIC: No hives    T(C): 36.8 (09-24-18 @ 04:37), Max: 36.8 (09-24-18 @ 04:37)  HR: 85 (09-24-18 @ 04:37) (69 - 85)  BP: 158/83 (09-24-18 @ 04:37) (144/80 - 158/83)  RR: 18 (09-24-18 @ 04:37) (18 - 18)  SpO2: 98% (09-24-18 @ 04:37) (96% - 98%)  Wt(kg): --Vital Signs Last 24 Hrs  T(C): 36.8 (24 Sep 2018 04:37), Max: 36.8 (24 Sep 2018 04:37)  T(F): 98.2 (24 Sep 2018 04:37), Max: 98.2 (24 Sep 2018 04:37)  HR: 85 (24 Sep 2018 04:37) (69 - 85)  BP: 158/83 (24 Sep 2018 04:37) (144/80 - 158/83)  BP(mean): --  RR: 18 (24 Sep 2018 04:37) (18 - 18)  SpO2: 98% (24 Sep 2018 04:37) (96% - 98%)    PHYSICAL EXAM:  GENERAL: NAD, well-groomed, well-developed  HEAD:  Atraumatic, Normocephalic  EYES: EOMI, conjunctiva and sclera clear  ENMT: Moist mucous membranes, Good dentition, No lesions  NECK: Supple, No JVD  NERVOUS SYSTEM:  Alert & Oriented X3, Good concentration; motor and sensory grossly intact   CHEST/LUNG: Clear to percussion bilaterally; No rales, wheezing  HEART: Regular rate and rhythm; No murmurs, rubs, or gallops  ABDOMEN: Soft, Nontender, Nondistended; Bowel sounds present  EXTREMITIES:  2+ Peripheral Pulses, +RLE edema  SKIN: No rashes or lesions    Consultant(s) Notes Reviewed:  [x ] YES  [ ] NO  Care Discussed with Consultants/Other Providers [ x] YES  [ ] NO    LABS:                        8.7    15.35 )-----------( 412      ( 24 Sep 2018 09:21 )             29.3     09-24    136  |  98  |  10  ----------------------------<  104<H>  4.1   |  31  |  0.78    Ca    8.6      24 Sep 2018 07:13  Phos  3.6     09-24  Mg     1.8     09-24    TPro  4.9<L>  /  Alb  2.6<L>  /  TBili  0.2  /  DBili  x   /  AST  26  /  ALT  7<L>  /  AlkPhos  91  09-24      RADIOLOGY & ADDITIONAL TESTS:    Imaging Personally Reviewed:  [x] YES  [ ] NO

## 2018-09-24 NOTE — PROGRESS NOTE ADULT - PROBLEM SELECTOR PLAN 3
- Pt w/ known MVP & remote Hx of A-fib > 30 years ago. On eliquis and metoprolol with controlled rates. Endometrial cancer w/ mets to abdominal wall & iliac nodes, likely accounting for some of her chronic RLE pain. Gets taxol through port weekly. Has had abdominal wall resection in past. Recently had 2nd opinion with Dr. Altaf Arteaga at [facility] as pt states she did not like the bad news she was given at [facility]. Jenni recommended to consider hormone therapy. Pt states she would want her HCP to be her daughter Vidya Tripp who lives in [place]. Discussed possible life sustaining measures given her current condition and pt states she would want all possible resuscitative efforts.  - Full Code  - Palliative consulted, goals of care discussed - patient not interested in hospice at this time  - as per daughter, patient still sees Dr. Hylton at [facility] for trx

## 2018-09-24 NOTE — PROGRESS NOTE ADULT - ASSESSMENT
Ms Belcher is 78F with mutliple comordities including  endometrial cancer w/ mets currently receiving weekly chemotherapy with paclitaxel which she seems to be tolerating, Ulcerative colitis?, chronic diarrhea on immodium, HTN, previous antibiotic exposure.  She has severe Cdiff on day #12 therapy  clinically improved  leukocytosis decreasing    Antibiotics:  Cipro 9/11  Flagyl 9/11; 9/13 -->  Vanco po 125 q 6h 9/12 -->14; 500 q 6 h 9/14-->    Suggest  Continue po Vanco

## 2018-09-24 NOTE — PROGRESS NOTE ADULT - PROBLEM SELECTOR PLAN 4
Pt states her UC has been well controlled on balsalazide. C/w balsalazide - Pt w/ known MVP & remote Hx of A-fib > 30 years ago. On eliquis and metoprolol with controlled rates.

## 2018-09-24 NOTE — PROGRESS NOTE ADULT - PROBLEM SELECTOR PLAN 5
Hypertensive today, meds were on hold due to low BPs. Restart home medications.  -Home meds: amlodipine 2.5mg daily, losartan 100mg daily Pt states her UC has been well controlled on balsalazide. C/w balsalazide

## 2018-09-24 NOTE — PROGRESS NOTE ADULT - PROBLEM SELECTOR PLAN 2
Endometrial cancer w/ mets to abdominal wall & iliac nodes, likely accounting for some of her chronic RLE pain. Gets taxol through port weekly. Has had abdominal wall resection in past. Recently had 2nd opinion with Dr. Altaf Arteaga at [facility] as pt states she did not like the bad news she was given at [facility]. Jenni recommended to consider hormone therapy. Pt states she would want her HCP to be her daughter Vidya Tripp who lives in [place]. Discussed possible life sustaining measures given her current condition and pt states she would want all possible resuscitative efforts.  - Full Code  - Palliative consulted, goals of care discussed - patient not interested in hospice at this time  - as per daughter, patient still sees Dr. Hylton at [facility] for trx Patient complaining of RLE pain and edema. Stated this has been going on since her admission. Could be due to metastatic disease vs chronic lipodermatosclerosis vs DVT. F/u RLE doppler to R/O DVT

## 2018-09-24 NOTE — PROGRESS NOTE ADULT - ASSESSMENT
78Fw/ hx of endometrial cancer w/ mets currently receiving weekly chemotherapy (which she has been on/off for years) with paclitaxel which she seems to be tolerating, ulcerative colitis, HTN, presenting with watery diarrhea in the setting of recent UTI s/p antibiotic therapy (2 months ago) and abdominal pain. Stool came back positive for C. difficile stool toxin (9/12) and has treated with vancomycin PO and IV flagyl. Leukocytosis has improved since going back up on the vancomycin to 500 with WBC at 22.07 from 34.49. Pt has had worsening pain from a combination of RLE lymphedema, colitis, cancer that requires further pain control. Electrolyte imbalances are improving.    79 yo woman with hx of metastatic endometrial cancer on weekly paclitaxel, ulcerative colitis, HTN, presenting with diarrhea in the setting of recent UTI s/p antibiotic therapy (2 months ago) and abdominal pain. Stool positive for C. difficile stool toxin (9/12), treated with IV flagyl and PO vancomycin (day 13/14). Leukocytosis continuing to improve (18.89 --> 15.35). Had hypokalemia during her hospitalization, has been resolved and remains stable today. Pt hypertensive today in context of discontinued HTN medications. Continues to complain of RLE pain and swelling. 79 yo woman with hx of metastatic endometrial cancer on weekly paclitaxel, ulcerative colitis, HTN, presenting with diarrhea in the setting of recent UTI s/p antibiotic therapy (2 months ago) and abdominal pain. Stool positive for C. difficile stool toxin (9/12), treated with IV flagyl and PO vancomycin (day 13/14). Leukocytosis continuing to improve (18.89 --> 15.35). Had hypokalemia during her hospitalization, has been resolved and remains stable today. Pt hypertensive today in context of discontinued HTN medications. Continues to complain of RLE pain and swelling.

## 2018-09-25 ENCOUNTER — TRANSCRIPTION ENCOUNTER (OUTPATIENT)
Age: 78
End: 2018-09-25

## 2018-09-25 PROCEDURE — 93971 EXTREMITY STUDY: CPT | Mod: 26

## 2018-09-25 PROCEDURE — 99233 SBSQ HOSP IP/OBS HIGH 50: CPT | Mod: GC

## 2018-09-25 RX ORDER — OXYCODONE HYDROCHLORIDE 5 MG/1
5 TABLET ORAL EVERY 8 HOURS
Qty: 0 | Refills: 0 | Status: DISCONTINUED | OUTPATIENT
Start: 2018-09-25 | End: 2018-09-26

## 2018-09-25 RX ORDER — OXYCODONE HYDROCHLORIDE 5 MG/1
5 TABLET ORAL EVERY 8 HOURS
Qty: 0 | Refills: 0 | Status: DISCONTINUED | OUTPATIENT
Start: 2018-09-25 | End: 2018-09-25

## 2018-09-25 RX ADMIN — OXYCODONE HYDROCHLORIDE 5 MILLIGRAM(S): 5 TABLET ORAL at 06:02

## 2018-09-25 RX ADMIN — BALSALAZIDE DISODIUM 2250 MILLIGRAM(S): 750 CAPSULE ORAL at 13:19

## 2018-09-25 RX ADMIN — OXYCODONE HYDROCHLORIDE 5 MILLIGRAM(S): 5 TABLET ORAL at 22:01

## 2018-09-25 RX ADMIN — Medication 650 MILLIGRAM(S): at 23:01

## 2018-09-25 RX ADMIN — PANTOPRAZOLE SODIUM 40 MILLIGRAM(S): 20 TABLET, DELAYED RELEASE ORAL at 06:02

## 2018-09-25 RX ADMIN — BALSALAZIDE DISODIUM 2250 MILLIGRAM(S): 750 CAPSULE ORAL at 22:04

## 2018-09-25 RX ADMIN — Medication 500 MILLIGRAM(S): at 23:47

## 2018-09-25 RX ADMIN — OXYCODONE HYDROCHLORIDE 5 MILLIGRAM(S): 5 TABLET ORAL at 13:18

## 2018-09-25 RX ADMIN — LOSARTAN POTASSIUM 100 MILLIGRAM(S): 100 TABLET, FILM COATED ORAL at 06:01

## 2018-09-25 RX ADMIN — ENOXAPARIN SODIUM 40 MILLIGRAM(S): 100 INJECTION SUBCUTANEOUS at 06:02

## 2018-09-25 RX ADMIN — ATORVASTATIN CALCIUM 10 MILLIGRAM(S): 80 TABLET, FILM COATED ORAL at 22:03

## 2018-09-25 RX ADMIN — Medication 500 MILLIGRAM(S): at 18:06

## 2018-09-25 RX ADMIN — SERTRALINE 25 MILLIGRAM(S): 25 TABLET, FILM COATED ORAL at 06:01

## 2018-09-25 RX ADMIN — Medication 500 MILLIGRAM(S): at 11:29

## 2018-09-25 RX ADMIN — Medication 650 MILLIGRAM(S): at 23:47

## 2018-09-25 RX ADMIN — OXYCODONE HYDROCHLORIDE 5 MILLIGRAM(S): 5 TABLET ORAL at 14:00

## 2018-09-25 RX ADMIN — AMLODIPINE BESYLATE 2.5 MILLIGRAM(S): 2.5 TABLET ORAL at 06:01

## 2018-09-25 RX ADMIN — OXYCODONE HYDROCHLORIDE 5 MILLIGRAM(S): 5 TABLET ORAL at 22:00

## 2018-09-25 RX ADMIN — BALSALAZIDE DISODIUM 2250 MILLIGRAM(S): 750 CAPSULE ORAL at 06:01

## 2018-09-25 RX ADMIN — Medication 500 MILLIGRAM(S): at 06:02

## 2018-09-25 RX ADMIN — SERTRALINE 25 MILLIGRAM(S): 25 TABLET, FILM COATED ORAL at 18:06

## 2018-09-25 NOTE — DISCHARGE NOTE ADULT - HOSPITAL COURSE
78F mediocre historian w/ hx of endometrial cancer w/ mets currently receiving weekly chemotherapy with paclitaxel which she seems to be tolerating, Ulcerative colitis?, chronic diarrhea on immodium, HTN, p/w not feeling well. Reportedly patient was in [place] 2 weeks ago for unclear reason. Pt states she was feeling better when starting roughly 2 days ago she developed weakness and nausea. Symptoms continued today without improvement so went for further evaluation. She had an outpatient CT abd/pelvis performed which indicated sigmoid colitis and came to the ER. Of note, pt's outpatient images have been uploaded into EMR. As per ER signout, pt's GI Dr. Hollis thinks radiation colitis? Endorses some LLQ pain and pain is worse with palpation. She endorses constipation over the last 2 days but had large solid BM today. Denies hematochezia or melena. No obvious source of bleeding as per patient. Pt has extensive paperwork with her including results of her CT abd/pelvis earlier today.     In ER: Given Cipro, flagyl IV, LR 1L, KCl 40mg PO, KCl 10mg IV      Patient was intially treated for her diarrhea with cipro and flagyl, WBC spiked to 36, found positive for C diff, she was managed on vanco, GI and surgery followed during hospitial, no evidence of toxic megacolon or surgical indication was found. Per GI team, flagyl was added to vanco 125mg PO, WBC improved, flagyl DC'd, WBC again increased, but after increasing vanco to 500mg PO, she maintained clinical improvement and completed vanco po x 14 days. 78F mediocre historian w/ hx of endometrial cancer w/ mets currently receiving weekly chemotherapy with paclitaxel which she seems to be tolerating, Ulcerative colitis?, chronic diarrhea on immodium, HTN, p/w not feeling well. Reportedly patient was in [place] 2 weeks ago for unclear reason. Pt states she was feeling better when starting roughly 2 days ago she developed weakness and nausea. Symptoms continued today without improvement so went for further evaluation. She had an outpatient CT abd/pelvis performed which indicated sigmoid colitis and came to the ER. Of note, pt's outpatient images have been uploaded into EMR. As per ER signout, pt's GI Dr. Hollis thinks radiation colitis? Endorses some LLQ pain and pain is worse with palpation. She endorses constipation over the last 2 days but had large solid BM today. Denies hematochezia or melena. No obvious source of bleeding as per patient. Pt has extensive paperwork with her including results of her CT abd/pelvis earlier today.     In ER: Given Cipro, flagyl IV, LR 1L, KCl 40mg PO, KCl 10mg IV      Patient was intially treated for her diarrhea with cipro and flagyl, WBC spiked to 36, found positive for C diff, she was managed on vanco, GI and surgery followed during hospitialization, no evidence of toxic megacolon or surgical indication was found. Per GI team, flagyl was added to vanco 125mg PO, WBC improved, flagyl DC'd, WBC again increased, but after increasing vanco to 500mg PO, she maintained clinical improvement and completed vanco po x 14 days.     Of note, pt has chronic R abdom pain, which is believed to be related to a metastasis from her endometrial carcinoma. She also has chronic RLE pain 2/2 lipodermatosclerosis. Given her co-treatment with zoloft, her tramadol was changed to oxycodone, has been achieving good pain control with this regimen. 78 year old woman with PMH of metastatic endometrial cancer on weekly paclitaxel, ulcerative colitis, HTN; presented with diarrhea in the setting of recent UTI s/p antibiotic therapy (2 months ago) and abdominal pain. Stool positive for C. difficile stool toxin (9/12). GI and surgery followed during hospitalization no evidence of toxic megacolon or surgical indication was found. Patient treated with IV flagyl and PO vancomycin. Diarrhea resolving. Leukocytosis downtrending. Patient will be dcd with additional 5 days of Po Vanco to complete on 9/31. Patient with complaints of right upper thigh and hip pain throughout admission. Patient had [facility] Duplex which was negative for dvt, hip and femur X-rays were negative for lytic or blastic lesions, No evidence of fracture, pt with mild arthrosis and trace rt knee effusion. Patient will be dcd with Oxycodone prn Mod-severe Pain and Tylenol prn mild pain. 78 year old woman with PMH of metastatic endometrial cancer on weekly paclitaxel, ulcerative colitis, HTN; presented with diarrhea in the setting of recent UTI s/p antibiotic therapy (2 months ago) and abdominal pain. Stool positive for C. difficile stool toxin (9/12). GI and surgery followed during hospitalization no evidence of toxic megacolon or surgical indication was found. Patient treated with IV flagyl and PO vancomycin. Diarrhea resolving. Leukocytosis downtrending. Patient will be dcd with additional 5 days of Po Vanco to complete on 9/31. Patient with complaints of right upper thigh and hip pain throughout admission. Patient had VA Duplex which was negative for dvt, hip and femur X-rays were negative for lytic or blastic lesions, No evidence of fracture, pt with mild arthrosis and trace rt knee effusion. Patient will be dcd with Oxycodone prn Mod-severe Pain and Tylenol prn mild pain.     Assessment and Plan on Discharge:     1:Clostridium difficile colitis:  - C/w PO Vanco 125 mg q 6 hrs to complete 9/31    -C/w probiotic supplements with meals.  -C/w mechanical soft low fiber lactose free diet with Ensure supplements for now.     2: Chronic pain of right lower extremity:  - Patient with chronic lymphedema on that side, per  daughter.   - RLE doppler negative for DVT.   -X-ray of right femur and right hip/pelvis negative for any fractures or dislocations, only shows arthrosis of the hip and knee joints, with trace right knee joint effusion.  - Patient says having good response to oxycodone. C/w oxycodone 5mg Q4H PRN mod- severe pain    - C/w Tylenol 650mg Q6H PRN mild pain     3: Endometrial cancer.:  - Endometrial cancer with mets to abdominal wall & iliac nodes, likely accounting for some of her chronic RLE pain/swelling. Was getting taxol through port weekly. Has had abdominal wall resection in past.   - Recently had 2nd opinion with Dr. Altaf Arteaga at [facility] as patient stated she did not like the bad news she was given at [facility].  -Dr. Arteaga had recommended to consider hormone therapy   -Palliative consulted, goals of care discussed - patient not interested in hospice at this time. Full Code.   -Per patient, oncologist recommended stopping chemotherapy and considering other options after her current condition improves.  -Her oncologist feels that rehab to improve her functional status is a good idea before considering more evaluation or any medical therapy.  - Patient to f/u with Oncologist as outpatient   -Patient with some anemia noted, Patient with hx of chronic anemia. Only occasionally has some minor bleeding from hemorrhoids in the toilet.   - Will dc patient with empiric ferrous sulfate 325mg daily and MVI daily.   - Patient to have  CBC checked within 3-7 days at rehab    4: Paroxysmal atrial fibrillation:   -Patient with known MVP and remote history of an episode of Afib > 30 years ago.   -Had episode of Afib on 9/14 in setting of dehydration and severe C. diff infection, which resolved with fluids. Was briefly on metoprolol here, but stopped due to ?fatigue per daughter. She was never started on anti-coagulation here.   -Currently in sinus rhythm and rate controlled, so no need for beta blocker at this time.   -CHADS Vasc score of 4 and therefore at risk for stroke.   -Discussed risks and benefits of AC with patient and daughter. They are opting for no full AC at this time. But we've agreed on starting at least ASA 81mg daily at this time for some protection.     5: Ulcerative colitis:  - Patient reportedly stated her ?UC/radiation colitis had been well controlled on balsalazide.   -C/w balsalazide  - F/u GI outpatient      6: Essential hypertension:  - C/w amlodipine 5mg daily  -C/w losartan 100mg daily.    7: Prophylactic measure:   -C/w lovenox SQ for DVT PPx.   -C/w PPI daily.

## 2018-09-25 NOTE — PROGRESS NOTE ADULT - SUBJECTIVE AND OBJECTIVE BOX
SUBJECTIVE:  BMs more formed.  reports lower abdominal, back and leg pain when ambulating, feels weak.  no nausea or vomiting.  tolerated diet.  _____________________________________________________  OBJECTIVE:    T(C): 36.4 (09-25-18 @ 05:12), Max: 36.8 (09-24-18 @ 20:40)  HR: 80 (09-25-18 @ 05:12)  BP: 156/66 (09-25-18 @ 05:12)  RR: 20 (09-25-18 @ 05:12)  SpO2: 95% (09-25-18 @ 05:12)  Wt(kg): --    09-24 @ 07:01  -  09-25 @ 07:00  --------------------------------------------------------  IN:    Oral Fluid: 960 mL  Total IN: 960 mL    OUT:  Total OUT: 0 mL    Total NET: 960 mL        PHYSICAL EXAM:      Constitutional: nad    Gastrointestinal: soft NTND +BS    _____________________________________________________  LABS:                        8.7    15.35 )-----------( 412      ( 24 Sep 2018 09:21 )             29.3     09-24    136  |  98  |  10  ----------------------------<  104<H>  4.1   |  31  |  0.78    Ca    8.6      24 Sep 2018 07:13  Phos  3.6     09-24  Mg     1.8     09-24    TPro  4.9<L>  /  Alb  2.6<L>  /  TBili  0.2  /  DBili  x   /  AST  26  /  ALT  7<L>  /  AlkPhos  91  09-24    LIVER FUNCTIONS - ( 24 Sep 2018 07:13 )  Alb: 2.6 g/dL / Pro: 4.9 g/dL / ALK PHOS: 91 U/L / ALT: 7 U/L / AST: 26 U/L / GGT: x           _____________________________________________________  ACTIVE MEDS:  MEDICATIONS  (STANDING):  amLODIPine   Tablet 2.5 milliGRAM(s) Oral daily  atorvastatin 10 milliGRAM(s) Oral at bedtime  balsalazide 2250 milliGRAM(s) Oral three times a day  enoxaparin Injectable 40 milliGRAM(s) SubCutaneous every 24 hours  losartan 100 milliGRAM(s) Oral daily  pantoprazole    Tablet 40 milliGRAM(s) Oral before breakfast  sertraline 25 milliGRAM(s) Oral two times a day  vancomycin    Solution 500 milliGRAM(s) Oral every 6 hours    MEDICATIONS  (PRN):  acetaminophen    Suspension .. 650 milliGRAM(s) Oral every 6 hours PRN Mild Pain (1 - 3), Moderate Pain (4 - 6)  acetaminophen   Tablet .. 650 milliGRAM(s) Oral every 6 hours PRN Temp greater or equal to 38C (100.4F)  acetaminophen   Tablet .. 650 milliGRAM(s) Oral every 6 hours PRN Moderate Pain (4 - 6)  oxyCODONE    IR 5 milliGRAM(s) Oral every 4 hours PRN moderate to severe pain    _____________________________________________________  ASSESSMENT:  78yFemale with radiation colitis and superimposed severe C.diff now recovering.  Has known metastatic endometrial Ca on chemo    PLAN:  Continue PO Vanco per ID  Consider rehab  Replete lytes  Will follow on prn basis    Aryan Hercules M.D.  [facility]  (O) [phone]

## 2018-09-25 NOTE — PROGRESS NOTE ADULT - PROBLEM SELECTOR PLAN 2
Patient complaining of RLE pain and edema. Stated this has been going on since her admission. Could be due to metastatic disease vs chronic lipodermatosclerosis vs DVT. F/u RLE doppler to R/O DVT.   -Pain worsened overnight and woke her from sleep, did not respond to current oxycodone dose (5mg PO); consider raising dose. Patient complaining of RLE pain and edema. Stated this has been going on since her admission. Could be due to metastatic disease vs chronic lipodermatosclerosis vs DVT. F/u RLE doppler to R/O DVT.   -Pain worsened overnight and woke her from sleep, with minimal response to PRN oxycodone. Change oxycodone to standing 5mg PO q8hrs. Add Tylenol 650mg PO q6hrs PRN breakthrough pain. Patient complaining of RLE pain and edema. Stated this has been going on since her admission. Could be due to metastatic disease vs chronic lipodermatosclerosis vs DVT. F/u RLE doppler to R/O DVT.   -Pain worsened overnight and woke her from sleep, with minimal response to PRN oxycodone. Change oxycodone to standing 5mg PO q8hrs. C/w Tylenol 650mg PO q6hrs PRN breakthrough pain.

## 2018-09-25 NOTE — PROGRESS NOTE ADULT - PROBLEM SELECTOR PLAN 7
DVT PPx  -Lovenox  PT recs: home with home PT to improve functional mobility and safely negotiate the home environment. Pt will require a RW for safe amb.  Pt today states that she wants to go to [facility]. Need to follow-up with oncologist regarding her treatment planning. Will influence d/c planning.

## 2018-09-25 NOTE — DISCHARGE NOTE ADULT - CARE PROVIDER_API CALL
Karl Michel), Gastroenterology; Internal Medicine  [address]  [address]  [place] [address]  Phone: [phone]  Fax: [phone]    Private, Oncologist  Patient to follow up with Private Oncologist at [facility]  Phone: (   )    -  Fax: (   )    -

## 2018-09-25 NOTE — PROGRESS NOTE ADULT - ASSESSMENT
79 yo woman with hx of metastatic endometrial cancer on weekly paclitaxel, ulcerative colitis, HTN, presenting with diarrhea in the setting of recent UTI s/p antibiotic therapy (2 months ago) and abdominal pain. Stool positive for C. difficile stool toxin (9/12), treated with IV flagyl and PO vancomycin (day 14/14). Continued to have episodes of diarrhea on 9/24. Leukocytosis was continuing to improve (18.89 --> 15.35). Pt hypertensive today after continuing her home HTN medications. Reports feeling that her pain was intolerable in RLE and LLQ; did not respond to oxycodone. 77 yo woman with hx of metastatic endometrial cancer on weekly paclitaxel, ulcerative colitis, HTN, presenting with diarrhea in the setting of recent UTI s/p antibiotic therapy (2 months ago) and abdominal pain. Stool positive for C. difficile stool toxin (9/12), treated with IV flagyl and PO vancomycin (day 14/14). Continued to have episodes of diarrhea on 9/24. Leukocytosis was continuing to improve (18.89 --> 15.35). Pt hypertensive today after continuing her home HTN medications. Reports feeling that her pain was intolerable in RLE and RLQ; did not respond to oxycodone. 79 yo woman with hx of metastatic endometrial cancer on weekly paclitaxel, ulcerative colitis, HTN, presenting with diarrhea in the setting of recent UTI s/p antibiotic therapy (2 months ago) and abdominal pain. Stool positive for C. difficile stool toxin (9/12), treated with IV flagyl and PO vancomycin (day 14/14). Continued to have episodes of diarrhea on 9/24. Leukocytosis was continuing to improve (18.89 --> 15.35). Pt hypertensive today after continuing her home HTN medications, in context of pain. Reports feeling that her pain was intolerable in RLE and RLQ, with minimal response to PRN oxycodone.

## 2018-09-25 NOTE — DISCHARGE NOTE ADULT - MEDICATION SUMMARY - MEDICATIONS TO TAKE
I will START or STAY ON the medications listed below when I get home from the hospital:    rolling walker  -- Please provider patient with rolling walker.   -- Indication: For Gait instability     balsalazide 750 mg oral capsule  -- 3 cap(s) by mouth 3 times a day  -- Indication: For Ulcerative colitis with complication, unspecified location    oxyCODONE 5 mg oral tablet  -- 1 tab(s) by mouth every 4 hours, As needed, moderate to severe pain PRN  -- Indication: For RT Lower Extremity pain     acetaminophen 325 mg oral tablet  -- 2 tab(s) by mouth every 6 hours, As needed, Mild Pain (1 - 3)  -- Indication: For RT lower Extremity Pain     aspirin 81 mg oral delayed release tablet  -- 1 tab(s) by mouth once a day  -- Indication: For Remote hx of paraxysmosal A.fib     losartan 100 mg oral tablet  -- 1 tab(s) by mouth once a day  -- Indication: For Htn    enoxaparin  -- 40 milligram(s) subcutaneous once a day  -- Indication: For Dvt prophylaxsis     sertraline 25 mg oral tablet  -- 1 tab(s) by mouth 2 times a day  -- Indication: For Depression     atorvastatin 10 mg oral tablet  -- 1 tab(s) by mouth once a day (at bedtime)  -- Indication: For HLD    amLODIPine 5 mg oral tablet  -- 1 tab(s) by mouth once a day  -- Indication: For HTN    vancomycin 125 mg oral capsule  -- 1 cap(s) by mouth every 6 hours x 4 days  To be Completed on 9/31  -- Indication: For C.DIFF    ferrous sulfate 325 mg (65 mg elemental iron) oral tablet  -- 1 tab(s) by mouth once a day  -- Indication: For Anemia     lactobacillus acidophilus oral capsule  -- 1 tab(s) by mouth 3 times a day  -- Indication: For C.diff    pantoprazole 40 mg oral delayed release tablet  -- 1 tab(s) by mouth once a day (before a meal)  -- Indication: For PUD  Prophylaxsis     Multiple Vitamins oral tablet  -- 1 tab(s) by mouth once a day  -- Indication: For Nutritional Support     Vitamin B-12 1000 mcg oral tablet  -- Indication: For Nutritional Support     Vitamin D3 5000 intl units oral capsule  -- Indication: For Nutritional Support I will START or STAY ON the medications listed below when I get home from the hospital:    rolling walker  -- Please provider patient with rolling walker.   -- Indication: For Gait instability     balsalazide 750 mg oral capsule  -- 3 cap(s) by mouth 3 times a day  -- Indication: For Ulcerative colitis with complication, unspecified location    oxyCODONE 5 mg oral tablet  -- 1 tab(s) by mouth every 4 hours, As needed, moderate to severe pain PRN  -- Indication: For RT Lower Extremity pain     acetaminophen 325 mg oral tablet  -- 2 tab(s) by mouth every 6 hours, As needed, Mild Pain (1 - 3)  -- Indication: For RT lower Extremity Pain     aspirin 81 mg oral delayed release tablet  -- 1 tab(s) by mouth once a day  -- Indication: For Remote hx of paraxysmosal A.fib     losartan 100 mg oral tablet  -- 1 tab(s) by mouth once a day  -- Indication: For Htn    enoxaparin  -- 40 milligram(s) subcutaneous once a day  -- Indication: For Dvt prophylaxsis     sertraline 25 mg oral tablet  -- 1 tab(s) by mouth 2 times a day  -- Indication: For Depression     atorvastatin 10 mg oral tablet  -- 1 tab(s) by mouth once a day (at bedtime)  -- Indication: For HLD    amLODIPine 5 mg oral tablet  -- 1 tab(s) by mouth once a day  -- Indication: For HTN    vancomycin 125 mg oral capsule  -- 1 cap(s) by mouth every 6 hours x 4 days  To be Completed on 9/31  -- Indication: For C.DIFF    ferrous sulfate 325 mg (65 mg elemental iron) oral tablet  -- 1 tab(s) by mouth once a day  -- Indication: For Anemia     lactobacillus acidophilus oral capsule  -- 1 tab(s) by mouth 3 times a day  -- Indication: For C.diff    pantoprazole 40 mg oral delayed release tablet  -- 1 tab(s) by mouth once a day (before a meal)  -- Indication: For PUD  Prophylaxsis     Multiple Vitamins oral tablet  -- 1 tab(s) by mouth once a day  -- Indication: For Nutritional Support     Vitamin B-12 1000 mcg oral tablet  -- 1 tab(s) by mouth once a day  -- Indication: For Nutritional support     Vitamin D3 5000 intl units oral capsule  -- 1 cap(s) by mouth once a day  -- Indication: For Nutritional Support

## 2018-09-25 NOTE — DISCHARGE NOTE ADULT - MEDICATION SUMMARY - MEDICATIONS TO STOP TAKING
I will STOP taking the medications listed below when I get home from the hospital:    omeprazole 20 mg oral delayed release capsule    Benicar 40 mg oral tablet  -- 1 tab(s) by mouth once a day    potassium chloride 10 mEq oral capsule, extended release  -- 1 cap(s) by mouth once a day    Imodium 2 mg oral capsule  -- 2 cap(s) by mouth once a day    Imodium 2 mg oral capsule  -- 1 cap(s) by mouth every 4 hours, As Needed

## 2018-09-25 NOTE — DISCHARGE NOTE ADULT - PATIENT PORTAL LINK FT
You can access the [email or web address] Portal, offered by [facility], by registering with the following website: [email or web address]

## 2018-09-25 NOTE — DISCHARGE NOTE ADULT - CARE PLAN
Principal Discharge DX:	Clostridium difficile colitis  Secondary Diagnosis:	Chronic pain of right lower extremity  Secondary Diagnosis:	Endometrial carcinoma Principal Discharge DX:	Clostridium difficile colitis  Goal:	Continue abx therapy  Assessment and plan of treatment:	Patient will be discharged on Oral Vanco 125 mg q 6 hrs for additional 4 days to complete on 9/31  Secondary Diagnosis:	Chronic pain of right lower extremity  Goal:	Supportive Care  Assessment and plan of treatment:	VA Duplex of Rt LE 9/25 : No evidence of DVT   X Ray HIP / Femur 9/27:  No acute fx or dislocation, No lytic or blastic lesions, arthrosis of hip and knee joints, trace knee effusion   Continue Tylenol prn mild pain, Continue Oxy PRN mod-severe pain  Secondary Diagnosis:	Endometrial carcinoma  Goal:	Supportive Care  Assessment and plan of treatment:	Patient to follow up with private oncologist as outpatient

## 2018-09-25 NOTE — PROGRESS NOTE ADULT - PROBLEM SELECTOR PLAN 1
Continued to have diarrhea throughout 9/24, though improving throughout the day. WBC peak at 36, remains on vanco 500mg PO. C/w vancomycin, today day 14. F/u CBC and BMP Continued to have diarrhea throughout 9/24, though improving throughout the day. WBC peak at 36, remains on vanco 500mg PO. C/w vancomycin, day 14/14. F/u CBC and BMP

## 2018-09-25 NOTE — DISCHARGE NOTE ADULT - ADDITIONAL INSTRUCTIONS
Patient must have repeat CBC Within 2-3 days of discharge as patient with slightly worsening anemia must monitor Hgb /hct   HGB/ Hct on 9/27:   8.5 / 27.6 Patient must have repeat CBC Within 3-7 days of discharge as patient with slightly worsening anemia must monitor Hgb /hct   HGB/ Hct on 9/27:   8.5 / 27.6

## 2018-09-25 NOTE — DISCHARGE NOTE ADULT - MEDICATION SUMMARY - MEDICATIONS TO CHANGE
I will SWITCH the dose or number of times a day I take the medications listed below when I get home from the hospital:    amLODIPine 2.5 mg oral tablet  -- 1 tab(s) by mouth once a day    amLODIPine 5 mg oral tablet  -- 1 tab(s) by mouth once a day (at bedtime)

## 2018-09-25 NOTE — CHART NOTE - NSCHARTNOTEFT_GEN_A_CORE
Follow up.    Chart reviewed, events noted. Adm for radiation colitis and superimposed severe C.diff on vanco therapy. Has known metastatic endometrial Ca, on chemotherapy.    Source: Patient [x]    Family [ ]     other [x ]    Diet : mechanical soft, low fiber, lactose restricted Ensure Enlive 2x/day      Patient reports that she continues to have loose BMs, denies N/V.   Appetite fair, mixes Ensue w/ cereal in am, drinks remainder. Willing to take additional Ensure.      Current Weight: most recent 9/19 115.7lb, dosing wt 9/12 110.4 lb, ? 5lb wt gain  has 1+ right leg edema    Pertinent Medications: MEDICATIONS  (STANDING):  amLODIPine   Tablet 2.5 milliGRAM(s) Oral daily  atorvastatin 10 milliGRAM(s) Oral at bedtime  balsalazide 2250 milliGRAM(s) Oral three times a day  enoxaparin Injectable 40 milliGRAM(s) SubCutaneous every 24 hours  losartan 100 milliGRAM(s) Oral daily  oxyCODONE    IR 5 milliGRAM(s) Oral every 8 hours  pantoprazole    Tablet 40 milliGRAM(s) Oral before breakfast  sertraline 25 milliGRAM(s) Oral two times a day  vancomycin    Solution 500 milliGRAM(s) Oral every 6 hours    MEDICATIONS  (PRN):  acetaminophen    Suspension .. 650 milliGRAM(s) Oral every 6 hours PRN Mild Pain (1 - 3), Moderate Pain (4 - 6)  acetaminophen   Tablet .. 650 milliGRAM(s) Oral every 6 hours PRN Temp greater or equal to 38C (100.4F)  acetaminophen   Tablet .. 650 milliGRAM(s) Oral every 6 hours PRN Moderate Pain (4 - 6)    Pertinent Labs:  09-24 Na136 mmol/L Glu 104 mg/dL<H> K+ 4.1 mmol/L Cr  0.78 mg/dL BUN 10 mg/dL 09-24 Phos 3.6 mg/dL 09-24 Alb 2.6 g/dL<L>    Skin: no pressure injuries documented    Estimated Needs:   [ ] no change since previous assessment  [x ] recalculated:  protein needs 50-60gm (1.0-1.2gm/kg dosing wt 60.2kg)    Previous Nutrition Diagnosis: [x ] Inadequate Protein-Energy Intake    Nutrition Diagnosis is [x ] ongoing  [ ] resolved [ ] not applicable   eating ~ 50% of meals, taking Ensure       New Nutrition Diagnosis: [x ] not applicable      Recommend:  continue current diet rx  increase Ensure Enlive to 2x/day (spoke to Team 1 w/ recommendation)           Monitoring and Evaluation:     [x ] PO intake [x ] Tolerance to diet prescription [x ] weights [x ] follow up per protocol    [ ] other:

## 2018-09-25 NOTE — PROGRESS NOTE ADULT - PROBLEM SELECTOR PLAN 3
Endometrial cancer w/ mets to abdominal wall & iliac nodes, likely accounting for some of her chronic RLE pain. Was getting taxol through port weekly. Has had abdominal wall resection in past. Recently had 2nd opinion with Dr. Altaf Arteaga at [facility] as pt states she did not like the bad news she was given at [facility]. Jenni recommended to consider hormone therapy. Pt states she would want her HCP to be her daughter Vidya Tripp who lives in [place]. Discussed possible life sustaining measures given her current condition and pt states she would want all possible resuscitative efforts.  - Full Code  - Palliative consulted, goals of care discussed - patient not interested in hospice at this time  - as per daughter, patient still sees Dr. Hylton at [facility] for trx.  - per patient, oncologist recommends stopping chemotherapy and considering other options after her current condition improves. Endometrial cancer w/ mets to abdominal wall & iliac nodes, likely accounting for some of her chronic RLE pain. Was getting taxol through port weekly. Has had abdominal wall resection in past. Recently had 2nd opinion with Dr. Altaf Arteaga at [facility] as pt states she did not like the bad news she was given at [facility]. Jenni recommended to consider hormone therapy. Pt states she would want her HCP to be her daughter Vidya Tripp who lives in [place]. Discussed possible life sustaining measures given her current condition and pt states she would want all possible resuscitative efforts.  - Full Code  - Palliative consulted, goals of care discussed - patient not interested in hospice at this time  - as per daughter, patient still sees Dr. Hylton at [facility] for [place]x.  - per patient, oncologist recommends stopping chemotherapy and considering other options after her current condition improves.  - Her oncologist feels that rehab to improve her functional status is a good idea before resuming more evaluation and medical therapy.

## 2018-09-25 NOTE — CHART NOTE - NSCHARTNOTEFT_GEN_A_CORE
Awaiting discussion with oncology re: disposition plan prior to discharge. Our team has discussed hospice with daughter. Will sign off for now, please reconsult as needed.

## 2018-09-25 NOTE — PROGRESS NOTE ADULT - PROBLEM SELECTOR PLAN 6
Hypertensive after restarting home medications. Consider raising doses.  -Home meds: amlodipine 2.5mg daily, losartan 100mg daily Hypertensive after restarting home medications in context of pain. C/w home meds.  -Home meds: amlodipine 2.5mg daily, losartan 100mg daily

## 2018-09-25 NOTE — DISCHARGE NOTE ADULT - CARE PROVIDERS DIRECT ADDRESSES
,[email or web address],[email or web address]

## 2018-09-25 NOTE — DISCHARGE NOTE ADULT - PLAN OF CARE
Continue abx therapy Patient will be discharged on Oral Vanco 125 mg q 6 hrs for additional 4 days to complete on 9/31 Supportive Care VA Duplex of Rt LE 9/25 : No evidence of DVT   X Ray HIP / Femur 9/27:  No acute fx or dislocation, No lytic or blastic lesions, arthrosis of hip and knee joints, trace knee effusion   Continue Tylenol prn mild pain, Continue Oxy PRN mod-severe pain Patient to follow up with private oncologist as outpatient

## 2018-09-25 NOTE — DISCHARGE NOTE ADULT - PROVIDER TOKENS
TOKEN:'[id number]',FREE:[LAST:[Private],FIRST:[Oncologist],PHONE:[(   )    -],FAX:[(   )    -],ADDRESS:[Patient to follow up with Private Oncologist at [facility]]]

## 2018-09-25 NOTE — PROGRESS NOTE ADULT - PROBLEM SELECTOR PLAN 4
- Pt w/ known MVP & remote Hx of A-fib > 30 years ago. On eliquis and metoprolol with controlled rates.

## 2018-09-26 LAB
ANION GAP SERPL CALC-SCNC: 9 MMOL/L — SIGNIFICANT CHANGE UP (ref 5–17)
BUN SERPL-MCNC: 10 MG/DL — SIGNIFICANT CHANGE UP (ref 7–23)
CALCIUM SERPL-MCNC: 9.1 MG/DL — SIGNIFICANT CHANGE UP (ref 8.4–10.5)
CHLORIDE SERPL-SCNC: 98 MMOL/L — SIGNIFICANT CHANGE UP (ref 96–108)
CO2 SERPL-SCNC: 30 MMOL/L — SIGNIFICANT CHANGE UP (ref 22–31)
CREAT SERPL-MCNC: 0.74 MG/DL — SIGNIFICANT CHANGE UP (ref 0.5–1.3)
GLUCOSE SERPL-MCNC: 112 MG/DL — HIGH (ref 70–99)
HCT VFR BLD CALC: 28.4 % — LOW (ref 34.5–45)
HGB BLD-MCNC: 8.7 G/DL — LOW (ref 11.5–15.5)
MAGNESIUM SERPL-MCNC: 1.6 MG/DL — SIGNIFICANT CHANGE UP (ref 1.6–2.6)
MCHC RBC-ENTMCNC: 26.1 PG — LOW (ref 27–34)
MCHC RBC-ENTMCNC: 30.6 GM/DL — LOW (ref 32–36)
MCV RBC AUTO: 85.3 FL — SIGNIFICANT CHANGE UP (ref 80–100)
PHOSPHATE SERPL-MCNC: 3.3 MG/DL — SIGNIFICANT CHANGE UP (ref 2.5–4.5)
PLATELET # BLD AUTO: 473 K/UL — HIGH (ref 150–400)
POTASSIUM SERPL-MCNC: 4.2 MMOL/L — SIGNIFICANT CHANGE UP (ref 3.5–5.3)
POTASSIUM SERPL-SCNC: 4.2 MMOL/L — SIGNIFICANT CHANGE UP (ref 3.5–5.3)
RBC # BLD: 3.33 M/UL — LOW (ref 3.8–5.2)
RBC # FLD: 20.8 % — HIGH (ref 10.3–14.5)
SODIUM SERPL-SCNC: 137 MMOL/L — SIGNIFICANT CHANGE UP (ref 135–145)
WBC # BLD: 14.16 K/UL — HIGH (ref 3.8–10.5)
WBC # FLD AUTO: 14.16 K/UL — HIGH (ref 3.8–10.5)

## 2018-09-26 PROCEDURE — 99233 SBSQ HOSP IP/OBS HIGH 50: CPT

## 2018-09-26 PROCEDURE — 99232 SBSQ HOSP IP/OBS MODERATE 35: CPT

## 2018-09-26 RX ORDER — VANCOMYCIN HCL 1 G
125 VIAL (EA) INTRAVENOUS EVERY 6 HOURS
Qty: 0 | Refills: 0 | Status: DISCONTINUED | OUTPATIENT
Start: 2018-09-26 | End: 2018-09-27

## 2018-09-26 RX ORDER — ACETAMINOPHEN 500 MG
650 TABLET ORAL EVERY 6 HOURS
Qty: 0 | Refills: 0 | Status: DISCONTINUED | OUTPATIENT
Start: 2018-09-26 | End: 2018-09-27

## 2018-09-26 RX ORDER — TRAMADOL HYDROCHLORIDE 50 MG/1
50 TABLET ORAL ONCE
Qty: 0 | Refills: 0 | Status: DISCONTINUED | OUTPATIENT
Start: 2018-09-26 | End: 2018-09-26

## 2018-09-26 RX ORDER — LACTOBACILLUS ACIDOPHILUS 100MM CELL
1 CAPSULE ORAL
Qty: 0 | Refills: 0 | Status: DISCONTINUED | OUTPATIENT
Start: 2018-09-26 | End: 2018-09-27

## 2018-09-26 RX ORDER — AMLODIPINE BESYLATE 2.5 MG/1
5 TABLET ORAL DAILY
Qty: 0 | Refills: 0 | Status: DISCONTINUED | OUTPATIENT
Start: 2018-09-27 | End: 2018-09-27

## 2018-09-26 RX ORDER — AMLODIPINE BESYLATE 2.5 MG/1
2.5 TABLET ORAL ONCE
Qty: 0 | Refills: 0 | Status: COMPLETED | OUTPATIENT
Start: 2018-09-26 | End: 2018-09-26

## 2018-09-26 RX ORDER — OXYCODONE HYDROCHLORIDE 5 MG/1
5 TABLET ORAL EVERY 4 HOURS
Qty: 0 | Refills: 0 | Status: DISCONTINUED | OUTPATIENT
Start: 2018-09-26 | End: 2018-09-27

## 2018-09-26 RX ADMIN — BALSALAZIDE DISODIUM 2250 MILLIGRAM(S): 750 CAPSULE ORAL at 16:35

## 2018-09-26 RX ADMIN — BALSALAZIDE DISODIUM 2250 MILLIGRAM(S): 750 CAPSULE ORAL at 06:13

## 2018-09-26 RX ADMIN — OXYCODONE HYDROCHLORIDE 5 MILLIGRAM(S): 5 TABLET ORAL at 20:00

## 2018-09-26 RX ADMIN — SERTRALINE 25 MILLIGRAM(S): 25 TABLET, FILM COATED ORAL at 06:13

## 2018-09-26 RX ADMIN — OXYCODONE HYDROCHLORIDE 5 MILLIGRAM(S): 5 TABLET ORAL at 20:38

## 2018-09-26 RX ADMIN — Medication 125 MILLIGRAM(S): at 17:56

## 2018-09-26 RX ADMIN — AMLODIPINE BESYLATE 2.5 MILLIGRAM(S): 2.5 TABLET ORAL at 06:13

## 2018-09-26 RX ADMIN — SERTRALINE 25 MILLIGRAM(S): 25 TABLET, FILM COATED ORAL at 17:56

## 2018-09-26 RX ADMIN — ATORVASTATIN CALCIUM 10 MILLIGRAM(S): 80 TABLET, FILM COATED ORAL at 22:36

## 2018-09-26 RX ADMIN — Medication 500 MILLIGRAM(S): at 06:14

## 2018-09-26 RX ADMIN — BALSALAZIDE DISODIUM 2250 MILLIGRAM(S): 750 CAPSULE ORAL at 23:45

## 2018-09-26 RX ADMIN — PANTOPRAZOLE SODIUM 40 MILLIGRAM(S): 20 TABLET, DELAYED RELEASE ORAL at 06:13

## 2018-09-26 RX ADMIN — OXYCODONE HYDROCHLORIDE 5 MILLIGRAM(S): 5 TABLET ORAL at 06:31

## 2018-09-26 RX ADMIN — ENOXAPARIN SODIUM 40 MILLIGRAM(S): 100 INJECTION SUBCUTANEOUS at 06:14

## 2018-09-26 RX ADMIN — Medication 500 MILLIGRAM(S): at 12:19

## 2018-09-26 RX ADMIN — LOSARTAN POTASSIUM 100 MILLIGRAM(S): 100 TABLET, FILM COATED ORAL at 06:13

## 2018-09-26 RX ADMIN — AMLODIPINE BESYLATE 2.5 MILLIGRAM(S): 2.5 TABLET ORAL at 14:22

## 2018-09-26 RX ADMIN — TRAMADOL HYDROCHLORIDE 50 MILLIGRAM(S): 50 TABLET ORAL at 01:26

## 2018-09-26 RX ADMIN — Medication 125 MILLIGRAM(S): at 23:46

## 2018-09-26 NOTE — PROGRESS NOTE ADULT - ASSESSMENT
78 year old woman with PMH of metastatic endometrial cancer on weekly paclitaxel, ulcerative colitis, HTN; presented with diarrhea in the setting of recent UTI s/p antibiotic therapy (2 months ago) and abdominal pain. Stool positive for C. difficile stool toxin (9/12), treated with IV flagyl and PO vancomycin. Diarrhea resolving. Leukocytosis downtrending. Patient having some right upper thigh/?hip pain.

## 2018-09-26 NOTE — PROGRESS NOTE ADULT - SUBJECTIVE AND OBJECTIVE BOX
SUBJECTIVE:  Feels better, able to eat.  leg pain controlled.  bowel movements mostly formed and "predictable"  _____________________________________________________  OBJECTIVE:    T(C): 36.4 (09-26-18 @ 06:17), Max: 36.5 (09-25-18 @ 12:58)  HR: 68 (09-26-18 @ 08:24)  BP: 163/81 (09-26-18 @ 08:24)  RR: 19 (09-26-18 @ 08:24)  SpO2: 95% (09-25-18 @ 21:54)  Wt(kg): --    09-25 @ 07:01  -  09-26 @ 07:00  --------------------------------------------------------  IN:  Total IN: 0 mL    OUT:    Voided: 200 mL  Total OUT: 200 mL    Total NET: -200 mL        PHYSICAL EXAM:      Constitutional: NAD    Gastrointestinal: soft ntnd +bs no r/g/hsm      _____________________________________________________  LABS:                        8.7    14.16 )-----------( 473      ( 26 Sep 2018 08:00 )             28.4     09-26    137  |  98  |  10  ----------------------------<  112<H>  4.2   |  30  |  0.74    Ca    9.1      26 Sep 2018 06:41  Phos  3.3     09-26  Mg     1.6     09-26        _____________________________________________________  ACTIVE MEDS:  MEDICATIONS  (STANDING):  amLODIPine   Tablet 2.5 milliGRAM(s) Oral daily  atorvastatin 10 milliGRAM(s) Oral at bedtime  balsalazide 2250 milliGRAM(s) Oral three times a day  enoxaparin Injectable 40 milliGRAM(s) SubCutaneous every 24 hours  losartan 100 milliGRAM(s) Oral daily  oxyCODONE    IR 5 milliGRAM(s) Oral every 8 hours  pantoprazole    Tablet 40 milliGRAM(s) Oral before breakfast  sertraline 25 milliGRAM(s) Oral two times a day  vancomycin    Solution 500 milliGRAM(s) Oral every 6 hours    MEDICATIONS  (PRN):  acetaminophen    Suspension .. 650 milliGRAM(s) Oral every 6 hours PRN Mild Pain (1 - 3), Moderate Pain (4 - 6)  acetaminophen   Tablet .. 650 milliGRAM(s) Oral every 6 hours PRN Temp greater or equal to 38C (100.4F)  acetaminophen   Tablet .. 650 milliGRAM(s) Oral every 6 hours PRN Moderate Pain (4 - 6)    _____________________________________________________  ASSESSMENT:  78yFemale with severe C.diff colitis now s/p 2 weeks of Vanco (and initial IV Flagyl) with improvement    PLAN:  1.  Continue balsalazide for radiation colitis  2.  Abx duration per ID  3.  Will follow prn    Aryan Hercules M.D.  [facility]  (O) [phone]

## 2018-09-26 NOTE — PROGRESS NOTE ADULT - PROBLEM SELECTOR PLAN 2
-Patient complained of RLE pain and edema. Reportedly stated this has been going on since her admission.   -RLE doppler negative for DVT.   -Will get X-ray of right femur and right hip/pelvis to further evaluate.   -Patient says having good response to oxycodone. Will increase to oxycodone 5mg Q4H PRN.   -C/w Tylenol 650mg Q6H PRN.

## 2018-09-26 NOTE — PROGRESS NOTE ADULT - PROBLEM SELECTOR PLAN 3
-Endometrial cancer with mets to abdominal wall & iliac nodes, possibly accounting for some of her chronic RLE pain. Was getting taxol through port weekly. Has had abdominal wall resection in past. Recently had 2nd opinion with Dr. Altaf Arteaga at [facility] as patient stated she did not like the bad news she was given at [facility]. -Dr. Arteaga had recommended to consider hormone therapy. But per discussion with previous medical team, this is not being offered at this time.   -Palliative consulted, goals of care discussed - patient not interested in hospice at this time. Full Code.   -Per patient, oncologist recommended stopping chemotherapy and considering other options after her current condition improves.  -Her oncologist feels that rehab to improve her functional status is a good idea before considering more evaluation or any medical therapy.  -Patient with some anemia noted, continue to monitor.

## 2018-09-26 NOTE — PROGRESS NOTE ADULT - SUBJECTIVE AND OBJECTIVE BOX
Patient is a 78y old  Female who presents with a chief complaint of Not feeling well (26 Sep 2018 17:06)        SUBJECTIVE / OVERNIGHT EVENTS: Patient reports feeling better overall. She says her stools are now more formed with occasional loose BM, but much better overall. She reports some right upper thigh pain, but says the oxycodone is helping. She denies abdominal pain, SOB, or CP.       MEDICATIONS  (STANDING):  atorvastatin 10 milliGRAM(s) Oral at bedtime  balsalazide 2250 milliGRAM(s) Oral three times a day  enoxaparin Injectable 40 milliGRAM(s) SubCutaneous every 24 hours  lactobacillus acidophilus 1 Tablet(s) Oral three times a day with meals  losartan 100 milliGRAM(s) Oral daily  pantoprazole    Tablet 40 milliGRAM(s) Oral before breakfast  sertraline 25 milliGRAM(s) Oral two times a day  vancomycin    Solution 125 milliGRAM(s) Oral every 6 hours    MEDICATIONS  (PRN):  acetaminophen   Tablet .. 650 milliGRAM(s) Oral every 6 hours PRN Temp greater or equal to 38C (100.4F)  acetaminophen   Tablet .. 650 milliGRAM(s) Oral every 6 hours PRN Mild Pain (1 - 3)  oxyCODONE    IR 5 milliGRAM(s) Oral every 4 hours PRN moderate to severe pain PRN      Vital Signs Last 24 Hrs  T(C): 36.7 (26 Sep 2018 19:53), Max: 36.8 (26 Sep 2018 11:00)  T(F): 98 (26 Sep 2018 19:53), Max: 98.2 (26 Sep 2018 11:00)  HR: 84 (26 Sep 2018 19:53) (68 - 84)  BP: 125/76 (26 Sep 2018 19:53) (125/76 - 168/77)  BP(mean): --  RR: 18 (26 Sep 2018 19:53) (16 - 20)  SpO2: 98% (26 Sep 2018 19:53) (95% - 98%)  CAPILLARY BLOOD GLUCOSE        I&O's Summary    25 Sep 2018 07:01  -  26 Sep 2018 07:00  --------------------------------------------------------  IN: 0 mL / OUT: 200 mL / NET: -200 mL          PHYSICAL EXAM:   GENERAL: NAD, thin  HEAD:  Atraumatic, Normocephalic  EYES: Conjunctiva and sclera pale  NECK: Supple  CHEST/LUNG: Clear to auscultation bilaterally; No wheeze  HEART: Regular rate and rhythm; No murmurs, rubs, or gallops  ABDOMEN: Soft, Nontender, Nondistended; Bowel sounds present  EXTREMITIES: Trace LE edema. Right thigh tenderness.   PSYCH/Neuro: AAOx3. Non-focal.   SKIN: No rashes or lesions      LABS:                        8.7    14.16 )-----------( 473      ( 26 Sep 2018 08:00 )             28.4     09-26    137  |  98  |  10  ----------------------------<  112<H>  4.2   |  30  |  0.74    Ca    9.1      26 Sep 2018 06:41  Phos  3.3     09-26  Mg     1.6     09-26        < from: [facility] Duplex Lower Ext Vein Scan, Right (09.25.18 @ 15:48) >  IMPRESSION:     No evidence of right lower extremity deep venous thrombosis.    < end of copied text >      RADIOLOGY & ADDITIONAL TESTS:    Imaging Personally Reviewed: RLE US duplex report.   Consultant(s) Notes Reviewed: ID and GI  Care Discussed with Consultants/Other Providers:

## 2018-09-26 NOTE — PROGRESS NOTE ADULT - ASSESSMENT
Ms Belcher is 78F with mutliple comordities including  endometrial cancer w/ mets currently receiving weekly chemotherapy with paclitaxel which she seems to be tolerating, Ulcerative colitis?, chronic diarrhea on immodium, HTN, previous antibiotic exposure.  She has resolved severe Cdiff   clinically improved  leukocytosis decreasing  deconditioned  malnourished state    Antibiotics:  Cipro 9/11  Flagyl 9/11; 9/13 -->  Vanco po 125 q 6h 9/12 -->14; 500 q 6 h 9/14-->    Suggest  Continue po Vanco additional 5 days  decrease dosage to 125 4 times daily    discussed with resident    no objection to discharge to rehab

## 2018-09-26 NOTE — PROGRESS NOTE ADULT - PROBLEM SELECTOR PLAN 1
-Patient says now having more formed stools, with occasional loose BM, but overall much improved.   -Leukocytosis improving.  -C/w PO vancomycin, today day 15 if start date 9/12.   -ID recs appreciated; c/w PO vancomycin x 5 more days, but they have no objections to discharge to rehab.   -Will add probiotic supplements with meals.  -C/w mechanical soft low fiber lactose free diet with Ensure supplements for now.

## 2018-09-26 NOTE — PROGRESS NOTE ADULT - SUBJECTIVE AND OBJECTIVE BOX
Follow Up:  Cdiff    Interval History/ROS: fatigued, improved diarrhea and po intake. formed stools    Allergies  erythromycin (Unknown)  macrolide antibiotics (Unknown)  morphine (Diarrhea)  sulfa drugs (Unknown)    ANTIMICROBIALS:  vancomycin    Solution 500 every 6 hours    OTHER MEDS:  MEDICATIONS  (STANDING):  acetaminophen   Tablet .. 650 every 6 hours PRN  acetaminophen   Tablet .. 650 every 6 hours PRN  atorvastatin 10 at bedtime  balsalazide 2250 three times a day  enoxaparin Injectable 40 every 24 hours  losartan 100 daily  oxyCODONE    IR 5 every 4 hours PRN  pantoprazole    Tablet 40 before breakfast  sertraline 25 two times a day    Vital Signs Last 24 Hrs  T(C): 36.5 (26 Sep 2018 13:53), Max: 36.8 (26 Sep 2018 11:00)  T(F): 97.7 (26 Sep 2018 13:53), Max: 98.2 (26 Sep 2018 11:00)  HR: 72 (26 Sep 2018 11:00) (68 - 80)  BP: 167/74 (26 Sep 2018 13:53) (138/77 - 168/77)  BP(mean): --  RR: 16 (26 Sep 2018 11:00) (16 - 20)  SpO2: 95% (25 Sep 2018 21:54) (95% - 95%)    PHYSICAL EXAM:  General: NAD, Non-toxic  Neurology: A&Ox3, nonfocal, pleasant, fatigued  Respiratory: Clear to auscultation bilaterally  CV: RRR, S1S2, no murmurs, rubs or gallops  Abdominal: Soft, Non-tender, non-distended,   Line Sites: Clear  Skin: No rash                        8.7    14.16 )-----------( 473      ( 26 Sep 2018 08:00 )             28.4   WBC Count: 14.16 (09-26 @ 08:00)  WBC Count: 15.35 (09-24 @ 09:21)  WBC Count: 18.89 (09-23 @ 08:47)  WBC Count: 21.88 (09-22 @ 08:10)    09-26    137  |  98  |  10  ----------------------------<  112<H>  4.2   |  30  |  0.74    Ca    9.1      26 Sep 2018 06:41  Phos  3.3     09-26  Mg     1.6     09-26    MICROBIOLOGY:  .Blood Blood-Peripheral  09-12-18   No growth at 5 days.  --  --      .Stool Feces  09-12-18   No enteric pathogens isolated.  (Stool culture examined for Salmonella,  Shigella, Campylobacter, Aeromonas, Plesiomonas,  Vibrio, E.coli O157 and Yersinia)  --  --      .Urine Clean Catch (Midstream)  09-11-18   Culture grew 3 or more types of organisms which indicate  collection contamination; consider recollection only if clinically  indicated.  --  --      .Blood Blood-Peripheral  09-11-18   No growth at 5 days.  --  --      RADIOLOGY:    Richard Amato MD; Division of Infectious Disease; Pager: [phone]; nights and weekends: [phone]

## 2018-09-26 NOTE — PROGRESS NOTE ADULT - PROBLEM SELECTOR PLAN 5
-Patient reportedly stated her ?UC/radiation colitis had been well controlled on balsalazide. -C/w balsalazide  -GI recs appreciated.

## 2018-09-26 NOTE — PROGRESS NOTE ADULT - PROBLEM SELECTOR PLAN 4
-Patient with known MVP and remote history of Afib > 30 years ago.   -Not currently on any rate control agents or anti-coagulation.

## 2018-09-26 NOTE — PROGRESS NOTE ADULT - PROBLEM SELECTOR PLAN 7
-C/w lovenox SQ for DVT PPx.   -C/w PPI daily.     8. Dispo: -PT follow up recs [facility]. Per discussion with previous medical team caring for patient, per oncologist no plans for any chemotherapy or hormone treatments at this time. -DC planning to [facility].

## 2018-09-27 VITALS — SYSTOLIC BLOOD PRESSURE: 126 MMHG | HEART RATE: 78 BPM | TEMPERATURE: 98 F | DIASTOLIC BLOOD PRESSURE: 74 MMHG

## 2018-09-27 PROBLEM — C56.9 MALIGNANT NEOPLASM OF UNSPECIFIED OVARY: Chronic | Status: ACTIVE | Noted: 2018-09-11

## 2018-09-27 LAB
ANION GAP SERPL CALC-SCNC: 10 MMOL/L — SIGNIFICANT CHANGE UP (ref 5–17)
APTT BLD: 30.5 SEC — SIGNIFICANT CHANGE UP (ref 27.5–37.4)
BUN SERPL-MCNC: 11 MG/DL — SIGNIFICANT CHANGE UP (ref 7–23)
CALCIUM SERPL-MCNC: 9.1 MG/DL — SIGNIFICANT CHANGE UP (ref 8.4–10.5)
CHLORIDE SERPL-SCNC: 97 MMOL/L — SIGNIFICANT CHANGE UP (ref 96–108)
CO2 SERPL-SCNC: 29 MMOL/L — SIGNIFICANT CHANGE UP (ref 22–31)
CREAT SERPL-MCNC: 0.74 MG/DL — SIGNIFICANT CHANGE UP (ref 0.5–1.3)
FERRITIN SERPL-MCNC: 102 NG/ML — SIGNIFICANT CHANGE UP (ref 15–150)
GLUCOSE SERPL-MCNC: 110 MG/DL — HIGH (ref 70–99)
HCT VFR BLD CALC: 27.6 % — LOW (ref 34.5–45)
HGB BLD-MCNC: 8.5 G/DL — LOW (ref 11.5–15.5)
INR BLD: 1.06 RATIO — SIGNIFICANT CHANGE UP (ref 0.88–1.16)
IRON SATN MFR SERPL: 17 UG/DL — LOW (ref 30–160)
IRON SATN MFR SERPL: 7 % — LOW (ref 14–50)
MCHC RBC-ENTMCNC: 26.2 PG — LOW (ref 27–34)
MCHC RBC-ENTMCNC: 30.8 GM/DL — LOW (ref 32–36)
MCV RBC AUTO: 85.2 FL — SIGNIFICANT CHANGE UP (ref 80–100)
PLATELET # BLD AUTO: 438 K/UL — HIGH (ref 150–400)
POTASSIUM SERPL-MCNC: 3.8 MMOL/L — SIGNIFICANT CHANGE UP (ref 3.5–5.3)
POTASSIUM SERPL-SCNC: 3.8 MMOL/L — SIGNIFICANT CHANGE UP (ref 3.5–5.3)
PROTHROM AB SERPL-ACNC: 12 SEC — SIGNIFICANT CHANGE UP (ref 10–13.1)
RBC # BLD: 3.24 M/UL — LOW (ref 3.8–5.2)
RBC # FLD: 20.7 % — HIGH (ref 10.3–14.5)
SODIUM SERPL-SCNC: 136 MMOL/L — SIGNIFICANT CHANGE UP (ref 135–145)
TIBC SERPL-MCNC: 254 UG/DL — SIGNIFICANT CHANGE UP (ref 220–430)
UIBC SERPL-MCNC: 237 UG/DL — SIGNIFICANT CHANGE UP (ref 110–370)
WBC # BLD: 11.77 K/UL — HIGH (ref 3.8–10.5)
WBC # FLD AUTO: 11.77 K/UL — HIGH (ref 3.8–10.5)

## 2018-09-27 PROCEDURE — 82947 ASSAY GLUCOSE BLOOD QUANT: CPT

## 2018-09-27 PROCEDURE — 93971 EXTREMITY STUDY: CPT

## 2018-09-27 PROCEDURE — 97116 GAIT TRAINING THERAPY: CPT

## 2018-09-27 PROCEDURE — 74018 RADEX ABDOMEN 1 VIEW: CPT

## 2018-09-27 PROCEDURE — 99239 HOSP IP/OBS DSCHRG MGMT >30: CPT

## 2018-09-27 PROCEDURE — 83690 ASSAY OF LIPASE: CPT

## 2018-09-27 PROCEDURE — 84145 PROCALCITONIN (PCT): CPT

## 2018-09-27 PROCEDURE — 96375 TX/PRO/DX INJ NEW DRUG ADDON: CPT

## 2018-09-27 PROCEDURE — 74019 RADEX ABDOMEN 2 VIEWS: CPT

## 2018-09-27 PROCEDURE — 99285 EMERGENCY DEPT VISIT HI MDM: CPT | Mod: 25

## 2018-09-27 PROCEDURE — 80053 COMPREHEN METABOLIC PANEL: CPT

## 2018-09-27 PROCEDURE — 82803 BLOOD GASES ANY COMBINATION: CPT

## 2018-09-27 PROCEDURE — 81001 URINALYSIS AUTO W/SCOPE: CPT

## 2018-09-27 PROCEDURE — 73502 X-RAY EXAM HIP UNI 2-3 VIEWS: CPT

## 2018-09-27 PROCEDURE — 96374 THER/PROPH/DIAG INJ IV PUSH: CPT

## 2018-09-27 PROCEDURE — 84132 ASSAY OF SERUM POTASSIUM: CPT

## 2018-09-27 PROCEDURE — 97530 THERAPEUTIC ACTIVITIES: CPT

## 2018-09-27 PROCEDURE — 85027 COMPLETE CBC AUTOMATED: CPT

## 2018-09-27 PROCEDURE — 84295 ASSAY OF SERUM SODIUM: CPT

## 2018-09-27 PROCEDURE — 87086 URINE CULTURE/COLONY COUNT: CPT

## 2018-09-27 PROCEDURE — 82330 ASSAY OF CALCIUM: CPT

## 2018-09-27 PROCEDURE — 93005 ELECTROCARDIOGRAM TRACING: CPT

## 2018-09-27 PROCEDURE — 87449 NOS EACH ORGANISM AG IA: CPT

## 2018-09-27 PROCEDURE — 73552 X-RAY EXAM OF FEMUR 2/>: CPT | Mod: 26,RT

## 2018-09-27 PROCEDURE — 87045 FECES CULTURE AEROBIC BACT: CPT

## 2018-09-27 PROCEDURE — 71045 X-RAY EXAM CHEST 1 VIEW: CPT

## 2018-09-27 PROCEDURE — 71046 X-RAY EXAM CHEST 2 VIEWS: CPT

## 2018-09-27 PROCEDURE — 85014 HEMATOCRIT: CPT

## 2018-09-27 PROCEDURE — 85730 THROMBOPLASTIN TIME PARTIAL: CPT

## 2018-09-27 PROCEDURE — 73552 X-RAY EXAM OF FEMUR 2/>: CPT

## 2018-09-27 PROCEDURE — 80048 BASIC METABOLIC PNL TOTAL CA: CPT

## 2018-09-27 PROCEDURE — 73502 X-RAY EXAM HIP UNI 2-3 VIEWS: CPT | Mod: 26,RT

## 2018-09-27 PROCEDURE — 85610 PROTHROMBIN TIME: CPT

## 2018-09-27 PROCEDURE — 83605 ASSAY OF LACTIC ACID: CPT

## 2018-09-27 PROCEDURE — 83735 ASSAY OF MAGNESIUM: CPT

## 2018-09-27 PROCEDURE — 83550 IRON BINDING TEST: CPT

## 2018-09-27 PROCEDURE — 87324 CLOSTRIDIUM AG IA: CPT

## 2018-09-27 PROCEDURE — 87040 BLOOD CULTURE FOR BACTERIA: CPT

## 2018-09-27 PROCEDURE — 82435 ASSAY OF BLOOD CHLORIDE: CPT

## 2018-09-27 PROCEDURE — 84100 ASSAY OF PHOSPHORUS: CPT

## 2018-09-27 PROCEDURE — 86140 C-REACTIVE PROTEIN: CPT

## 2018-09-27 PROCEDURE — 87046 STOOL CULTR AEROBIC BACT EA: CPT

## 2018-09-27 PROCEDURE — 82728 ASSAY OF FERRITIN: CPT

## 2018-09-27 PROCEDURE — 97110 THERAPEUTIC EXERCISES: CPT

## 2018-09-27 PROCEDURE — 97161 PT EVAL LOW COMPLEX 20 MIN: CPT

## 2018-09-27 PROCEDURE — 85652 RBC SED RATE AUTOMATED: CPT

## 2018-09-27 RX ORDER — LACTOBACILLUS ACIDOPHILUS 100MM CELL
1 CAPSULE ORAL
Qty: 0 | Refills: 0 | COMMUNITY
Start: 2018-09-27

## 2018-09-27 RX ORDER — SERTRALINE 25 MG/1
1 TABLET, FILM COATED ORAL
Qty: 0 | Refills: 0 | COMMUNITY
Start: 2018-09-27

## 2018-09-27 RX ORDER — ASPIRIN/CALCIUM CARB/MAGNESIUM 324 MG
81 TABLET ORAL DAILY
Qty: 0 | Refills: 0 | Status: DISCONTINUED | OUTPATIENT
Start: 2018-09-27 | End: 2018-09-27

## 2018-09-27 RX ORDER — OMEPRAZOLE 10 MG/1
0 CAPSULE, DELAYED RELEASE ORAL
Qty: 0 | Refills: 0 | COMMUNITY

## 2018-09-27 RX ORDER — ACETAMINOPHEN 500 MG
2 TABLET ORAL
Qty: 0 | Refills: 0 | COMMUNITY
Start: 2018-09-27

## 2018-09-27 RX ORDER — PREGABALIN 225 MG/1
0 CAPSULE ORAL
Qty: 0 | Refills: 0 | COMMUNITY

## 2018-09-27 RX ORDER — FERROUS SULFATE 325(65) MG
325 TABLET ORAL DAILY
Qty: 0 | Refills: 0 | Status: DISCONTINUED | OUTPATIENT
Start: 2018-09-27 | End: 2018-09-27

## 2018-09-27 RX ORDER — ATORVASTATIN CALCIUM 80 MG/1
0 TABLET, FILM COATED ORAL
Qty: 0 | Refills: 0 | COMMUNITY

## 2018-09-27 RX ORDER — BALSALAZIDE DISODIUM 750 MG/1
3 CAPSULE ORAL
Qty: 0 | Refills: 0 | COMMUNITY

## 2018-09-27 RX ORDER — LOPERAMIDE HCL 2 MG
1 TABLET ORAL
Qty: 0 | Refills: 0 | COMMUNITY

## 2018-09-27 RX ORDER — CHOLECALCIFEROL (VITAMIN D3) 125 MCG
0 CAPSULE ORAL
Qty: 0 | Refills: 0 | COMMUNITY

## 2018-09-27 RX ORDER — LOSARTAN POTASSIUM 100 MG/1
1 TABLET, FILM COATED ORAL
Qty: 0 | Refills: 0 | COMMUNITY
Start: 2018-09-27

## 2018-09-27 RX ORDER — PANTOPRAZOLE SODIUM 20 MG/1
1 TABLET, DELAYED RELEASE ORAL
Qty: 0 | Refills: 0 | COMMUNITY
Start: 2018-09-27

## 2018-09-27 RX ORDER — OXYCODONE HYDROCHLORIDE 5 MG/1
2 TABLET ORAL
Qty: 0 | Refills: 0 | COMMUNITY
Start: 2018-09-27

## 2018-09-27 RX ORDER — ASPIRIN/CALCIUM CARB/MAGNESIUM 324 MG
1 TABLET ORAL
Qty: 0 | Refills: 0 | COMMUNITY
Start: 2018-09-27

## 2018-09-27 RX ORDER — OXYCODONE HYDROCHLORIDE 5 MG/1
1 TABLET ORAL
Qty: 0 | Refills: 0 | COMMUNITY
Start: 2018-09-27

## 2018-09-27 RX ORDER — BALSALAZIDE DISODIUM 750 MG/1
3 CAPSULE ORAL
Qty: 0 | Refills: 0 | COMMUNITY
Start: 2018-09-27

## 2018-09-27 RX ORDER — SERTRALINE 25 MG/1
1 TABLET, FILM COATED ORAL
Qty: 0 | Refills: 0 | COMMUNITY

## 2018-09-27 RX ORDER — POTASSIUM CHLORIDE 20 MEQ
1 PACKET (EA) ORAL
Qty: 0 | Refills: 0 | COMMUNITY

## 2018-09-27 RX ORDER — ATORVASTATIN CALCIUM 80 MG/1
1 TABLET, FILM COATED ORAL
Qty: 0 | Refills: 0 | COMMUNITY
Start: 2018-09-27

## 2018-09-27 RX ORDER — ENOXAPARIN SODIUM 100 MG/ML
40 INJECTION SUBCUTANEOUS
Qty: 0 | Refills: 0 | COMMUNITY
Start: 2018-09-27

## 2018-09-27 RX ORDER — LOPERAMIDE HCL 2 MG
2 TABLET ORAL
Qty: 0 | Refills: 0 | COMMUNITY

## 2018-09-27 RX ORDER — AMLODIPINE BESYLATE 2.5 MG/1
1 TABLET ORAL
Qty: 0 | Refills: 0 | COMMUNITY

## 2018-09-27 RX ORDER — AMLODIPINE BESYLATE 2.5 MG/1
1 TABLET ORAL
Qty: 0 | Refills: 0 | COMMUNITY
Start: 2018-09-27

## 2018-09-27 RX ORDER — OLMESARTAN MEDOXOMIL 5 MG/1
1 TABLET, FILM COATED ORAL
Qty: 0 | Refills: 0 | COMMUNITY

## 2018-09-27 RX ADMIN — OXYCODONE HYDROCHLORIDE 5 MILLIGRAM(S): 5 TABLET ORAL at 18:54

## 2018-09-27 RX ADMIN — BALSALAZIDE DISODIUM 2250 MILLIGRAM(S): 750 CAPSULE ORAL at 13:33

## 2018-09-27 RX ADMIN — LOSARTAN POTASSIUM 100 MILLIGRAM(S): 100 TABLET, FILM COATED ORAL at 06:13

## 2018-09-27 RX ADMIN — ENOXAPARIN SODIUM 40 MILLIGRAM(S): 100 INJECTION SUBCUTANEOUS at 06:16

## 2018-09-27 RX ADMIN — Medication 125 MILLIGRAM(S): at 18:53

## 2018-09-27 RX ADMIN — OXYCODONE HYDROCHLORIDE 5 MILLIGRAM(S): 5 TABLET ORAL at 13:00

## 2018-09-27 RX ADMIN — Medication 125 MILLIGRAM(S): at 06:14

## 2018-09-27 RX ADMIN — Medication 1 TABLET(S): at 08:53

## 2018-09-27 RX ADMIN — OXYCODONE HYDROCHLORIDE 5 MILLIGRAM(S): 5 TABLET ORAL at 03:54

## 2018-09-27 RX ADMIN — Medication 325 MILLIGRAM(S): at 13:34

## 2018-09-27 RX ADMIN — Medication 1 TABLET(S): at 18:53

## 2018-09-27 RX ADMIN — OXYCODONE HYDROCHLORIDE 5 MILLIGRAM(S): 5 TABLET ORAL at 12:08

## 2018-09-27 RX ADMIN — BALSALAZIDE DISODIUM 2250 MILLIGRAM(S): 750 CAPSULE ORAL at 06:13

## 2018-09-27 RX ADMIN — Medication 125 MILLIGRAM(S): at 12:09

## 2018-09-27 RX ADMIN — SERTRALINE 25 MILLIGRAM(S): 25 TABLET, FILM COATED ORAL at 06:15

## 2018-09-27 RX ADMIN — OXYCODONE HYDROCHLORIDE 5 MILLIGRAM(S): 5 TABLET ORAL at 04:26

## 2018-09-27 RX ADMIN — SERTRALINE 25 MILLIGRAM(S): 25 TABLET, FILM COATED ORAL at 18:53

## 2018-09-27 RX ADMIN — OXYCODONE HYDROCHLORIDE 5 MILLIGRAM(S): 5 TABLET ORAL at 19:11

## 2018-09-27 RX ADMIN — AMLODIPINE BESYLATE 5 MILLIGRAM(S): 2.5 TABLET ORAL at 06:22

## 2018-09-27 RX ADMIN — PANTOPRAZOLE SODIUM 40 MILLIGRAM(S): 20 TABLET, DELAYED RELEASE ORAL at 06:16

## 2018-09-27 RX ADMIN — Medication 1 TABLET(S): at 12:09

## 2018-09-27 NOTE — PROGRESS NOTE ADULT - PROBLEM SELECTOR PROBLEM 1
Clostridium difficile colitis

## 2018-09-27 NOTE — PROGRESS NOTE ADULT - PROVIDER SPECIALTY LIST ADULT
Colorectal Surgery
Gastroenterology
Hospitalist
Hospitalist
Infectious Disease
Internal Medicine
Palliative Care
Palliative Care
Gastroenterology
Internal Medicine
Internal Medicine

## 2018-09-27 NOTE — PROGRESS NOTE ADULT - SUBJECTIVE AND OBJECTIVE BOX
Patient is a 78y old  Female who presents with a chief complaint of Not feeling well (27 Sep 2018 14:11)        SUBJECTIVE / OVERNIGHT EVENTS: Patient reports right thigh pain persists, but oxycodone helps. She says stools are now more formed. She denies any melena. Only occasionally she'll get a little blood in her stool from hemorrhoids. She reports mild abdominal pain.       MEDICATIONS  (STANDING):  amLODIPine   Tablet 5 milliGRAM(s) Oral daily  aspirin enteric coated 81 milliGRAM(s) Oral daily  atorvastatin 10 milliGRAM(s) Oral at bedtime  balsalazide 2250 milliGRAM(s) Oral three times a day  enoxaparin Injectable 40 milliGRAM(s) SubCutaneous every 24 hours  ferrous    sulfate 325 milliGRAM(s) Oral daily  lactobacillus acidophilus 1 Tablet(s) Oral three times a day with meals  losartan 100 milliGRAM(s) Oral daily  multivitamin 1 Tablet(s) Oral daily  pantoprazole    Tablet 40 milliGRAM(s) Oral before breakfast  sertraline 25 milliGRAM(s) Oral two times a day  vancomycin    Solution 125 milliGRAM(s) Oral every 6 hours    MEDICATIONS  (PRN):  acetaminophen   Tablet .. 650 milliGRAM(s) Oral every 6 hours PRN Temp greater or equal to 38C (100.4F)  acetaminophen   Tablet .. 650 milliGRAM(s) Oral every 6 hours PRN Mild Pain (1 - 3)  oxyCODONE    IR 5 milliGRAM(s) Oral every 4 hours PRN moderate to severe pain PRN      Vital Signs Last 24 Hrs  T(C): 36.9 (27 Sep 2018 13:52), Max: 36.9 (27 Sep 2018 13:52)  T(F): 98.4 (27 Sep 2018 13:52), Max: 98.4 (27 Sep 2018 13:52)  HR: 78 (27 Sep 2018 13:52) (78 - 84)  BP: 126/74 (27 Sep 2018 13:52) (125/76 - 130/74)  BP(mean): --  RR: 20 (27 Sep 2018 03:00) (18 - 20)  SpO2: 98% (27 Sep 2018 03:00) (98% - 98%)  CAPILLARY BLOOD GLUCOSE        I&O's Summary    26 Sep 2018 07:01  -  27 Sep 2018 07:00  --------------------------------------------------------  IN: 0 mL / OUT: 2 mL / NET: -2 mL    27 Sep 2018 07:01  -  27 Sep 2018 16:04  --------------------------------------------------------  IN: 860 mL / OUT: 300 mL / NET: 560 mL          PHYSICAL EXAM:  GENERAL: NAD, well-developed  HEAD:  Atraumatic, Normocephalic  EYES: Conjunctiva and sclera clear  NECK: Supple  CHEST/LUNG: Clear to auscultation bilaterally; No wheeze  HEART: Regular rate and rhythm; No murmurs, rubs, or gallops  ABDOMEN: Soft, mildly tender, Nondistended; Bowel sounds present  EXTREMITIES: Trace to 1+ LE edema (R>L)  PSYCH/Neuro: AAOx3. Non-focal.   SKIN: No rashes or lesions      LABS:                        8.5    11.77 )-----------( 438      ( 27 Sep 2018 08:45 )             27.6     09-27    136  |  97  |  11  ----------------------------<  110<H>  3.8   |  29  |  0.74    Ca    9.1      27 Sep 2018 06:44  Phos  3.3     09-26  Mg     1.6     09-26      PT/INR - ( 27 Sep 2018 08:47 )   PT: 12.0 sec;   INR: 1.06 ratio         PTT - ( 27 Sep 2018 08:47 )  PTT:30.5 sec      < from: Xray Hip w/ Pelvis 2 or 3 Views, Right (09.27.18 @ 11:22) >  IMPRESSION:   No acute fracture or dislocation.    Arthrosis of the hip and knee joints. Trace right knee joint effusion.    < end of copied text >        RADIOLOGY & ADDITIONAL TESTS:    Imaging Personally Reviewed: X-ray of right thigh reports.   Consultant(s) Notes Reviewed: ID and GI  Care Discussed with Consultants/Other Providers:

## 2018-09-27 NOTE — PROGRESS NOTE ADULT - ATTENDING COMMENTS
David Sharif MD  Division of [facility] Medicine  Cell: [phone]  Pager: [phone]  Office: [phone]
David Sharif MD  Division of [facility] Medicine  Cell: [phone]  Pager: [phone]  Office: [phone]
Clinically improved   ADAT
Improved, but WBC increasing.  -consider adding Flagyl
I have personally seen and examined the patient and completed the note.
Mildly improved clinically.  -Palliative care consult
Clinically feeling better, stool more formed.   WBC downtrending. Will stop flagyl, decrease dose of Vanc to 125mg PO Q6  D/w ID
Diarrhea improving. C/w po Vanco.
Diarrhea improving. Continue po Vanco. D/c planning.
WBC stable from yesterday on decreased dosage of Vanc PO. COntinued improvement in symptoms however, complaining of RLQ pain. Pain likely multifactorial (Metastatic lesions seen on CT coupled with colitis). Will trial Oxycodone for pain in addition to tylenol.
Formed BM this am, WBC now downtrending on increased Vanc PO. Trend CBC  c/w Abx, course as per ID
WBC uptrended today. Will increase Vanc PO back to 500mg Q6  Monitor BMs

## 2018-09-27 NOTE — PROGRESS NOTE ADULT - SUBJECTIVE AND OBJECTIVE BOX
SUBJECTIVE:  BMs formed, no diarrhea. Only residual abdominal pain is the chronic RLQ pain associated with her endometrial cancer. Plans to go to Rehab within the next couple days.  _____________________________________________________  OBJECTIVE:    T(C): 36.7 (09-27-18 @ 03:00), Max: 36.8 (09-26-18 @ 11:00)  HR: 80 (09-27-18 @ 03:00)  BP: 130/74 (09-27-18 @ 03:00)  RR: 20 (09-27-18 @ 03:00)  SpO2: 98% (09-27-18 @ 03:00)  Wt(kg): --    General = Comfortable-appearing, no acute distress  Abdomen = Non-distended, normal active bowel sounds, soft, nontender except RLQ with chronic tenderness/fullness  _____________________________________________________  LABS:                        8.7    14.16 )-----------( 473      ( 26 Sep 2018 08:00 )             28.4     09-27    136  |  97  |  11  ----------------------------<  110<H>  3.8   |  29  |  0.74    Ca    9.1      27 Sep 2018 06:44  Phos  3.3     09-26  Mg     1.6     09-26          _____________________________________________________  ACTIVE MEDS:  MEDICATIONS  (STANDING):  amLODIPine   Tablet 5 milliGRAM(s) Oral daily  atorvastatin 10 milliGRAM(s) Oral at bedtime  balsalazide 2250 milliGRAM(s) Oral three times a day  enoxaparin Injectable 40 milliGRAM(s) SubCutaneous every 24 hours  lactobacillus acidophilus 1 Tablet(s) Oral three times a day with meals  losartan 100 milliGRAM(s) Oral daily  pantoprazole    Tablet 40 milliGRAM(s) Oral before breakfast  sertraline 25 milliGRAM(s) Oral two times a day  vancomycin    Solution 125 milliGRAM(s) Oral every 6 hours    MEDICATIONS  (PRN):  acetaminophen   Tablet .. 650 milliGRAM(s) Oral every 6 hours PRN Temp greater or equal to 38C (100.4F)  acetaminophen   Tablet .. 650 milliGRAM(s) Oral every 6 hours PRN Mild Pain (1 - 3)  oxyCODONE    IR 5 milliGRAM(s) Oral every 4 hours PRN moderate to severe pain PRN    _____________________________________________________  ASSESSMENT:  78yFemale recovering from severe bout of C. diff colitis.    PLAN:  Complete the vanco course, now at 125 mg 4x/day, for 5 days as advised by ID  Continue balsalazide, pantoprazole  Patient advised to follow-up at office 1-2 weeks after discharge from Rehab  Please call GI as needed if new issues arise    Karl Michel M.D.  (O) [phone]  (C) [phone]

## 2018-09-27 NOTE — PROGRESS NOTE ADULT - PROBLEM SELECTOR PLAN 4
-Actually more consistent with paroxysmal Afib and not chronic Afib.   -Patient with known MVP and remote history of an episode of Afib > 30 years ago.   -Had episode of Afib on 9/14 in setting of dehydration and severe C. diff infection, which resolved with fluids. Was briefly on metoprolol here, but stopped due to ?fatigue per daughter. She was never started on anti-coagulation here.   -Currently in sinus rhythm and rate controlled, so no need for beta blocker at this time.   -CHADS Vasc score of 4 and therefore at risk for stroke.   -Discussed risks and benefits of AC with patient and daughter. They are opting for no full AC at this time. But we've agreed on starting at least ASA 81mg daily at this time for some protection.

## 2018-09-27 NOTE — CHART NOTE - NSCHARTNOTEFT_GEN_A_CORE
Case d/w  this evening patient stable for discharge to [facility]. Medication reconciliation reviewed with  prior to discharge. Patient in NAD With stable VSS Will proceed with Dc this evening.

## 2018-09-27 NOTE — PROGRESS NOTE ADULT - PROBLEM SELECTOR PLAN 2
-Patient complained of RLE pain and edema. Reportedly stated this has been going on since her admission. However, she also has chronic lymphedema on that side, per discussion with daughter.   -RLE doppler negative for DVT.   -X-ray of right femur and right hip/pelvis negative for any fractures or dislocations, only shows arthrosis of the hip and knee joints, with trace right knee joint effusion.  -Patient says having good response to oxycodone. C/w oxycodone 5mg Q4H PRN.   -C/w Tylenol 650mg Q6H PRN.

## 2018-09-27 NOTE — PROGRESS NOTE ADULT - PROBLEM SELECTOR PLAN 7
-C/w lovenox SQ for DVT PPx.   -C/w PPI daily.     8. Dispo: -PT follow up recs [facility]. Per discussion with previous medical team caring for patient, per oncologist no plans for any chemotherapy or hormone treatments at this time. -DC planning to [facility] for today. -Discussed with SW, patient, and her daughter.

## 2018-09-27 NOTE — PROGRESS NOTE ADULT - PROBLEM SELECTOR PLAN 1
-Patient says stools are now mostly formed or a little loose, but overall much improved.   -Leukocytosis improving. Afebrile.   -C/w PO vancomycin, today day 16/20 if start date 9/12. ID extended course 5 days from yesterday.     -ID recs appreciated; c/w PO vancomycin x 5 more days from yesterday, and they had no objections to discharge to rehab.   -C/w probiotic supplements with meals.  -C/w mechanical soft low fiber lactose free diet with Ensure supplements for now.

## 2018-10-16 ENCOUNTER — FORM ENCOUNTER (OUTPATIENT)
Age: 78
End: 2018-10-16

## 2018-10-18 ENCOUNTER — FORM ENCOUNTER (OUTPATIENT)
Age: 78
End: 2018-10-18

## 2018-12-11 ENCOUNTER — APPOINTMENT (OUTPATIENT)
Dept: INFECTIOUS DISEASE | Facility: CLINIC | Age: 78
End: 2018-12-11
Payer: MEDICARE

## 2018-12-11 VITALS
TEMPERATURE: 97 F | RESPIRATION RATE: 15 BRPM | OXYGEN SATURATION: 99 % | SYSTOLIC BLOOD PRESSURE: 130 MMHG | WEIGHT: 103 LBS | HEART RATE: 79 BPM | BODY MASS INDEX: 18.72 KG/M2 | DIASTOLIC BLOOD PRESSURE: 73 MMHG

## 2018-12-11 PROCEDURE — 99215 OFFICE O/P EST HI 40 MIN: CPT

## 2018-12-12 NOTE — ASU PATIENT PROFILE, ADULT - TEACHING/LEARNING EDUCATIONAL LEVEL
I spoke with the patient and explained that she is due for a follow up on her diabetes. She said that she will give us a call after the holidays to set up an appointment.     She also explained that she broke her glucometer and would like a new Rx for a one touch ultra sent to her pharmacy.    graduate school

## 2018-12-18 ENCOUNTER — APPOINTMENT (OUTPATIENT)
Dept: INFECTIOUS DISEASE | Facility: CLINIC | Age: 78
End: 2018-12-18
Payer: MEDICARE

## 2018-12-18 VITALS
HEART RATE: 68 BPM | HEIGHT: 62.2 IN | OXYGEN SATURATION: 100 % | TEMPERATURE: 97.2 F | SYSTOLIC BLOOD PRESSURE: 144 MMHG | DIASTOLIC BLOOD PRESSURE: 83 MMHG | WEIGHT: 103 LBS | RESPIRATION RATE: 14 BRPM | BODY MASS INDEX: 18.71 KG/M2

## 2018-12-18 PROCEDURE — 99213 OFFICE O/P EST LOW 20 MIN: CPT | Mod: 25

## 2018-12-18 PROCEDURE — G0455: CPT

## 2018-12-20 ENCOUNTER — APPOINTMENT (OUTPATIENT)
Dept: INFECTIOUS DISEASE | Facility: CLINIC | Age: 78
End: 2018-12-20
Payer: MEDICARE

## 2018-12-20 VITALS
DIASTOLIC BLOOD PRESSURE: 83 MMHG | OXYGEN SATURATION: 100 % | SYSTOLIC BLOOD PRESSURE: 159 MMHG | TEMPERATURE: 97.3 F | HEIGHT: 62.2 IN | HEART RATE: 76 BPM | RESPIRATION RATE: 14 BRPM

## 2018-12-20 DIAGNOSIS — A04.72 ENTEROCOLITIS DUE TO CLOSTRIDIUM DIFFICILE, NOT SPECIFIED AS RECURRENT: ICD-10-CM

## 2018-12-20 PROCEDURE — 99213 OFFICE O/P EST LOW 20 MIN: CPT | Mod: 25

## 2018-12-20 PROCEDURE — G0455: CPT

## 2018-12-21 PROBLEM — A04.72 CLOSTRIDIUM DIFFICILE COLITIS: Status: ACTIVE | Noted: 2018-12-12

## 2019-01-05 ENCOUNTER — INPATIENT (INPATIENT)
Facility: HOSPITAL | Age: 79
LOS: 3 days | Discharge: NURSING, REHAB OR LONG-TERM CARE | DRG: 872 | End: 2019-01-09
Attending: INTERNAL MEDICINE | Admitting: INTERNAL MEDICINE
Payer: MEDICARE

## 2019-01-05 VITALS
SYSTOLIC BLOOD PRESSURE: 110 MMHG | TEMPERATURE: 98 F | WEIGHT: 89.95 LBS | HEART RATE: 65 BPM | OXYGEN SATURATION: 96 % | RESPIRATION RATE: 16 BRPM | DIASTOLIC BLOOD PRESSURE: 72 MMHG | HEIGHT: 64 IN

## 2019-01-05 DIAGNOSIS — Z29.9 ENCOUNTER FOR PROPHYLACTIC MEASURES, UNSPECIFIED: ICD-10-CM

## 2019-01-05 DIAGNOSIS — Z92.21 PERSONAL HISTORY OF ANTINEOPLASTIC CHEMOTHERAPY: Chronic | ICD-10-CM

## 2019-01-05 DIAGNOSIS — R19.00 INTRA-ABDOMINAL AND PELVIC SWELLING, MASS AND LUMP, UNSPECIFIED SITE: Chronic | ICD-10-CM

## 2019-01-05 DIAGNOSIS — C54.1 MALIGNANT NEOPLASM OF ENDOMETRIUM: ICD-10-CM

## 2019-01-05 DIAGNOSIS — R55 SYNCOPE AND COLLAPSE: ICD-10-CM

## 2019-01-05 DIAGNOSIS — Z87.448 PERSONAL HISTORY OF OTHER DISEASES OF URINARY SYSTEM: Chronic | ICD-10-CM

## 2019-01-05 DIAGNOSIS — K51.00 ULCERATIVE (CHRONIC) PANCOLITIS WITHOUT COMPLICATIONS: ICD-10-CM

## 2019-01-05 DIAGNOSIS — Z98.42 CATARACT EXTRACTION STATUS, LEFT EYE: Chronic | ICD-10-CM

## 2019-01-05 DIAGNOSIS — E87.6 HYPOKALEMIA: ICD-10-CM

## 2019-01-05 DIAGNOSIS — D72.829 ELEVATED WHITE BLOOD CELL COUNT, UNSPECIFIED: ICD-10-CM

## 2019-01-05 DIAGNOSIS — I10 ESSENTIAL (PRIMARY) HYPERTENSION: ICD-10-CM

## 2019-01-05 DIAGNOSIS — E78.5 HYPERLIPIDEMIA, UNSPECIFIED: ICD-10-CM

## 2019-01-05 LAB
ALBUMIN SERPL ELPH-MCNC: 1.8 G/DL — LOW (ref 3.3–5)
ALP SERPL-CCNC: 148 U/L — HIGH (ref 40–120)
ALT FLD-CCNC: 9 U/L — LOW (ref 12–78)
ANION GAP SERPL CALC-SCNC: 14 MMOL/L — SIGNIFICANT CHANGE UP (ref 5–17)
ANION GAP SERPL CALC-SCNC: 8 MMOL/L — SIGNIFICANT CHANGE UP (ref 5–17)
APPEARANCE UR: CLEAR — SIGNIFICANT CHANGE UP
AST SERPL-CCNC: 14 U/L — LOW (ref 15–37)
BACTERIA # UR AUTO: ABNORMAL
BILIRUB SERPL-MCNC: 0.3 MG/DL — SIGNIFICANT CHANGE UP (ref 0.2–1.2)
BILIRUB UR-MCNC: ABNORMAL
BUN SERPL-MCNC: 20 MG/DL — SIGNIFICANT CHANGE UP (ref 7–23)
BUN SERPL-MCNC: 22 MG/DL — SIGNIFICANT CHANGE UP (ref 7–23)
C DIFF BY PCR RESULT: DETECTED
C DIFF TOX GENS STL QL NAA+PROBE: SIGNIFICANT CHANGE UP
CALCIUM SERPL-MCNC: 7.4 MG/DL — LOW (ref 8.5–10.1)
CALCIUM SERPL-MCNC: 7.5 MG/DL — LOW (ref 8.5–10.1)
CHLORIDE SERPL-SCNC: 102 MMOL/L — SIGNIFICANT CHANGE UP (ref 96–108)
CHLORIDE SERPL-SCNC: 97 MMOL/L — SIGNIFICANT CHANGE UP (ref 96–108)
CO2 SERPL-SCNC: 29 MMOL/L — SIGNIFICANT CHANGE UP (ref 22–31)
CO2 SERPL-SCNC: 32 MMOL/L — HIGH (ref 22–31)
COLOR SPEC: YELLOW — SIGNIFICANT CHANGE UP
COMMENT - URINE: SIGNIFICANT CHANGE UP
CREAT SERPL-MCNC: 1.2 MG/DL — SIGNIFICANT CHANGE UP (ref 0.5–1.3)
CREAT SERPL-MCNC: 1.6 MG/DL — HIGH (ref 0.5–1.3)
DIFF PNL FLD: ABNORMAL
EPI CELLS # UR: SIGNIFICANT CHANGE UP
GLUCOSE SERPL-MCNC: 105 MG/DL — HIGH (ref 70–99)
GLUCOSE SERPL-MCNC: 85 MG/DL — SIGNIFICANT CHANGE UP (ref 70–99)
GLUCOSE UR QL: NEGATIVE — SIGNIFICANT CHANGE UP
HCT VFR BLD CALC: 31 % — LOW (ref 34.5–45)
HGB BLD-MCNC: 9.4 G/DL — LOW (ref 11.5–15.5)
KETONES UR-MCNC: ABNORMAL
LACTATE SERPL-SCNC: 1.3 MMOL/L — SIGNIFICANT CHANGE UP (ref 0.7–2)
LEUKOCYTE ESTERASE UR-ACNC: ABNORMAL
MAGNESIUM SERPL-MCNC: 1.2 MG/DL — LOW (ref 1.6–2.6)
MCHC RBC-ENTMCNC: 23.6 PG — LOW (ref 27–34)
MCHC RBC-ENTMCNC: 30.3 GM/DL — LOW (ref 32–36)
MCV RBC AUTO: 77.7 FL — LOW (ref 80–100)
NITRITE UR-MCNC: NEGATIVE — SIGNIFICANT CHANGE UP
NRBC # BLD: 0 /100 WBCS — SIGNIFICANT CHANGE UP (ref 0–0)
PH UR: 5 — SIGNIFICANT CHANGE UP (ref 5–8)
PLATELET # BLD AUTO: 563 K/UL — HIGH (ref 150–400)
POTASSIUM SERPL-MCNC: 2.1 MMOL/L — CRITICAL LOW (ref 3.5–5.3)
POTASSIUM SERPL-MCNC: 3.4 MMOL/L — LOW (ref 3.5–5.3)
POTASSIUM SERPL-SCNC: 2.1 MMOL/L — CRITICAL LOW (ref 3.5–5.3)
POTASSIUM SERPL-SCNC: 3.4 MMOL/L — LOW (ref 3.5–5.3)
PROT SERPL-MCNC: 5.4 G/DL — LOW (ref 6–8.3)
PROT UR-MCNC: 25 MG/DL
RBC # BLD: 3.99 M/UL — SIGNIFICANT CHANGE UP (ref 3.8–5.2)
RBC # FLD: 18.6 % — HIGH (ref 10.3–14.5)
RBC CASTS # UR COMP ASSIST: ABNORMAL /HPF (ref 0–4)
SODIUM SERPL-SCNC: 140 MMOL/L — SIGNIFICANT CHANGE UP (ref 135–145)
SODIUM SERPL-SCNC: 142 MMOL/L — SIGNIFICANT CHANGE UP (ref 135–145)
SP GR SPEC: 1.02 — SIGNIFICANT CHANGE UP (ref 1.01–1.02)
TROPONIN I SERPL-MCNC: <.015 NG/ML — SIGNIFICANT CHANGE UP (ref 0.01–0.04)
UROBILINOGEN FLD QL: NEGATIVE — SIGNIFICANT CHANGE UP
WBC # BLD: 53.17 K/UL — CRITICAL HIGH (ref 3.8–10.5)
WBC # FLD AUTO: 53.17 K/UL — CRITICAL HIGH (ref 3.8–10.5)
WBC UR QL: SIGNIFICANT CHANGE UP

## 2019-01-05 PROCEDURE — 93010 ELECTROCARDIOGRAM REPORT: CPT

## 2019-01-05 PROCEDURE — 12345: CPT | Mod: NC

## 2019-01-05 PROCEDURE — 71045 X-RAY EXAM CHEST 1 VIEW: CPT | Mod: 26

## 2019-01-05 PROCEDURE — 99223 1ST HOSP IP/OBS HIGH 75: CPT | Mod: AI,GC

## 2019-01-05 PROCEDURE — 99285 EMERGENCY DEPT VISIT HI MDM: CPT

## 2019-01-05 PROCEDURE — 74176 CT ABD & PELVIS W/O CONTRAST: CPT | Mod: 26

## 2019-01-05 PROCEDURE — 70450 CT HEAD/BRAIN W/O DYE: CPT | Mod: 26

## 2019-01-05 PROCEDURE — 71250 CT THORAX DX C-: CPT | Mod: 26

## 2019-01-05 RX ORDER — MORPHINE SULFATE 50 MG/1
2 CAPSULE, EXTENDED RELEASE ORAL ONCE
Qty: 0 | Refills: 0 | Status: DISCONTINUED | OUTPATIENT
Start: 2019-01-05 | End: 2019-01-05

## 2019-01-05 RX ORDER — FOLIC ACID 0.8 MG
1 TABLET ORAL
Qty: 0 | Refills: 0 | COMMUNITY

## 2019-01-05 RX ORDER — FERROUS SULFATE 325(65) MG
325 TABLET ORAL DAILY
Qty: 0 | Refills: 0 | Status: DISCONTINUED | OUTPATIENT
Start: 2019-01-05 | End: 2019-01-09

## 2019-01-05 RX ORDER — CHOLECALCIFEROL (VITAMIN D3) 125 MCG
5000 CAPSULE ORAL DAILY
Qty: 0 | Refills: 0 | Status: DISCONTINUED | OUTPATIENT
Start: 2019-01-05 | End: 2019-01-07

## 2019-01-05 RX ORDER — ALPRAZOLAM 0.25 MG
1 TABLET ORAL
Qty: 0 | Refills: 0 | COMMUNITY

## 2019-01-05 RX ORDER — POTASSIUM CHLORIDE 20 MEQ
10 PACKET (EA) ORAL
Qty: 0 | Refills: 0 | Status: COMPLETED | OUTPATIENT
Start: 2019-01-05 | End: 2019-01-05

## 2019-01-05 RX ORDER — POTASSIUM CHLORIDE 20 MEQ
40 PACKET (EA) ORAL EVERY 4 HOURS
Qty: 0 | Refills: 0 | Status: COMPLETED | OUTPATIENT
Start: 2019-01-05 | End: 2019-01-06

## 2019-01-05 RX ORDER — AMLODIPINE BESYLATE 2.5 MG/1
5 TABLET ORAL DAILY
Qty: 0 | Refills: 0 | Status: DISCONTINUED | OUTPATIENT
Start: 2019-01-05 | End: 2019-01-09

## 2019-01-05 RX ORDER — CHOLESTYRAMINE 4 G/9G
4 POWDER, FOR SUSPENSION ORAL DAILY
Qty: 0 | Refills: 0 | Status: DISCONTINUED | OUTPATIENT
Start: 2019-01-05 | End: 2019-01-08

## 2019-01-05 RX ORDER — VANCOMYCIN HCL 1 G
500 VIAL (EA) INTRAVENOUS EVERY 6 HOURS
Qty: 0 | Refills: 0 | Status: DISCONTINUED | OUTPATIENT
Start: 2019-01-05 | End: 2019-01-06

## 2019-01-05 RX ORDER — OXYCODONE HYDROCHLORIDE 5 MG/1
10 TABLET ORAL EVERY 6 HOURS
Qty: 0 | Refills: 0 | Status: DISCONTINUED | OUTPATIENT
Start: 2019-01-05 | End: 2019-01-09

## 2019-01-05 RX ORDER — LACTOBACILLUS ACIDOPHILUS 100MM CELL
1 CAPSULE ORAL
Qty: 0 | Refills: 0 | Status: DISCONTINUED | OUTPATIENT
Start: 2019-01-05 | End: 2019-01-09

## 2019-01-05 RX ORDER — FOLIC ACID 0.8 MG
1 TABLET ORAL DAILY
Qty: 0 | Refills: 0 | Status: DISCONTINUED | OUTPATIENT
Start: 2019-01-05 | End: 2019-01-09

## 2019-01-05 RX ORDER — SODIUM CHLORIDE 9 MG/ML
1000 INJECTION INTRAMUSCULAR; INTRAVENOUS; SUBCUTANEOUS ONCE
Qty: 0 | Refills: 0 | Status: COMPLETED | OUTPATIENT
Start: 2019-01-05 | End: 2019-01-05

## 2019-01-05 RX ORDER — ATORVASTATIN CALCIUM 80 MG/1
10 TABLET, FILM COATED ORAL AT BEDTIME
Qty: 0 | Refills: 0 | Status: DISCONTINUED | OUTPATIENT
Start: 2019-01-05 | End: 2019-01-09

## 2019-01-05 RX ORDER — SERTRALINE 25 MG/1
25 TABLET, FILM COATED ORAL
Qty: 0 | Refills: 0 | Status: DISCONTINUED | OUTPATIENT
Start: 2019-01-05 | End: 2019-01-09

## 2019-01-05 RX ORDER — MESALAMINE 400 MG
800 TABLET, DELAYED RELEASE (ENTERIC COATED) ORAL THREE TIMES A DAY
Qty: 0 | Refills: 0 | Status: DISCONTINUED | OUTPATIENT
Start: 2019-01-05 | End: 2019-01-09

## 2019-01-05 RX ORDER — CHOLECALCIFEROL (VITAMIN D3) 125 MCG
1 CAPSULE ORAL
Qty: 0 | Refills: 0 | COMMUNITY

## 2019-01-05 RX ORDER — VANCOMYCIN HCL 1 G
1 VIAL (EA) INTRAVENOUS
Qty: 0 | Refills: 0 | COMMUNITY

## 2019-01-05 RX ORDER — DEXTROSE MONOHYDRATE, SODIUM CHLORIDE, AND POTASSIUM CHLORIDE 50; .745; 4.5 G/1000ML; G/1000ML; G/1000ML
1000 INJECTION, SOLUTION INTRAVENOUS
Qty: 0 | Refills: 0 | Status: DISCONTINUED | OUTPATIENT
Start: 2019-01-05 | End: 2019-01-06

## 2019-01-05 RX ORDER — VANCOMYCIN HCL 1 G
125 VIAL (EA) INTRAVENOUS ONCE
Qty: 0 | Refills: 0 | Status: COMPLETED | OUTPATIENT
Start: 2019-01-05 | End: 2019-01-05

## 2019-01-05 RX ORDER — PREGABALIN 225 MG/1
1 CAPSULE ORAL
Qty: 0 | Refills: 0 | COMMUNITY

## 2019-01-05 RX ORDER — METRONIDAZOLE 500 MG
500 TABLET ORAL ONCE
Qty: 0 | Refills: 0 | Status: COMPLETED | OUTPATIENT
Start: 2019-01-05 | End: 2019-01-05

## 2019-01-05 RX ORDER — FERROUS SULFATE 325(65) MG
1 TABLET ORAL
Qty: 0 | Refills: 0 | COMMUNITY

## 2019-01-05 RX ORDER — PANTOPRAZOLE SODIUM 20 MG/1
40 TABLET, DELAYED RELEASE ORAL
Qty: 0 | Refills: 0 | Status: DISCONTINUED | OUTPATIENT
Start: 2019-01-05 | End: 2019-01-09

## 2019-01-05 RX ORDER — ENOXAPARIN SODIUM 100 MG/ML
30 INJECTION SUBCUTANEOUS EVERY 24 HOURS
Qty: 0 | Refills: 0 | Status: DISCONTINUED | OUTPATIENT
Start: 2019-01-05 | End: 2019-01-09

## 2019-01-05 RX ORDER — MAGNESIUM SULFATE 500 MG/ML
1 VIAL (ML) INJECTION ONCE
Qty: 0 | Refills: 0 | Status: COMPLETED | OUTPATIENT
Start: 2019-01-05 | End: 2019-01-05

## 2019-01-05 RX ADMIN — OXYCODONE HYDROCHLORIDE 10 MILLIGRAM(S): 5 TABLET ORAL at 17:52

## 2019-01-05 RX ADMIN — Medication 100 MILLIGRAM(S): at 14:34

## 2019-01-05 RX ADMIN — SODIUM CHLORIDE 1000 MILLILITER(S): 9 INJECTION INTRAMUSCULAR; INTRAVENOUS; SUBCUTANEOUS at 07:30

## 2019-01-05 RX ADMIN — Medication 100 MILLIEQUIVALENT(S): at 09:45

## 2019-01-05 RX ADMIN — Medication 100 MILLIEQUIVALENT(S): at 11:40

## 2019-01-05 RX ADMIN — Medication 125 MILLIGRAM(S): at 14:35

## 2019-01-05 RX ADMIN — OXYCODONE HYDROCHLORIDE 10 MILLIGRAM(S): 5 TABLET ORAL at 19:03

## 2019-01-05 RX ADMIN — Medication 500 MILLIGRAM(S): at 20:56

## 2019-01-05 RX ADMIN — DEXTROSE MONOHYDRATE, SODIUM CHLORIDE, AND POTASSIUM CHLORIDE 100 MILLILITER(S): 50; .745; 4.5 INJECTION, SOLUTION INTRAVENOUS at 19:04

## 2019-01-05 RX ADMIN — ATORVASTATIN CALCIUM 10 MILLIGRAM(S): 80 TABLET, FILM COATED ORAL at 21:55

## 2019-01-05 RX ADMIN — Medication 1 TABLET(S): at 17:52

## 2019-01-05 RX ADMIN — SODIUM CHLORIDE 1000 MILLILITER(S): 9 INJECTION INTRAMUSCULAR; INTRAVENOUS; SUBCUTANEOUS at 06:30

## 2019-01-05 RX ADMIN — Medication 10 MILLIEQUIVALENT(S): at 09:45

## 2019-01-05 RX ADMIN — Medication 100 GRAM(S): at 22:59

## 2019-01-05 RX ADMIN — SERTRALINE 25 MILLIGRAM(S): 25 TABLET, FILM COATED ORAL at 17:53

## 2019-01-05 RX ADMIN — Medication 100 MILLIEQUIVALENT(S): at 08:48

## 2019-01-05 RX ADMIN — Medication 800 MILLIGRAM(S): at 21:55

## 2019-01-05 NOTE — CHART NOTE - NSCHARTNOTEFT_GEN_A_CORE
Notified by lab of critical lab values: K+ 3.4 and Mg 1.2  S: on exam, denies NV ABD pain, improved strength after IV fluids. No CP SOB Fever or chills  Exam  Gen NAD  Cor RRR  Pulm CTAB   ABD SNT    Tele monitor reviewed, NSR    A/P    1. Hypokalemia. Vast improved from admission, on maintenance IV Fluids w added K+  2. Hypomagnesemia: replete, trend in AM. Per family, has multiple instances of mypomagnesemia, and routinely gets repletion with chemotherapy. Consider PO MG Ox pending clinical course.

## 2019-01-05 NOTE — ED PROVIDER NOTE - CARE PLAN
Principal Discharge DX:	Syncope, unspecified syncope type Principal Discharge DX:	Syncope, unspecified syncope type  Secondary Diagnosis:	Pancolitis  Secondary Diagnosis:	Hypokalemia

## 2019-01-05 NOTE — H&P ADULT - PROBLEM SELECTOR PLAN 3
Patient with significant hx of radiation colitis, ulcerative colitis, and C diff colitis x 3 in the past year, currently 2 weeks s/p fecal transplant for C diff  CT showing pancolitis  C Diff PCR pending  Continue with Vancomycin PO, Flagyl IV  GI (Liz) consulted, low suspicion for C diff colitis. f/u recs  Surgery (Venkat) consulted for possible surgical intervention, hx of previous abdominal wall resection due to tumor load.   F/u CBC with diff Patient with significant hx of radiation colitis, ulcerative colitis, and C diff colitis x 3 in the past year, currently 2 weeks s/p fecal transplant for C diff  CT showing pancolitis  C Diff PCR pending  Continue with Vancomycin PO, Flagyl IV  GI (Liz) consulted, low suspicion for C diff colitis. f/u recs  Surgery (Ginger) consulted for possible surgical intervention, hx of previous abdominal wall resection due to tumor load.   F/u CBC with diff

## 2019-01-05 NOTE — ED ADULT NURSE NOTE - NSIMPLEMENTINTERV_GEN_ALL_ED
Implemented All Universal Safety Interventions:  [place] to call system. Call bell, personal items and telephone within reach. Instruct patient to call for assistance. Room bathroom lighting operational. Non-slip footwear when patient is off stretcher. Physically safe environment: no spills, clutter or unnecessary equipment. Stretcher in lowest position, wheels locked, appropriate side rails in place.

## 2019-01-05 NOTE — CONSULT NOTE ADULT - ASSESSMENT
77 yo with long standing colitis, combination of c. diff vs poss radiation.  PResents after vasovagal syncopal episode.  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

## 2019-01-05 NOTE — ED ADULT TRIAGE NOTE - CHIEF COMPLAINT QUOTE
patient was sitting in the toilet and she passed out while sitting on the toilet bowl. passed out a couple of times as per ems

## 2019-01-05 NOTE — H&P ADULT - PROBLEM SELECTOR PLAN 5
PMHx of metastatic endometrial ca, s/p KATH, radiation, and chemotherapy, last chemo 9/2018  -Recent PET scan (12/2018) showing new L supraclavicular node involvement  -Pain control with Oxycodone, will continue  - Patient uses medical marijuana at home

## 2019-01-05 NOTE — H&P ADULT - HISTORY OF PRESENT ILLNESS
78/F with PMHx significant for metastatic endometrial cancer undergoing chemo, last treatment sept 2018, radiation colitis, HTN, R hydronephrosis (s/p ureteral stent 6/2018), thyroid nodules, HTN presents with witnessed syncope that occurred on the morning of admission after passing a bowel movement sitting on the toilet. Event was witnessed by aide (Ana), who reports the patient became unresponsive after passing a large amount of watery stool, noting her eyes rolls back in her head, and had LOC. Also complaints of lower abdominal dull pain. Ana caught patient and lowered her to the ground, denies any head injury. Denies having had similar symptoms in the past. Daughter (kayode) present at the bedside reports patient has had poor oral intake over the past 1 week. Of note, patient has had C Diff colitis 3 times in the past year, and is s/p fecal transplant x 2, 2 weeks ago. complications from recurrent c diff delaying chemo treatments. Patient denies any fever, chills SOB n/v, chest pain, but endorses generalized weakness and states she has been ambulating little over the past week.     ED Vitals: T 98.0F HR 65 /72 RR 16 SpO2 96% on RA  In ED patient received IV flagyl, PO vancomycin, 1L NS IVF, 2g50dAr KCl riders  Labs significant for WBC 53.17, hb 9.4, lactate 1.3, K 2.1, Cr 1.6, troponin negative x1, UA with trace ketones, leuk esterase, small blood, few bacteria and epithelials  BCx UCx C diff toxin PCR pending  CT Chest and abdomen pelvis: Pancolitis, small left pleural effusion. Distended gallbladder without definite radiopaque gallstones. Right ureteral stent in place with minimal residual hydronephrosis.  CT Head: No acute ICH, atrophy noted  CXR: Lungs clear  EKG: NSR 92 BPM, possible anterolateral ischemia    Patient seen and evaluated at the bedside, seen resting comfortably, isolation for C diff, NAD, daughter (Kayode) seen at the bedside. Patient endorses generalized weakness over the past 1 week, however notes that watery bowel movements are not new and has had them for years since she had radiation therapy for endometrial cancer. patient reports she has had fecal transplants x2, the last of which was 2 weeks ago. Of note, she does not remember her syncopal episode. She is also advised to have potassium supplement due to chronic diarrhea leaving her with chronically low potassium levels, but admits she has not been taking K lately due to worsening diarrhea. Denies similar symptoms in the past. no fever, chills SOB n/v chest pain. VS stable, labs and imaging reviewed.

## 2019-01-05 NOTE — ED PROVIDER NOTE - OBJECTIVE STATEMENT
79 y/o WF C/C patient was sitting in the toilet and she passed out while sitting on the toilet bowl. passed out a couple of times as per ems  syncope 78 female presents to ER by EMS with report of weakness, loose stool, syncope on the toilet, states patient would syncopize when attempted to move for several times and return to baseline. Home aide states she did not let the patient fall when she passed out, has had decreased appetite, generalized weakness.

## 2019-01-05 NOTE — H&P ADULT - PROBLEM SELECTOR PLAN 8
IMPROVE VTE Individual Risk Assessment          RISK                                                          Points  [  ] Previous VTE                                                3  [  ] Thrombophilia                                            2  [  ] Lower limb paralysis                                  2        (unable to hold up >15 seconds)    [ x ] Current Cancer                                            2         (within 6 months)  [ x ] Immobilization > 24 hrs expected                  1  [  ] ICU/CCU stay > 24 hours                           1  [ x ] Age > 60                                                        1    IMPROVE VTE Score: 4  Pharmacologic VTE ppx with lovenox 40mg

## 2019-01-05 NOTE — ED ADULT NURSE REASSESSMENT NOTE - NS ED NURSE REASSESS COMMENT FT1
Pt received from [facility] in stable condition. Patient WBC elevated @53 and Potassium @2.1 - supplementation in process per MD orders. Mediport to [facility] accessed by [facility]. No distress noted.

## 2019-01-05 NOTE — H&P ADULT - PSH
Abdominal mass  s/p mass removed 2013,on chemo-right chest infusa port  Encounter for biopsy  2/14/13 - right iliac lymph node biopsy  H/O hydronephrosis  ureteral stent, multiple stent exchanges, last in 6/2018  H/O total hysterectomy  2001  History of cataract extraction, left    History of chemotherapy  Infusaport insertion ; 2013  History of D&C  age 22  History of tonsillectomy

## 2019-01-05 NOTE — CONSULT NOTE ADULT - SUBJECTIVE AND OBJECTIVE BOX
78/F with PMHx significant for metastatic endometrial cancer undergoing chemo, last treatment sept 2018, radiation colitis, HTN, R hydronephrosis (s/p ureteral stent 6/2018), thyroid nodules, HTN presents with witnessed syncope that occurred on the morning of admission after passing a bowel movement sitting on the toilet. Event was witnessed by aide (Ana), who reports the patient became unresponsive after passing a large amount of watery stool, noting her eyes rolls back in her head, and had LOC. Also complaints of lower abdominal dull pain. Ana caught patient and lowered her to the ground, denies any head injury. Denies having had similar symptoms in the past. Daughter (kayode) present at the bedside reports patient has had poor oral intake over the past 1 week. Of note, patient has had C Diff colitis 3 times in the past year, and is s/p fecal transplant x 2, 2 weeks ago. complications from recurrent c diff delaying chemo treatments. Patient denies any fever, chills SOB n/v, chest pain, but endorses generalized weakness and states she has been ambulating little over the past week.       REVIEW OF SYSTEMS:    CONSTITUTIONAL: +weakness  EYES: No visual changes; No double vision,  No vertigo, eye pain  Ears: no otalgia, no otorhea, no hearing loss, tinnitus  Nose: no epistaxis, rhinorrhea, sinus pressure  Throat: no throat pain, no oral lesions  NECK: No pain or stiffness  RESPIRATORY: No cough (productive or dry), wheezing, hemoptysis; No shortness of breath  CARDIOVASCULAR: No chest pain or palpitations,    GASTROINTESTINAL: as above  GENITOURINARY: No dysuria, frequency, urgency or hematuria,    NEUROLOGICAL: No numbness or weakness, headache, memory loss,   SKIN: R leg weakness  Psych: No anxiety, sadness, insomnia,    Endocrine: No Heat or Cold intolerance, polydipsia, polyphagia  Heme/Lymph: no LN enlargement, no easy bruising or bleeding    PAST MEDICAL & SURGICAL HISTORY:  Malignant neoplasm of ovary, unspecified laterality  GERD (gastroesophageal reflux disease)  Fatty liver  Multiple thyroid nodules  Anemia  Cataracts, bilateral: monitored  History of chemotherapy  Lymphedema of leg: right  Hydronephrosis determined by ultrasound: right kidney  Abdominal mass: 2013 metastatic endometrial cancer, s/p surgery, on chemotherapy at present-infusa port right chest  Atrial fibrillation: one episode &gt;30 years ago  Mitral valve prolapse  Endometrial carcinoma: 2001 - s/p KATH - tx with radiation, and currently receiving chemotherapy  Hyperlipidemia  Ulcerative colitis  Anxiety  Post-Herpetic Trigeminal Neuralgia: left  Benign Hypertension  History of cataract extraction, left  H/O hydronephrosis: ureteral stent, multiple stent exchanges, last in 6/2018  History of chemotherapy: Infusaport insertion ; 2013  Abdominal mass: s/p mass removed 2013,on chemo-right chest infusa port  Encounter for biopsy: 2/14/13 - right iliac lymph node biopsy  History of tonsillectomy  H/O total hysterectomy: 2001  History of D&C: age 22    Allergies    erythromycin (Unknown)  macrolide antibiotics (Unknown)  morphine (Diarrhea)  sulfa drugs (Unknown)    Intolerances    SH- neg  FH- NC    .  VITAL SIGNS:  T(C): 36.9 (01-05-19 @ 16:06), Max: 36.9 (01-05-19 @ 16:06)  T(F): 98.4 (01-05-19 @ 16:06), Max: 98.4 (01-05-19 @ 16:06)  HR: 86 (01-05-19 @ 16:06) (65 - 86)  BP: 103/55 (01-05-19 @ 16:06) (91/62 - 110/72)  BP(mean): --  RR: 16 (01-05-19 @ 16:06) (16 - 18)  SpO2: 99% (01-05-19 @ 16:06) (96% - 99%)  Wt(kg): --    PHYSICAL EXAM:    Constitutional:  mild distress, comfortable  Head: NC/AT  Eyes: PERRL b/l  ENT: MMM  Neck: supple; no JVD or thyromegaly  Respiratory: CTA B/L   Cardiac: +S1/S2; RRR   Gastrointestinal: thin, soft, tenderness throughout   Back: no CVA B/L  Extremities: RLE swelling  Musculoskeletal: NROM x4;   Vascular: 2+ radial, femoral, DP/PT pulses B/L  Dermatologic: skin warm, dry and intact; no rashes, wounds, or scars  Lymphatic: no submandibular, cervical or  LAD  Neurologic: AAOx3; CNII-XII grossly intact; no focal deficits  Psychiatric: affect and characteristics of appearance, verbalizations, behaviors are appropriate                          9.4    53.17 )-----------( 3      ( 05 Jan 2019 06:43 )             31.0   01-05    142  |  102  |  22  ----------------------------<  85  3.4<L>   |  32<H>  |  1.20    Ca    7.4<L>      05 Jan 2019 17:12  Mg     1.2     01-05    TPro  5.4<L>  /  Alb  1.8<L>  /  TBili  0.3  /  DBili  x   /  AST  14<L>  /  ALT  9<L>  /  AlkPhos  148<H>  01-05    < from: CT Abdomen and Pelvis No Cont (01.05.19 @ 09:33) >    Findings compatible with pancolitis, differential including infectious   versus inflammatory etiology with consideration of C. Difficile   infection. No definite bowel obstruction.    Enlarging right pelvic/adnexal soft tissue mass with additional interval   development of a presacral mass and mesenteric adenopathy compatible with   interval progression of metastatic disease. Additional findings which may   suggest early carcinomatosis.    Small left pleural effusion.    Distended gallbladder without definite radiopaque gallstones.    Right ureteral stent in place with minimal residual hydronephrosis.        < end of copied text >

## 2019-01-05 NOTE — H&P ADULT - NSHPOUTPATIENTPROVIDERS_GEN_ALL_CORE
PMD Dr Stefan Cadena [phone]  Oncologist Dr. Darrin Dumont [facility] [phone]  Cardiologist Dr Travis Ferrera [phone]

## 2019-01-05 NOTE — H&P ADULT - ATTENDING COMMENTS
78/F with PMHx significant for metastatic endometrial cancer undergoing chemo, last treatment sept 2018, radiation colitis, HTN, R hydronephrosis (s/p ureteral stent 6/2018), thyroid nodules, HTN presents with witnessed syncope that occurred on the morning of admission after passing a bowel movement sitting on the toilet. Admitted for syncope workup and colitis.     sepsis r/o   wbc of 50 K  recently had fecal transplant( 2 weeks ago)  will emperically start on vanco po  pt also had advanced endometiral cancer, last chemo was in september, leukocytosis could be secondary to malingncy  consulted with hemonc to look further into slide and guided if its all infectious related     severe hypokalemia  supplemented  repeat K 3.4    Syncope, unspecified syncope type.  Plan: Patient with witnessed syncopal episodes early on the morning of admission, no memory of event, no head injury.   Syncope of unclear etiology - metabolic derangement vs vasovagal vs dehydration  CT head- no ICH but presence of atrophy  start on mild hydration

## 2019-01-05 NOTE — H&P ADULT - NSHPPHYSICALEXAM_GEN_ALL_CORE
Vital Signs Last 24 Hrs  T(C): 36.9 (05 Jan 2019 16:06), Max: 36.9 (05 Jan 2019 16:06)  T(F): 98.4 (05 Jan 2019 16:06), Max: 98.4 (05 Jan 2019 16:06)  HR: 86 (05 Jan 2019 16:06) (65 - 86)  BP: 103/55 (05 Jan 2019 16:06) (91/62 - 110/72)  BP(mean): --  RR: 16 (05 Jan 2019 16:06) (16 - 18)  SpO2: 99% (05 Jan 2019 16:06) (96% - 99%)    Physical Exam:  General: Well developed, cachetic and frail appearing elderly  female, NAD. Chemoport noted on the right upper anterior chest wall  HEENT: NCAT, PERRLA, EOMI bl, dry mucous membranes   Neck: Supple, nontender, no mass  Neurology: A&Ox3, nonfocal, CN II-XII grossly intact, sensation intact  Respiratory: Clear to auscultation, No wheezes, rales, rhonchi  CV: RRR, +S1/S2, no murmurs, rubs or gallops  Abdominal: Soft,  ND, mild tenderness in the lower abdomen, bowel sounds in 4 quadrants  Extremities: No LE edema, no clubbing, radial, DP and PT peripheral pulses intact  MSK: Normal ROM, no joint erythema or warmth, no joint swelling   Skin: warm, dry, normal color

## 2019-01-05 NOTE — H&P ADULT - ASSESSMENT
78/F with PMHx significant for metastatic endometrial cancer undergoing chemo, last treatment sept 2018, radiation colitis, HTN, R hydronephrosis (s/p ureteral stent 6/2018), thyroid nodules, HTN presents with witnessed syncope that occurred on the morning of admission after passing a bowel movement sitting on the toilet. Admitted for syncope workup and colitis.

## 2019-01-05 NOTE — CONSULT NOTE ADULT - PROBLEM SELECTOR RECOMMENDATION 9
in and out  low residue diet  vancomycin 125 q 6 hours  bacid one tab tid  cholestyramine 4 grams once a day  may need flex sig if symptoms persist

## 2019-01-05 NOTE — CONSULT NOTE ADULT - SUBJECTIVE AND OBJECTIVE BOX
Chief Complaint:  Patient is a 78y old  Female who presents with a chief complaint of Syncope 78/F with PMHx significant for metastatic endometrial cancer undergoing chemo, last treatment 2018, radiation colitis, HTN, R hydronephrosis (s/p ureteral stent 2018), thyroid nodules, HTN presents with witnessed syncope that occurred on the morning of admission after passing a bowel movement sitting on the toilet. Event was witnessed by aide (Ana), who reports the patient became unresponsive after passing a large amount of watery stool, noting her eyes rolls back in her head, and had LOC. Also complaints of lower abdominal dull pain. Ana caught patient and lowered her to the ground, denies any head injury. Denies having had similar symptoms in the past. Daughter (kayode) present at the bedside reports patient has had poor oral intake over the past 1 week. Of note, patient has had C Diff colitis 3 times in the past year, and is s/p fecal transplant x 2, 2 weeks ago. complications from recurrent c diff delaying chemo treatments. Patient denies any fever, chills SOB n/v, chest pain, but endorses generalized weakness and states she has been ambulating little over the past week.     ED Vitals: T 98.0F HR 65 /72 RR 16 SpO2 96% on RA  In ED patient received IV flagyl, PO vancomycin, 1L NS IVF, [id number] KCl riders  Labs significant for WBC 53.17, hb 9.4, lactate 1.3, K 2.1, Cr 1.6, troponin negative x1, UA with trace ketones, leuk esterase, small blood, few bacteria and epithelials  BCx UCx C diff toxin PCR pending  CT Chest and abdomen pelvis: Pancolitis, small left pleural effusion. Distended gallbladder without definite radiopaque gallstones. Right ureteral stent in place with minimal residual hydronephrosis.  CT Head: No acute ICH, atrophy noted  CXR: Lungs clear  EKG: NSR 92 BPM, possible anterolateral ischemia    Patient seen and evaluated at the bedside, seen resting comfortably, isolation for C diff, NAD, daughter (Kayode) seen at the bedside. Patient endorses generalized weakness over the past 1 week, however notes that watery bowel movements are not new and has had them for years since she had radiation therapy for endometrial cancer. patient reports she has had fecal transplants x2, the last of which was 2 weeks ago. Of note, she does not remember her syncopal episode. She is also advised to have potassium supplement due to chronic diarrhea leaving her with chronically low potassium levels, but admits she has not been taking K lately due to worsening diarrhea. Denies similar symptoms in the past. no fever, chills SOB n/v chest pain. VS stable, labs and imaging reviewed.      Review of Systems:  Review of Systems: Constitutional: denies fever, chills, diaphoresis admits to weakness  HEENT: denies blurry vision, difficulty hearing  Respiratory: denies SOB, MENJIVAR, cough, sputum production, wheezing  Cardiovascular: denies chest pain, palpitations, edema  Gastrointestinal: denies nausea, vomiting, constipation,  melena, admits to diarrhea, lower abdominal pain  Genitourinary: denies dysuria, frequency, urgency  Skin/Breast: denies rash, itching  Musculoskeletal: denies myalgias, joint swelling admits muscle weakness  Neurologic: denies headache,  dizziness, paresthesias, numbness/tingling  Psychiatric: denies feeling depressed, suicidal, homicidal thoughts, admits to feeling anxious   Hematology/Oncology: denies bruising, tender endorses enlarged lymph nodes  ROS negative except as noted above	    Allergies:  erythromycin (Unknown)  macrolide antibiotics (Unknown)  morphine (Diarrhea)  sulfa drugs (Unknown)      Medications:  amLODIPine   Tablet 5 milliGRAM(s) Oral daily  atorvastatin 10 milliGRAM(s) Oral at bedtime  cholecalciferol 5000 Unit(s) Oral daily  dextrose 5% + lactated ringers with potassium chloride 20 mEq/L 1000 milliLiter(s) IV Continuous <Continuous>  enoxaparin Injectable 30 milliGRAM(s) SubCutaneous every 24 hours  ferrous    sulfate 325 milliGRAM(s) Oral daily  folic acid 1 milliGRAM(s) Oral daily  lactobacillus acidophilus 1 Tablet(s) Oral three times a day with meals  mesalamine DR Capsule 800 milliGRAM(s) Oral three times a day  oxyCODONE    IR 10 milliGRAM(s) Oral every 6 hours  pantoprazole    Tablet 40 milliGRAM(s) Oral before breakfast  potassium chloride    Tablet ER 40 milliEquivalent(s) Oral every 4 hours  sertraline 25 milliGRAM(s) Oral two times a day  vancomycin    Solution 500 milliGRAM(s) Oral every 6 hours      PMHX/PSHX:  Malignant neoplasm of ovary, unspecified laterality  GERD (gastroesophageal reflux disease)  Trigeminal neuralgia  Fatty liver  Multiple thyroid nodules  Anemia  Cataracts, bilateral  History of chemotherapy  Hydronephrosis of right kidney  Lymphedema of leg  Uterine cancer  Hydronephrosis determined by ultrasound  Abdominal mass  Atrial fibrillation  Mitral valve prolapse  Endometrial carcinoma  Hyperlipidemia  Ulcerative colitis  Anxiety  Post-Herpetic Trigeminal Neuralgia  Benign Neoplasm of Breast  Benign Hypertension  History of cataract extraction, left  H/O hydronephrosis  History of urethral stent  History of chemotherapy  Abdominal mass  Encounter for biopsy  History of tonsillectomy  H/O total hysterectomy  History of D&C      Family history:  Hypertension  Family history of early CAD (Father)      Social History: no etoh no cigs no ivda lives at home     ROS:     General:  No wt loss, fevers, chills, night sweats, fatigue,   Eyes:  Good vision, no reported pain  ENT:  No sore throat, pain, runny nose, dysphagia  CV:  No pain, palpitations, hypo/hypertension  Resp:  No dyspnea, cough, tachypnea, wheezing  GI:  No pain, No nausea, No vomiting, No diarrhea, No constipation, No weight loss, No fever, No pruritis, No rectal bleeding, No tarry stools, No dysphagia,  :  No pain, bleeding, incontinence, nocturia  Muscle:  No pain, weakness  Neuro:  No weakness, tingling, memory problems  Psych:  No fatigue, insomnia, mood problems, depression  Endocrine:  No polyuria, polydipsia, cold/heat intolerance  Heme:  No petechiae, ecchymosis, easy bruisability  Skin:  No rash, tattoos, scars, edema      PHYSICAL EXAM:   Vital Signs:  Vital Signs Last 24 Hrs  T(C): 36.5 (2019 21:09), Max: 36.9 (2019 16:06)  T(F): 97.7 (2019 21:09), Max: 98.4 (2019 16:06)  HR: 79 (2019 21:09) (65 - 86)  BP: 110/50 (2019 21:09) (91/62 - 110/72)  BP(mean): --  RR: 16 (2019 21:09) (16 - 18)  SpO2: 98% (2019 21:09) (96% - 99%)  Daily Height in cm: 162.56 (2019 06:03)    Daily     GENERAL:  Appears stated age, well-groomed, well-nourished, no distress  HEENT:  NC/AT,  conjunctivae clear and pink, no thyromegaly, nodules, adenopathy, no JVD, sclera -anicteric  CHEST:  Full & symmetric excursion, no increased effort, breath sounds clear  HEART:  Regular rhythm, S1, S2, no murmur/rub/S3/S4, no abdominal bruit, no edema  ABDOMEN:  Soft, non-tender, non-distended, normoactive bowel sounds,  no masses ,no hepato-splenomegaly, no signs of chronic liver disease  EXTEREMITIES:  no cyanosis,clubbing or edema  SKIN:  No rash/erythema/ecchymoses/petechiae/wounds/abscess/warm/dry  NEURO:  Alert, oriented, no asterixis, no tremor, no encephalopathy    LABS:                        9.4    53.17 )-----------( 563      ( 2019 06:43 )             31.0         142  |  102  |  22  ----------------------------<  85  3.4<L>   |  32<H>  |  1.20    Ca    7.4<L>      2019 17:12  Mg     1.2         TPro  5.4<L>  /  Alb  1.8<L>  /  TBili  0.3  /  DBili  x   /  AST  14<L>  /  ALT  9<L>  /  AlkPhos  148<H>      LIVER FUNCTIONS - ( 2019 06:43 )  Alb: 1.8 g/dL / Pro: 5.4 g/dL / ALK PHOS: 148 U/L / ALT: 9 U/L / AST: 14 U/L / GGT: x             Urinalysis Basic - ( 2019 11:41 )    Color: Yellow / Appearance: Clear / S.020 / pH: x  Gluc: x / Ketone: Trace  / Bili: Small / Urobili: Negative   Blood: x / Protein: 25 mg/dL / Nitrite: Negative   Leuk Esterase: Trace / RBC: 3-5 /HPF / WBC 3-5   Sq Epi: x / Non Sq Epi: Few / Bacteria: Few          Imaging:

## 2019-01-05 NOTE — H&P ADULT - PROBLEM SELECTOR PLAN 4
Patient found to have significant leukocytosis on admission labs WBC 53, likely multifactorial - pancolitis vs, anxiety/stress vs. metastatic disease  - Continue to trend while on antibiotics  - Consult Heme/Onc (Andree) - f/u recs  -F/u BCx, UCx

## 2019-01-05 NOTE — H&P ADULT - NSHPSOCIALHISTORY_GEN_ALL_CORE
EtOH: Denies  Tobacco: Former smoker, quit ~1970s  Recreational drug use: Medical marijuana use  Lives: Lives alone, has live-in aide 7 days/week, daughter frequently visits  Ambulates: Independently without assistive devices  Occupation:  (retired)  ADLs: requires assistance with most activities

## 2019-01-05 NOTE — H&P ADULT - NSHPREVIEWOFSYSTEMS_GEN_ALL_CORE
Constitutional: denies fever, chills, diaphoresis admits to weakness  HEENT: denies blurry vision, difficulty hearing  Respiratory: denies SOB, MENJIVAR, cough, sputum production, wheezing  Cardiovascular: denies chest pain, palpitations, edema  Gastrointestinal: denies nausea, vomiting, constipation,  melena, admits to diarrhea, lower abdominal pain  Genitourinary: denies dysuria, frequency, urgency  Skin/Breast: denies rash, itching  Musculoskeletal: denies myalgias, joint swelling admits muscle weakness  Neurologic: denies headache,  dizziness, paresthesias, numbness/tingling  Psychiatric: denies feeling depressed, suicidal, homicidal thoughts, admits to feeling anxious   Hematology/Oncology: denies bruising, tender endorses enlarged lymph nodes   ROS negative except as noted above

## 2019-01-05 NOTE — H&P ADULT - PROBLEM SELECTOR PLAN 2
Patient with chronic hypokalemia 2/2 chronic diarrhea  advised to take oral potassium supplement, variable compliance due to resultant worsening of diarrhea  K 2.1 on admission labs, repleted with IV 10mEq x 3  F/u CMP, replete as necessary

## 2019-01-05 NOTE — ED ADULT NURSE NOTE - OBJECTIVE STATEMENT
Received patient awake, presenting to the ED after having a syncopal episode while sitting on the toilet. As per Aide, she noticed the patient on the toilet, got up, looked flushed. The aide was there on time to hold onto the patient, did not the hit the floor. Syncopal episode lasted a few seconds. Patient was recently discharged and was recovering from C-diff, no acute distress noted at this time. Denies SOB/CP, no dizziness, no diaphoresis. safety maintained, will continue to monitor.

## 2019-01-05 NOTE — H&P ADULT - PROBLEM SELECTOR PLAN 1
Patient with witnessed syncopal episodes early on the morning of admission, no memory of event, no head injury.   Syncope of unclear etiology - metabolic derangement vs vasovagal vs dehydration  CT head- no ICH but presence of atrophy  Will check echo, carotid dopplers  Check orthostatic vital signs  F/u CMP Patient with witnessed syncopal episodes early on the morning of admission, no memory of event, no head injury.   Syncope of unclear etiology - metabolic derangement vs vasovagal vs dehydration  CT head- no ICH but presence of atrophy  Will check echo, carotid dopplers  Check orthostatic vital signs  Patient's Balsalazide not on formulary, will give mesalamine 800mg TID  F/u CMP

## 2019-01-06 DIAGNOSIS — K21.9 GASTRO-ESOPHAGEAL REFLUX DISEASE WITHOUT ESOPHAGITIS: ICD-10-CM

## 2019-01-06 LAB
ALBUMIN SERPL ELPH-MCNC: 1.6 G/DL — LOW (ref 3.3–5)
ALP SERPL-CCNC: 141 U/L — HIGH (ref 40–120)
ALT FLD-CCNC: 10 U/L — LOW (ref 12–78)
ANION GAP SERPL CALC-SCNC: 9 MMOL/L — SIGNIFICANT CHANGE UP (ref 5–17)
ANION GAP SERPL CALC-SCNC: 9 MMOL/L — SIGNIFICANT CHANGE UP (ref 5–17)
AST SERPL-CCNC: 16 U/L — SIGNIFICANT CHANGE UP (ref 15–37)
BASOPHILS # BLD AUTO: 0 K/UL — SIGNIFICANT CHANGE UP (ref 0–0.2)
BASOPHILS NFR BLD AUTO: 0 % — SIGNIFICANT CHANGE UP (ref 0–2)
BILIRUB SERPL-MCNC: 0.3 MG/DL — SIGNIFICANT CHANGE UP (ref 0.2–1.2)
BUN SERPL-MCNC: 22 MG/DL — SIGNIFICANT CHANGE UP (ref 7–23)
BUN SERPL-MCNC: 23 MG/DL — SIGNIFICANT CHANGE UP (ref 7–23)
CALCIUM SERPL-MCNC: 7.7 MG/DL — LOW (ref 8.5–10.1)
CALCIUM SERPL-MCNC: 7.7 MG/DL — LOW (ref 8.5–10.1)
CHLORIDE SERPL-SCNC: 102 MMOL/L — SIGNIFICANT CHANGE UP (ref 96–108)
CHLORIDE SERPL-SCNC: 103 MMOL/L — SIGNIFICANT CHANGE UP (ref 96–108)
CO2 SERPL-SCNC: 27 MMOL/L — SIGNIFICANT CHANGE UP (ref 22–31)
CO2 SERPL-SCNC: 31 MMOL/L — SIGNIFICANT CHANGE UP (ref 22–31)
CREAT SERPL-MCNC: 1.1 MG/DL — SIGNIFICANT CHANGE UP (ref 0.5–1.3)
CREAT SERPL-MCNC: 1.1 MG/DL — SIGNIFICANT CHANGE UP (ref 0.5–1.3)
CULTURE RESULTS: NO GROWTH — SIGNIFICANT CHANGE UP
EOSINOPHIL # BLD AUTO: 0.47 K/UL — SIGNIFICANT CHANGE UP (ref 0–0.5)
EOSINOPHIL NFR BLD AUTO: 1 % — SIGNIFICANT CHANGE UP (ref 0–6)
GLUCOSE SERPL-MCNC: 125 MG/DL — HIGH (ref 70–99)
GLUCOSE SERPL-MCNC: 134 MG/DL — HIGH (ref 70–99)
HCT VFR BLD CALC: 29.6 % — LOW (ref 34.5–45)
HGB BLD-MCNC: 9 G/DL — LOW (ref 11.5–15.5)
LYMPHOCYTES # BLD AUTO: 1.86 K/UL — SIGNIFICANT CHANGE UP (ref 1–3.3)
LYMPHOCYTES # BLD AUTO: 4 % — LOW (ref 13–44)
MAGNESIUM SERPL-MCNC: 1.4 MG/DL — LOW (ref 1.6–2.6)
MCHC RBC-ENTMCNC: 23.6 PG — LOW (ref 27–34)
MCHC RBC-ENTMCNC: 30.4 GM/DL — LOW (ref 32–36)
MCV RBC AUTO: 77.5 FL — LOW (ref 80–100)
MONOCYTES # BLD AUTO: 1.4 K/UL — HIGH (ref 0–0.9)
MONOCYTES NFR BLD AUTO: 3 % — SIGNIFICANT CHANGE UP (ref 2–14)
NEUTROPHILS # BLD AUTO: 42.81 K/UL — HIGH (ref 1.8–7.4)
NEUTROPHILS NFR BLD AUTO: 87 % — HIGH (ref 43–77)
PLATELET # BLD AUTO: 506 K/UL — HIGH (ref 150–400)
POTASSIUM SERPL-MCNC: 2.8 MMOL/L — CRITICAL LOW (ref 3.5–5.3)
POTASSIUM SERPL-MCNC: 3.4 MMOL/L — LOW (ref 3.5–5.3)
POTASSIUM SERPL-SCNC: 2.8 MMOL/L — CRITICAL LOW (ref 3.5–5.3)
POTASSIUM SERPL-SCNC: 3.4 MMOL/L — LOW (ref 3.5–5.3)
PROT SERPL-MCNC: 4.9 G/DL — LOW (ref 6–8.3)
RBC # BLD: 3.82 M/UL — SIGNIFICANT CHANGE UP (ref 3.8–5.2)
RBC # FLD: 18.5 % — HIGH (ref 10.3–14.5)
SODIUM SERPL-SCNC: 139 MMOL/L — SIGNIFICANT CHANGE UP (ref 135–145)
SODIUM SERPL-SCNC: 142 MMOL/L — SIGNIFICANT CHANGE UP (ref 135–145)
SPECIMEN SOURCE: SIGNIFICANT CHANGE UP
WBC # BLD: 46.53 K/UL — CRITICAL HIGH (ref 3.8–10.5)
WBC # FLD AUTO: 46.53 K/UL — CRITICAL HIGH (ref 3.8–10.5)

## 2019-01-06 PROCEDURE — 99233 SBSQ HOSP IP/OBS HIGH 50: CPT

## 2019-01-06 PROCEDURE — 93880 EXTRACRANIAL BILAT STUDY: CPT | Mod: 26

## 2019-01-06 RX ORDER — POTASSIUM CHLORIDE 20 MEQ
40 PACKET (EA) ORAL ONCE
Qty: 0 | Refills: 0 | Status: COMPLETED | OUTPATIENT
Start: 2019-01-06 | End: 2019-01-06

## 2019-01-06 RX ORDER — VANCOMYCIN HCL 1 G
125 VIAL (EA) INTRAVENOUS EVERY 6 HOURS
Qty: 0 | Refills: 0 | Status: DISCONTINUED | OUTPATIENT
Start: 2019-01-06 | End: 2019-01-09

## 2019-01-06 RX ORDER — MAGNESIUM SULFATE 500 MG/ML
2 VIAL (ML) INJECTION ONCE
Qty: 0 | Refills: 0 | Status: COMPLETED | OUTPATIENT
Start: 2019-01-06 | End: 2019-01-06

## 2019-01-06 RX ORDER — MORPHINE SULFATE 50 MG/1
2 CAPSULE, EXTENDED RELEASE ORAL ONCE
Qty: 0 | Refills: 0 | Status: DISCONTINUED | OUTPATIENT
Start: 2019-01-06 | End: 2019-01-06

## 2019-01-06 RX ORDER — POTASSIUM CHLORIDE 20 MEQ
10 PACKET (EA) ORAL
Qty: 0 | Refills: 0 | Status: COMPLETED | OUTPATIENT
Start: 2019-01-06 | End: 2019-01-06

## 2019-01-06 RX ORDER — METRONIDAZOLE 500 MG
500 TABLET ORAL EVERY 8 HOURS
Qty: 0 | Refills: 0 | Status: DISCONTINUED | OUTPATIENT
Start: 2019-01-06 | End: 2019-01-08

## 2019-01-06 RX ADMIN — Medication 125 MILLIGRAM(S): at 23:31

## 2019-01-06 RX ADMIN — Medication 1 TABLET(S): at 18:17

## 2019-01-06 RX ADMIN — CHOLESTYRAMINE 4 GRAM(S): 4 POWDER, FOR SUSPENSION ORAL at 08:37

## 2019-01-06 RX ADMIN — OXYCODONE HYDROCHLORIDE 10 MILLIGRAM(S): 5 TABLET ORAL at 07:04

## 2019-01-06 RX ADMIN — OXYCODONE HYDROCHLORIDE 10 MILLIGRAM(S): 5 TABLET ORAL at 00:33

## 2019-01-06 RX ADMIN — SERTRALINE 25 MILLIGRAM(S): 25 TABLET, FILM COATED ORAL at 05:37

## 2019-01-06 RX ADMIN — Medication 100 MILLIEQUIVALENT(S): at 14:36

## 2019-01-06 RX ADMIN — Medication 800 MILLIGRAM(S): at 14:36

## 2019-01-06 RX ADMIN — OXYCODONE HYDROCHLORIDE 10 MILLIGRAM(S): 5 TABLET ORAL at 06:34

## 2019-01-06 RX ADMIN — Medication 125 MILLIGRAM(S): at 18:16

## 2019-01-06 RX ADMIN — Medication 5000 UNIT(S): at 13:28

## 2019-01-06 RX ADMIN — PANTOPRAZOLE SODIUM 40 MILLIGRAM(S): 20 TABLET, DELAYED RELEASE ORAL at 05:37

## 2019-01-06 RX ADMIN — OXYCODONE HYDROCHLORIDE 10 MILLIGRAM(S): 5 TABLET ORAL at 00:03

## 2019-01-06 RX ADMIN — SERTRALINE 25 MILLIGRAM(S): 25 TABLET, FILM COATED ORAL at 18:16

## 2019-01-06 RX ADMIN — Medication 100 MILLIEQUIVALENT(S): at 11:15

## 2019-01-06 RX ADMIN — Medication 100 MILLIGRAM(S): at 14:35

## 2019-01-06 RX ADMIN — DEXTROSE MONOHYDRATE, SODIUM CHLORIDE, AND POTASSIUM CHLORIDE 100 MILLILITER(S): 50; .745; 4.5 INJECTION, SOLUTION INTRAVENOUS at 05:39

## 2019-01-06 RX ADMIN — Medication 800 MILLIGRAM(S): at 05:37

## 2019-01-06 RX ADMIN — ATORVASTATIN CALCIUM 10 MILLIGRAM(S): 80 TABLET, FILM COATED ORAL at 22:32

## 2019-01-06 RX ADMIN — Medication 40 MILLIEQUIVALENT(S): at 11:16

## 2019-01-06 RX ADMIN — ENOXAPARIN SODIUM 30 MILLIGRAM(S): 100 INJECTION SUBCUTANEOUS at 05:37

## 2019-01-06 RX ADMIN — Medication 500 MILLIGRAM(S): at 00:41

## 2019-01-06 RX ADMIN — Medication 100 MILLIEQUIVALENT(S): at 21:15

## 2019-01-06 RX ADMIN — OXYCODONE HYDROCHLORIDE 10 MILLIGRAM(S): 5 TABLET ORAL at 13:22

## 2019-01-06 RX ADMIN — Medication 800 MILLIGRAM(S): at 22:32

## 2019-01-06 RX ADMIN — Medication 1 MILLIGRAM(S): at 13:26

## 2019-01-06 RX ADMIN — Medication 50 GRAM(S): at 20:26

## 2019-01-06 RX ADMIN — Medication 100 MILLIGRAM(S): at 22:32

## 2019-01-06 RX ADMIN — Medication 1 TABLET(S): at 13:28

## 2019-01-06 RX ADMIN — Medication 100 MILLIEQUIVALENT(S): at 20:07

## 2019-01-06 RX ADMIN — Medication 125 MILLIGRAM(S): at 13:21

## 2019-01-06 RX ADMIN — Medication 1 TABLET(S): at 08:37

## 2019-01-06 RX ADMIN — Medication 325 MILLIGRAM(S): at 13:24

## 2019-01-06 RX ADMIN — Medication 500 MILLIGRAM(S): at 05:37

## 2019-01-06 RX ADMIN — Medication 100 MILLIEQUIVALENT(S): at 13:23

## 2019-01-06 RX ADMIN — OXYCODONE HYDROCHLORIDE 10 MILLIGRAM(S): 5 TABLET ORAL at 14:50

## 2019-01-06 RX ADMIN — OXYCODONE HYDROCHLORIDE 10 MILLIGRAM(S): 5 TABLET ORAL at 23:26

## 2019-01-06 NOTE — PROGRESS NOTE ADULT - PROBLEM SELECTOR PLAN 3
Patient with significant hx of radiation colitis, ulcerative colitis, and C diff colitis x 3 in the past year, currently 2 weeks s/p fecal transplant for C diff  CT showing pancolitis  C Diff positive  Continue with Vancomycin PO, Flagyl IV  Surgery (Ginger) consulted for possible surgical intervention, hx of previous abdominal wall resection due to tumor load -- no surgical intervention at this time recommended  F/u CBC with diff

## 2019-01-06 NOTE — PROGRESS NOTE ADULT - PROBLEM SELECTOR PLAN 4
Patient found to have significant leukocytosis on admission labs WBC 53, likely multifactorial - pancolitis with possible recurrence of c diff and possibly in part due to worsening metastatic disease  - Continue to trend while on antibiotics  - Consult Heme/Onc (Andree) - f/u recs  -F/u BCx, UCx

## 2019-01-06 NOTE — PROGRESS NOTE ADULT - SUBJECTIVE AND OBJECTIVE BOX
Patient is a 78y old  Female who presents with a chief complaint of Syncope      INTERVAL HPI/OVERNIGHT EVENTS: pt states she has mild abd pain and loose BMs (not very frequent). Denies fever, chills, CP, SOB. No dizziness or fainting in the hospital.     MEDICATIONS  (STANDING):  amLODIPine   Tablet 5 milliGRAM(s) Oral daily  atorvastatin 10 milliGRAM(s) Oral at bedtime  cholecalciferol 5000 Unit(s) Oral daily  cholestyramine Powder (Sugar-Free) 4 Gram(s) Oral daily  enoxaparin Injectable 30 milliGRAM(s) SubCutaneous every 24 hours  ferrous    sulfate 325 milliGRAM(s) Oral daily  folic acid 1 milliGRAM(s) Oral daily  lactobacillus acidophilus 1 Tablet(s) Oral three times a day with meals  mesalamine DR Capsule 800 milliGRAM(s) Oral three times a day  metroNIDAZOLE  IVPB 500 milliGRAM(s) IV Intermittent every 8 hours  oxyCODONE    IR 10 milliGRAM(s) Oral every 6 hours  pantoprazole    Tablet 40 milliGRAM(s) Oral before breakfast  sertraline 25 milliGRAM(s) Oral two times a day  vancomycin    Solution 125 milliGRAM(s) Oral every 6 hours    MEDICATIONS  (PRN):      Allergies    erythromycin (Unknown)  macrolide antibiotics (Unknown)  morphine (Diarrhea)  sulfa drugs (Unknown)    Intolerances        REVIEW OF SYSTEMS:  CONSTITUTIONAL: No fever or chills  HEENT:  No headache, no sore throat  RESPIRATORY: No cough, wheezing, or shortness of breath  CARDIOVASCULAR: No chest pain, palpitations  GASTROINTESTINAL: mild abd pain, No nausea, vomiting; loose BMs (improving diarrhea)   GENITOURINARY: No dysuria, frequency, or hematuria  NEUROLOGICAL: no focal weakness or dizziness  MUSCULOSKELETAL: no myalgias     Vital Signs Last 24 Hrs  T(C): 36.4 (2019 08:39), Max: 37.1 (2019 00:15)  T(F): 97.5 (2019 08:39), Max: 98.8 (2019 00:15)  HR: 71 (2019 08:39) (63 - 86)  BP: 109/64 (2019 08:39) (91/56 - 110/50)  BP(mean): --  RR: 16 (2019 08:39) (16 - 18)  SpO2: 98% (2019 08:39) (93% - 99%)    PHYSICAL EXAM:  GENERAL: NAD, frail  HEENT:  EOMI, moist mucous membranes  CHEST/LUNG:  CTA b/l, no rales, wheezes, or rhonchi  HEART:  RRR, S1, S2  ABDOMEN:  BS+, soft, mild TTP in lower abd without guarding, nondistended  EXTREMITIES: no edema, cyanosis, or calf tenderness  NERVOUS SYSTEM: Answering questions and following commands appropriately    LABS:                        9.0    46.53 )-----------( 506      ( 2019 09:17 )             29.6         142  |  102  |  22  ----------------------------<  125<H>  2.8<LL>   |  31  |  1.10    Ca    7.7<L>      2019 09:17  Phos  2.5       Mg     1.5         TPro  4.9<L>  /  Alb  1.6<L>  /  TBili  0.3  /  DBili  x   /  AST  16  /  ALT  10<L>  /  AlkPhos  141<H>        Urinalysis Basic - ( 2019 11:41 )    Color: Yellow / Appearance: Clear / S.020 / pH: x  Gluc: x / Ketone: Trace  / Bili: Small / Urobili: Negative   Blood: x / Protein: 25 mg/dL / Nitrite: Negative   Leuk Esterase: Trace / RBC: 3-5 /HPF / WBC 3-5   Sq Epi: x / Non Sq Epi: Few / Bacteria: Few      CAPILLARY BLOOD GLUCOSE            Culture - Blood (collected 19 @ 16:48)  Source: .Blood Blood-Peripheral  Preliminary Report (19 @ 17:01):    No growth to date.    Culture - Blood (collected 19 @ 16:48)  Source: .Blood Blood-Peripheral  Preliminary Report (19 @ 17:01):    No growth to date.    Culture - Urine (collected 19 @ 16:44)  Source: .Urine Catheterized  Final Report (19 @ 22:44):    No growth    c diff PCR: positive    RADIOLOGY & ADDITIONAL TESTS:    Personally reviewed.     Consultant(s) Notes Reviewed:  [x] YES  [ ] NO Patient is a 78y old  Female who presents with a chief complaint of Syncope    INTERVAL HPI/OVERNIGHT EVENTS: pt states she has mild abd pain and loose BMs (not very frequent). Denies fever, chills, CP, SOB. No dizziness or fainting in the hospital.     MEDICATIONS  (STANDING):  amLODIPine   Tablet 5 milliGRAM(s) Oral daily  atorvastatin 10 milliGRAM(s) Oral at bedtime  cholecalciferol 5000 Unit(s) Oral daily  cholestyramine Powder (Sugar-Free) 4 Gram(s) Oral daily  enoxaparin Injectable 30 milliGRAM(s) SubCutaneous every 24 hours  ferrous    sulfate 325 milliGRAM(s) Oral daily  folic acid 1 milliGRAM(s) Oral daily  lactobacillus acidophilus 1 Tablet(s) Oral three times a day with meals  mesalamine DR Capsule 800 milliGRAM(s) Oral three times a day  metroNIDAZOLE  IVPB 500 milliGRAM(s) IV Intermittent every 8 hours  oxyCODONE    IR 10 milliGRAM(s) Oral every 6 hours  pantoprazole    Tablet 40 milliGRAM(s) Oral before breakfast  sertraline 25 milliGRAM(s) Oral two times a day  vancomycin    Solution 125 milliGRAM(s) Oral every 6 hours    MEDICATIONS  (PRN):      Allergies    erythromycin (Unknown)  macrolide antibiotics (Unknown)  morphine (Diarrhea)  sulfa drugs (Unknown)    Intolerances        REVIEW OF SYSTEMS:  CONSTITUTIONAL: No fever or chills  HEENT:  No headache, no sore throat  RESPIRATORY: No cough, wheezing, or shortness of breath  CARDIOVASCULAR: No chest pain, palpitations  GASTROINTESTINAL: mild abd pain, No nausea, vomiting; loose BMs (improving diarrhea)   GENITOURINARY: No dysuria, frequency, or hematuria  NEUROLOGICAL: no focal weakness or dizziness  MUSCULOSKELETAL: no myalgias     Vital Signs Last 24 Hrs  T(C): 36.4 (2019 08:39), Max: 37.1 (2019 00:15)  T(F): 97.5 (2019 08:39), Max: 98.8 (2019 00:15)  HR: 71 (2019 08:39) (63 - 86)  BP: 109/64 (2019 08:39) (91/56 - 110/50)  BP(mean): --  RR: 16 (2019 08:39) (16 - 18)  SpO2: 98% (2019 08:39) (93% - 99%)    PHYSICAL EXAM:  GENERAL: NAD, frail  HEENT:  EOMI, moist mucous membranes  CHEST/LUNG:  CTA b/l, no rales, wheezes, or rhonchi  HEART:  RRR, S1, S2  ABDOMEN:  BS+, soft, mild TTP in lower abd without guarding, nondistended  EXTREMITIES: no edema, cyanosis, or calf tenderness  NERVOUS SYSTEM: Answering questions and following commands appropriately    LABS:                        9.0    46.53 )-----------( 506      ( 2019 09:17 )             29.6         142  |  102  |  22  ----------------------------<  125<H>  2.8<LL>   |  31  |  1.10    Ca    7.7<L>      2019 09:17  Phos  2.5       Mg     1.5         TPro  4.9<L>  /  Alb  1.6<L>  /  TBili  0.3  /  DBili  x   /  AST  16  /  ALT  10<L>  /  AlkPhos  141<H>        Urinalysis Basic - ( 2019 11:41 )    Color: Yellow / Appearance: Clear / S.020 / pH: x  Gluc: x / Ketone: Trace  / Bili: Small / Urobili: Negative   Blood: x / Protein: 25 mg/dL / Nitrite: Negative   Leuk Esterase: Trace / RBC: 3-5 /HPF / WBC 3-5   Sq Epi: x / Non Sq Epi: Few / Bacteria: Few      CAPILLARY BLOOD GLUCOSE            Culture - Blood (collected 19 @ 16:48)  Source: .Blood Blood-Peripheral  Preliminary Report (19 @ 17:01):    No growth to date.    Culture - Blood (collected 19 @ 16:48)  Source: .Blood Blood-Peripheral  Preliminary Report (19 @ 17:01):    No growth to date.    Culture - Urine (collected 19 @ 16:44)  Source: .Urine Catheterized  Final Report (19 @ 22:44):    No growth    c diff PCR: positive    RADIOLOGY & ADDITIONAL TESTS:    Personally reviewed.     Consultant(s) Notes Reviewed:  [x] YES  [ ] NO

## 2019-01-06 NOTE — CONSULT NOTE ADULT - ASSESSMENT
IMP: recurrent C diff colitis    Plan: dec po vanco (2) to 125 mg q6h. cont cholestryramine(2) per GI. readd IV flagyl (2). IMP: recurrent C diff colitis    Plan: dec po vanco (2) to 125 mg q6h. cont cholestryramine(2) per GI. readd IV flagyl (2). cont bacid

## 2019-01-06 NOTE — PROGRESS NOTE ADULT - PROBLEM SELECTOR PLAN 1
in and out  low residue diet  vancomycin 125 q 6 hours  bacid one tab tid  cholestyramine 4 grams once a day  may need flex sig if symptoms persist    monitor wbc  add remeron for diet inc apetite

## 2019-01-06 NOTE — PROGRESS NOTE ADULT - ASSESSMENT
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 Admitted for syncope workup and sepsis due to c difficile colitis.

## 2019-01-06 NOTE — CONSULT NOTE ADULT - SUBJECTIVE AND OBJECTIVE BOX
All records reviewed.  pt seen w son in room, allowed by pt    HPI:  77y/o woman w met endometrial cancer(abdomen/pelvic involvement, under care Dr Darrin Dumont [facility], last chemo >6months ago. First dx  post TAHBSO/RT, recur near stomach  post debulking surgery, started various chemo regimen since , first w Dr Michell Kruse then Dr Dumont >1yr ago) radiation colitis, HTN, R hydronephrosis (s/p ureteral stent 2018, due for stent change)), thyroid nodules, HTN. Recurrent C diff since 6months ago post fecal tx 2 wks ago w sx improvement.  Adm w syncope after large BM(loose but formed, not watery)    ED Vitals: T 98.0F HR 65 /72 RR 16 SpO2 96% on RA  In ED patient received IV flagyl, PO vancomycin, 1L NS IVF, 5b13rPe KCl riders  Labs significant for WBC 53.17, Hgb 9.4, lactate 1.3, K 2.1, Cr 1.6, troponin negative x1, UA with trace ketones, leuk esterase, small blood, few bacteria and epithelials  BCx UCx C diff toxin PCR sent  CT Chest and abdomen pelvis: Pancolitis, small left pleural effusion. Distended gallbladder without definite radiopaque gallstones. Right ureteral stent in place with minimal residual hydronephrosis.  CT Head: No acute ICH, atrophy noted  CXR: Lungs clear  EKG: NSR 92 BPM, possible anterolateral ischemia    Today WBC 46.53  Old labs in Computer 9-10/2018 WBC as high as 35k decr to 10+ on discharge, Hgb ~8+        PAST MEDICAL & SURGICAL HISTORY:  Malignant neoplasm of ovary, unspecified laterality  GERD (gastroesophageal reflux disease)  Fatty liver  Multiple thyroid nodules  Anemia  Cataracts, bilateral: monitored  History of chemotherapy  Lymphedema of leg: right  Hydronephrosis determined by ultrasound: right kidney  Abdominal mass:  metastatic endometrial cancer, s/p surgery, on chemotherapy at present-infusa port right chest  Atrial fibrillation: one episode &gt;30 years ago  Mitral valve prolapse  Endometrial carcinoma:  - s/p KATH - tx with radiation, and currently receiving chemotherapy  Hyperlipidemia  Ulcerative colitis  Anxiety  Post-Herpetic Trigeminal Neuralgia: left  Benign Hypertension  History of cataract extraction, left  H/O hydronephrosis: ureteral stent, multiple stent exchanges, last in 2018  History of chemotherapy: Infusaport insertion ;   Abdominal mass: s/p mass removed ,on chemo-right chest infusa port  Encounter for biopsy: 13 - right iliac lymph node biopsy  History of tonsillectomy  H/O total hysterectomy:   History of D&C: age 22      Review of System:  see above  fatigue  no pain  no diarrhea    MEDICATIONS  (STANDING):  amLODIPine   Tablet 5 milliGRAM(s) Oral daily  atorvastatin 10 milliGRAM(s) Oral at bedtime  cholecalciferol 5000 Unit(s) Oral daily  cholestyramine Powder (Sugar-Free) 4 Gram(s) Oral daily  enoxaparin Injectable 30 milliGRAM(s) SubCutaneous every 24 hours  ferrous    sulfate 325 milliGRAM(s) Oral daily  folic acid 1 milliGRAM(s) Oral daily  lactobacillus acidophilus 1 Tablet(s) Oral three times a day with meals  mesalamine DR Capsule 800 milliGRAM(s) Oral three times a day  metroNIDAZOLE  IVPB 500 milliGRAM(s) IV Intermittent every 8 hours  oxyCODONE    IR 10 milliGRAM(s) Oral every 6 hours  pantoprazole    Tablet 40 milliGRAM(s) Oral before breakfast  potassium chloride  10 mEq/100 mL IVPB 10 milliEquivalent(s) IV Intermittent every 1 hour  sertraline 25 milliGRAM(s) Oral two times a day  vancomycin    Solution 125 milliGRAM(s) Oral every 6 hours    MEDICATIONS  (PRN):      Allergies    erythromycin (Unknown)  macrolide antibiotics (Unknown)  morphine (Diarrhea)  sulfa drugs (Unknown)    Intolerances        SOCIAL HISTORY:  d/c tobacco many years ago, no Etoh    FAMILY HISTORY:  no malignancy, no blood dyscrasia    Vital Signs Last 24 Hrs  T(C): 36.4 (2019 08:39), Max: 37.1 (2019 00:15)  T(F): 97.5 (2019 08:39), Max: 98.8 (2019 00:15)  HR: 71 (2019 08:39) (63 - 86)  BP: 109/64 (2019 08:39) (91/56 - 110/50)  BP(mean): --  RR: 16 (2019 08:39) (16 - 18)  SpO2: 98% (2019 08:39) (93% - 99%)    PHYSICAL EXAM:      General:frail elderly woman in bed, INAD  Eyes:sclera anicteric, pupils equal and reactive to light  ENMT:buccal mucosa moist, pharynx not injected  Neck:supple, trachea midline  Lungs:clear, no wheeze/rhonchi  Cardiovascular:regular rate and rhythm, S1 S2  Abdomen:soft, nontender, no organomegaly present, bowel sounds normal  Neurological:alert and oriented x3, Cranial Nerves II-XII grossly intact  Skin:no increased ecchymosis/petechiae/purpura  Lymph Nodes:no palpable cervical/supraclavicular lymph nodes enlargements  Extremities:no cyanosis/clubbing/edema        LABS:                        9.0    46.53 )-----------( 506      ( 2019 09:17 )             29.6     01-06 @ 09:17  WBC46.53  RBC3.82 Hgb9.0 Hct29.6  MCV77.5  [id number]  Auto Qwnkez21.0 Band5.0 Auto Lymph4.0 Atypical Lymph-- Reactive Lymph-- Auto Mono3.0 Auto Eos1.0 Auto Baso0.0        01- @ 06:43  WBC53.17  RBC3.99 Hgb9.4 Hct31.0  MCV77.7  [id number]  Auto Neutro-- Band-- Auto Lymph-- Atypical Lymph-- Reactive Lymph-- Auto Mono-- Auto Eos-- Auto Baso--    Complete Blood Count (18 @ 07:50)    WBC Count: 35.4 K/uL    RBC Count: 3.69 M/uL    Hemoglobin: 9.8 g/dL    Hematocrit: 31.2 %    Mean Cell Volume: 84.5 fl    Mean Cell Hemoglobin: 26.5 pg    Mean Cell Hemoglobin Conc: 31.4 gm/dL    Red Cell Distrib Width: 18.1 %    Platelet Count - Automated: 490 K/uL    Complete Blood Count + Automated Diff (10.30.18 @ 10:42)    WBC Count: 11.69 K/uL    RBC Count: 3.49 M/uL    Hemoglobin: 8.3 g/dL    Hematocrit: 27.8 %    Mean Cell Volume: 79.7 fl    Mean Cell Hemoglobin: 23.8 pg    Mean Cell Hemoglobin Conc: 29.9 gm/dL    Red Cell Distrib Width: 19.8 %    Platelet Count - Automated: 340 K/uL              142  |  102  |  22  ----------------------------<  125<H>  2.8<LL>   |  31  |  1.10    Ca    7.7<L>      2019 09:17  Phos  2.5       Mg     1.5         TPro  4.9<L>  /  Alb  1.6<L>  /  TBili  0.3  /  DBili  x   /  AST  16  /  ALT  10<L>  /  AlkPhos  141<H>        Urinalysis Basic - ( 2019 11:41 )    Color: Yellow / Appearance: Clear / S.020 / pH: x  Gluc: x / Ketone: Trace  / Bili: Small / Urobili: Negative   Blood: x / Protein: 25 mg/dL / Nitrite: Negative   Leuk Esterase: Trace / RBC: 3-5 /HPF / WBC 3-5   Sq Epi: x / Non Sq Epi: Few / Bacteria: Few        PERIPHERAL BLOOD SMEAR REVIEW:  RBC-normocytic, normochromic, no significant anisocytosis or poikilocytosis. No rouleaux/schistocytes or increased polychromasia.  WBC-increased in number but normal in differential and morphology, sl increase of bands, no immature or abnormal cells seen.  Platelets-adequate on smear, no platelets clumping or giant platelets, occ large platelets.       RADIOLOGY & ADDITIONAL STUDIES:

## 2019-01-06 NOTE — PROGRESS NOTE ADULT - SUBJECTIVE AND OBJECTIVE BOX
INTERVAL HPI/OVERNIGHT EVENTS:  No new overnight event.  No N/V/D.  Tolerating diet.  clears weak apetitie    Allergies    erythromycin (Unknown)  macrolide antibiotics (Unknown)  morphine (Diarrhea)  sulfa drugs (Unknown)    Intolerances    General:  No wt loss, fevers, chills, night sweats, fatigue,   Eyes:  Good vision, no reported pain  ENT:  No sore throat, pain, runny nose, dysphagia  CV:  No pain, palpitations, hypo/hypertension  Resp:  No dyspnea, cough, tachypnea, wheezing  GI:  No pain, No nausea, No vomiting, No diarrhea, No constipation, No weight loss, No fever, No pruritis, No rectal bleeding, No tarry stools, No dysphagia,  :  No pain, bleeding, incontinence, nocturia  Muscle:  No pain, weakness  Neuro:  No weakness, tingling, memory problems  Psych:  No fatigue, insomnia, mood problems, depression  Endocrine:  No polyuria, polydipsia, cold/heat intolerance  Heme:  No petechiae, ecchymosis, easy bruisability  Skin:  No rash, tattoos, scars, edema    PHYSICAL EXAM:   Vital Signs:  Vital Signs Last 24 Hrs  T(C): 36.4 (2019 08:39), Max: 37.1 (2019 00:15)  T(F): 97.5 (2019 08:39), Max: 98.8 (2019 00:15)  HR: 71 (2019 08:39) (63 - 86)  BP: 109/64 (2019 08:39) (91/56 - 110/50)  BP(mean): --  RR: 16 (2019 08:39) (16 - 18)  SpO2: 98% (2019 08:39) (93% - 99%)  Daily     Daily Weight in k.5 (2019 04:51)I&O's Summary      GENERAL:  Appears stated age, well-groomed, well-nourished, no distress  HEENT:  NC/AT,  conjunctivae clear and pink, no thyromegaly, nodules, adenopathy, no JVD, sclera -anicteric  CHEST:  Full & symmetric excursion, no increased effort, breath sounds clear  HEART:  Regular rhythm, S1, S2, no murmur/rub/S3/S4, no abdominal bruit, no edema  ABDOMEN:  Soft, non-tender, non-distended, normoactive bowel sounds,  no masses ,no hepato-splenomegaly, no signs of chronic liver disease  EXTEREMITIES:  no cyanosis,clubbing or edema  SKIN:  No rash/erythema/ecchymoses/petechiae/wounds/abscess/warm/dry  NEURO:  Alert, oriented, no asterixis, no tremor, no encephalopathy      LABS:                        9.0    46.53 )-----------( 506      ( 2019 09:17 )             29.6     -    142  |  102  |  22  ----------------------------<  125<H>  2.8<LL>   |  31  |  1.10    Ca    7.7<L>      2019 09:17  Phos  2.5       Mg     1.5         TPro  4.9<L>  /  Alb  1.6<L>  /  TBili  0.3  /  DBili  x   /  AST  16  /  ALT  10<L>  /  AlkPhos  141<H>        Urinalysis Basic - ( 2019 11:41 )    Color: Yellow / Appearance: Clear / S.020 / pH: x  Gluc: x / Ketone: Trace  / Bili: Small / Urobili: Negative   Blood: x / Protein: 25 mg/dL / Nitrite: Negative   Leuk Esterase: Trace / RBC: 3-5 /HPF / WBC 3-5   Sq Epi: x / Non Sq Epi: Few / Bacteria: Few      amylase   lipase  RADIOLOGY & ADDITIONAL TESTS:

## 2019-01-06 NOTE — CONSULT NOTE ADULT - ASSESSMENT
77y/o woman w met endometrial cancer(abdomen/pelvic involvement, under care Dr Darrin Dumont [facility], last chemo >6months ago. First dx 2000 post TAHBSO/RT, recur near stomach 2013 post debulking surgery, started various chemo regimen since 2016, first w Dr Michell Kruse then Dr Dumont >1yr ago) radiation colitis, HTN, R hydronephrosis (s/p ureteral stent 6/2018, due for stent change)), thyroid nodules, HTN. Recurrent C diff since 6months ago post fecal tx 2 wks ago w sx improvement.  Adm w syncope after large BM(loose but formed, not watery)    CBC on adm WBC 53.17, Hgb 9.4, lactate 1.3  Today WBC 46.53  Old labs in Computer 9-10/2018 WBC as high as 35k decr to 10+ on discharge, Hgb ~8+    -leukocytosis-suspect reactive, due to inflammation, can be from infection, similarly increased during previous hospitalizations although not as high. Ember blood smear no abnormal or immature morphology.   -anemia-likely due to chronic disease, malignancy, acute illness. Note MCV used to be in 90's range, decreased to 80's and now 70's range. Ember blood RBC morphology no sig hypochromasia, pt reports on oral iron w Vit C, will check iron studies(also B12/folate/thyroid)  -continue present acute medical care, follow serial CBC    further heme/onc recommendations pending above  discussed w pt and son  thank you, will follow w you

## 2019-01-06 NOTE — PROGRESS NOTE ADULT - SUBJECTIVE AND OBJECTIVE BOX
pt seen  feeling better  denying pain  +BM  ICU Vital Signs Last 24 Hrs  T(C): 36.4 (06 Jan 2019 08:39), Max: 37.1 (06 Jan 2019 00:15)  T(F): 97.5 (06 Jan 2019 08:39), Max: 98.8 (06 Jan 2019 00:15)  HR: 71 (06 Jan 2019 08:39) (63 - 86)  BP: 109/64 (06 Jan 2019 08:39) (91/56 - 110/50)  BP(mean): --  ABP: --  ABP(mean): --  RR: 16 (06 Jan 2019 08:39) (16 - 18)  SpO2: 98% (06 Jan 2019 08:39) (93% - 99%)  gen-NAD  resp-clear  abd-soft ND, mild tenderness                        9.0    x     )-----------( 506      ( 06 Jan 2019 09:17 )             29.6

## 2019-01-06 NOTE — PROGRESS NOTE ADULT - PROBLEM SELECTOR PLAN 8
Pharmacologic VTE ppx with lovenox 40mg    Started discussion re: STEVENSON -- pt/family at bedside decided full code for now, but will discuss with other family as well

## 2019-01-06 NOTE — PROGRESS NOTE ADULT - PROBLEM SELECTOR PLAN 1
Patient with witnessed syncopal episodes early on the morning of admission, no memory of event, no head injury.   Syncope of unclear etiology - most likely vasovagal with dehydration, but had severe hypokalemia so may have had an arrhythmia.   -monitor on tele  CT head- no ICH but presence of atrophy  Will check echo, carotid dopplers  Check orthostatic vital signs  significantly hypokalemic/hypomagnesemic -- repleted electrolytes

## 2019-01-06 NOTE — CONSULT NOTE ADULT - SUBJECTIVE AND OBJECTIVE BOX
Patient is a 78y old  Female who presents with a chief complaint of Syncope (2019 21:19)      HPI:  78/F with PMHx significant for metastatic endometrial cancer undergoing chemo, last treatment 2018, radiation colitis, HTN, R hydronephrosis (s/p ureteral stent 2018), thyroid nodules, HTN presents with witnessed syncope that occurred on the morning of admission after passing a bowel movement sitting on the toilet. Event was witnessed by aide (Ana), who reports the patient became unresponsive after passing a large amount of watery stool, noting her eyes rolls back in her head, and had LOC. Also complaints of lower abdominal dull pain. Ana caught patient and lowered her to the ground, denies any head injury. Denies having had similar symptoms in the past. Daughter (kayode) present at the bedside reports patient has had poor oral intake over the past 1 week. Of note, patient has had C Diff colitis 3 times in the past year, and is s/p fecal transplant x 2, 2 weeks ago. complications from recurrent c diff delaying chemo treatments. Patient denies any fever, chills SOB n/v, chest pain, but endorses generalized weakness and states she has been ambulating little over the past week.     ED Vitals: T 98.0F HR 65 /72 RR 16 SpO2 96% on RA  In ED patient received IV flagyl, PO vancomycin, 1L NS IVF, [id number] KCl riders  Labs significant for WBC 53.17, hb 9.4, lactate 1.3, K 2.1, Cr 1.6, troponin negative x1, UA with trace ketones, leuk esterase, small blood, few bacteria and epithelials  BCx UCx C diff toxin PCR pending  CT Chest and abdomen pelvis: Pancolitis, small left pleural effusion. Distended gallbladder without definite radiopaque gallstones. Right ureteral stent in place with minimal residual hydronephrosis.  CT Head: No acute ICH, atrophy noted  CXR: Lungs clear  EKG: NSR 92 BPM, possible anterolateral ischemia    Patient seen and evaluated at the bedside, seen resting comfortably, isolation for C diff, NAD, daughter (Kayode) seen at the bedside. Patient endorses generalized weakness over the past 1 week, however notes that watery bowel movements are not new and has had them for years since she had radiation therapy for endometrial cancer. patient reports she has had fecal transplants x2, the last of which was 2 weeks ago. Of note, she does not remember her syncopal episode. She is also advised to have potassium supplement due to chronic diarrhea leaving her with chronically low potassium levels, but admits she has not been taking K lately due to worsening diarrhea. Denies similar symptoms in the past. no fever, chills SOB n/v chest pain. VS stable, labs and imaging reviewed. (2019 16:15)      Asked to see patient for ID Consult    Allergies    erythromycin (Unknown)  macrolide antibiotics (Unknown)  morphine (Diarrhea)  sulfa drugs (Unknown)    Intolerances        MEDICATIONS  (STANDING):  amLODIPine   Tablet 5 milliGRAM(s) Oral daily  atorvastatin 10 milliGRAM(s) Oral at bedtime  cholecalciferol 5000 Unit(s) Oral daily  cholestyramine Powder (Sugar-Free) 4 Gram(s) Oral daily  dextrose 5% + lactated ringers with potassium chloride 20 mEq/L 1000 milliLiter(s) (100 mL/Hr) IV Continuous <Continuous>  enoxaparin Injectable 30 milliGRAM(s) SubCutaneous every 24 hours  ferrous    sulfate 325 milliGRAM(s) Oral daily  folic acid 1 milliGRAM(s) Oral daily  lactobacillus acidophilus 1 Tablet(s) Oral three times a day with meals  mesalamine DR Capsule 800 milliGRAM(s) Oral three times a day  oxyCODONE    IR 10 milliGRAM(s) Oral every 6 hours  pantoprazole    Tablet 40 milliGRAM(s) Oral before breakfast  sertraline 25 milliGRAM(s) Oral two times a day  vancomycin    Solution 500 milliGRAM(s) Oral every 6 hours    MEDICATIONS  (PRN):      PAST MEDICAL & SURGICAL HISTORY:  Malignant neoplasm of ovary, unspecified laterality  GERD (gastroesophageal reflux disease)  Fatty liver  Multiple thyroid nodules  Anemia  Cataracts, bilateral: monitored  History of chemotherapy  Lymphedema of leg: right  Hydronephrosis determined by ultrasound: right kidney  Abdominal mass:  metastatic endometrial cancer, s/p surgery, on chemotherapy at present-infusa port right chest  Atrial fibrillation: one episode &gt;30 years ago  Mitral valve prolapse  Endometrial carcinoma:  - s/p KATH - tx with radiation, and currently receiving chemotherapy  Hyperlipidemia  Ulcerative colitis  Anxiety  Post-Herpetic Trigeminal Neuralgia: left  Benign Hypertension  History of cataract extraction, left  H/O hydronephrosis: ureteral stent, multiple stent exchanges, last in 2018  History of chemotherapy: Infusaport insertion ;   Abdominal mass: s/p mass removed ,on chemo-right chest infusa port  Encounter for biopsy: 13 - right iliac lymph node biopsy  History of tonsillectomy  H/O total hysterectomy:   History of D&C: age 22      FAMILY HISTORY:  Hypertension  Family history of early CAD (Father)      Social History    Smoking History:  YES[  ]  NO[ x ]   UNKNOWN[  ]    REVIEW OF SYSTEMS:  All systems below were reviewed and are normal [  ]  Constitutional:  HEENT:  Lymph nodes:  ID:  Pulmonary:no cough sob  Cardiac:no CP  GI:no NVD abd pain today  Renal:  Musculoskeletal:  Neuro:  Endocrine:  Skin:  All other systems above were reviewed and are normal   [x  ]    HOME MEDICATIONS:    MEDICATIONS  (STANDING):  amLODIPine   Tablet 5 milliGRAM(s) Oral daily  atorvastatin 10 milliGRAM(s) Oral at bedtime  cholecalciferol 5000 Unit(s) Oral daily  cholestyramine Powder (Sugar-Free) 4 Gram(s) Oral daily  dextrose 5% + lactated ringers with potassium chloride 20 mEq/L 1000 milliLiter(s) (100 mL/Hr) IV Continuous <Continuous>  enoxaparin Injectable 30 milliGRAM(s) SubCutaneous every 24 hours  ferrous    sulfate 325 milliGRAM(s) Oral daily  folic acid 1 milliGRAM(s) Oral daily  lactobacillus acidophilus 1 Tablet(s) Oral three times a day with meals  mesalamine DR Capsule 800 milliGRAM(s) Oral three times a day  oxyCODONE    IR 10 milliGRAM(s) Oral every 6 hours  pantoprazole    Tablet 40 milliGRAM(s) Oral before breakfast  sertraline 25 milliGRAM(s) Oral two times a day  vancomycin    Solution 500 milliGRAM(s) Oral every 6 hours    MEDICATIONS  (PRN):        Vital Signs Last 24 Hrs  T(C): 36.4 (2019 08:39), Max: 37.1 (2019 00:15)  T(F): 97.5 (2019 08:39), Max: 98.8 (2019 00:15)  HR: 71 (2019 08:39) (63 - 86)  BP: 109/64 (2019 08:39) (91/56 - 110/50)  BP(mean): --  RR: 16 (2019 08:39) (16 - 18)  SpO2: 98% (2019 08:39) (93% - 99%)    PHYSICAL EXAM:  Constitutional:  HEENT:  Neck:  Lymph Nodes:  Lungs:clear  Heart:rr  Abdomen:soft nontender  Renal:  Extremities:  Neurologic:  Vascular:  Endocrine:  Skin:  Except for written comments, all of above normal [ x ]    I&O's Summary      LABORATORY:    CBC Full  -  ( 2019 09:17 )  WBC Count : x  Hemoglobin : 9.0 g/dL  Hematocrit : 29.6 %  Platelet Count - Automated : 506 K/uL  Mean Cell Volume : 77.5 fl  Mean Cell Hemoglobin : 23.6 pg  Mean Cell Hemoglobin Concentration : 30.4 gm/dL  Auto Neutrophil # : x  Auto Lymphocyte # : x  Auto Monocyte # : x  Auto Eosinophil # : x  Auto Basophil # : x  Auto Neutrophil % : x  Auto Lymphocyte % : x  Auto Monocyte % : x  Auto Eosinophil % : x  Auto Basophil % : x          142  |  102  |  22  ----------------------------<  85  3.4<L>   |  32<H>  |  1.20    Ca    7.4<L>      2019 17:12  Mg     1.2         TPro  5.4<L>  /  Alb  1.8<L>  /  TBili  0.3  /  DBili  x   /  AST  14<L>  /  ALT  9<L>  /  AlkPhos  148<H>        Urinalysis Basic - ( 2019 11:41 )    Color: Yellow / Appearance: Clear / S.020 / pH: x  Gluc: x / Ketone: Trace  / Bili: Small / Urobili: Negative   Blood: x / Protein: 25 mg/dL / Nitrite: Negative   Leuk Esterase: Trace / RBC: 3-5 /HPF / WBC 3-5   Sq Epi: x / Non Sq Epi: Few / Bacteria: Few                                        C Diff:          @ 15:32  Detected              Vanco. trough:  Genta trough:    Radiology:< from: CT Chest No Cont (19 @ 09:35) >  EXAM:  CT ABDOMEN AND PELVIS                          EXAM:  CT CHEST                            PROCEDURE DATE:  2019          INTERPRETATION:  Exam Date: 2019 9:35 AM  Clinical Information: Metastatic endometrial cancer with weakness,   elevated white blood cell count  Technique:       CT of the chest , abdomen and pelvis was performed with   axial images obtained from the thoracic inlet to the pubic symphysis       without IV contrast.   Oral contrast was not administered.  Comparison:  CT abdomen 2018, CT chest abdomen pelvis 2015    FINDINGS:    Chest:    Lungs, Airways and Pleura: Central tracheobronchial tree is grossly   patent. No endobronchial lesions. Minimal bronchiectasis. Trace to small   left pleural effusion. Linear scarring or atelectasis in the lingula. No   pneumothorax. No pulmonary edema. Minimal biapical pleural parenchymal   scarring.    Heart and Great Vessels: Right-sided Mediport with tip in the SVC.   Atherosclerotic changes of the aorta and coronary vasculature. Heart is   normal in size. Trace pericardial effusion.    Mediastinum and Marjorie: Hiatal hernia with an intrathoracic component of   proximal stomach.    Neck and Chest Wall: within normal limits    Abdomen and pelvis:    Liver: within normal limits  Gallbladder: Distended gallbladder. No definite radiopaque gallstones   identified..  Bile ducts: No intrahepatic or extrahepatic biliary dilatation  Pancreas: within normal limits    Spleen: within normal limits  Adrenal: Indeterminate nodular thickening of the left adrenal gland.  Kidneys, Ureters and Bladder: Right ureteral stent is in place. Minimal   residual right-sided hydronephrosis. Atrophy of the right kidney.   Exophytic left upper pole renal cyst. No left hydronephrosis. Left ureter   is unremarkable. Bladder is not well distended with circumferential   bladder wall thickening, correlate with urinalysis.        Pelvis: Right pelvic/adnexal soft tissue mass measuring 5.2 x 3.9 cm   appears increased in size from prior exam where it measured 3.4 x 3.1 cm   however this may be secondary to noncontrast exam. Marked pelvic   infiltrative changes and presacral edema mildly more prominent than on   prior exam. Small pelvic ascites. Presacral soft tissue density extending   into the region of the aortic bifurcation new from prior exam suggesting   interval development of metastatic disease.    Bowel: Diffuse marked pancolonic thickening and pericolonic infiltrative   changes compatible with pancolitis differential including infectious   versus inflammatory etiology with consideration for C. Difficile   infection given degree of thickening. No definite bowel obstruction.    Peritoneum: Soft tissue mesenteric nodular density measuring 3.7 x 2.8 cm   new fromthe prior exam compatible with a metastatic implant. Small   ascites. Mesenteric edema. Questionable interval development of   nodularity to the peritoneal surface may be a component of the diffuse   mesenteric edema however early carcinomatosis cannot be excluded.    Retroperitoneum: Left para-aortic adenopathy measuring up to 1.1 cm   appears new from prior exam.  Vessels: Atherosclerotic changes.        Abdominal wall: Prior right abdominal wall mesh repair. Marked anasarca.  Bones: A few scattered small sclerotic lesions appear unchanged.   Degenerative changes and osteopenia.    IMPRESSION:      Findings compatible with pancolitis, differential including infectious   versus inflammatory etiology with consideration of C. Difficile   infection. No definite bowel obstruction.    Enlarging right pelvic/adnexal soft tissue mass with additional interval   development of a presacral mass and mesenteric adenopathy compatible with   interval progression of metastatic disease. Additional findings which may   suggest early carcinomatosis.    Small left pleural effusion.    Distended gallbladder without definite radiopaque gallstones.    Right ureteral stent in place with minimal residual hydronephrosis.                KENN JACKSON M.D., ATTENDING RADIOLOGIST  This document has been electronically signed. 2019  9:51AM                < end of copied text >  < from: Xray Chest 1 View-PORTABLE IMMEDIATE (19 @ 08:07) >    EXAM:  XR CHEST PORTABLE IMMED 1V                            PROCEDURE DATE:  2019          INTERPRETATION:  Clinical information: Syncope.    Technique: Frontal view of the chest.    Comparison: Prior chest x-ray examination from 10/17/2018.    Findings: There is a Mediport overlying the right chest wall, with its   tip in the SVC.    The lungs are clear. The cardiomediastinal silhouette is normal. There   are mild multilevel degenerative changes of the thoracic spine.    IMPRESSION: Clear lungs.                RONAL SCHWAB M.D., ATTENDING RADIOLOGIST  This document has been electronically signed. 2019 11:58AM          < end of copied text >  < from: CT Head No Cont (19 @ 07:01) >  EXAM:  CT BRAIN                            PROCEDURE DATE:  2019          INTERPRETATION:  NONCONTRAST CT OF THE BRAIN    CLINICAL HISTORY: Syncope.    TECHNIQUE: Axial CT images are obtained from the cranial vertex to the   skull base without the administration of IV contrast.    No similar prior studies are available for comparison.     FINDINGS:    Examination is somewhat limited secondary to motion artifact as well as   streak artifact along the frontal region. There is no acute intra-axial   or extra-axial hemorrhage. There is no mass effect or shift of the   midline. The basal cisterns are not effaced. There is cerebral volume   loss with prominence of the ventricles and sulci. There are patchy areas   of low attenuation within the periventricular and subcortical white   matter which are nonspecific but likely the sequela of chronic   microvascular change. There is no CT evidence of a large vascular   territory infarct.    No significant scalp soft tissue swelling. Few areasof lucencies   identified within the calvarium. Hyperostosis frontalis. Small polyp and   retention cyst in the right ethmoid sinus. Remaining visualized paranasal   sinuses and mastoid air cells are well aerated.    IMPRESSION:    No acute intracranial hemorrhage, mass effect, or CT evidence of a large   vascular territory infarct allowing for the limitation of the study.   Atrophy and chronic microvascular ischemic gliotic changes. If clinically   indicated, short-term follow-up or MRI may be obtained for further   evaluation provided no MR contraindications.                    MARCY LAO M.D., ATTENDING RADIOLOGIST  This document has been electronically signed. 2019  7:12AM          < end of copied text >      Microbiology:bl and stool cx pending

## 2019-01-06 NOTE — PROGRESS NOTE ADULT - ATTENDING COMMENTS
Note written by attending, see above.  Time spent: 40min. More than 50% of the visit was spent counseling the patient / pt's family on her condition - c difficile colitis, metastatic endometrial cancer, leukocytosis, hypokalemia.

## 2019-01-06 NOTE — PROGRESS NOTE ADULT - ASSESSMENT
79 yo with metastatic endometrial CA with chronic colitis admitted for vasovagal episode    cont vasovagal workup

## 2019-01-07 ENCOUNTER — TRANSCRIPTION ENCOUNTER (OUTPATIENT)
Age: 79
End: 2019-01-07

## 2019-01-07 LAB
ANION GAP SERPL CALC-SCNC: 8 MMOL/L — SIGNIFICANT CHANGE UP (ref 5–17)
BASOPHILS # BLD AUTO: 0.04 K/UL — SIGNIFICANT CHANGE UP (ref 0–0.2)
BASOPHILS NFR BLD AUTO: 0.1 % — SIGNIFICANT CHANGE UP (ref 0–2)
BUN SERPL-MCNC: 23 MG/DL — SIGNIFICANT CHANGE UP (ref 7–23)
CALCIUM SERPL-MCNC: 7.8 MG/DL — LOW (ref 8.5–10.1)
CHLORIDE SERPL-SCNC: 102 MMOL/L — SIGNIFICANT CHANGE UP (ref 96–108)
CO2 SERPL-SCNC: 30 MMOL/L — SIGNIFICANT CHANGE UP (ref 22–31)
CREAT SERPL-MCNC: 0.93 MG/DL — SIGNIFICANT CHANGE UP (ref 0.5–1.3)
EOSINOPHIL # BLD AUTO: 0.4 K/UL — SIGNIFICANT CHANGE UP (ref 0–0.5)
EOSINOPHIL NFR BLD AUTO: 1.5 % — SIGNIFICANT CHANGE UP (ref 0–6)
FERRITIN SERPL-MCNC: 214 NG/ML — HIGH (ref 15–150)
FOLATE SERPL-MCNC: >20 NG/ML — SIGNIFICANT CHANGE UP
GLUCOSE SERPL-MCNC: 90 MG/DL — SIGNIFICANT CHANGE UP (ref 70–99)
HCT VFR BLD CALC: 28.1 % — LOW (ref 34.5–45)
HGB BLD-MCNC: 8.6 G/DL — LOW (ref 11.5–15.5)
IMM GRANULOCYTES NFR BLD AUTO: 1.5 % — SIGNIFICANT CHANGE UP (ref 0–1.5)
IRON SATN MFR SERPL: 10 % — LOW (ref 14–50)
IRON SATN MFR SERPL: 14 UG/DL — LOW (ref 30–160)
LYMPHOCYTES # BLD AUTO: 1.65 K/UL — SIGNIFICANT CHANGE UP (ref 1–3.3)
LYMPHOCYTES # BLD AUTO: 6.1 % — LOW (ref 13–44)
MAGNESIUM SERPL-MCNC: 2 MG/DL — SIGNIFICANT CHANGE UP (ref 1.6–2.6)
MCHC RBC-ENTMCNC: 23.5 PG — LOW (ref 27–34)
MCHC RBC-ENTMCNC: 30.6 GM/DL — LOW (ref 32–36)
MCV RBC AUTO: 76.8 FL — LOW (ref 80–100)
MONOCYTES # BLD AUTO: 0.99 K/UL — HIGH (ref 0–0.9)
MONOCYTES NFR BLD AUTO: 3.6 % — SIGNIFICANT CHANGE UP (ref 2–14)
NEUTROPHILS # BLD AUTO: 23.76 K/UL — HIGH (ref 1.8–7.4)
NEUTROPHILS NFR BLD AUTO: 87.2 % — HIGH (ref 43–77)
NRBC # BLD: 0 /100 WBCS — SIGNIFICANT CHANGE UP (ref 0–0)
PHOSPHATE SERPL-MCNC: 1.6 MG/DL — LOW (ref 2.5–4.5)
PLATELET # BLD AUTO: 460 K/UL — HIGH (ref 150–400)
POTASSIUM SERPL-MCNC: 3.4 MMOL/L — LOW (ref 3.5–5.3)
POTASSIUM SERPL-SCNC: 3.4 MMOL/L — LOW (ref 3.5–5.3)
RBC # BLD: 3.66 M/UL — LOW (ref 3.8–5.2)
RBC # FLD: 18.4 % — HIGH (ref 10.3–14.5)
SODIUM SERPL-SCNC: 140 MMOL/L — SIGNIFICANT CHANGE UP (ref 135–145)
T4 FREE SERPL-MCNC: 0.9 NG/DL — SIGNIFICANT CHANGE UP (ref 0.9–1.8)
TIBC SERPL-MCNC: 141 UG/DL — LOW (ref 220–430)
TSH SERPL-MCNC: 3.02 UIU/ML — SIGNIFICANT CHANGE UP (ref 0.36–3.74)
UIBC SERPL-MCNC: 127 UG/DL — SIGNIFICANT CHANGE UP (ref 110–370)
VIT B12 SERPL-MCNC: >2000 PG/ML — HIGH (ref 232–1245)
WBC # BLD: 27.26 K/UL — HIGH (ref 3.8–10.5)
WBC # FLD AUTO: 27.26 K/UL — HIGH (ref 3.8–10.5)

## 2019-01-07 PROCEDURE — 99233 SBSQ HOSP IP/OBS HIGH 50: CPT | Mod: GC

## 2019-01-07 RX ORDER — SODIUM,POTASSIUM PHOSPHATES 278-250MG
1 POWDER IN PACKET (EA) ORAL
Qty: 0 | Refills: 0 | Status: DISCONTINUED | OUTPATIENT
Start: 2019-01-07 | End: 2019-01-07

## 2019-01-07 RX ORDER — POTASSIUM CHLORIDE 20 MEQ
10 PACKET (EA) ORAL ONCE
Qty: 0 | Refills: 0 | Status: COMPLETED | OUTPATIENT
Start: 2019-01-07 | End: 2019-01-07

## 2019-01-07 RX ORDER — POTASSIUM PHOSPHATE, MONOBASIC POTASSIUM PHOSPHATE, DIBASIC 236; 224 MG/ML; MG/ML
30 INJECTION, SOLUTION INTRAVENOUS ONCE
Qty: 0 | Refills: 0 | Status: COMPLETED | OUTPATIENT
Start: 2019-01-07 | End: 2019-01-07

## 2019-01-07 RX ORDER — POTASSIUM CHLORIDE 20 MEQ
40 PACKET (EA) ORAL ONCE
Qty: 0 | Refills: 0 | Status: COMPLETED | OUTPATIENT
Start: 2019-01-07 | End: 2019-01-07

## 2019-01-07 RX ORDER — METOPROLOL TARTRATE 50 MG
5 TABLET ORAL ONCE
Qty: 0 | Refills: 0 | Status: COMPLETED | OUTPATIENT
Start: 2019-01-07 | End: 2019-01-07

## 2019-01-07 RX ADMIN — Medication 800 MILLIGRAM(S): at 21:18

## 2019-01-07 RX ADMIN — Medication 800 MILLIGRAM(S): at 15:15

## 2019-01-07 RX ADMIN — OXYCODONE HYDROCHLORIDE 10 MILLIGRAM(S): 5 TABLET ORAL at 19:07

## 2019-01-07 RX ADMIN — Medication 5000 UNIT(S): at 12:25

## 2019-01-07 RX ADMIN — SERTRALINE 25 MILLIGRAM(S): 25 TABLET, FILM COATED ORAL at 18:07

## 2019-01-07 RX ADMIN — AMLODIPINE BESYLATE 5 MILLIGRAM(S): 2.5 TABLET ORAL at 05:14

## 2019-01-07 RX ADMIN — OXYCODONE HYDROCHLORIDE 10 MILLIGRAM(S): 5 TABLET ORAL at 13:25

## 2019-01-07 RX ADMIN — ATORVASTATIN CALCIUM 10 MILLIGRAM(S): 80 TABLET, FILM COATED ORAL at 21:18

## 2019-01-07 RX ADMIN — OXYCODONE HYDROCHLORIDE 10 MILLIGRAM(S): 5 TABLET ORAL at 18:07

## 2019-01-07 RX ADMIN — SERTRALINE 25 MILLIGRAM(S): 25 TABLET, FILM COATED ORAL at 05:17

## 2019-01-07 RX ADMIN — OXYCODONE HYDROCHLORIDE 10 MILLIGRAM(S): 5 TABLET ORAL at 12:25

## 2019-01-07 RX ADMIN — Medication 100 MILLIGRAM(S): at 05:20

## 2019-01-07 RX ADMIN — CHOLESTYRAMINE 4 GRAM(S): 4 POWDER, FOR SUSPENSION ORAL at 10:10

## 2019-01-07 RX ADMIN — OXYCODONE HYDROCHLORIDE 10 MILLIGRAM(S): 5 TABLET ORAL at 00:05

## 2019-01-07 RX ADMIN — Medication 1 TABLET(S): at 12:26

## 2019-01-07 RX ADMIN — Medication 125 MILLIGRAM(S): at 05:17

## 2019-01-07 RX ADMIN — Medication 325 MILLIGRAM(S): at 12:25

## 2019-01-07 RX ADMIN — Medication 1 MILLIGRAM(S): at 12:26

## 2019-01-07 RX ADMIN — Medication 100 MILLIGRAM(S): at 15:16

## 2019-01-07 RX ADMIN — Medication 100 MILLIGRAM(S): at 21:18

## 2019-01-07 RX ADMIN — Medication 800 MILLIGRAM(S): at 05:13

## 2019-01-07 RX ADMIN — OXYCODONE HYDROCHLORIDE 10 MILLIGRAM(S): 5 TABLET ORAL at 05:14

## 2019-01-07 RX ADMIN — ENOXAPARIN SODIUM 30 MILLIGRAM(S): 100 INJECTION SUBCUTANEOUS at 05:12

## 2019-01-07 RX ADMIN — Medication 125 MILLIGRAM(S): at 18:07

## 2019-01-07 RX ADMIN — POTASSIUM PHOSPHATE, MONOBASIC POTASSIUM PHOSPHATE, DIBASIC 85 MILLIMOLE(S): 236; 224 INJECTION, SOLUTION INTRAVENOUS at 21:18

## 2019-01-07 RX ADMIN — Medication 1 TABLET(S): at 18:07

## 2019-01-07 RX ADMIN — Medication 1 TABLET(S): at 10:10

## 2019-01-07 RX ADMIN — Medication 125 MILLIGRAM(S): at 12:25

## 2019-01-07 RX ADMIN — Medication 5 MILLIGRAM(S): at 18:06

## 2019-01-07 RX ADMIN — Medication 100 MILLIEQUIVALENT(S): at 20:11

## 2019-01-07 RX ADMIN — PANTOPRAZOLE SODIUM 40 MILLIGRAM(S): 20 TABLET, DELAYED RELEASE ORAL at 05:17

## 2019-01-07 NOTE — PHYSICAL THERAPY INITIAL EVALUATION ADULT - TRANSFER SAFETY CONCERNS NOTED: SIT/STAND, REHAB EVAL
decreased safety awareness/decreased sequencing ability/decreased weight-shifting ability/decreased balance during turns/decreased proprioception/losing balance/decreased step length

## 2019-01-07 NOTE — PROGRESS NOTE ADULT - SUBJECTIVE AND OBJECTIVE BOX
INTERVAL HPI/OVERNIGHT EVENTS:  No new overnight event.  No N/V/D.  Tolerating diet.     MEDICATIONS  (STANDING):  amLODIPine   Tablet 5 milliGRAM(s) Oral daily  atorvastatin 10 milliGRAM(s) Oral at bedtime  cholecalciferol 5000 Unit(s) Oral daily  cholestyramine Powder (Sugar-Free) 4 Gram(s) Oral daily  enoxaparin Injectable 30 milliGRAM(s) SubCutaneous every 24 hours  ferrous    sulfate 325 milliGRAM(s) Oral daily  folic acid 1 milliGRAM(s) Oral daily  lactobacillus acidophilus 1 Tablet(s) Oral three times a day with meals  mesalamine DR Capsule 800 milliGRAM(s) Oral three times a day  metroNIDAZOLE  IVPB 500 milliGRAM(s) IV Intermittent every 8 hours  oxyCODONE    IR 10 milliGRAM(s) Oral every 6 hours  pantoprazole    Tablet 40 milliGRAM(s) Oral before breakfast  potassium phosphate / sodium phosphate powder 1 Packet(s) Oral two times a day  sertraline 25 milliGRAM(s) Oral two times a day  vancomycin    Solution 125 milliGRAM(s) Oral every 6 hours    MEDICATIONS  (PRN):      Allergies    erythromycin (Unknown)  macrolide antibiotics (Unknown)  morphine (Diarrhea)  sulfa drugs (Unknown)    Intolerances        Review of Systems:    General:  No wt loss, fevers, chills, night sweats,fatigue,   Eyes:  Good vision, no reported pain  ENT:  No sore throat, pain, runny nose, dysphagia  CV:  No pain, palpitatioins, hypo/hypertension  Resp:  No dyspnea, cough, tachypnea, wheezing  GI:  No pain, No nausea, No vomiting, No diarrhea, No constipatiion, No weight loss, No fever, No pruritis, No rectal bleeding, No tarry stools, No dysphagia,  :  No pain, bleeding, incontinence, nocturia  Muscle:  No pain, weakness  Neuro:  No weakness, tingling, memory problems  Psych:  No fatigue, insomnia, mood problems, depression  Endocrine:  No polyuria, polydypsia, cold/heat intolerance  Heme:  No petechiae, ecchymosis, easy bruisability  Skin:  No rash, tattoos, scars, edema      Vital Signs Last 24 Hrs  T(C): 36.6 (07 Jan 2019 12:45), Max: 36.6 (07 Jan 2019 12:45)  T(F): 97.9 (07 Jan 2019 12:45), Max: 97.9 (07 Jan 2019 12:45)  HR: 70 (07 Jan 2019 12:45) (62 - 74)  BP: 109/65 (07 Jan 2019 12:45) (101/60 - 132/83)  BP(mean): --  RR: 16 (07 Jan 2019 12:45) (16 - 16)  SpO2: 99% (07 Jan 2019 12:45) (97% - 100%)    PHYSICAL EXAM:    Constitutional: NAD, well-developed  HEENT: EOMI, throat clear  Neck: No LAD, supple  Respiratory: CTA and P  Cardiovascular: S1 and S2, RRR, no M  Gastrointestinal: BS+, soft, NT/ND, neg HSM,  Extremities: No peripheral edema, neg clubing, cyanosis  Vascular: 2+ peripheral pulses  Neurological: A/O x 3, no focal deficits  Psychiatric: Normal mood, normal affect  Skin: No rashes      LABS:                        8.6    27.26 )-----------( 460      ( 07 Jan 2019 07:05 )             28.1     01-07    140  |  102  |  23  ----------------------------<  90  3.4<L>   |  30  |  0.93    Ca    7.8<L>      07 Jan 2019 07:56  Phos  1.6     01-07  Mg     2.0     01-07    TPro  4.9<L>  /  Alb  1.6<L>  /  TBili  0.3  /  DBili  x   /  AST  16  /  ALT  10<L>  /  AlkPhos  141<H>  01-06          RADIOLOGY & ADDITIONAL TESTS:

## 2019-01-07 NOTE — PHYSICAL THERAPY INITIAL EVALUATION ADULT - PHYSICAL ASSIST/NONPHYSICAL ASSIST: STAND/SIT, REHAB EVAL
set-up required/1 person assist/Tactile, verbal and manual cues needed/nonverbal cues (demo/gestures)/verbal cues

## 2019-01-07 NOTE — PHYSICAL THERAPY INITIAL EVALUATION ADULT - PERTINENT HX OF CURRENT PROBLEM, REHAB EVAL
As per H&P:"78/F with PMHx significant for metastatic endometrial cancer undergoing chemo, last treatment sept 2018, radiation colitis, HTN, R hydronephrosis (s/p ureteral stent 6/2018), thyroid nodules, HTN presents with witnessed syncope that occurred on the morning of admission after passing a bowel movement sitting on the toilet. Event was witnessed by aide (Ana), who reports the patient became unresponsive after passing a large amount of watery stool, noting her eyes rolls back."

## 2019-01-07 NOTE — DISCHARGE NOTE ADULT - SECONDARY DIAGNOSIS.
Pancolitis Malignant neoplasm of ovary, unspecified laterality Lymphedema of leg Leukocytosis Hypokalemia Malignancy

## 2019-01-07 NOTE — PROGRESS NOTE ADULT - ATTENDING COMMENTS
Pt seen + examined. Case discussed with intern/resident. Agree with assessment and plan above (edited) with following addendum:  Time spent: 40min. More than 50% of the visit was spent counseling the patient / pt's family on medical condition -- leukocytosis, c difficile colitis, hypokalemia.    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 Admitted for syncope workup and sepsis due to c difficile colitis.  -Patient with witnessed syncopal episodes early on the morning of admission -- Syncope of unclear etiology - most likely vasovagal with dehydration, but had severe hypokalemia so may have had an arrhythmia.   -significantly hypokalemic/hypomagnesemic on admission -- repleted electrolytes  -monitor on tele  -diet advanced to regular with lactose restrictions  -Patient with significant hx of radiation colitis, ulcerative colitis, and C diff colitis x 3 in the past year, currently 2 weeks s/p fecal transplant for C diff  and CT on admission showing pancolitis; C Diff PCR positive  -Continue with Vancomycin PO, Flagyl IV; per ID flagyl to be d/c'd tomorrow-- pt's leukocytosis had rapid improvement now down to 27 from >50. This suggests there was a reactive component that is improving, likely infection. complete vanco po course; f/up ID recs  -diarrhea has resolved  -pt/family interested in home hospice -- consulted hospice who is evaluating pt this afternoon

## 2019-01-07 NOTE — PROGRESS NOTE ADULT - PROBLEM SELECTOR PLAN 4
improving  Patient found to have significant leukocytosis on admission labs WBC 53, likely multifactorial - pancolitis with possible recurrence of c diff and possibly in part due to worsening metastatic disease  - Continue to trend while on antibiotics  - Consult Heme/Onc (Andree) - f/u recs  -F/u BCx, UCx improving rapidly today so suspect there may have been a reactive component, such as infection, that is improving  Patient found to have significant leukocytosis on admission labs WBC 53, likely multifactorial - pancolitis with possible recurrence of c diff and possibly in part due to worsening metastatic disease  - Continue to trend while on antibiotics  - Consult Heme/Onc (Andree) - f/u recs  -F/u BCx, UCx

## 2019-01-07 NOTE — PHYSICAL THERAPY INITIAL EVALUATION ADULT - PLANNED THERAPY INTERVENTIONS, PT EVAL
strengthening/balance training/bed mobility training/transfer training/Stair negotiation: GOAL: Assess stairs when/if appropriate/gait training

## 2019-01-07 NOTE — PHARMACOTHERAPY INTERVENTION NOTE - COMMENTS
s/w pt daughter regarding home medications, updated list in [facility] - pt takes medical marijuana (sublingual oil as confirmed by daughter), informed admitting team of this home med, would require physician follow up and confirmation, referred MD to medical marijuana policy. Also explained to pt family the process of approving medical marijuana in this hospital in detail (regarding administration, storage, explained we have a process as per policy), family in agreement with proceeding by protocol. Admitting team to discuss
Spoke with Dr. Trevino today regarding patient's antibiotic therapy for C.diff.  Patient is currently receiving PO vancomycin and IV metronidazole.  Per most recently updated IDSA C.diff guidelines, metronidazole is no longer recommended as a treatment and PO vancomcyin monotherapy is appropriate.  Suggested discontinuing the metronidazole and leaving the patient on PO vancomycin and Dr. Trevino requested we wait for ID to assess the metronidazole.
pt presented to ER with syncope, recent fecal transplant 2 weeks ago and history of multiple c. diff episodes. Pt family had multiple questions, including use of abx in this pt - as per pt daughter, was informed by her outpt ID physician to avoid abx due to c. diff. Discussed with family at length the use of abx in c. diff (including treatment algorithm), indications, and possible delay of care by refusing to give pt abx. As per family, would like to wait until making decision to treat, I discussed with admitting attending Dr. Spring regarding situation

## 2019-01-07 NOTE — PHYSICAL THERAPY INITIAL EVALUATION ADULT - DID THE PATIENT HAVE SURGERY?
chest x-ray: clear lings; Carotid bilateral arterial dopplers: (+) moderate bilateral atheromatosis /s flow limiting stenosis; K+ 3.4; HGB 8.6; WBC 27.26/n/a

## 2019-01-07 NOTE — DISCHARGE NOTE ADULT - PATIENT PORTAL LINK FT
You can access the [facility] Patient Portal, offered by [facility], by registering with the following website: [email or web address]

## 2019-01-07 NOTE — PHYSICAL THERAPY INITIAL EVALUATION ADULT - IMPAIRED TRANSFERS: SIT/STAND, REHAB EVAL
impaired postural control/decreased strength/cognition/impaired coordination/narrow base of support/impaired balance/decreased flexibility

## 2019-01-07 NOTE — PROGRESS NOTE ADULT - SUBJECTIVE AND OBJECTIVE BOX
Patient is a 78y old  Female who presents with a chief complaint of Syncope (2019 13:34)    Asked to see patient for ID Consult  Interval History:recurrent C diff colitis    CENTRAL LINE:   [  ] YES       [ x] NO  LOWERY:                 [  ] YES       [ x ] NO     PAST MEDICAL & SURGICAL HISTORY:  Malignant neoplasm of ovary, unspecified laterality  GERD (gastroesophageal reflux disease)  Fatty liver  Multiple thyroid nodules  Anemia  Cataracts, bilateral: monitored  History of chemotherapy  Lymphedema of leg: right  Hydronephrosis determined by ultrasound: right kidney  Abdominal mass:  metastatic endometrial cancer, s/p surgery, on chemotherapy at present-infusa port right chest  Atrial fibrillation: one episode &gt;30 years ago  Mitral valve prolapse  Endometrial carcinoma:  - s/p KATH - tx with radiation, and currently receiving chemotherapy  Hyperlipidemia  Ulcerative colitis  Anxiety  Post-Herpetic Trigeminal Neuralgia: left  Benign Hypertension  History of cataract extraction, left  H/O hydronephrosis: ureteral stent, multiple stent exchanges, last in 2018  History of chemotherapy: Infusaport insertion ;   Abdominal mass: s/p mass removed ,on chemo-right chest infusa port  Encounter for biopsy: 13 - right iliac lymph node biopsy  History of tonsillectomy  H/O total hysterectomy:   History of D&C: age 22      REVIEW OF SYSTEMS:  All systems below were reviewed and are normal [  ]  Constitutional:  HEENT:  Lymph nodes:  ID:  Pulmonary:no cough sob  Cardiac:no CP  GI:no NVD abd pain-no BM in > 48 hours.  Renal:  Musculoskeletal:  Neuro:  Endocrine:  Skin:  All other systems above were reviewed and are normal   [x  ]    Allergies  Allergies    erythromycin (Unknown)  macrolide antibiotics (Unknown)  morphine (Diarrhea)  sulfa drugs (Unknown)    Intolerances        MEDICATIONS  (STANDING):  amLODIPine   Tablet 5 milliGRAM(s) Oral daily  atorvastatin 10 milliGRAM(s) Oral at bedtime  cholecalciferol 5000 Unit(s) Oral daily  cholestyramine Powder (Sugar-Free) 4 Gram(s) Oral daily  enoxaparin Injectable 30 milliGRAM(s) SubCutaneous every 24 hours  ferrous    sulfate 325 milliGRAM(s) Oral daily  folic acid 1 milliGRAM(s) Oral daily  lactobacillus acidophilus 1 Tablet(s) Oral three times a day with meals  mesalamine DR Capsule 800 milliGRAM(s) Oral three times a day  metroNIDAZOLE  IVPB 500 milliGRAM(s) IV Intermittent every 8 hours  oxyCODONE    IR 10 milliGRAM(s) Oral every 6 hours  pantoprazole    Tablet 40 milliGRAM(s) Oral before breakfast  sertraline 25 milliGRAM(s) Oral two times a day  vancomycin    Solution 125 milliGRAM(s) Oral every 6 hours    MEDICATIONS  (PRN):      Vital Signs Last 24 Hrs  T(C): 36.4 (2019 04:24), Max: 36.5 (2019 16:37)  T(F): 97.5 (2019 04:24), Max: 97.7 (2019 16:37)  HR: 62 (2019 04:24) (62 - 74)  BP: 118/50 (2019 04:24) (101/60 - 132/83)  BP(mean): --  RR: 16 (2019 04:24) (16 - 16)  SpO2: 98% (2019 04:24) (97% - 100%)    I&O's Summary    2019 07:01  -  2019 07:00  --------------------------------------------------------  IN: 450 mL / OUT: 0 mL / NET: 450 mL        PHYSICAL EXAM:  Constitutional:  HEENT:  Lymph nodes:  Neck:  Lungs:clear  Heart:rr  Abdomen:soft nontender  Renal:  Extremities:lymphedema R thigh-chronic  Musculoskeletal:  Neurologic:  Vascular:  Endocrine:  Skin:  All of the above normal except for written comments [x ]    LABORATORY:    CBC Full  -  ( 2019 07:05 )  WBC Count : 27.26 K/uL  Hemoglobin : 8.6 g/dL  Hematocrit : 28.1 %  Platelet Count - Automated : 460 K/uL  Mean Cell Volume : 76.8 fl  Mean Cell Hemoglobin : 23.5 pg  Mean Cell Hemoglobin Concentration : 30.6 gm/dL  Auto Neutrophil # : 23.76 K/uL  Auto Lymphocyte # : 1.65 K/uL  Auto Monocyte # : 0.99 K/uL  Auto Eosinophil # : 0.40 K/uL  Auto Basophil # : 0.04 K/uL  Auto Neutrophil % : 87.2 %  Auto Lymphocyte % : 6.1 %  Auto Monocyte % : 3.6 %  Auto Eosinophil % : 1.5 %  Auto Basophil % : 0.1 %              140  |  102  |  23  ----------------------------<  90  3.4<L>   |  30  |  0.93    Ca    7.8<L>      2019 07:56  Phos  1.6       Mg     2.0         TPro  4.9<L>  /  Alb  1.6<L>  /  TBili  0.3  /  DBili  x   /  AST  16  /  ALT  10<L>  /  AlkPhos  141<H>            Urinalysis Basic - ( 2019 11:41 )    Color: Yellow / Appearance: Clear / S.020 / pH: x  Gluc: x / Ketone: Trace  / Bili: Small / Urobili: Negative   Blood: x / Protein: 25 mg/dL / Nitrite: Negative   Leuk Esterase: Trace / RBC: 3-5 /HPF / WBC 3-5   Sq Epi: x / Non Sq Epi: Few / Bacteria: Few        Vanco. trough:  Genta. trough:      Radiology:    Microbiology:bl and ur cx's NG

## 2019-01-07 NOTE — DISCHARGE NOTE ADULT - MEDICATION SUMMARY - MEDICATIONS TO TAKE
I will START or STAY ON the medications listed below when I get home from the hospital:    balsalazide 750 mg oral capsule  -- 3 cap(s) by mouth 3 times a day  -- Indication: For Home colitis medication    oxyCODONE 5 mg oral tablet  -- 2 tab(s) by mouth every 6 hours, As Needed for pain  -- Indication: For for pain as needed    sertraline 25 mg oral tablet  -- 1 tab(s) by mouth 2 times a day  -- Indication: For depression    atorvastatin 10 mg oral tablet  -- 1 tab(s) by mouth once a day (at bedtime) on tuesday and saturday only  -- Indication: For Hyperlipidemia    Xanax 0.25 mg oral tablet  -- 1 tab(s) by mouth , As Needed  -- Indication: For Anxiety as needed    amLODIPine 5 mg oral tablet  -- 1 tab(s) by mouth once a day  -- Indication: For Hypertension    FIRST-Vancomycin 25 oral liquid  -- 5 milliliters (125mg) by mouth every 6 hours for 14 days   any formulation (tablet or liquid) is acceptable as long as it is 125mg q6h  -- Indication: For clostridium difficile solution    ferrous sulfate 325 mg (65 mg elemental iron) oral tablet  -- 1 tab(s) by mouth once a day  -- Indication: For iron supplement    lactobacillus acidophilus oral capsule  -- 1 cap(s) by mouth 3 times a day (with meals)  -- Indication: For over the counter probiotic    pantoprazole 40 mg oral delayed release tablet  -- 1 tab(s) by mouth once a day (before a meal)  -- Indication: For Acid reflux     Vitamin D3 5000 intl units oral capsule  -- 1 cap(s) by mouth once a day  -- Indication: For Home vitamin D supplement    folic acid 1 mg oral tablet  -- 1 tab(s) by mouth once a day  -- Indication: For Home folate supplement

## 2019-01-07 NOTE — PROGRESS NOTE ADULT - PROBLEM SELECTOR PLAN 3
diet advanced to regular with lactose restrictions  Patient with significant hx of radiation colitis, ulcerative colitis, and C diff colitis x 3 in the past year, currently 2 weeks s/p fecal transplant for C diff  CT showing pancolitis  C Diff positive  Continue with Vancomycin PO, Flagyl IV  Surgery (Ginger) consulted for possible surgical intervention, hx of previous abdominal wall resection due to tumor load -- no surgical intervention at this time recommended  F/u CBC with diff diet advanced to regular with lactose restrictions  Patient with significant hx of radiation colitis, ulcerative colitis, and C diff colitis x 3 in the past year, currently 2 weeks s/p fecal transplant for C diff  CT showing pancolitis  C Diff positive  Continue with Vancomycin PO, Flagyl IV; per ID flagyl to be d/c'd tmrw  Surgery (Ginger) consulted for possible surgical intervention, hx of previous abdominal wall resection due to tumor load -- no surgical intervention at this time recommended  F/u CBC with diff

## 2019-01-07 NOTE — PHYSICAL THERAPY INITIAL EVALUATION ADULT - ADDITIONAL COMMENTS
Pt lives alone in a split level house /c 5 NATALIE and 1 handrail, 8 plus 8 inside /c 1 handrail. Prior to a few weeks ago pt was independent /c all functional mobility and ADL's. Pt does have a 12 hour aide and aide does sleep over but does not care for pt over-a night. Pt owns a rolling walker, lightweight transport chair (purchased online by [employer], states has no brakes on it), shower chair, commode, and raised toliet seat.

## 2019-01-07 NOTE — DISCHARGE NOTE ADULT - HOSPITAL COURSE
78/F with PMHx significant for metastatic endometrial cancer undergoing chemo, last treatment sept 2018, radiation colitis, HTN, R hydronephrosis (s/p ureteral stent 6/2018), thyroid nodules, HTN presented with witnessed syncope that occurred after passing a bowel movement sitting on the toilet. Event was witnessed by aide (Ana), who reports the patient became unresponsive after passing a large amount of watery stool, noting her eyes rolls back in her head, and had LOC. Also complaints of lower abdominal dull pain. Ana caught patient and lowered her to the ground, denies any head injury. Denied having had similar episodes in the past. Daughter (kayode) present at the bedside reported patient has had poor oral intake over the past 1 week. Of note, patient has had C Diff colitis 3 times in the past year, and is s/p fecal transplant x 2, 2 weeks ago. Patient denies any fever, chills SOB n/v, chest pain, but admits generalized weakness over the past 1 week, however notes that watery bowel movements are not new and has had them for years since she had radiation therapy for endometrial cancer. She was also advised to have potassium supplement due to chronic diarrhea leaving her with chronically low potassium levels, but admitted she has not been taking K lately due to worsening diarrhea. Denied fever, chills SOB n/v chest pain.    ED Vitals: T 98.0F HR 65 /72 RR 16 SpO2 96% on RA  In ED patient received IV flagyl, PO vancomycin, 1L NS IVF, [id number] KCl riders  Labs significant for WBC 53.17, hb 9.4, lactate 1.3, K 2.1, Cr 1.6, troponin negative x1, UA with trace ketones, leuk esterase, small blood, few bacteria and epithelials  BCx UCx C diff toxin PCR pending  CT Chest and abdomen pelvis: Pancolitis, small left pleural effusion. Distended gallbladder without definite radiopaque gallstones. Right ureteral stent in place with minimal residual hydronephrosis.  CT Head: No acute ICH, atrophy noted  CXR: Lungs clear  EKG: NSR 92 BPM, possible anterolateral ischemia 78/F with PMHx significant for metastatic endometrial cancer undergoing chemo, last treatment sept 2018, radiation colitis, HTN, R hydronephrosis (s/p ureteral stent 6/2018), thyroid nodules, HTN presented with witnessed syncope that occurred after passing a bowel movement sitting on the toilet. Event was witnessed by aide (Ana), who reports the patient became unresponsive after passing a large amount of watery stool, noting her eyes rolls back in her head, and had LOC, no head trauma. Also complaints of lower abdominal dull pain. Of note, patient has had C Diff colitis 3 times in the past year, and is s/p fecal transplant x 2, 2 weeks ago. Patient admitted to generalized weakness x 1 week, and watery bowel movements (chronic).     ED Vitals: T 98.0F HR 65 /72 RR 16 SpO2 96% on RA  In ED patient received IV flagyl, PO vancomycin, 1L NS IVF, [id number] KCl riders  Labs significant for WBC 53.17, hb 9.4, lactate 1.3, K 2.1, Cr 1.6, troponin negative x1, UA with trace ketones, leuk esterase, small blood, few bacteria and epithelials  CT Chest and abdomen pelvis: Pancolitis, small left pleural effusion. Distended gallbladder without definite radiopaque gallstones. Right ureteral stent in place with minimal residual hydronephrosis.  CT Head: No acute ICH, atrophy noted  CXR: Lungs clear  EKG: NSR 92 BPM, possible anterolateral ischemia    Pt was C diff positive and started on PO vanco and IV flagyl with significant downtrend in WBC (26). Pt was seen by ID who recommended continuation of the PO vanco and DC'd the flagyl. UCx were negative, BCx x2 showed NGTD. Pt was seen by GI (Liz) for pancolitis. She was initially on clear liquid diet and later advanced to regular diet. Pt was seen by General Surgery (Ginger) who recommended supportive treatment. Pt was seen by Onc (Andree [facility]) for leukocytosis and advanced metastatic cancer. Pt was evaluated and approved for home hospice. 78/F with PMHx significant for metastatic endometrial cancer undergoing chemo, last treatment sept 2018, radiation colitis, HTN, R hydronephrosis (s/p ureteral stent 6/2018), thyroid nodules, HTN presented with witnessed syncope that occurred after passing a bowel movement sitting on the toilet. Event was witnessed by aide (Ana), who reports the patient became unresponsive after passing a large amount of watery stool, noting her eyes rolls back in her head, and had LOC, no head trauma. Also complaints of lower abdominal dull pain. Of note, patient has had C Diff colitis 3 times in the past year, and is s/p fecal transplant x 2, 2 weeks ago. Patient admitted to generalized weakness x 1 week, and watery bowel movements (chronic).     ED Vitals: T 98.0F HR 65 /72 RR 16 SpO2 96% on RA  In ED patient received IV flagyl, PO vancomycin, 1L NS IVF, [id number] KCl riders  Labs significant for WBC 53.17, hb 9.4, lactate 1.3, K 2.1, Cr 1.6, troponin negative x1, UA with trace ketones, leuk esterase, small blood, few bacteria and epithelials  CT Chest and abdomen pelvis: Pancolitis, small left pleural effusion. Distended gallbladder without definite radiopaque gallstones. Right ureteral stent in place with minimal residual hydronephrosis.  CT Head: No acute ICH, atrophy noted  CXR: Lungs clear  EKG: NSR 92 BPM, possible anterolateral ischemia    Pt was C diff positive and started on PO vanco and IV flagyl with significant downtrend in WBC (26). Pt was seen by ID who recommended continuation of the PO vanco and DC'd the flagyl. UCx were negative, BCx x2 showed NGTD. Pt was seen by GI (Liz) for pancolitis. She was initially on clear liquid diet and later advanced to regular diet. Pt was seen by General Surgery (Ginger) who recommended supportive treatment. Pt was seen by Onc (Andree Kam) for leukocytosis and advanced metastatic cancer. Pt was evaluated and approved for home hospice. 78/F with PMHx significant for metastatic endometrial cancer undergoing chemo, last treatment sept 2018, radiation colitis, HTN, R hydronephrosis (s/p ureteral stent 6/2018), thyroid nodules, HTN presented with witnessed syncope that occurred after passing a bowel movement sitting on the toilet. Event was witnessed by aide (Ana), who reports the patient became unresponsive after passing a large amount of watery stool, noting her eyes rolls back in her head, and had LOC, no head trauma. Also complaints of lower abdominal dull pain. Of note, patient has had C Diff colitis 3 times in the past year, and is s/p fecal transplant x 2, 2 weeks ago. Patient admitted to generalized weakness x 1 week, and watery bowel movements (chronic). Pt admitted to telemetry unit for syncope and collapse.     Head CT was negative for acute bleed or infarct, atrophy noted. CT A/P showed pancolitis and metastatic disease. WBC on admission was 53 and C diff PCR was positive. Pt was treated with PO vancomycin and IV flagyl. WBC downtrended. Flagyl was later discontinued and Pt was continued on PO vanco and discharged on PO vanco. Pt was significantly hypokalemic (2.1) on admission and potassium was replaced. Pt was advised to continue replacing potassium via supplement. UCx were negative, BCx x2 showed NGTD. Pt was seen by GI (Liz) for pancolitis. She was initially on clear liquid diet and later advanced to regular diet. Pt was seen by General Surgery (Ginger) who recommended supportive treatment. Pt was seen by Onc ([facility]) for leukocytosis and advanced metastatic cancer. Pt was evaluated and approved for home hospice. Patient seen and examined on day of discharge. Pt reported feeling generalized weakness but otherwise no complaints. Pain adequately controlled. No abdominal pain or distension.     Vital Signs Last 24 Hrs  T(C): 36.7 (09 Jan 2019 11:29), Max: 37.3 (08 Jan 2019 16:40)  T(F): 98 (09 Jan 2019 11:29), Max: 99.2 (08 Jan 2019 16:40)  HR: 68 (09 Jan 2019 11:29) (68 - 82)  BP: 95/60 (09 Jan 2019 11:29) (95/60 - 118/74)  RR: 18 (09 Jan 2019 11:29) (16 - 18)  SpO2: 98% (09 Jan 2019 11:29) (96% - 99%)    Physical Exam:  General: cachectic, NAD  HEENT: NCAT, EOMI bl, moist mucous membranes   Neurology: A&Ox3, nonfocal  Respiratory: decreased breath sounds   CV: RRR, +S1/S2  Abdominal: Soft, NT, ND +BSx4  Extremities: No C/C/E, + peripheral pulses, + right thigh edema  Skin: warm, dry, normal color    Patient is medically stable for discharge to home with home hospice. 78/F with PMHx significant for metastatic endometrial cancer undergoing chemo, last treatment sept 2018, radiation colitis, HTN, R hydronephrosis (s/p ureteral stent 6/2018), thyroid nodules, HTN presented with witnessed syncope that occurred after passing a bowel movement sitting on the toilet. Event was witnessed by aide (Ana), who reports the patient became unresponsive after passing a large amount of watery stool, noting her eyes rolls back in her head, and had LOC, no head trauma. Also complaints of lower abdominal dull pain. Of note, patient has had C Diff colitis 3 times in the past year, and is s/p fecal transplant x 2, 2 weeks ago. Patient admitted to generalized weakness x 1 week, and watery bowel movements (chronic). Pt admitted to telemetry unit for syncope and collapse.     Head CT was negative for acute bleed or infarct, atrophy noted. CT A/P showed pancolitis and metastatic disease. WBC on admission was 53 and C diff PCR was positive. Pt was treated with PO vancomycin and IV flagyl. WBC downtrended. Flagyl was later discontinued and Pt was continued on PO vanco and discharged on PO vanco. Pt was significantly hypokalemic (2.1) on admission and potassium was replaced. Pt was advised to continue replacing potassium via supplement. UCx were negative, BCx x2 showed NGTD. Pt was seen by GI (Liz) for pancolitis. She was initially on clear liquid diet and later advanced to regular diet. Pt was seen by General Surgery (Ginger) who recommended supportive treatment. Pt was seen by Onc ([facility]) for leukocytosis and advanced metastatic cancer. Pt was evaluated and approved for home hospice. Patient seen and examined on day of discharge. Pt reported feeling generalized weakness but otherwise no complaints. Pain adequately controlled. No abdominal pain or distension.     Vital Signs Last 24 Hrs  T(C): 36.7 (09 Jan 2019 11:29), Max: 37.3 (08 Jan 2019 16:40)  T(F): 98 (09 Jan 2019 11:29), Max: 99.2 (08 Jan 2019 16:40)  HR: 68 (09 Jan 2019 11:29) (68 - 82)  BP: 95/60 (09 Jan 2019 11:29) (95/60 - 118/74)  RR: 18 (09 Jan 2019 11:29) (16 - 18)  SpO2: 98% (09 Jan 2019 11:29) (96% - 99%)    Physical Exam:  General: cachectic, NAD  HEENT: NCAT, EOMI bl, moist mucous membranes   Neurology: A&Ox3, nonfocal  Respiratory: decreased breath sounds   CV: RRR, +S1/S2  Abdominal: Soft, NT, ND +BSx4  Extremities: No C/C/E, + peripheral pulses  Skin: warm, dry, normal color    Patient is medically stable for discharge to home with home hospice.     Time spent : 45min

## 2019-01-07 NOTE — PROGRESS NOTE ADULT - SUBJECTIVE AND OBJECTIVE BOX
pt seen  feeling good  no complaints  denies abdominal pain or significant diarrhea  ICU Vital Signs Last 24 Hrs  T(C): 36.4 (07 Jan 2019 09:00), Max: 36.5 (06 Jan 2019 16:37)  T(F): 97.6 (07 Jan 2019 09:00), Max: 97.7 (06 Jan 2019 16:37)  HR: 68 (07 Jan 2019 09:00) (62 - 74)  BP: 109/68 (07 Jan 2019 09:00) (101/60 - 132/83)  BP(mean): --  ABP: --  ABP(mean): --  RR: 16 (07 Jan 2019 09:00) (16 - 16)  SpO2: 99% (07 Jan 2019 09:00) (97% - 100%)  gen-NAD  Resp-clear  abd-soft NT/ND                            8.6    27.26 )-----------( 460      ( 07 Jan 2019 07:05 )             28.1   01-07    140  |  102  |  23  ----------------------------<  90  3.4<L>   |  30  |  0.93    Ca    7.8<L>      07 Jan 2019 07:56  Phos  1.6     01-07  Mg     2.0     01-07    TPro  4.9<L>  /  Alb  1.6<L>  /  TBili  0.3  /  DBili  x   /  AST  16  /  ALT  10<L>  /  AlkPhos  141<H>  01-06

## 2019-01-07 NOTE — PHYSICAL THERAPY INITIAL EVALUATION ADULT - IMPAIRMENTS FOUND, PT EVAL
gross motor/arousal, attention, and cognition/cognitive impairment/gait, locomotion, and balance/joint integrity and mobility/muscle strength/posture/aerobic capacity/endurance/poor safety awareness

## 2019-01-07 NOTE — PROGRESS NOTE ADULT - ASSESSMENT
79 yo female admitted for vasovagal episode found to have pancolitis. Hx of Chronic C. Diff infection and stool transplant x2     cont current antibiotics     advance to regular diet

## 2019-01-07 NOTE — PHYSICAL THERAPY INITIAL EVALUATION ADULT - IMPAIRMENTS CONTRIBUTING TO GAIT DEVIATIONS, PT EVAL
decreased flexibility/decreased strength/impaired balance/cognition/impaired coordination/narrow base of support/impaired postural control

## 2019-01-07 NOTE — PHYSICAL THERAPY INITIAL EVALUATION ADULT - GENERAL OBSERVATIONS, REHAB EVAL
Pt received in tele in bed resting /c son and private care-taker present. Pt medicated earlier for abdominal pain and RLE/lymphedema pain. Pt and son agreeable /c PT evaluation.

## 2019-01-07 NOTE — DISCHARGE NOTE ADULT - CARE PLAN
Principal Discharge DX:	Syncope, unspecified syncope type  Goal:	stable  Assessment and plan of treatment:	Syncope may have been caused by dehydration (in the setting of diarrhea) or cardiac arrhythmia caused by low potassium in the blood. You had a head CT which did not show any acute bleed or acute infarct. You were monitored on the telemetry floor for arrhythmias. Your potassium was found to be very low which could be secondary to the diarrhea. Your potasium levels were replaced and checked daily. You should continue to replace potassium via supplement.  Secondary Diagnosis:	Pancolitis  Assessment and plan of treatment:	You were initially placed on a clear liquid diet that was later advanced to regular diet. You were treated with antibiotics. You were seen by a Gastroenterologist and a General Surgeon.  Secondary Diagnosis:	Malignant neoplasm of ovary, unspecified laterality  Goal:	Chronic  Assessment and plan of treatment:	You were seen by an Oncologist and approved for Home Hospice.  Secondary Diagnosis:	Lymphedema of leg  Secondary Diagnosis:	Leukocytosis  Secondary Diagnosis:	Hypokalemia Principal Discharge DX:	Syncope, unspecified syncope type  Goal:	prevent recurrence  Assessment and plan of treatment:	Syncope may have been caused by dehydration (in the setting of diarrhea) or cardiac arrhythmia caused by low potassium in the blood. You had a head CT which did not show any acute bleed or acute infarct or other acute changes. You were monitored on the telemetry floor for arrhythmias. Your potassium was found to be very low which could be secondary to the diarrhea. Your potasium levels were replaced and checked daily. You should continue to replace potassium via supplement you had been using at home.  Stay well hydrated.  Secondary Diagnosis:	Pancolitis  Assessment and plan of treatment:	Your colitis was likely related to c difficile bacteria. Please take the prescribed vancomycin every 6 hours for 14 more days and follow up with your gastroenterologist, or with Dr. Hill whom you saw in the hospital (number below) within 1 week. Speak to the gastroenterologist if you need to continue any further vancomycin after the two weeks given your recurrent c difficile infections. Also discuss if any plan for another stool transplant.  Secondary Diagnosis:	Lymphedema of leg  Assessment and plan of treatment:	Keep leg elevated when not walking. Follow up with PMD.  Secondary Diagnosis:	Leukocytosis  Assessment and plan of treatment:	Your white blood cell count was high likely due to infection and possible in part due to your malignancy. It has responded well to antibiotics so please complete course as prescribed and directed by your gastroenterologist.  Secondary Diagnosis:	Hypokalemia  Assessment and plan of treatment:	Your potassium was found to be very low which could be secondary to the diarrhea. Your potasium levels were replaced and checked daily. You should continue to replace potassium via supplement you had been using at home.  Secondary Diagnosis:	Malignancy  Assessment and plan of treatment:	You have decided to pursue home hospice at this time. If you have any symptoms associated with your malignancy, please call your hospice care nurse or physician to help get medication to alleviate those symptoms.

## 2019-01-07 NOTE — PROGRESS NOTE ADULT - ASSESSMENT
79y/o woman w met endometrial cancer(abdomen/pelvic involvement, under care Dr Darrin Dumont [facility], last chemo >6months ago. First dx 2000 post TAHBSO/RT, recur near stomach 2013 post debulking surgery, started various chemo regimen since 2016, first w Dr Michell Kruse then Dr Dumont >1yr ago) radiation colitis, HTN, R hydronephrosis (s/p ureteral stent 6/2018, due for stent change)), thyroid nodules, HTN. 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  Adm w syncope after large BM(loose but formed, not watery)  Heme asked to evaluate for leukocytosis and anemia    -leukocytosis-suspect reactive, due to inflammation, now downtrending with resuming po Vanco/IV flagyl as treatment for c-diff  -anemia-likely due to chronic disease, malignancy, acute illness; follow up  iron studies, B12/folate/thyroid)  -continue present acute medical care, follow serial CBC  - discussed w pt, son, aide and Dr. Haines (hospitalist)

## 2019-01-07 NOTE — PHYSICAL THERAPY INITIAL EVALUATION ADULT - GAIT DEVIATIONS NOTED, PT EVAL
c/o boy, pt taking hands off RW/decreased van/decreased velocity of limb motion/decreased step length/decreased stride length/decreased weight-shifting ability

## 2019-01-07 NOTE — DISCHARGE NOTE ADULT - CARE PROVIDER_API CALL
Vishal Hill (DO), Gastroenterology; Internal Medicine  [address] [place] [address]  Phone: [phone]  Fax: [phone]

## 2019-01-07 NOTE — PROGRESS NOTE ADULT - PROBLEM SELECTOR PLAN 8
Pharmacologic VTE ppx with lovenox 40mg    Started discussion re: [facility] -- pt/family at bedside decided full code for now, but will discuss with other family as well    PT eval Pharmacologic VTE ppx with lovenox 40mg    Started discussion re: [facility] -- pt/family at bedside decided full code for now, but will discuss with other family as well    f/u PT eval

## 2019-01-07 NOTE — DISCHARGE NOTE ADULT - PLAN OF CARE
stable Syncope may have been caused by dehydration (in the setting of diarrhea) or cardiac arrhythmia caused by low potassium in the blood. You had a head CT which did not show any acute bleed or acute infarct. You were monitored on the telemetry floor for arrhythmias. Your potassium was found to be very low which could be secondary to the diarrhea. Your potasium levels were replaced and checked daily. You should continue to replace potassium via supplement. You were initially placed on a clear liquid diet that was later advanced to regular diet. You were treated with antibiotics. You were seen by a Gastroenterologist and a General Surgeon. Chronic You were seen by an Oncologist and approved for Home Hospice. prevent recurrence Syncope may have been caused by dehydration (in the setting of diarrhea) or cardiac arrhythmia caused by low potassium in the blood. You had a head CT which did not show any acute bleed or acute infarct or other acute changes. You were monitored on the telemetry floor for arrhythmias. Your potassium was found to be very low which could be secondary to the diarrhea. Your potasium levels were replaced and checked daily. You should continue to replace potassium via supplement you had been using at home.  Stay well hydrated. Your colitis was likely related to c difficile bacteria. Please take the prescribed vancomycin every 6 hours for 14 more days and follow up with your gastroenterologist, or with Dr. Hill whom you saw in the hospital (number below) within 1 week. Speak to the gastroenterologist if you need to continue any further vancomycin after the two weeks given your recurrent c difficile infections. Also discuss if any plan for another stool transplant. Keep leg elevated when not walking. Follow up with PMD. Your white blood cell count was high likely due to infection and possible in part due to your malignancy. It has responded well to antibiotics so please complete course as prescribed and directed by your gastroenterologist. Your potassium was found to be very low which could be secondary to the diarrhea. Your potasium levels were replaced and checked daily. You should continue to replace potassium via supplement you had been using at home. You have decided to pursue home hospice at this time. If you have any symptoms associated with your malignancy, please call your hospice care nurse or physician to help get medication to alleviate those symptoms.

## 2019-01-07 NOTE — PROGRESS NOTE ADULT - SUBJECTIVE AND OBJECTIVE BOX
Patient seen and examined;  Chart reviewed and events noted;   feeling better; denies any abdominal pain or cramping    MEDICATIONS  (STANDING):  amLODIPine   Tablet 5 milliGRAM(s) Oral daily  atorvastatin 10 milliGRAM(s) Oral at bedtime  cholecalciferol 5000 Unit(s) Oral daily  cholestyramine Powder (Sugar-Free) 4 Gram(s) Oral daily  enoxaparin Injectable 30 milliGRAM(s) SubCutaneous every 24 hours  ferrous    sulfate 325 milliGRAM(s) Oral daily  folic acid 1 milliGRAM(s) Oral daily  lactobacillus acidophilus 1 Tablet(s) Oral three times a day with meals  mesalamine DR Capsule 800 milliGRAM(s) Oral three times a day  metroNIDAZOLE  IVPB 500 milliGRAM(s) IV Intermittent every 8 hours  oxyCODONE    IR 10 milliGRAM(s) Oral every 6 hours  pantoprazole    Tablet 40 milliGRAM(s) Oral before breakfast  potassium chloride   Powder 40 milliEquivalent(s) Oral once  potassium phosphate / sodium phosphate powder 1 Packet(s) Oral two times a day  sertraline 25 milliGRAM(s) Oral two times a day  vancomycin    Solution 125 milliGRAM(s) Oral every 6 hours    Vital Signs Last 24 Hrs  T(C): 36.4 (07 Jan 2019 09:00), Max: 36.5 (06 Jan 2019 16:37)  T(F): 97.6 (07 Jan 2019 09:00), Max: 97.7 (06 Jan 2019 16:37)  HR: 68 (07 Jan 2019 09:00) (62 - 74)  BP: 109/68 (07 Jan 2019 09:00) (101/60 - 132/83)  BP(mean): --  RR: 16 (07 Jan 2019 09:00) (16 - 16)  SpO2: 99% (07 Jan 2019 09:00) (97% - 100%)    PHYSICAL EXAM  General: adult elderly thin woman in NAD  HEENT: clear oropharynx, anicteric sclera, pink conjunctivae  Neck: supple  CV: normal S1S2 with no murmur rubs or gallops  Lungs: clear to auscultation, no wheezes, no rhales  Abdomen: soft non-tender non-distended, no hepato/splenomegaly  Ext: no clubbing cyanosis or edema  Skin: no rashes and no petichiae  Neuro: alert and oriented X3 no focal deficits      LABS:                        8.6    27.26 )-----------( 460      ( 07 Jan 2019 07:05 )             28.1     Hemoglobin: 8.6 g/dL (01-07 @ 07:05)  Hemoglobin: 9.0 g/dL (01-06 @ 09:17)  Hemoglobin: 9.4 g/dL (01-05 @ 06:43)    WBC Count: 27.26 K/uL (01-07 @ 07:05)  WBC Count: 46.53 K/uL (01-06 @ 09:17)  WBC Count: 53.17 K/uL (01-05 @ 06:43)    Platelet Count - Automated: 460 K/uL (01-07 @ 07:05)  Platelet Count - Automated: 506 K/uL (01-06 @ 09:17)  Platelet Count - Automated: 563 K/uL (01-05 @ 06:43)    01-07    140  |  102  |  23  ----------------------------<  90  3.4<L>   |  30  |  0.93    Ca    7.8<L>      07 Jan 2019 07:56  Phos  1.6     01-07  Mg     2.0     01-07    TPro  4.9<L>  /  Alb  1.6<L>  /  TBili  0.3  /  DBili  x   /  AST  16  /  ALT  10<L>  /  AlkPhos  141<H>  01-06    Iron studies - pending  B12/folate - pending

## 2019-01-07 NOTE — PROGRESS NOTE ADULT - PROBLEM SELECTOR PLAN 1
Patient with witnessed syncopal episodes early on the morning of admission, no memory of event, no head injury.   Syncope of unclear etiology - most likely vasovagal with dehydration, but had severe hypokalemia so may have had an arrhythmia.   -monitor on tele  CT head- no ICH but presence of atrophy  Will check echo   carotid dopplers - moderate atheromas, without stenosis  Check orthostatic vital signs  significantly hypokalemic/hypomagnesemic -- repleted electrolytes Patient with witnessed syncopal episodes early on the morning of admission, no memory of event, no head injury.   Syncope of unclear etiology - most likely vasovagal with dehydration, but had severe hypokalemia so may have had an arrhythmia.   -monitor on tele  CT head- no ICH but presence of atrophy  echo ordered  carotid dopplers - moderate atheromas, without stenosis  Check orthostatic vital signs  significantly hypokalemic/hypomagnesemic -- repleted electrolytes

## 2019-01-08 DIAGNOSIS — D64.9 ANEMIA, UNSPECIFIED: ICD-10-CM

## 2019-01-08 DIAGNOSIS — F41.9 ANXIETY DISORDER, UNSPECIFIED: ICD-10-CM

## 2019-01-08 DIAGNOSIS — R41.82 ALTERED MENTAL STATUS, UNSPECIFIED: ICD-10-CM

## 2019-01-08 LAB
ANION GAP SERPL CALC-SCNC: 5 MMOL/L — SIGNIFICANT CHANGE UP (ref 5–17)
BASOPHILS # BLD AUTO: 0.05 K/UL — SIGNIFICANT CHANGE UP (ref 0–0.2)
BASOPHILS NFR BLD AUTO: 0.2 % — SIGNIFICANT CHANGE UP (ref 0–2)
BUN SERPL-MCNC: 19 MG/DL — SIGNIFICANT CHANGE UP (ref 7–23)
CALCIUM SERPL-MCNC: 8.2 MG/DL — LOW (ref 8.5–10.1)
CHLORIDE SERPL-SCNC: 105 MMOL/L — SIGNIFICANT CHANGE UP (ref 96–108)
CO2 SERPL-SCNC: 31 MMOL/L — SIGNIFICANT CHANGE UP (ref 22–31)
CREAT SERPL-MCNC: 0.84 MG/DL — SIGNIFICANT CHANGE UP (ref 0.5–1.3)
EOSINOPHIL # BLD AUTO: 0.27 K/UL — SIGNIFICANT CHANGE UP (ref 0–0.5)
EOSINOPHIL NFR BLD AUTO: 1.2 % — SIGNIFICANT CHANGE UP (ref 0–6)
GLUCOSE SERPL-MCNC: 93 MG/DL — SIGNIFICANT CHANGE UP (ref 70–99)
HCT VFR BLD CALC: 29.6 % — LOW (ref 34.5–45)
HGB BLD-MCNC: 8.8 G/DL — LOW (ref 11.5–15.5)
IMM GRANULOCYTES NFR BLD AUTO: 2.2 % — HIGH (ref 0–1.5)
LYMPHOCYTES # BLD AUTO: 1.66 K/UL — SIGNIFICANT CHANGE UP (ref 1–3.3)
LYMPHOCYTES # BLD AUTO: 7.5 % — LOW (ref 13–44)
MAGNESIUM SERPL-MCNC: 1.8 MG/DL — SIGNIFICANT CHANGE UP (ref 1.6–2.6)
MCHC RBC-ENTMCNC: 23.1 PG — LOW (ref 27–34)
MCHC RBC-ENTMCNC: 29.7 GM/DL — LOW (ref 32–36)
MCV RBC AUTO: 77.7 FL — LOW (ref 80–100)
MONOCYTES # BLD AUTO: 0.81 K/UL — SIGNIFICANT CHANGE UP (ref 0–0.9)
MONOCYTES NFR BLD AUTO: 3.6 % — SIGNIFICANT CHANGE UP (ref 2–14)
NEUTROPHILS # BLD AUTO: 18.97 K/UL — HIGH (ref 1.8–7.4)
NEUTROPHILS NFR BLD AUTO: 85.3 % — HIGH (ref 43–77)
PHOSPHATE SERPL-MCNC: 3.8 MG/DL — SIGNIFICANT CHANGE UP (ref 2.5–4.5)
PLATELET # BLD AUTO: 477 K/UL — HIGH (ref 150–400)
POTASSIUM SERPL-MCNC: 4.8 MMOL/L — SIGNIFICANT CHANGE UP (ref 3.5–5.3)
POTASSIUM SERPL-SCNC: 4.8 MMOL/L — SIGNIFICANT CHANGE UP (ref 3.5–5.3)
RBC # BLD: 3.81 M/UL — SIGNIFICANT CHANGE UP (ref 3.8–5.2)
RBC # FLD: 18.4 % — HIGH (ref 10.3–14.5)
SODIUM SERPL-SCNC: 141 MMOL/L — SIGNIFICANT CHANGE UP (ref 135–145)
WBC # BLD: 22.24 K/UL — HIGH (ref 3.8–10.5)
WBC # FLD AUTO: 22.24 K/UL — HIGH (ref 3.8–10.5)

## 2019-01-08 PROCEDURE — 99233 SBSQ HOSP IP/OBS HIGH 50: CPT | Mod: GC

## 2019-01-08 PROCEDURE — 70450 CT HEAD/BRAIN W/O DYE: CPT | Mod: 26

## 2019-01-08 PROCEDURE — 99497 ADVNCD CARE PLAN 30 MIN: CPT | Mod: 25

## 2019-01-08 RX ORDER — ALPRAZOLAM 0.25 MG
0.25 TABLET ORAL EVERY 6 HOURS
Qty: 0 | Refills: 0 | Status: DISCONTINUED | OUTPATIENT
Start: 2019-01-08 | End: 2019-01-09

## 2019-01-08 RX ADMIN — Medication 1 TABLET(S): at 17:31

## 2019-01-08 RX ADMIN — SERTRALINE 25 MILLIGRAM(S): 25 TABLET, FILM COATED ORAL at 17:31

## 2019-01-08 RX ADMIN — Medication 125 MILLIGRAM(S): at 23:22

## 2019-01-08 RX ADMIN — AMLODIPINE BESYLATE 5 MILLIGRAM(S): 2.5 TABLET ORAL at 05:33

## 2019-01-08 RX ADMIN — Medication 0.25 MILLIGRAM(S): at 23:20

## 2019-01-08 RX ADMIN — Medication 125 MILLIGRAM(S): at 01:15

## 2019-01-08 RX ADMIN — Medication 800 MILLIGRAM(S): at 23:20

## 2019-01-08 RX ADMIN — Medication 125 MILLIGRAM(S): at 05:34

## 2019-01-08 RX ADMIN — OXYCODONE HYDROCHLORIDE 10 MILLIGRAM(S): 5 TABLET ORAL at 14:02

## 2019-01-08 RX ADMIN — OXYCODONE HYDROCHLORIDE 10 MILLIGRAM(S): 5 TABLET ORAL at 12:00

## 2019-01-08 RX ADMIN — Medication 325 MILLIGRAM(S): at 12:00

## 2019-01-08 RX ADMIN — OXYCODONE HYDROCHLORIDE 10 MILLIGRAM(S): 5 TABLET ORAL at 18:13

## 2019-01-08 RX ADMIN — Medication 0.25 MILLIGRAM(S): at 14:43

## 2019-01-08 RX ADMIN — Medication 1 MILLIGRAM(S): at 12:00

## 2019-01-08 RX ADMIN — OXYCODONE HYDROCHLORIDE 10 MILLIGRAM(S): 5 TABLET ORAL at 06:41

## 2019-01-08 RX ADMIN — Medication 800 MILLIGRAM(S): at 05:33

## 2019-01-08 RX ADMIN — Medication 125 MILLIGRAM(S): at 17:32

## 2019-01-08 RX ADMIN — OXYCODONE HYDROCHLORIDE 10 MILLIGRAM(S): 5 TABLET ORAL at 05:34

## 2019-01-08 RX ADMIN — OXYCODONE HYDROCHLORIDE 10 MILLIGRAM(S): 5 TABLET ORAL at 18:33

## 2019-01-08 RX ADMIN — SERTRALINE 25 MILLIGRAM(S): 25 TABLET, FILM COATED ORAL at 05:34

## 2019-01-08 RX ADMIN — PANTOPRAZOLE SODIUM 40 MILLIGRAM(S): 20 TABLET, DELAYED RELEASE ORAL at 05:34

## 2019-01-08 RX ADMIN — Medication 800 MILLIGRAM(S): at 14:16

## 2019-01-08 RX ADMIN — ATORVASTATIN CALCIUM 10 MILLIGRAM(S): 80 TABLET, FILM COATED ORAL at 23:20

## 2019-01-08 RX ADMIN — Medication 1 TABLET(S): at 12:00

## 2019-01-08 RX ADMIN — OXYCODONE HYDROCHLORIDE 10 MILLIGRAM(S): 5 TABLET ORAL at 01:14

## 2019-01-08 RX ADMIN — ENOXAPARIN SODIUM 30 MILLIGRAM(S): 100 INJECTION SUBCUTANEOUS at 05:34

## 2019-01-08 RX ADMIN — Medication 125 MILLIGRAM(S): at 12:01

## 2019-01-08 RX ADMIN — Medication 100 MILLIGRAM(S): at 05:33

## 2019-01-08 RX ADMIN — OXYCODONE HYDROCHLORIDE 10 MILLIGRAM(S): 5 TABLET ORAL at 23:21

## 2019-01-08 NOTE — PROGRESS NOTE ADULT - PROBLEM SELECTOR PLAN 5
PMHx of metastatic endometrial ca, s/p KATH, radiation, and chemotherapy, last chemo 9/2018  -Recent PET scan (12/2018) showing new L supraclavicular node involvement  -Pain control with Oxycodone, will continue  - Patient uses medical marijuana at home  - Pt with AMS this morning and "not herself" as per son and daughter, Pt's speech not making sense, will obtain Head CT to r/o brain mets PMHx of metastatic endometrial ca, s/p KATH, radiation, and chemotherapy, last chemo 9/2018  -Recent PET scan (12/2018) showing new L supraclavicular node involvement  -Pain control with Oxycodone, will continue  - Patient uses medical marijuana at home  - Pt more confused this morning and "not herself" as per son and daughter, Pt's speech not making sense, will obtain Head CT to eval for acute process.

## 2019-01-08 NOTE — PROGRESS NOTE ADULT - SUBJECTIVE AND OBJECTIVE BOX
pt seen  no complaints  ICU Vital Signs Last 24 Hrs  T(C): 36.8 (08 Jan 2019 08:42), Max: 36.8 (08 Jan 2019 08:42)  T(F): 98.2 (08 Jan 2019 08:42), Max: 98.2 (08 Jan 2019 08:42)  HR: 81 (08 Jan 2019 08:42) (60 - 172)  BP: 121/78 (08 Jan 2019 08:42) (102/52 - 123/77)  BP(mean): --  ABP: --  ABP(mean): --  RR: 16 (08 Jan 2019 08:42) (16 - 16)  SpO2: 97% (08 Jan 2019 08:42) (97% - 100%)  gen-NAD  resp-clear  abd-soft Nt/ND                        8.8    22.24 )-----------( 477      ( 08 Jan 2019 07:22 )             29.6

## 2019-01-08 NOTE — PROGRESS NOTE ADULT - ASSESSMENT
79 yo with pancolitis with hx of c. diff in past. No abd pain, No BM 3 days     cont reg diet     cont abx     cont vasovagal workup

## 2019-01-08 NOTE — PROGRESS NOTE ADULT - ATTENDING COMMENTS
Pt seen + examined. Case discussed with intern/resident. Agree with assessment and plan above (edited) with following addendum:  Time spent: 40min. More than 50% of the visit was spent counseling the patient / pt's family on medical condition -- leukocytosis, c difficile colitis, hypokalemia.    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 Admitted for syncope workup and sepsis due to c difficile colitis.  -Patient with witnessed syncopal episodes early on the morning of admission -- Syncope of unclear etiology - most likely vasovagal with dehydration, but had severe hypokalemia so may have had an arrhythmia.   -significantly hypokalemic/hypomagnesemic on admission -- repleted electrolytes  -monitor on tele  -diet advanced to regular with lactose restrictions  -Patient with significant hx of radiation colitis, ulcerative colitis, and C diff colitis x 3 in the past year, currently 2 weeks s/p fecal transplant for C diff  and CT on admission showing pancolitis; C Diff PCR positive  -Continue with Vancomycin PO -- pt's leukocytosis had rapid improvement now down to 22 from 53. This suggests there was a reactive component that is improving, likely infection. complete vanco po course; f/up ID recs  -diarrhea has resolved -- now constipated -- no nausea/vomiting. passing gas. consider abd xray if remains constipated to eval bowel gas pattern and colonic diameter; abd non-tender  -pt/family interested in home hospice -- consulted hospice who now accepted pt and are setting up equipment at home  -today pt had mild acute encephalopathy in the morning and had CT Brain which was unchanged. The confusion resolved spontaneously and suspect may be element of hospital delirium that improved after re-orientation with family at bedside.  -will aim to discharge home with home hospice tomorrow Pt seen + examined. Case discussed with intern/resident. Agree with assessment and plan above (edited) with following addendum:  Time spent: 68min. (36min spent on patient care and 32min spent on ACP). More than 50% of the visit was spent counseling the patient / pt's family on medical condition -- leukocytosis, c difficile colitis, hypokalemia.    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 Admitted for syncope workup and sepsis due to c difficile colitis.  -Patient with witnessed syncopal episodes early on the morning of admission -- Syncope of unclear etiology - most likely vasovagal with dehydration, but had severe hypokalemia so may have had an arrhythmia.   -significantly hypokalemic/hypomagnesemic on admission -- repleted electrolytes  -monitor on tele  -diet advanced to regular with lactose restrictions  -Patient with significant hx of radiation colitis, ulcerative colitis, and C diff colitis x 3 in the past year, currently 2 weeks s/p fecal transplant for C diff  and CT on admission showing pancolitis; C Diff PCR positive  -Continue with Vancomycin PO -- pt's leukocytosis had rapid improvement now down to 22 from 53. This suggests there was a reactive component that is improving, likely infection. complete vanco po course; f/up ID recs  -diarrhea has resolved -- now constipated -- no nausea/vomiting. passing gas. consider abd xray if remains constipated to eval bowel gas pattern and colonic diameter; abd non-tender  -pt/family interested in home hospice -- consulted hospice who now accepted pt and are setting up equipment at home  -today pt had mild acute encephalopathy in the morning and had CT Brain which was unchanged. The confusion resolved spontaneously and suspect may be element of hospital delirium that improved after re-orientation with family at bedside.  -will aim to discharge home with home hospice tomorrow    Advanced Care Planning: spoke with pt, pt's children / HCP along with palliative care nurse. Completed a MOLST form. Pt is DNR/DNI, no tube feeds, wants IVF/Abx if needed. Wish to have home hospice which is being arranged. -- time spent on ACP: 32min Pt seen + examined. Case discussed with intern/resident. Agree with assessment and plan above (edited) with following addendum:  Time spent: 68min. (36min spent on patient care and 32min spent on ACP). More than 50% of the visit was spent counseling the patient / pt's family on medical condition -- leukocytosis, c difficile colitis, hypokalemia.    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 Admitted for syncope workup and sepsis due to c difficile colitis.  -Patient with witnessed syncopal episodes early on the morning of admission -- Syncope of unclear etiology - most likely vasovagal with dehydration, but had severe hypokalemia so may have had an arrhythmia.   -significantly hypokalemic/hypomagnesemic on admission -- repleted electrolytes  -monitor on tele  -diet advanced to regular with lactose restrictions  -Patient with significant hx of radiation colitis, ulcerative colitis, and C diff colitis x 3 in the past year, currently 2 weeks s/p fecal transplant for C diff  and CT on admission showing pancolitis; C Diff PCR positive  -Continue with Vancomycin PO -- pt's leukocytosis had rapid improvement now down to 22 from 53. This suggests there was a reactive component that is improving, likely infection. complete vanco po course; f/up ID recs  -diarrhea has resolved -- now constipated -- no nausea/vomiting. passing gas. consider abd xray if remains constipated to eval bowel gas pattern and colonic diameter; abd non-tender  -pt/family interested in home hospice -- consulted hospice who now accepted pt and are setting up equipment at home  -today pt had mild acute encephalopathy in the morning and had CT Brain which was unchanged. The confusion resolved spontaneously and suspect element of hospital delirium that improved after re-orientation with family at bedside.  -will aim to discharge home with home hospice tomorrow    Advanced Care Planning: spoke with pt, pt's children / HCP along with palliative care nurse. Completed a MOLST form. Pt is DNR/DNI, no tube feeds, wants IVF/Abx if needed. Wish to have home hospice which is being arranged. -- time spent on ACP: 32min

## 2019-01-08 NOTE — PROGRESS NOTE ADULT - PROBLEM SELECTOR PLAN 1
Patient with witnessed syncopal episodes early on the morning of admission, no memory of event, no head injury.   Syncope of unclear etiology - most likely vasovagal with dehydration, but had severe hypokalemia so may have had an arrhythmia.   -monitor on tele  CT head- no ICH but presence of atrophy  echo results pending  carotid dopplers - moderate atheromas, without stenosis  Check orthostatic vital signs  significantly hypokalemic/hypomagnesemic -- repleted electrolytes Patient with witnessed syncopal episodes early on the morning of admission, no memory of event, no head injury.   Syncope of unclear etiology - most likely vasovagal with dehydration, but had severe hypokalemia so may have had an arrhythmia.   -monitor on tele  CT head- no ICH but presence of atrophy  echo results pending  carotid dopplers - moderate atheromas, without stenosis  Check orthostatic vital signs  was significantly hypokalemic/hypomagnesemic -- repleted electrolytes

## 2019-01-08 NOTE — PROGRESS NOTE ADULT - PROBLEM SELECTOR PLAN 3
multifactorial, colitis, malignancy, acute illness  iron studies noted  no overt s/s gib  trend h/h, transfuse prn  cont ppi  heme/onc following  further recs pending clinical course

## 2019-01-08 NOTE — PROGRESS NOTE ADULT - PROBLEM SELECTOR PLAN 10
Pharmacologic VTE ppx with lovenox 40mg  Pt is DNR/DNI  MOLST form signed   Home Hospice approved   PT eval - wheelchair recommended; unable to do PT today due to weakness Pharmacologic VTE ppx with lovenox 40mg  Pt is DNR/DNI  MOLST completed  Home Hospice approved   PT eval - wheelchair recommended; unable to do PT today due to weakness

## 2019-01-08 NOTE — PROGRESS NOTE ADULT - SUBJECTIVE AND OBJECTIVE BOX
Patient is a 78y old  Female who presents with a chief complaint of Syncope (2019 13:34)    Asked to see patient for ID Consult  Interval History:recurrent C diff colitis    CENTRAL LINE:   [  ] YES       [ x] NO  LOWERY:                 [  ] YES       [ x ] NO     PAST MEDICAL & SURGICAL HISTORY:  Malignant neoplasm of ovary, unspecified laterality  GERD (gastroesophageal reflux disease)  Fatty liver  Multiple thyroid nodules  Anemia  Cataracts, bilateral: monitored  History of chemotherapy  Lymphedema of leg: right  Hydronephrosis determined by ultrasound: right kidney  Abdominal mass:  metastatic endometrial cancer, s/p surgery, on chemotherapy at present-infusa port right chest  Atrial fibrillation: one episode &gt;30 years ago  Mitral valve prolapse  Endometrial carcinoma:  - s/p KATH - tx with radiation, and currently receiving chemotherapy  Hyperlipidemia  Ulcerative colitis  Anxiety  Post-Herpetic Trigeminal Neuralgia: left  Benign Hypertension  History of cataract extraction, left  H/O hydronephrosis: ureteral stent, multiple stent exchanges, last in 2018  History of chemotherapy: Infusaport insertion ;   Abdominal mass: s/p mass removed ,on chemo-right chest infusa port  Encounter for biopsy: 13 - right iliac lymph node biopsy  History of tonsillectomy  H/O total hysterectomy:   History of D&C: age 22      REVIEW OF SYSTEMS:  All systems below were reviewed and are normal [  ]  Constitutional:  HEENT:  Lymph nodes:  ID:  Pulmonary:no cough sob  Cardiac:no CP  GI:no NVD abd pain-no BM in > 72 hours.  Renal:  Musculoskeletal:  Neuro:  Endocrine:  Skin:  All other systems above were reviewed and are normal   [x  ]    Allergies  Allergies    erythromycin (Unknown)  macrolide antibiotics (Unknown)  morphine (Diarrhea)  sulfa drugs (Unknown)    Intolerances        MEDICATIONS  (STANDING):  amLODIPine   Tablet 5 milliGRAM(s) Oral daily  atorvastatin 10 milliGRAM(s) Oral at bedtime  cholecalciferol (vit D3) 5000units 1 Capsule(s) 1 Capsule(s) Oral daily  cholestyramine Powder (Sugar-Free) 4 Gram(s) Oral daily  enoxaparin Injectable 30 milliGRAM(s) SubCutaneous every 24 hours  ferrous    sulfate 325 milliGRAM(s) Oral daily  folic acid 1 milliGRAM(s) Oral daily  lactobacillus acidophilus 1 Tablet(s) Oral three times a day with meals  mesalamine DR Capsule 800 milliGRAM(s) Oral three times a day  metroNIDAZOLE  IVPB 500 milliGRAM(s) IV Intermittent every 8 hours  oxyCODONE    IR 10 milliGRAM(s) Oral every 6 hours  pantoprazole    Tablet 40 milliGRAM(s) Oral before breakfast  sertraline 25 milliGRAM(s) Oral two times a day  vancomycin    Solution 125 milliGRAM(s) Oral every 6 hours    MEDICATIONS  (PRN):      Vital Signs Last 24 Hrs  T(C): 36.8 (2019 08:42), Max: 36.8 (2019 08:42)  T(F): 98.2 (2019 08:42), Max: 98.2 (2019 08:42)  HR: 81 (2019 08:42) (60 - 172)  BP: 121/78 (2019 08:42) (102/52 - 123/77)  BP(mean): --  RR: 16 (2019 08:42) (16 - 16)  SpO2: 97% (2019 08:42) (97% - 100%)    I&O's Summary      PHYSICAL EXAM:  Constitutional:  HEENT:  Lymph nodes:  Neck:  Lungs:clear  Heart:rr  Abdomen:soft nontender  Renal:  Extremities:lymphedema R thigh-chronic  Musculoskeletal:  Neurologic:  Vascular:  Endocrine:  Skin:  All of the above normal except for written comments [x ]    LABORATORY:    CBC Full  -  ( 2019 07:22 )  WBC Count : 22.24 K/uL  Hemoglobin : 8.8 g/dL  Hematocrit : 29.6 %  Platelet Count - Automated : 477 K/uL  Mean Cell Volume : 77.7 fl  Mean Cell Hemoglobin : 23.1 pg  Mean Cell Hemoglobin Concentration : 29.7 gm/dL  Auto Neutrophil # : 18.97 K/uL  Auto Lymphocyte # : 1.66 K/uL  Auto Monocyte # : 0.81 K/uL  Auto Eosinophil # : 0.27 K/uL  Auto Basophil # : 0.05 K/uL  Auto Neutrophil % : 85.3 %  Auto Lymphocyte % : 7.5 %  Auto Monocyte % : 3.6 %  Auto Eosinophil % : 1.2 %  Auto Basophil % : 0.2 %        141  |  105  |  19  ----------------------------<  93  4.8   |  31  |  0.84    Ca    8.2<L>      2019 07:22  Phos  3.8       Mg     1.8         TPro  4.9<L>  /  Alb  1.6<L>  /  TBili  0.3  /  DBili  x   /  AST  16  /  ALT  10<L>  /  AlkPhos  141<H>          Urinalysis Basic - ( 2019 11:41 )    Color: Yellow / Appearance: Clear / S.020 / pH: x  Gluc: x / Ketone: Trace  / Bili: Small / Urobili: Negative   Blood: x / Protein: 25 mg/dL / Nitrite: Negative   Leuk Esterase: Trace / RBC: 3-5 /HPF / WBC 3-5   Sq Epi: x / Non Sq Epi: Few / Bacteria: Few        Vanco. trough:  Genta. trough:      Radiology:    Microbiology:bl and ur cx's NG

## 2019-01-08 NOTE — PROGRESS NOTE ADULT - PROBLEM SELECTOR PLAN 1
CT showed pancolitis  no further diarrhea per pt/family  cdiff +  bld cxs ngtd  cont vanco per id  cont bacid, delzicol  seen by surgery  LFLR diet as tolerated  monitor leukocytosis/abd exam/stool output  will follow

## 2019-01-08 NOTE — PROGRESS NOTE ADULT - PROBLEM SELECTOR PLAN 4
improving rapidly today so suspect there may have been a reactive component, such as infection, that is improving  Patient found to have significant leukocytosis on admission labs WBC 53, likely multifactorial - pancolitis with possible recurrence of c diff and possibly in part due to worsening metastatic disease  - Continue to trend while on antibiotics  - Consult Heme/Onc (Andree) - f/u recs  - F/u BCx, UCx improving rapidly today so suspect there may have been a reactive component, such as infection, that is improving  Patient found to have significant leukocytosis on admission labs WBC 53, likely multifactorial - pancolitis with possible recurrence of c diff and possibly in part due to worsening metastatic disease  - Continue to trend while on antibiotics -- responding well  - Consult Heme/Onc (Andree) - f/u recs  - F/u BCx, UCx

## 2019-01-08 NOTE — PROGRESS NOTE ADULT - ASSESSMENT
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 Admitted for syncope workup and sepsis due to c difficile colitis.

## 2019-01-08 NOTE — PROGRESS NOTE ADULT - SUBJECTIVE AND OBJECTIVE BOX
INTERVAL HPI/OVERNIGHT EVENTS:  pt seen and examined son present  pt denies n/v/d/abd pain  per son no bm in 2 days, taking in some po at meals  per overnight rn no vomiting no bm no c/o abd pain no s/s active gib  afebrile overnight labs noted    MEDICATIONS  (STANDING):  amLODIPine   Tablet 5 milliGRAM(s) Oral daily  atorvastatin 10 milliGRAM(s) Oral at bedtime  cholecalciferol (vit D3) 5000units 1 Capsule(s) 1 Capsule(s) Oral daily  cholestyramine Powder (Sugar-Free) 4 Gram(s) Oral daily  enoxaparin Injectable 30 milliGRAM(s) SubCutaneous every 24 hours  ferrous    sulfate 325 milliGRAM(s) Oral daily  folic acid 1 milliGRAM(s) Oral daily  lactobacillus acidophilus 1 Tablet(s) Oral three times a day with meals  mesalamine DR Capsule 800 milliGRAM(s) Oral three times a day  oxyCODONE    IR 10 milliGRAM(s) Oral every 6 hours  pantoprazole    Tablet 40 milliGRAM(s) Oral before breakfast  sertraline 25 milliGRAM(s) Oral two times a day  vancomycin    Solution 125 milliGRAM(s) Oral every 6 hours    MEDICATIONS  (PRN):      Allergies    erythromycin (Unknown)  macrolide antibiotics (Unknown)  morphine (Diarrhea)  sulfa drugs (Unknown)    Intolerances        Review of Systems:    General:  No wt loss, fevers, chills, night sweats, fatigue   Eyes:  Good vision, no reported pain  ENT:  No sore throat, pain, runny nose, dysphagia  CV:  No pain, palpitations, hypo/hypertension  Resp:  No dyspnea, cough, tachypnea, wheezing  GI:  No pain, No nausea, No vomiting, No diarrhea, No constipation, No weight loss, No fever, No pruritis, No rectal bleeding, No melena, No dysphagia  :  No pain, bleeding, incontinence, nocturia  Muscle:  No pain, weakness  Neuro:  No weakness, tingling, memory problems  Psych:  No fatigue, insomnia, mood problems, depression  Endocrine:  No polyuria, polydypsia, cold/heat intolerance  Heme:  No petechiae, ecchymosis, easy bruisability  Skin:  No rash, tattoos, scars, edema      Vital Signs Last 24 Hrs  T(C): 36.8 (08 Jan 2019 08:42), Max: 36.8 (08 Jan 2019 08:42)  T(F): 98.2 (08 Jan 2019 08:42), Max: 98.2 (08 Jan 2019 08:42)  HR: 81 (08 Jan 2019 08:42) (60 - 172)  BP: 121/78 (08 Jan 2019 08:42) (102/52 - 123/77)  BP(mean): --  RR: 16 (08 Jan 2019 08:42) (16 - 16)  SpO2: 97% (08 Jan 2019 08:42) (97% - 100%)    PHYSICAL EXAM:    Constitutional: NAD  HEENT: ncat  Neck: No LAD  Respiratory:  dec bs  Cardiovascular: S1 and S2, RRR  Gastrointestinal: soft nt mild dt  Extremities: No peripheral edema  Vascular: 2+ peripheral pulses  Neurological: Awake alert  Skin: No rashes      LABS:                        8.8    22.24 )-----------( 477      ( 08 Jan 2019 07:22 )             29.6     01-08    141  |  105  |  19  ----------------------------<  93  4.8   |  31  |  0.84    Ca    8.2<L>      08 Jan 2019 07:22  Phos  3.8     01-08  Mg     1.8     01-08            RADIOLOGY & ADDITIONAL TESTS:

## 2019-01-08 NOTE — PROGRESS NOTE ADULT - PROBLEM SELECTOR PLAN 8
- Pt with AMS this morning and "not herself" as per son and daughter, Pt's speech not making sense  - on exam Pt's responses to open-ended questions were nonsensical   - will obtain Head CT to r/o brain mets - Pt with AMS this morning and "not herself" as per son and daughter, pt's speech not making sense  - on exam Pt's responses to open-ended questions were not consistently appropriate  - will obtain Head CT to r/o brain mets

## 2019-01-08 NOTE — GOALS OF CARE CONVERSATION - PERSONAL ADVANCE DIRECTIVE - CONVERSATION DETAILS
met pt daughter, Vidya, hcp, will attempt to provide copy before D/C, & son, Garret. discusses pt for home hospice: pt with some confusion, resuscitation directives: daenna reviewed: daughter agrees to dnr dni comfort care no TF wants IVF & antibiotics if needed. Dr Trevino to complete molst form. Contact # given.

## 2019-01-08 NOTE — PROGRESS NOTE ADULT - SUBJECTIVE AND OBJECTIVE BOX
Patient is a 78y old  Female who presents with a chief complaint of Syncope (08 Jan 2019 10:53)      INTERVAL HPI/OVERNIGHT EVENTS: Pt seen and examined at bedside. Son and daughter present. Per son and dtr mother is worse today, much weaker, not making sense when speaking. Pt answering yes and no questions, but when asked open-ended questions answers are fluent but nonsensical. ?Pt is hallucinating vs metabolic encephalopathy vs brain mets will obtain Head CT. Pt family asking about anxiety and state that she takes 0.25mg xanax occasionally will resume here for anxiety.     MEDICATIONS  (STANDING):  amLODIPine   Tablet 5 milliGRAM(s) Oral daily  atorvastatin 10 milliGRAM(s) Oral at bedtime  cholecalciferol (vit D3) 5000units 1 Capsule(s) 1 Capsule(s) Oral daily  enoxaparin Injectable 30 milliGRAM(s) SubCutaneous every 24 hours  ferrous    sulfate 325 milliGRAM(s) Oral daily  folic acid 1 milliGRAM(s) Oral daily  lactobacillus acidophilus 1 Tablet(s) Oral three times a day with meals  mesalamine DR Capsule 800 milliGRAM(s) Oral three times a day  oxyCODONE    IR 10 milliGRAM(s) Oral every 6 hours  pantoprazole    Tablet 40 milliGRAM(s) Oral before breakfast  sertraline 25 milliGRAM(s) Oral two times a day  vancomycin    Solution 125 milliGRAM(s) Oral every 6 hours    MEDICATIONS  (PRN):  ALPRAZolam 0.25 milliGRAM(s) Oral every 6 hours PRN anxiety      Allergies    erythromycin (Unknown)  macrolide antibiotics (Unknown)  morphine (Diarrhea)  sulfa drugs (Unknown)    Intolerances        REVIEW OF SYSTEMS:   CONSTITUTIONAL: No fever or chills, + generalized weakness  HEENT:  No headache, no sore throat  RESPIRATORY: No cough, wheezing, or shortness of breath  CARDIOVASCULAR: No chest pain, palpitations, + R leg swelling (lymphedema)  GASTROINTESTINAL: No abd pain, nausea, vomiting, no BM in 2 days  GENITOURINARY: No dysuria, frequency, or hematuria  NEUROLOGICAL: speech nonsensical  MUSCULOSKELETAL: + right thigh pain     Vital Signs Last 24 Hrs  T(C): 36.8 (08 Jan 2019 08:42), Max: 36.8 (08 Jan 2019 08:42)  T(F): 98.2 (08 Jan 2019 08:42), Max: 98.2 (08 Jan 2019 08:42)  HR: 81 (08 Jan 2019 08:42) (60 - 172)  BP: 121/78 (08 Jan 2019 08:42) (102/52 - 123/77)  BP(mean): --  RR: 16 (08 Jan 2019 08:42) (16 - 16)  SpO2: 97% (08 Jan 2019 08:42) (97% - 100%)    PHYSICAL EXAM:  GENERAL: NAD  HEENT:  EOMI, moist mucous membranes  CHEST/LUNG:  decreased breath sounds  HEART:  RRR, S1, S2  ABDOMEN:  BS+, soft, nontender, nondistended  EXTREMITIES: no edema, cyanosis, or calf tenderness, + right thigh ttp  NERVOUS SYSTEM: alert, not answering questions appropriately see HPI    LABS:                        8.8    22.24 )-----------( 477      ( 08 Jan 2019 07:22 )             29.6     CBC Full  -  ( 08 Jan 2019 07:22 )  WBC Count : 22.24 K/uL  Hemoglobin : 8.8 g/dL  Hematocrit : 29.6 %  Platelet Count - Automated : 477 K/uL  Mean Cell Volume : 77.7 fl  Mean Cell Hemoglobin : 23.1 pg  Mean Cell Hemoglobin Concentration : 29.7 gm/dL  Auto Neutrophil # : 18.97 K/uL  Auto Lymphocyte # : 1.66 K/uL  Auto Monocyte # : 0.81 K/uL  Auto Eosinophil # : 0.27 K/uL  Auto Basophil # : 0.05 K/uL  Auto Neutrophil % : 85.3 %  Auto Lymphocyte % : 7.5 %  Auto Monocyte % : 3.6 %  Auto Eosinophil % : 1.2 %  Auto Basophil % : 0.2 %    08 Jan 2019 07:22    141    |  105    |  19     ----------------------------<  93     4.8     |  31     |  0.84     Ca    8.2        08 Jan 2019 07:22  Phos  3.8       08 Jan 2019 07:22  Mg     1.8       08 Jan 2019 07:22          CAPILLARY BLOOD GLUCOSE            Culture - Blood (collected 01-05-19 @ 16:48)  Source: .Blood Blood-Peripheral  Preliminary Report (01-06-19 @ 17:01):    No growth to date.    Culture - Blood (collected 01-05-19 @ 16:48)  Source: .Blood Blood-Peripheral  Preliminary Report (01-06-19 @ 17:01):    No growth to date.    Culture - Urine (collected 01-05-19 @ 16:44)  Source: .Urine Catheterized  Final Report (01-06-19 @ 22:44):    No growth        RADIOLOGY & ADDITIONAL TESTS:    Personally reviewed.     Consultant(s) Notes Reviewed:  [x] YES  [ ] NO    Care Discussed with [x] Consultants  [x] Patient  [ ] Family  [ ]      [ ] Other; RN  DVT ppx Patient is a 78y old  Female who presents with a chief complaint of Syncope (08 Jan 2019 10:53)      INTERVAL HPI/OVERNIGHT EVENTS: Pt seen and examined at bedside. Son and daughter present. Per son and dtr mother is worse today, much weaker, making less sense when speaking after she woke up. Pt answering yes and no questions, but when asked open-ended questions answers are fluent but not always appropriate. Head CT ordered. Pt family asking about anxiety and state that she takes 0.25mg xanax occasionally will resume here for anxiety.     MEDICATIONS  (STANDING):  amLODIPine   Tablet 5 milliGRAM(s) Oral daily  atorvastatin 10 milliGRAM(s) Oral at bedtime  cholecalciferol (vit D3) 5000units 1 Capsule(s) 1 Capsule(s) Oral daily  enoxaparin Injectable 30 milliGRAM(s) SubCutaneous every 24 hours  ferrous    sulfate 325 milliGRAM(s) Oral daily  folic acid 1 milliGRAM(s) Oral daily  lactobacillus acidophilus 1 Tablet(s) Oral three times a day with meals  mesalamine DR Capsule 800 milliGRAM(s) Oral three times a day  oxyCODONE    IR 10 milliGRAM(s) Oral every 6 hours  pantoprazole    Tablet 40 milliGRAM(s) Oral before breakfast  sertraline 25 milliGRAM(s) Oral two times a day  vancomycin    Solution 125 milliGRAM(s) Oral every 6 hours    MEDICATIONS  (PRN):  ALPRAZolam 0.25 milliGRAM(s) Oral every 6 hours PRN anxiety      Allergies    erythromycin (Unknown)  macrolide antibiotics (Unknown)  morphine (Diarrhea)  sulfa drugs (Unknown)    Intolerances        REVIEW OF SYSTEMS:   CONSTITUTIONAL: No fever or chills, + generalized weakness  HEENT:  No headache, no sore throat  RESPIRATORY: No cough, wheezing, or shortness of breath  CARDIOVASCULAR: No chest pain, palpitations, + R leg swelling (lymphedema)  GASTROINTESTINAL: No abd pain, nausea, vomiting, no BM in 2 days  GENITOURINARY: No dysuria, frequency, or hematuria  NEUROLOGICAL: speech nonsensical  MUSCULOSKELETAL: + right thigh pain     Vital Signs Last 24 Hrs  T(C): 36.8 (08 Jan 2019 08:42), Max: 36.8 (08 Jan 2019 08:42)  T(F): 98.2 (08 Jan 2019 08:42), Max: 98.2 (08 Jan 2019 08:42)  HR: 81 (08 Jan 2019 08:42) (60 - 172)  BP: 121/78 (08 Jan 2019 08:42) (102/52 - 123/77)  BP(mean): --  RR: 16 (08 Jan 2019 08:42) (16 - 16)  SpO2: 97% (08 Jan 2019 08:42) (97% - 100%)    PHYSICAL EXAM:  GENERAL: NAD, cachectic  HEENT:  EOMI, moist mucous membranes  CHEST/LUNG:  decreased breath sounds  HEART:  RRR, S1, S2  ABDOMEN:  BS+, soft, nontender, nondistended  EXTREMITIES: no edema, cyanosis, or calf tenderness, + right thigh ttp  NERVOUS SYSTEM: alert, not answering questions appropriately see HPI    LABS:                        8.8    22.24 )-----------( 477      ( 08 Jan 2019 07:22 )             29.6     CBC Full  -  ( 08 Jan 2019 07:22 )  WBC Count : 22.24 K/uL  Hemoglobin : 8.8 g/dL  Hematocrit : 29.6 %  Platelet Count - Automated : 477 K/uL  Mean Cell Volume : 77.7 fl  Mean Cell Hemoglobin : 23.1 pg  Mean Cell Hemoglobin Concentration : 29.7 gm/dL  Auto Neutrophil # : 18.97 K/uL  Auto Lymphocyte # : 1.66 K/uL  Auto Monocyte # : 0.81 K/uL  Auto Eosinophil # : 0.27 K/uL  Auto Basophil # : 0.05 K/uL  Auto Neutrophil % : 85.3 %  Auto Lymphocyte % : 7.5 %  Auto Monocyte % : 3.6 %  Auto Eosinophil % : 1.2 %  Auto Basophil % : 0.2 %    08 Jan 2019 07:22    141    |  105    |  19     ----------------------------<  93     4.8     |  31     |  0.84     Ca    8.2        08 Jan 2019 07:22  Phos  3.8       08 Jan 2019 07:22  Mg     1.8       08 Jan 2019 07:22          CAPILLARY BLOOD GLUCOSE            Culture - Blood (collected 01-05-19 @ 16:48)  Source: .Blood Blood-Peripheral  Preliminary Report (01-06-19 @ 17:01):    No growth to date.    Culture - Blood (collected 01-05-19 @ 16:48)  Source: .Blood Blood-Peripheral  Preliminary Report (01-06-19 @ 17:01):    No growth to date.    Culture - Urine (collected 01-05-19 @ 16:44)  Source: .Urine Catheterized  Final Report (01-06-19 @ 22:44):    No growth        RADIOLOGY & ADDITIONAL TESTS:    Personally reviewed.     Consultant(s) Notes Reviewed:  [x] YES  [ ] NO

## 2019-01-08 NOTE — PROGRESS NOTE ADULT - PROBLEM SELECTOR PLAN 7
Continue antihypertensives with hold parameters  Routine hemodynamic monitoring

## 2019-01-08 NOTE — PROGRESS NOTE ADULT - PROBLEM SELECTOR PLAN 3
tolerating regular diet with lactose restrictions, however poor appetite today  Patient with significant hx of radiation colitis, ulcerative colitis, and C diff colitis x 3 in the past year, currently 2 weeks s/p fecal transplant for C diff  CT showing pancolitis  C Diff positive  Continue with Vancomycin PO, Flagyl IV; per ID flagyl to be d/c'd tmrw  Surgery (Ginger) consulted for possible surgical intervention, hx of previous abdominal wall resection due to tumor load -- no surgical intervention at this time recommended  F/u CBC with diff

## 2019-01-09 VITALS
DIASTOLIC BLOOD PRESSURE: 60 MMHG | SYSTOLIC BLOOD PRESSURE: 95 MMHG | HEART RATE: 68 BPM | RESPIRATION RATE: 18 BRPM | TEMPERATURE: 98 F | OXYGEN SATURATION: 98 %

## 2019-01-09 LAB
ANION GAP SERPL CALC-SCNC: 8 MMOL/L — SIGNIFICANT CHANGE UP (ref 5–17)
BUN SERPL-MCNC: 17 MG/DL — SIGNIFICANT CHANGE UP (ref 7–23)
CALCIUM SERPL-MCNC: 8.1 MG/DL — LOW (ref 8.5–10.1)
CHLORIDE SERPL-SCNC: 105 MMOL/L — SIGNIFICANT CHANGE UP (ref 96–108)
CO2 SERPL-SCNC: 28 MMOL/L — SIGNIFICANT CHANGE UP (ref 22–31)
CREAT SERPL-MCNC: 0.84 MG/DL — SIGNIFICANT CHANGE UP (ref 0.5–1.3)
GLUCOSE SERPL-MCNC: 86 MG/DL — SIGNIFICANT CHANGE UP (ref 70–99)
HCT VFR BLD CALC: 27.7 % — LOW (ref 34.5–45)
HGB BLD-MCNC: 8.3 G/DL — LOW (ref 11.5–15.5)
MAGNESIUM SERPL-MCNC: 1.6 MG/DL — SIGNIFICANT CHANGE UP (ref 1.6–2.6)
MCHC RBC-ENTMCNC: 23.1 PG — LOW (ref 27–34)
MCHC RBC-ENTMCNC: 30 GM/DL — LOW (ref 32–36)
MCV RBC AUTO: 77.2 FL — LOW (ref 80–100)
NRBC # BLD: 0 /100 WBCS — SIGNIFICANT CHANGE UP (ref 0–0)
PHOSPHATE SERPL-MCNC: 2.2 MG/DL — LOW (ref 2.5–4.5)
PLATELET # BLD AUTO: 432 K/UL — HIGH (ref 150–400)
POTASSIUM SERPL-MCNC: 3.8 MMOL/L — SIGNIFICANT CHANGE UP (ref 3.5–5.3)
POTASSIUM SERPL-SCNC: 3.8 MMOL/L — SIGNIFICANT CHANGE UP (ref 3.5–5.3)
RBC # BLD: 3.59 M/UL — LOW (ref 3.8–5.2)
RBC # FLD: 18.4 % — HIGH (ref 10.3–14.5)
SODIUM SERPL-SCNC: 141 MMOL/L — SIGNIFICANT CHANGE UP (ref 135–145)
WBC # BLD: 22.16 K/UL — HIGH (ref 3.8–10.5)
WBC # FLD AUTO: 22.16 K/UL — HIGH (ref 3.8–10.5)

## 2019-01-09 PROCEDURE — 96365 THER/PROPH/DIAG IV INF INIT: CPT

## 2019-01-09 PROCEDURE — 82962 GLUCOSE BLOOD TEST: CPT

## 2019-01-09 PROCEDURE — 93880 EXTRACRANIAL BILAT STUDY: CPT

## 2019-01-09 PROCEDURE — 83550 IRON BINDING TEST: CPT

## 2019-01-09 PROCEDURE — 82550 ASSAY OF CK (CPK): CPT

## 2019-01-09 PROCEDURE — 83540 ASSAY OF IRON: CPT

## 2019-01-09 PROCEDURE — 83735 ASSAY OF MAGNESIUM: CPT

## 2019-01-09 PROCEDURE — 82553 CREATINE MB FRACTION: CPT

## 2019-01-09 PROCEDURE — 97162 PT EVAL MOD COMPLEX 30 MIN: CPT

## 2019-01-09 PROCEDURE — 87493 C DIFF AMPLIFIED PROBE: CPT

## 2019-01-09 PROCEDURE — 80048 BASIC METABOLIC PNL TOTAL CA: CPT

## 2019-01-09 PROCEDURE — 82746 ASSAY OF FOLIC ACID SERUM: CPT

## 2019-01-09 PROCEDURE — 93306 TTE W/DOPPLER COMPLETE: CPT

## 2019-01-09 PROCEDURE — 36415 COLL VENOUS BLD VENIPUNCTURE: CPT

## 2019-01-09 PROCEDURE — 87040 BLOOD CULTURE FOR BACTERIA: CPT

## 2019-01-09 PROCEDURE — 87086 URINE CULTURE/COLONY COUNT: CPT

## 2019-01-09 PROCEDURE — 74176 CT ABD & PELVIS W/O CONTRAST: CPT

## 2019-01-09 PROCEDURE — 80053 COMPREHEN METABOLIC PANEL: CPT

## 2019-01-09 PROCEDURE — 99285 EMERGENCY DEPT VISIT HI MDM: CPT | Mod: 25

## 2019-01-09 PROCEDURE — 84484 ASSAY OF TROPONIN QUANT: CPT

## 2019-01-09 PROCEDURE — 83605 ASSAY OF LACTIC ACID: CPT

## 2019-01-09 PROCEDURE — 82607 VITAMIN B-12: CPT

## 2019-01-09 PROCEDURE — 74018 RADEX ABDOMEN 1 VIEW: CPT

## 2019-01-09 PROCEDURE — 84443 ASSAY THYROID STIM HORMONE: CPT

## 2019-01-09 PROCEDURE — 93005 ELECTROCARDIOGRAM TRACING: CPT

## 2019-01-09 PROCEDURE — 71250 CT THORAX DX C-: CPT

## 2019-01-09 PROCEDURE — 84100 ASSAY OF PHOSPHORUS: CPT

## 2019-01-09 PROCEDURE — 84439 ASSAY OF FREE THYROXINE: CPT

## 2019-01-09 PROCEDURE — 74018 RADEX ABDOMEN 1 VIEW: CPT | Mod: 26

## 2019-01-09 PROCEDURE — 71045 X-RAY EXAM CHEST 1 VIEW: CPT

## 2019-01-09 PROCEDURE — 81001 URINALYSIS AUTO W/SCOPE: CPT

## 2019-01-09 PROCEDURE — 85027 COMPLETE CBC AUTOMATED: CPT

## 2019-01-09 PROCEDURE — 70450 CT HEAD/BRAIN W/O DYE: CPT

## 2019-01-09 PROCEDURE — 99239 HOSP IP/OBS DSCHRG MGMT >30: CPT

## 2019-01-09 PROCEDURE — 96366 THER/PROPH/DIAG IV INF ADDON: CPT

## 2019-01-09 PROCEDURE — 82728 ASSAY OF FERRITIN: CPT

## 2019-01-09 RX ORDER — SERTRALINE 25 MG/1
1 TABLET, FILM COATED ORAL
Qty: 60 | Refills: 0 | OUTPATIENT
Start: 2019-01-09 | End: 2019-02-07

## 2019-01-09 RX ORDER — VANCOMYCIN HCL 1 G
5 VIAL (EA) INTRAVENOUS
Qty: 280 | Refills: 0 | OUTPATIENT
Start: 2019-01-09 | End: 2019-01-22

## 2019-01-09 RX ORDER — PANTOPRAZOLE SODIUM 20 MG/1
1 TABLET, DELAYED RELEASE ORAL
Qty: 30 | Refills: 0 | OUTPATIENT
Start: 2019-01-09 | End: 2019-02-07

## 2019-01-09 RX ORDER — LACTOBACILLUS ACIDOPHILUS 100MM CELL
1 CAPSULE ORAL
Qty: 0 | Refills: 0 | COMMUNITY
Start: 2019-01-09

## 2019-01-09 RX ORDER — SODIUM,POTASSIUM PHOSPHATES 278-250MG
2 POWDER IN PACKET (EA) ORAL ONCE
Qty: 0 | Refills: 0 | Status: COMPLETED | OUTPATIENT
Start: 2019-01-09 | End: 2019-01-09

## 2019-01-09 RX ORDER — L.ACIDOPH/B.ANIMALIS/B.LONGUM 15B CELL
1 CAPSULE ORAL
Qty: 0 | Refills: 0 | COMMUNITY

## 2019-01-09 RX ORDER — OMEPRAZOLE 10 MG/1
1 CAPSULE, DELAYED RELEASE ORAL
Qty: 0 | Refills: 0 | COMMUNITY

## 2019-01-09 RX ADMIN — Medication 2 PACKET(S): at 12:46

## 2019-01-09 RX ADMIN — Medication 1 TABLET(S): at 13:15

## 2019-01-09 RX ADMIN — Medication 1 MILLIGRAM(S): at 13:16

## 2019-01-09 RX ADMIN — Medication 325 MILLIGRAM(S): at 13:16

## 2019-01-09 RX ADMIN — OXYCODONE HYDROCHLORIDE 10 MILLIGRAM(S): 5 TABLET ORAL at 00:21

## 2019-01-09 RX ADMIN — Medication 100 UNIT(S): at 14:40

## 2019-01-09 RX ADMIN — OXYCODONE HYDROCHLORIDE 10 MILLIGRAM(S): 5 TABLET ORAL at 14:18

## 2019-01-09 RX ADMIN — Medication 1 TABLET(S): at 08:41

## 2019-01-09 RX ADMIN — Medication 0.25 MILLIGRAM(S): at 08:41

## 2019-01-09 RX ADMIN — Medication 0.25 MILLIGRAM(S): at 14:41

## 2019-01-09 RX ADMIN — Medication 800 MILLIGRAM(S): at 14:49

## 2019-01-09 RX ADMIN — Medication 125 MILLIGRAM(S): at 13:15

## 2019-01-09 RX ADMIN — OXYCODONE HYDROCHLORIDE 10 MILLIGRAM(S): 5 TABLET ORAL at 13:15

## 2019-01-09 NOTE — PROGRESS NOTE ADULT - ASSESSMENT
77y/o woman w met endometrial cancer(abdomen/pelvic involvement, under care Dr Darrin Dumont [facility], last chemo >6months ago. First dx 2000 post TAHBSO/RT, recur near stomach 2013 post debulking surgery, started various chemo regimen since 2016, first w Dr Michell Kruse then Dr Dumont >1yr ago) radiation colitis, HTN, R hydronephrosis (s/p ureteral stent 6/2018, due for stent change)), thyroid nodules, HTN. 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  Adm w syncope after large BM(loose but formed, not watery)  Heme asked to evaluate for leukocytosis and anemia    -leukocytosis  -suspect reactive, due to inflammation, now downtrending with resuming po Vanco/IV flagyl as treatment for c-diff  - peaked at 46K 1/6/19 and is now 22K         -anemia  workup consistent with chronic disease, malignancy,   no evidence of iron / B12 def , iron restrictive pattern         -continue present acute medical care, follow serial CBC 79y/o woman w met endometrial cancer(abdomen/pelvic involvement, under care Dr Darrin Dumont [facility], last chemo >6months ago. First dx 2000 post TAHBSO/RT, recur near stomach 2013 post debulking surgery, started various chemo regimen since 2016, first w Dr Michell Kruse then Dr Dumont >1yr ago) radiation colitis, HTN, R hydronephrosis (s/p ureteral stent 6/2018, due for stent change)), thyroid nodules, HTN. 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  Adm w syncope after large BM(loose but formed, not watery)  Heme asked to evaluate for leukocytosis and anemia    -leukocytosis  -suspect reactive, due to inflammation, now downtrending with resuming po Vanco/IV flagyl as treatment for c-diff  - peaked at 46K 1/6/19 and is now 22K         -anemia  workup consistent with chronic disease, malignancy,   no evidence of iron / B12 def , iron restrictive pattern     - Endometrial carcinoma.    known metastatic endometrial ca, s/p KATH, radiation, and chemotherapy, last chemo 9/2018  Recent PET scan (12/2018) showing progression  seen by Hospice , approved for home hospice   symptoms mngt : pain Control 79y/o woman w met endometrial cancer(abdomen/pelvic involvement, under care Dr Darrin Dumont [facility], last chemo >6months ago. First dx 2000 post TAHBSO/RT, recur near stomach 2013 post debulking surgery, started various chemo regimen since 2016, first w Dr Michell Kruse then Dr Dumont >1yr ago) radiation colitis, HTN, R hydronephrosis (s/p ureteral stent 6/2018, due for stent change)), thyroid nodules, HTN. 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  Adm w syncope after large BM(loose but formed, not watery)  Heme asked to evaluate for leukocytosis and anemia    -leukocytosis  -suspect reactive, due to inflammation, now downtrending with resuming po Vanco/IV flagyl as treatment for c-diff  - peaked at 46K 1/6/19 and is now 22K   -anemia  workup consistent with chronic disease, malignancy,   no evidence of iron / B12 def , iron restrictive pattern     - Endometrial carcinoma.    known metastatic endometrial ca, s/p KATH, radiation, and chemotherapy, last chemo 9/2018  Recent PET scan (12/2018) showing progression  seen by Hospice , approved for home hospice   symptoms mngt : pain Control       signing off

## 2019-01-09 NOTE — PROGRESS NOTE ADULT - SUBJECTIVE AND OBJECTIVE BOX
pt seen  happy  no complaints  no N/V/D  +BM  ICU Vital Signs Last 24 Hrs  T(C): 36.7 (09 Jan 2019 11:29), Max: 37.3 (08 Jan 2019 16:40)  T(F): 98 (09 Jan 2019 11:29), Max: 99.2 (08 Jan 2019 16:40)  HR: 68 (09 Jan 2019 11:29) (68 - 82)  BP: 95/60 (09 Jan 2019 11:29) (95/60 - 118/74)  BP(mean): --  ABP: --  ABP(mean): --  RR: 18 (09 Jan 2019 11:29) (16 - 18)  SpO2: 98% (09 Jan 2019 11:29) (96% - 99%)  genNAD  resp-clear  abd-soft NT/ND                          8.3    22.16 )-----------( 432      ( 09 Jan 2019 06:48 )             27.7

## 2019-01-09 NOTE — PROGRESS NOTE ADULT - PROVIDER SPECIALTY LIST ADULT
Gastroenterology
Heme/Onc
Heme/Onc
Hospitalist
Hospitalist
Infectious Disease
Surgery
Hospitalist

## 2019-01-09 NOTE — PROGRESS NOTE ADULT - PROBLEM SELECTOR PLAN 3
multifactorial, colitis, malignancy, acute illness  iron studies noted  no overt s/s gib  trend h/h, transfuse prn  cont ppi  seen by heme/onc  dc planning w hospice in progress

## 2019-01-09 NOTE — PROGRESS NOTE ADULT - PROBLEM SELECTOR PLAN 2
gerd precautions  in and out  ppi once a day  may need egd  advance diet
gerd precautions  in and out  ppi once a day  may need egd  advance diet
gerd precautions  ppi once a day
gerd precautions  ppi once a day
improving  Patient with chronic hypokalemia 2/2 chronic diarrhea  advised to take oral potassium supplement as outpt, but pt has had only variable compliance due to resultant GI side effects.  K 2.1 on admission labs, improving though still low  F/u CMP, replete as necessary
Patient with chronic hypokalemia 2/2 chronic diarrhea  advised to take oral potassium supplement as outpt, but pt has had only variable compliance due to resultant GI side effects.  K 2.1 on admission labs, was below 3 today as well, aggressively repleting. -- repleted Mg as well  F/u CMP, replete as necessary
resolved  Patient with chronic hypokalemia 2/2 chronic diarrhea  advised to take oral potassium supplement as outpt, but pt has had only variable compliance due to resultant GI side effects.  K 2.1 on admission labs, normal today  F/u CMP, replete as necessary

## 2019-01-09 NOTE — PROGRESS NOTE ADULT - SUBJECTIVE AND OBJECTIVE BOX
INTERVAL HPI/OVERNIGHT EVENTS:  pt seen and examined this am aide present  denies n/v/d/abd pain  per overnight rn no diarrhea, per flowsheets +bm this am  for possible dc home w home hospice today  afebrile overnight labs noted    MEDICATIONS  (STANDING):  amLODIPine   Tablet 5 milliGRAM(s) Oral daily  atorvastatin 10 milliGRAM(s) Oral at bedtime  cholecalciferol (vit D3) 5000units 1 Capsule(s) 1 Capsule(s) Oral daily  enoxaparin Injectable 30 milliGRAM(s) SubCutaneous every 24 hours  ferrous    sulfate 325 milliGRAM(s) Oral daily  folic acid 1 milliGRAM(s) Oral daily  lactobacillus acidophilus 1 Tablet(s) Oral three times a day with meals  mesalamine DR Capsule 800 milliGRAM(s) Oral three times a day  oxyCODONE    IR 10 milliGRAM(s) Oral every 6 hours  pantoprazole    Tablet 40 milliGRAM(s) Oral before breakfast  potassium phosphate / sodium phosphate powder 2 Packet(s) Oral once  sertraline 25 milliGRAM(s) Oral two times a day  vancomycin    Solution 125 milliGRAM(s) Oral every 6 hours    MEDICATIONS  (PRN):  ALPRAZolam 0.25 milliGRAM(s) Oral every 6 hours PRN anxiety      Allergies    erythromycin (Unknown)  macrolide antibiotics (Unknown)  morphine (Diarrhea)  sulfa drugs (Unknown)    Intolerances        Review of Systems:    General:  No wt loss, fevers, chills, night sweats, fatigue   Eyes:  Good vision, no reported pain  ENT:  No sore throat, pain, runny nose, dysphagia  CV:  No pain, palpitations, hypo/hypertension  Resp:  No dyspnea, cough, tachypnea, wheezing  GI:  No pain, No nausea, No vomiting, No diarrhea, No constipation, No weight loss, No fever, No pruritis, No rectal bleeding, No melena, No dysphagia  :  No pain, bleeding, incontinence, nocturia  Muscle:  No pain, weakness  Neuro:  No weakness, tingling, memory problems  Psych:  No fatigue, insomnia, mood problems, depression  Endocrine:  No polyuria, polydypsia, cold/heat intolerance  Heme:  No petechiae, ecchymosis, easy bruisability  Skin:  No rash, tattoos, scars, edema      Vital Signs Last 24 Hrs  T(C): 36.7 (09 Jan 2019 11:29), Max: 37.3 (08 Jan 2019 16:40)  T(F): 98 (09 Jan 2019 11:29), Max: 99.2 (08 Jan 2019 16:40)  HR: 68 (09 Jan 2019 11:29) (68 - 82)  BP: 95/60 (09 Jan 2019 11:29) (95/60 - 118/74)  BP(mean): --  RR: 18 (09 Jan 2019 11:29) (16 - 18)  SpO2: 98% (09 Jan 2019 11:29) (96% - 99%)    PHYSICAL EXAM:    Constitutional: NAD  HEENT: ncat  Neck: No LAD  Respiratory:  dec bs  Cardiovascular: S1 and S2, RRR  Gastrointestinal: soft nt mild dt  Extremities: No peripheral edema  Vascular: 2+ peripheral pulses  Neurological: Awake alert  Skin: No rashes        LABS:                        8.3    22.16 )-----------( 432      ( 09 Jan 2019 06:48 )             27.7     01-09    141  |  105  |  17  ----------------------------<  86  3.8   |  28  |  0.84    Ca    8.1<L>      09 Jan 2019 06:48  Phos  2.2     01-09  Mg     1.6     01-09            RADIOLOGY & ADDITIONAL TESTS:  < from: Xray Abdomen 1 View PORTABLE -Routine (01.09.19 @ 09:48) >    EXAM:  XR ABDOMEN PORTABLE ROUTINE 1V                            PROCEDURE DATE:  01/09/2019          INTERPRETATION:  This study is limited due to the use of portable   technique.    Clinical information: Endometrial cancer, C. difficile, constipation    Portable supine study of the abdomen, 8:43 AM    Nonobstructive bowel gas pattern although the colonic wall does appear   thickened in the portions of the colon which are filled with gas. Stool   is visible on the right side of the large bowel.    A double-ended pigtail catheter is present on the right side of the   abdomen following the course of the right urinary tract. Multiple   surgical clips and staples are visible in the right lower quadrant and   pelvis. No acute osseous abnormalities.    IMPRESSION: See above report                ISREAL MCDERMOTT M.D.,ATTENDING RADIOLOGIST  This document has been electronically signed. Jan 9 2019 10:39AM                < end of copied text >

## 2019-01-09 NOTE — PROGRESS NOTE ADULT - SUBJECTIVE AND OBJECTIVE BOX
Patient is a 78y old  Female who presents with a chief complaint of Syncope (2019 13:34)    Asked to see patient for ID Consult  Interval History:recurrent C diff colitis    CENTRAL LINE:   [  ] YES       [ x] NO  LOWERY:                 [  ] YES       [ x ] NO     PAST MEDICAL & SURGICAL HISTORY:  Malignant neoplasm of ovary, unspecified laterality  GERD (gastroesophageal reflux disease)  Fatty liver  Multiple thyroid nodules  Anemia  Cataracts, bilateral: monitored  History of chemotherapy  Lymphedema of leg: right  Hydronephrosis determined by ultrasound: right kidney  Abdominal mass:  metastatic endometrial cancer, s/p surgery, on chemotherapy at present-infusa port right chest  Atrial fibrillation: one episode &gt;30 years ago  Mitral valve prolapse  Endometrial carcinoma:  - s/p KATH - tx with radiation, and currently receiving chemotherapy  Hyperlipidemia  Ulcerative colitis  Anxiety  Post-Herpetic Trigeminal Neuralgia: left  Benign Hypertension  History of cataract extraction, left  H/O hydronephrosis: ureteral stent, multiple stent exchanges, last in 2018  History of chemotherapy: Infusaport insertion ;   Abdominal mass: s/p mass removed ,on chemo-right chest infusa port  Encounter for biopsy: 13 - right iliac lymph node biopsy  History of tonsillectomy  H/O total hysterectomy:   History of D&C: age 22      REVIEW OF SYSTEMS:  All systems below were reviewed and are normal [  ]  Constitutional:  HEENT:  Lymph nodes:  ID:  Pulmonary:no cough sob  Cardiac:no CP  GI:no NVD abd pain  Renal:  Musculoskeletal:  Neuro:  Endocrine:  Skin:  All other systems above were reviewed and are normal   [x  ]    Allergies  Allergies    erythromycin (Unknown)  macrolide antibiotics (Unknown)  morphine (Diarrhea)  sulfa drugs (Unknown)    Intolerances        MEDICATIONS  (STANDING):  amLODIPine   Tablet 5 milliGRAM(s) Oral daily  atorvastatin 10 milliGRAM(s) Oral at bedtime  cholecalciferol (vit D3) 5000units 1 Capsule(s) 1 Capsule(s) Oral daily  enoxaparin Injectable 30 milliGRAM(s) SubCutaneous every 24 hours  ferrous    sulfate 325 milliGRAM(s) Oral daily  folic acid 1 milliGRAM(s) Oral daily  lactobacillus acidophilus 1 Tablet(s) Oral three times a day with meals  mesalamine DR Capsule 800 milliGRAM(s) Oral three times a day  oxyCODONE    IR 10 milliGRAM(s) Oral every 6 hours  pantoprazole    Tablet 40 milliGRAM(s) Oral before breakfast  potassium phosphate / sodium phosphate powder 2 Packet(s) Oral once  sertraline 25 milliGRAM(s) Oral two times a day  vancomycin    Solution 125 milliGRAM(s) Oral every 6 hours    MEDICATIONS  (PRN):      Vital Signs Last 24 Hrs  T(C): 36.6 (2019 08:13), Max: 37.3 (2019 16:40)  T(F): 97.8 (2019 08:13), Max: 99.2 (2019 16:40)  HR: 70 (2019 08:13) (70 - 82)  BP: 103/64 (2019 08:13) (98/66 - 121/78)  BP(mean): --  RR: 18 (2019 08:13) (16 - 18)  SpO2: 96% (2019 08:13) (96% - 99%)    I&O's Summary    PHYSICAL EXAM:  Constitutional:  HEENT:  Lymph nodes:  Neck:  Lungs:clear  Heart:rr  Abdomen:soft nontender  Renal:  Extremities:lymphedema R thigh-chronic  Musculoskeletal:  Neurologic:lethargic  Vascular:  Endocrine:  Skin:  All of the above normal except for written comments [x ]    LABORATORY:    CBC Full  -  ( 2019 06:48 )  WBC Count : 22.16 K/uL  Hemoglobin : 8.3 g/dL  Hematocrit : 27.7 %  Platelet Count - Automated : 432 K/uL  Mean Cell Volume : 77.2 fl  Mean Cell Hemoglobin : 23.1 pg  Mean Cell Hemoglobin Concentration : 30.0 gm/dL  Auto Neutrophil # : x  Auto Lymphocyte # : x  Auto Monocyte # : x  Auto Eosinophil # : x  Auto Basophil # : x  Auto Neutrophil % : x  Auto Lymphocyte % : x  Auto Monocyte % : x  Auto Eosinophil % : x  Auto Basophil % : x        141  |  105  |  17  ----------------------------<  86  3.8   |  28  |  0.84    Ca    8.1<L>      2019 06:48  Phos  2.2       Mg     1.6               Urinalysis Basic - ( 2019 11:41 )    Color: Yellow / Appearance: Clear / S.020 / pH: x  Gluc: x / Ketone: Trace  / Bili: Small / Urobili: Negative   Blood: x / Protein: 25 mg/dL / Nitrite: Negative   Leuk Esterase: Trace / RBC: 3-5 /HPF / WBC 3-5   Sq Epi: x / Non Sq Epi: Few / Bacteria: Few        Vanco. trough:  Genta. trough:      Radiology:< from: CT Head No Cont (19 @ 12:50) >  EXAM:  CT BRAIN                            PROCEDURE DATE:  2019          INTERPRETATION:  Change in mental status.    Noncontrast head CT. Prior 2018.    Compared to prior study there is no interval change. No intra-axial or   extra-axial hemorrhage, territorial infarct, edema or mass effect. No   evidence of hydrocephalus. No acute sinus abnormality. Calvarium intact.    Impression: No acute changes compared to 2019.                CONTRERAS HAYDEN M.D., ATTENDING RADIOLOGIST  This document has been electronically signed. 2019  1:17PM      < from: US Duplex Carotid Arteries Complete, Bilateral (19 @ 10:01) >  EXAM:  US DPLX CAROTIDS COMPL BI                            PROCEDURE DATE:  2019          INTERPRETATION:  History: Syncope.    Bilateral extracranial carotid Doppler.    Moderate amount of scattered bilateral atherosclerotic plaque mostly in   the carotid bulbs. Peak systolic flow velocities in bilateral CCA, ECA,   and ICA fall within normal range. Normal bilateral ICA/CCA velocity   ratios. Antegrade flow each vertebral artery.    Impression: Moderate bilateral atheromatosis without flow-limiting   stenosis.                CONTRERAS HAYDEN M.D., ATTENDING RADIOLOGIST  This document has been electronically signed. 2019 10:14AM          < end of copied text >            Microbiology:bl and ur cx's NG

## 2019-01-09 NOTE — PROGRESS NOTE ADULT - ASSESSMENT
79 yo with vasovagal episode with hx of chronic c. diff        bedside KUB        cleared for d/c from gen barb standpoint

## 2019-01-09 NOTE — PROGRESS NOTE ADULT - SUBJECTIVE AND OBJECTIVE BOX
Patient seen and examined;  Chart reviewed and events noted;   feeling better; denies any abdominal pain or cramping    MEDICATIONS  reviewed    Vital Signs Last 24 Hrs      PHYSICAL EXAM  General: adult elderly thin woman in NAD  HEENT: clear oropharynx, anicteric sclera, pink conjunctivae  Neck: supple  CV: normal S1S2 with no murmur rubs or gallops  Lungs: clear to auscultation, no wheezes, no rhales  Abdomen: soft non-tender non-distended, no hepato/splenomegaly  Ext: no clubbing cyanosis or edema  Skin: no rashes and no petichiae  Neuro: alert and oriented X3 no focal deficits      LABS:                        8.6    27.26 )-----------( 460      ( 07 Jan 2019 07:05 )             28.1     LABS:    CBC Full  -  ( 09 Jan 2019 06:48 )  WBC Count : 22.16 K/uL  Hemoglobin : 8.3 g/dL  Hematocrit : 27.7 %  Platelet Count - Automated : 432 K/uL  Mean Cell Volume : 77.2 fl  Mean Cell Hemoglobin : 23.1 pg  Mean Cell Hemoglobin Concentration : 30.0 gm/dL      01-09    141  |  105  |  17  ----------------------------<  86  3.8   |  28  |  0.84    Ca    8.1<L>      09 Jan 2019 06:48  Phos  2.2     01-09  Mg     1.6     01-09 Patient seen and examined;  Chart reviewed and events noted;   feeling better; denies any abdominal pain or cramping  awaiting hospice    comfortable with decision    MEDICATIONS  reviewed    Vital Signs Last 24 Hrs      PHYSICAL EXAM  General: frail lady not in distress  HEENT: clear oropharynx, anicteric sclera, pink conjunctivae  Neck: supple  CV: normal S1S2 with no murmur rubs or gallops  Lungs: clear to auscultation, no wheezes, no rhales  Abdomen: soft non-tender non-distended, no hepatosplenomegaly  Ext: no clubbing cyanosis or edema  Skin: no rashes and no petichiae  Neuro: alert and oriented X1 no focal deficits      LABS:                        8.6    27.26 )-----------( 460      ( 07 Jan 2019 07:05 )             28.1     LABS:    CBC Full  -  ( 09 Jan 2019 06:48 )  WBC Count : 22.16 K/uL  Hemoglobin : 8.3 g/dL  Hematocrit : 27.7 %  Platelet Count - Automated : 432 K/uL  Mean Cell Volume : 77.2 fl  Mean Cell Hemoglobin : 23.1 pg  Mean Cell Hemoglobin Concentration : 30.0 gm/dL      01-09    141  |  105  |  17  ----------------------------<  86  3.8   |  28  |  0.84    Ca    8.1<L>      09 Jan 2019 06:48  Phos  2.2     01-09  Mg     1.6     01-09

## 2019-01-09 NOTE — PROGRESS NOTE ADULT - PROBLEM SELECTOR PROBLEM 2
Gastroesophageal reflux disease without esophagitis
Hypokalemia

## 2019-01-09 NOTE — PROGRESS NOTE ADULT - PROBLEM SELECTOR PLAN 1
CT showed pancolitis  axr noted, +bm this am per flow sheets, cleared for dc from sx perspective  cdiff +  bld cxs ngtd  cont vanco as ordered, no plans for taper as pt planned for home hospice  cont bacid, delzicol  LFLR diet as tolerated  monitor leukocytosis/abd exam/stool output  dc planning in progress w home hospice  plan dw attg, agreeable

## 2019-01-09 NOTE — PROGRESS NOTE ADULT - PROBLEM SELECTOR PROBLEM 1
Pancolitis
Syncope, unspecified syncope type

## 2019-01-09 NOTE — PROGRESS NOTE ADULT - REASON FOR ADMISSION
Syncope
Syncope; leukocytosis
Syncope

## 2019-03-05 NOTE — H&P PST ADULT - MEDICATION HERBAL REMEDIES, PROFILE
lethargic, but will respond to some questions  moves all extremities alert this am, responsive, answers appropriately  moves all extremities no awake and alert Awake alert - confusion at times but reoriented Awake alert follows simple commands   Bipap mask on Lethargic but arousabil to verbal stimuli

## 2019-10-01 NOTE — PACU DISCHARGE NOTE - NAUSEA/VOMITING:
Prep Survey      Responses   Facility patient discharged from?  [facility]   Is patient eligible?  No   What are the reasons patient is not eligible?  [facility] [[facility] for rehab]   Does the patient have one of the following disease processes/diagnoses(primary or secondary)?  CHF   Prep survey completed?  Yes          Haleigh Lindo RN        
None

## 2020-07-02 NOTE — ASU PREOPERATIVE ASSESSMENT, ADULT ([facility] ONLY) - TEACHING/LEARNING OTHER LEARNERS
significant other Post-Care Instructions: I reviewed with the patient in detail post-care instructions. Patient is to keep the biopsy site dry overnight, and then apply aquaphor twice daily until healed. Patient may apply hydrogen peroxide soaks to remove any crusting.

## 2020-08-17 NOTE — ED ADULT NURSE REASSESSMENT NOTE - PAIN ACTIVITY
0 Drysol Counseling:  I discussed with the patient the risks of drysol/aluminum chloride including but not limited to skin rash, itching, irritation, burning.

## 2020-11-03 NOTE — PROGRESS NOTE ADULT - PROBLEM SELECTOR PROBLEM 9
Anxiety
Charcot's arthropathy  R foot  Chronic midline low back pain with bilateral sciatica    Dextrocardia    Diabetes 1.5, managed as type 2    Essential hypertension    Peripheral neuralgia

## 2020-12-07 NOTE — H&P PST ADULT - NEUROLOGICAL SYMPTOMS
MEDICARE WELLNESS VISIT NOTE      HISTORY OF PRESENT ILLNESS:   Bon Lundberg Sr. presents for his Subsequent Annual Medicare Wellness Visit.   He has complaints or concerns which include impacted cerumen.  He has had a history of this.  When I examined him he had 2 years with impacted cerumen.  The nurse lavaged these without difficulty.    He reports his sleeping is improving.    He reports his sugars are improving.  He is agreeable to an A1 c today.  He is also due for a microalbumin..      Patient Care Team:  Yunier Novak MD as PCP - General (Family Practice)        Patient Active Problem List   Diagnosis   • HTN (hypertension)   • History of CVA (cerebrovascular accident)   • Type 1 diabetes mellitus (CMS/HCC)   • Hyperlipidemia   • COPD (chronic obstructive pulmonary disease) (CMS/HCC)   • Benign prostatic hyperplasia   • Chronic kidney disease (CKD), stage III (moderate) (CMS/HCC)   • Chronic headaches   • Gastroesophageal reflux disease   • Generalized osteoarthritis   • Recurrent major depressive disorder, in partial remission (CMS/HCC)   • Adrenal cortical nodule (CMS/HCC)   • Hypoxia   • Chronic diarrhea   • Collagenous colitis         Past Medical History:   Diagnosis Date   • Anxiety and depression    • Bilateral cataracts    • BPH (benign prostatic hypertrophy)    • Chronic headaches 8/17/2015    Takes an occasional tramadol   • Chronic kidney disease (CKD), stage III (moderate) (CMS/HCC)    • Chronic sinusitis    • COPD (chronic obstructive pulmonary disease) (CMS/HCC)    • CVA (cerebral vascular accident) (CMS/HCC) 04/01/2011    left upper extremity weakness   • Depression    • Diabetic ketoacidosis (CMS/HCC)    • Falls frequently 5/21/2015   • HTN (hypertension)    • Hyperlipidemia    • Ileus (CMS/HCC) 11/27/2018   • Leukocytosis    • Non-intractable cyclical vomiting with nausea 11/27/2018   • Pneumonia    • Type 1 diabetes mellitus (CMS/HCC)     Non-insulin dependent         Past Surgical  History:   Procedure Laterality Date   • Colonoscopy  2019    no further procedures needed   • Excision of lingual tonsil     • Remv cataract extracap insert lens  2011    right eye   • Remv cataract extracap insert lens  2011    left eye   • Thromboendart w/w0 graft carotid  neck incs  2011    Carotid Endarterectomy, right internal carotid artery stenosis         Social History     Tobacco Use   • Smoking status: Former Smoker     Packs/day: 4.00     Years: 65.00     Pack years: 260.00     Types: Cigarettes     Start date:      Quit date:      Years since quittin.9   • Smokeless tobacco: Never Used   • Tobacco comment: 20 - patient states he used to smoke 3-4 packs per day   Substance Use Topics   • Alcohol use: No     Alcohol/week: 0.0 standard drinks     Frequency: Never     Binge frequency: Never     Comment: none since , was a heavy drinker   • Drug use: No     Drug use:    Drug Use:    No              Family History - Reviewed    Current Outpatient Medications   Medication Sig Dispense Refill   • budesonide (ENTOCORT EC) 3 MG 24 hr capsule Take 1 capsule by mouth nightly. 90 capsule 0   • blood glucose test strip Test blood sugar 8 times daily as directed. Diagnosis: E.10.9. Meter: Please dispense what is covered by patient's insurance 300 strip 11   • insulin glargine (LANTUS) 100 UNIT/ML vial solution Inject 10 Units into the skin 2 times daily. 20 mL 12   • insulin lispro (HumaLOG) 100 UNIT/ML injectable solution Inject 10 Units into the skin 3 times daily (before meals). 20 mL 12   • Continuous Blood Gluc  (FreeStyle [place] 14 Day [place]) Device 1 each 3 times daily. 1 each 0   • traMADol (ULTRAM) 50 MG tablet TAKE 1 TABLET BY MOUTH EVERY 4 HOURS AS NEEDED FOR PAIN 60 tablet 0   • pravastatin (PRAVACHOL) 80 MG tablet Take 1 tablet by mouth nightly 90 tablet 0   • ipratropium-albuterol (DUONEB) 0.5-2.5 (3) MG/3ML nebulizer solution USE 1 AMPULE IN  NEBULIZER EVERY 4 TO 6 HOURS AS NEEDED FOR SHORTNESS OF BREATH FOR WHEEZING 90 mL 0   • Continuous Blood Gluc Sensor (FREESTYLE MANUEL 14 DAY SENSOR) Misc 1 each every 14 days. 2 each 11   • sharps container Please use with blood glucose monitoring items 1 each 11   • blood glucose lancets Test blood sugar 8 times daily as directed. Diagnosis: E.10.9. Meter: Please dispense what is covered by patient's insurance 120 each 11   • blood glucose meter Test blood sugar 8 times daily as directed. Diagnosis: E.10.9. Meter: Please dispense what is covered by patient's insurance 1 kit 0   • ibuprofen (MOTRIN) 200 MG tablet Take 400 mg by mouth every 6 hours as needed for Pain. Take 2 tablets (=400mg)     • aspirin 81 MG tablet Take 81 mg by mouth daily.     • omeprazole (PRILOSEC) 40 MG capsule Take 1 capsule by mouth daily. 90 capsule 3   • Insulin Syringe-Needle U-100 (RELION INSULIN SYRINGE) 31G X 15/64\" 0.5 ML Misc Use syringes to inject insulin 5-7 times daily as needed. 600 each 3   • fluticasone (FLONASE) 50 MCG/ACT nasal spray USE 2 SPRAYS IN EACH NOSTRIL ONCE DAILY 1 Inhaler 11   • Calcium Carbonate Antacid (TUMS PO) Take 1 tablet by mouth as needed (upset stomach). takes 1-3 tablets several times per day.  Maximum dose per bottle is 7500 mg.     • cyanocobalamin (VITAMIN B-12) 100 MCG tablet Take 100 mcg by mouth daily.     • cholecalciferol (VITAMIN D3) 1000 UNITS tablet Take 1 tablet by mouth daily. 90 tablet 0   • buPROPion (WELLBUTRIN SR) 150 MG 12 hr tablet Take 150 mg by mouth 2 times daily.     • Melatonin 10 MG Tab Take 10 mg by mouth nightly as needed.      • LYSINE HCL PO Take 1 tablet by mouth daily.        No current facility-administered medications for this visit.         The following items on the Medicare Health Risk Assessment were found to be positive  3.) During the past 4 weeks, how would you rate your health?: Fair     4.) During the past 4 weeks, what was the hardest physical activity you could  do for at least 2 minutes?: Very Light     5.) Do you do moderate to strenuous exercise (brisk walk) for about 20 minutes for 3 or more days per week?: No, I usually do not exercise this much     6 a.) How many servings of Fruits and Vegetables do you have each day ( 1 serving = 1 piece of fruit, 1/2 cup fruits or vegetables): 1 per day     6 b.) How many servings of High Fiber / Whole Grain Foods to you have each day ( 1 serving = 1 cup cold cereal, 1/2 cup cooked cereal, 1 slice bread): 1 per day     10.) How often do you have trouble taking medicines the way you have been told to take them?: Sometimes I take my prescribed medications     13.) Do you need help with any of the following activities?: Get to places outside of walking distance (can't drive alone, or take a bus/taxi alone)     14.) During the past 4 weeks, was someone available to help if you needed and wanted help?: Yes, a little     15.) How confident are you that you can control and manage most of your health problems?: Somewhat confident         Vision and Hearing screens: not applicable    Advance Directive:   The patient has the following documents:  State Do Not Resuscitate (DNR)/POLST DNR    Cognitive Assessment: no evidence of cognitive dysfunction by direct observation    Recent PHQ 2/9 Score    PHQ 2:  Date Adult PHQ 2 Score Adult PHQ 2 Interpretation   12/7/2020 0 No further screening needed       PHQ 9:  Date Adult PHQ 9 Score Adult PHQ 9 Interpretation   12/17/2019 3 Minimal Depression       DEPRESSION ASSESSMENT/PLAN:  Depression screening is negative no further plan needed.     Body mass index is 20.95 kg/m².    BMI ASSESSMENT/PLAN:  Patient BMI is within normal range.     Needed Screening/Treatment:   Diabetes screening  and Immunizations reviewed and patient needs: Herpes Zoster and TDAP  Needed follow up:  Follow-up in 3 months for follow-up on all of your issues     Continue present management otherwise.    Status post successful  lavage by the MA    See orders.   See Patient Instructions section.   Return in about 3 months (around 3/7/2021) for recheck.   fingertips / toes/paresthesias

## 2021-01-16 NOTE — PACU DISCHARGE NOTE - AIRWAY PATENCY:
ED Resident Physician Note      Patient : Nabila Donis Age: 76 year old Sex: female   MRN: [id number] Encounter Date: 1/16/2021      History     Chief Complaint   Patient presents with   • Weakness     possible covid, fever, weakness, x since tuesday       HPI: 76 year old female presenting to the ED for evaluation of shortness of breath.  Patient has a past medical history of diabetes, hypertension, dementia, now presenting for shortness of breath.  Multiple family members with positive Covid symptoms.  Symptoms started about 4 days ago, acutely worsening 2 to 3 days ago.  She denies any chest pain or leg swelling.  She does endorse mild diffuse abdominal pain when she coughs however denies at rest.  Denies any diarrhea, constipation, dysuria or hematuria.  Positive fevers and chills.  History is collected from the patient in conjunction with her grandson at the bedside who is POA.  Never been a smoker.      No Known Allergies      Past Medical History:   Diagnosis Date   • Diabetes mellitus (CMS/[facility])    • Essential (primary) hypertension          No past surgical history on file.      No family history on file.      Social History     Tobacco Use   • Smoking status: Not on file   Substance Use Topics   • Alcohol use: Not on file   • Drug use: Not on file       Review of Systems   Constitutional: Positive for chills and fever.   HENT: Negative for congestion and sore throat.    Respiratory: Positive for cough and shortness of breath.    Cardiovascular: Negative for chest pain and leg swelling.   Gastrointestinal: Positive for abdominal pain. Negative for constipation, diarrhea, nausea and vomiting.   Genitourinary: Negative for difficulty urinating, dysuria and hematuria.   Musculoskeletal: Negative for joint swelling and myalgias.   Skin: Negative for rash and wound.   Allergic/Immunologic: Negative for environmental allergies and food allergies.   Neurological: Negative for dizziness and headaches.    Psychiatric/Behavioral: Positive for confusion. Negative for agitation.       Physical Exam     ED Triage Vitals [01/16/21 1114]   ED Triage Vitals Group      Temp (!) 100.6 °F (38.1 °C)      Heart Rate (!) 138      Resp (!) 50      BP (!) 150/56      SpO2 (!) 56 %      EtCO2 mmHg       Height       Weight 101 lb 6.6 oz (46 kg)      Weight Scale Used       BMI (Calculated)       IBW/kg (Calculated)        Physical Exam  Constitutional:       Appearance: She is toxic-appearing.      Comments: Petite, uncomfortable appearing   HENT:      Head: Normocephalic and atraumatic.      Nose: Nose normal. No congestion.      Mouth/Throat:      Mouth: Mucous membranes are moist.      Pharynx: Oropharynx is clear.   Eyes:      Extraocular Movements: Extraocular movements intact.      Conjunctiva/sclera: Conjunctivae normal.   Cardiovascular:      Rate and Rhythm: Regular rhythm. Tachycardia present.   Pulmonary:      Comments: Coarse breath sounds appreciated in all lung fields, with scattered crackles, no wheezing  Abdominal:      General: There is no distension.      Palpations: Abdomen is soft.      Tenderness: There is no abdominal tenderness. There is no guarding.   Musculoskeletal:         General: No swelling, tenderness or deformity.   Skin:     General: Skin is warm and dry.   Neurological:      Mental Status: She is alert.      Comments: Awake and alert, moving all extremities spontaneously   Psychiatric:      Comments: Calm, cooperative         Lab Results     Results for orders placed or performed during the hospital encounter of 01/16/21   C Reactive Protein   Result Value Ref Range    C-Reactive Protein 12.2 (H) <=1.0 mg/dL   Prothrombin Time   Result Value Ref Range    Prothrombin Time 10.1 9.7 - 11.8 sec    INR 1.0 <=5.0   Comprehensive Metabolic Panel   Result Value Ref Range    Fasting Status      Sodium 135 135 - 145 mmol/L    Potassium 4.0 3.4 - 5.1 mmol/L    Chloride 100 98 - 107 mmol/L    Carbon Dioxide  Satisfactory 22 21 - 32 mmol/L    Anion Gap 17 10 - 20 mmol/L    Glucose 286 (H) 65 - 99 mg/dL    BUN 17 6 - 20 mg/dL    Creatinine 0.53 0.51 - 0.95 mg/dL    Glomerular Filtration Rate >90 >90 mL/min/1.73m2    BUN/ Creatinine Ratio 32 (H) 7 - 25    Calcium 8.2 (L) 8.4 - 10.2 mg/dL    Bilirubin, Total 0.5 0.2 - 1.0 mg/dL    GOT/AST 38 (H) <=37 Units/L    GPT/ALT 29 <64 Units/L    Alkaline Phosphatase 67 45 - 117 Units/L    Albumin 3.0 (L) 3.6 - 5.1 g/dL    Protein, Total 8.6 (H) 6.4 - 8.2 g/dL    Globulin 5.6 (H) 2.0 - 4.0 g/dL    A/G Ratio 0.5 (L) 1.0 - 2.4   Creatine Kinase   Result Value Ref Range    CK 80 26 - 192 Units/L   Procalcitonin   Result Value Ref Range    Procalcitonin <0.05 <=0.09 ng/mL   Lactate Dehydrogenase   Result Value Ref Range    LD, Total 513 (H) 82 - 240 Units/L   Ferritin   Result Value Ref Range    Ferritin 412 (H) 8 - 252 ng/mL   Troponin I Ultra Sensitive   Result Value Ref Range    Troponin I, Ultra Sensitive 0.07 (HH) <=0.04 ng/mL   D Dimer, Quantitative   Result Value Ref Range    D Dimer, Quantitative 0.89 (H) <0.57 mg/L (FEU)   CBC with Automated Differential (performable only)   Result Value Ref Range    WBC 9.5 4.2 - 11.0 K/mcL    RBC 4.81 4.00 - 5.20 mil/mcL    HGB 14.4 12.0 - 15.5 g/dL    HCT 43.9 36.0 - 46.5 %    MCV 91.3 78.0 - 100.0 fl    MCH 29.9 26.0 - 34.0 pg    MCHC 32.8 32.0 - 36.5 g/dL    RDW-CV 13.4 11.0 - 15.0 %    RDW-SD 45.4 39.0 - 50.0 fL     140 - 450 K/mcL    NRBC 0 <=0 /100 WBC    Neutrophil, Percent 89 %    Lymphocytes, Percent 5 %    Mono, Percent 6 %    Eosinophils, Percent 0 %    Basophils, Percent 0 %    Immature Granulocytes 0 %    Absolute Neutrophils 8.4 (H) 1.8 - 7.7 K/mcL    Absolute Lymphocytes 0.5 (L) 1.0 - 4.0 K/mcL    Absolute Monocytes 0.6 0.3 - 0.9 K/mcL    Absolute Eosinophils  0.0 0.0 - 0.5 K/mcL    Absolute Basophils 0.0 0.0 - 0.3 K/mcL    Absolute Immmature Granulocytes 0.0 0.0 - 0.2 K/mcL         Radiology Results     Imaging Results          XR  CHEST PA OR AP 1 VIEW (Final result)  Result time 01/16/21 11:52:37    Final result                 Impression:       Prominent heart size.  Atherosclerotic aorta.      Scattered bilateral lung opacities and scattered diffusely throughout both  lung fields including thickened interstitial markings as well as patchy  opacities.  Significant worsening compared to prior study.  Findings could  represent atypical multifocal pneumonia.  Correlate clinically.  Continued  follow-up recommended.    Electronically Signed by: LORRAINE LEARY M.D.   Signed on: 1/16/2021 11:52 AM                Narrative:      EXAM:  XR CHEST PA OR AP 1 VIEW      CLINICAL INDICATION:  Dyspnea, Covid positive    COMPARISON:  04/18/2016                                Medical Decision Making        76-year-old female with past medical history as above presenting with her POA who is her grandson for worsening shortness of breath over the last 4 days, positive Covid contacts at home.  On arrival, the patient was tachycardic to 138, with a respiratory rate in the 50s, saturating 56% on room air, improved to 93% on nonrebreather, however with only mild improvement in effort.  Febrile to 100.6.  Blood pressure within normal limits.  Lungs with coarse breath sounds in all lung fields, scattered crackles, concern for Covid pneumonia.  Called for stat high flow nasal cannula, initiated Covid sepsis order set.    EKG: Sinus tachycardia, heart rate 109, no contiguous ST or T wave abnormalities, no STEMI.    Medications:  Decadron 6 mg    Labs:  Elevated inflammatory markers, mildly, ferritin of 412, C-reactive protein mildly elevated at 12.2,   Pro-Domingo negative, low suspicion for significant bacterial infection  Slightly elevated D-dimer at 0.89, however likely secondary to most likely Covid infection troponin elevated to 0.07, likely demand, will trend, no ischemic EKG findings  No leukocytosis  No anemia  Hyperglycemia to 286  No  DARRICK    Imaging:  Chest x-ray shows prominent heart size.  Atherosclerotic aorta.  Scattered bilateral lung opacities and scattered diffuse throughout both lung fields, thickened interstitial markings, patchy opacities.    Reevaluated the patient after initiation of high flow nasal cannula.  She is at 57% FiO2 at 50 L, states she is feeling better.  Saturating 95%, and tachycardia improved to around 105.    Patient accepted to stepdown unit.    ICU resident came down to do a screening exam.  Does agree with telemetry stepdown bed.  I spoke to the service resident who will be admitting the patient, all questions answered.  Stable for admission.      Clinical Impression     COVID-19 pneumonia    Disposition        Admit 1/16/2021 12:47 PM  Telemetry Bed?: Yes  Patient Class: Inpatient [1]  Level of Care: Intermediate/Stepdown [8]  Admission Diagnosis: Acute respiratory failure due to COVID-19 (CMS/[facility]) [[id number]]  Admitting Physician: CAROLINA FRANCOIS [[id number]]  Is this a telephone or verbal order?: This is a telephone order from the admitting physician    Britta Cifuentes MD   1/16/2021 12:56 PM     Britta Cifuentes MD  Resident  01/16/21 1641       Britta Cifuentes MD  Resident  01/16/21 9727

## 2021-08-09 NOTE — H&P ADULT - I WAS PHYSICALLY PRESENT FOR THE KEY PORTIONS OF THE EVALUATION AND MANAGEMENT (E/M) SERVICE PROVIDED.  I AGREE WITH THE ABOVE HISTORY, PHYSICAL, AND PLAN WHICH I HAVE REVIEWED AND EDITED WHERE APPROPRIATE
Chief Complaint:       Palpitations (from adderall possibly  - )      History of Present Illness   Source of history provided by:  patient. Betty Holt is a 52 y.o. old female who presents to [facility] care with complaints of palpitations and left chest pain x 14 days. States the pain is located over the left side of chest.  States the pain does not radiate to the left shoulder, jaw, through the back, or to the opposite side of the chest.  Pain is not worsening with exertion. Pt has not had pain like this before. The CP does not cause SOB. Denies any N/V, diaphoresis, HA, fever, cough, or recent illness. No dizziness, syncope, or edema. Denies any hx of asthma or COPD. Former smoker. She reports that she is a patient at [facility], and she states that she was having palpitations and intermittent left-sided chest pain. She walked in with a prescription for an EKG. ROS    Unless otherwise stated in this report or unable to obtain because of the patient's clinical or mental status as evidenced by the medical record, this patients's positive and negative responses for Review of Systems, constitutional, psych, eyes, ENT, cardiovascular, respiratory, gastrointestinal, neurological, genitourinary, musculoskeletal, integument systems and systems related to the presenting problem are either stated in the preceding or were not pertinent or were negative for the symptoms and/or complaints related to the medical problem. Past Medical History:  has no past medical history on file. Past Surgical History:  has no past surgical history on file. Social History:  reports that she quit smoking about 4 years ago. She has never used smokeless tobacco.  Family History: family history is not on file.    Allergies: Latex and Wellbutrin [bupropion]    Physical Exam   Vital Signs:  /74   Pulse 76   Temp 97.4 °F (36.3 °C)   Wt 231 lb (104.8 kg)   SpO2 97%   BMI 39.65 kg/m² Oxygen Saturation Interpretation: Normal.    General Appearance/Constitutional:  Alert, development consistent with age, NAD. HEENT:  NC/NT. Airway patent. Neck:  Normal ROM. Supple. Lungs:  CTAB without wheezing, rales, or rhonchi. Heart:  Regular rate and rhythm, normal heart sounds, without pathological murmurs, ectopy, gallops, or rubs. Chest:  Symmetrical without visible rash or tenderness. Back:  No costovertebral tenderness. Skin:  Normal turgor. Warm, dry, without visible rash, unless noted elsewhere. Neurological:  Oriented. Motor functions intact. Psychiatric:  Pleasant and appropriate. Mood and affect are normal.    Test Results Section   (All laboratory and radiology results have been personally reviewed by myself)  Labs:  No results found for this visit on 08/09/21. Imaging: All Radiology results interpreted by Radiologist unless otherwise noted. No results found. EKG #1:  Interpreted by this provider unless otherwise noted. Time:  1143    Rate: 63  Rhythm: Regular  Interpretation: Sinus rhythm, no ectopy noted  Comparison: None    Assessment / Plan   Impression(s):  Vale was seen today for palpitations. Diagnoses and all orders for this visit:    Palpitation  -     EKG 12 lead    Intermittent left-sided chest pain          Vitals reviewed which are stable. EKG revealed normal sinus rhythm without ST elevation, T inversion, or the presence of Q waves. However, pt advised that this cannot predict a future cardiac event. Pt advised close f/u with PCP in 7 days for recheck and further workup. ED sooner if symptoms worsen or change. ED immediately with worsening or persistent CP, dyspnea, palpitations, edema, diaphoresis, fever, calf pain/swelling, etc. Pt is in agreement with this care plan. All questions answered. Return if symptoms worsen or fail to improve.     CLEO Lucio NP Statement Selected

## 2021-09-28 NOTE — H&P PST ADULT - BMI (KG/M2)
20.8 Render Risk Assessment In Note?: no Additional Notes: Patient did state the one side affect she has is GI upset -30 minutes  after taking the medication. Detail Level: Detailed

## 2021-11-30 NOTE — ED PROVIDER NOTE - EYES, MLM
Clear bilaterally, pupils equal, round and reactive to light. Tremfya Counseling: I discussed with the patient the risks of guselkumab including but not limited to immunosuppression, serious infections, and drug reactions.  The patient understands that monitoring is required including a PPD at baseline and must alert us or the primary physician if symptoms of infection or other concerning signs are noted.

## 2022-05-12 NOTE — H&P PST ADULT - GASTROINTESTINAL DETAILS
Brief Postoperative Note      Patient: Wade Gutierrez II  YOB: 1973  MRN: [id number]    Date of Procedure: 5/12/2022    Pre-Op Diagnosis: INCISIONAL HERNIA    Post-Op Diagnosis: Same       Procedure(s):  OPEN INCISIONAL HERNIA REPAIR WITH MESH    Surgeon(s):  Vickie Garcia MD    Assistant:  Resident: Maciej Bear MD    Anesthesia: General    Estimated Blood Loss (mL): Minimal    Complications: None    Specimens:   ID Type Source Tests Collected by Time Destination   A : [facility] [place] Tissue Tissue SURGICAL PATHOLOGY Vickie Garcia MD 5/12/2022 1419        Implants:  Implant Name Type Inv.  Item Serial No.  Lot No. LRB No. Used Action   MESH LANDON [id number] INGUINAL POLYPR [facility] ([place], [place]) - [id number]  [id number] INGUINAL POLYPR [facility] [id number] N/A 1 Implanted         Drains: * No LDAs found *     Findings: retrorectus mesh placement; modified keyon stoppa repair    Electronically signed by Kehinde Lucero MD on 5/12/2022 at 3:40 PM
soft/bowel sounds normal/no distention/nontender/no masses palpable

## 2022-05-17 NOTE — ASU PREOP CHECKLIST - NS PREOP CHK HIBICLENS NA
N/A Terbinafine Counseling: Patient counseling regarding adverse effects of terbinafine including but not limited to headache, diarrhea, rash, upset stomach, liver function test abnormalities, itching, taste/smell disturbance, nausea, abdominal pain, and flatulence.  There is a rare possibility of liver failure that can occur when taking terbinafine.  The patient understands that a baseline LFT and kidney function test may be required. The patient verbalized understanding of the proper use and possible adverse effects of terbinafine.  All of the patient's questions and concerns were addressed.

## 2022-12-22 NOTE — PROGRESS NOTE ADULT - PROBLEM SELECTOR PLAN 5
Problem: INFECTION - ADULT  Goal: Absence or prevention of progression during hospitalization  Description: INTERVENTIONS:  - Assess and monitor for signs and symptoms of infection  - Monitor lab/diagnostic results  - Monitor all insertion sites, i e  indwelling lines, tubes, and drains  - Monitor endotracheal if appropriate and nasal secretions for changes in amount and color  - [place] appropriate cooling/warming therapies per order  - Administer medications as ordered  - Instruct and encourage patient and family to use good hand hygiene technique  - Identify and instruct in appropriate isolation precautions for identified infection/condition  Outcome: Progressing     Problem: PAIN - ADULT  Goal: Verbalizes/displays adequate comfort level or baseline comfort level  Description: Interventions:  - Encourage patient to monitor pain and request assistance  - Assess pain using appropriate pain scale  - Administer analgesics based on type and severity of pain and evaluate response  - Implement non-pharmacological measures as appropriate and evaluate response  - Consider cultural and social influences on pain and pain management  - Notify physician/advanced practitioner if interventions unsuccessful or patient reports new pain  Outcome: Progressing     Problem: RESPIRATORY - ADULT  Goal: Achieves optimal ventilation and oxygenation  Description: INTERVENTIONS:  - Assess for changes in respiratory status  - Assess for changes in mentation and behavior  - Position to facilitate oxygenation and minimize respiratory effort  - Oxygen administered by appropriate delivery if ordered  - Initiate smoking cessation education as indicated  - Encourage broncho-pulmonary hygiene including cough, deep breathe, Incentive Spirometry  - Assess the need for suctioning and aspirate as needed  - Assess and instruct to report SOB or any respiratory difficulty  - Respiratory Therapy support as indicated  Outcome: Progressing     Problem: METABOLIC, FLUID AND ELECTROLYTES - ADULT  Goal: Electrolytes maintained within normal limits  Description: INTERVENTIONS:  - Monitor labs and assess patient for signs and symptoms of electrolyte imbalances  - Administer electrolyte replacement as ordered  - Monitor response to electrolyte replacements, including repeat lab results as appropriate  - Instruct patient on fluid and nutrition as appropriate  Outcome: Progressing Pt states her UC has been well controlled on balsalazide. Current colitis due to C. diff - see above  -Cont. balsalazide

## 2023-05-03 NOTE — PROGRESS NOTE ADULT - PROBLEM SELECTOR PROBLEM 5
Ulcerative colitis with complication, unspecified location Cimzia Counseling:  I discussed with the patient the risks of Cimzia including but not limited to immunosuppression, allergic reactions and infections.  The patient understands that monitoring is required including a PPD at baseline and must alert us or the primary physician if symptoms of infection or other concerning signs are noted.

## 2023-05-10 NOTE — H&P ADULT - PROBLEM/PLAN-8
DISPLAY PLAN FREE TEXT Area M Indication Text: Tumors in this location are included in Area M (cheek, forehead, scalp, neck, jawline and pretibial skin).  Mohs surgery is indicated for tumors in these anatomic locations.

## 2023-05-23 NOTE — H&P PST ADULT - NEGATIVE ENMT SYMPTOMS
Afebrile, Tmax 37.1, WBC 22 today  BC  remain negative    ____________________________________________________  ROS  GENERAL: denies chills, , night sweats, weight loss.   PSYCH: denies depression, anxiety, suicidal ideation, hallucination, and delusions  SKIN: no rash or lesions; no color changes, no abnormal nevi,no  dryness, and nojaundice    EYES: denies visual changes, floaters, pain, inflammation, blurred vision, and discharge  ENT: denies tinnitus, vertigo, epistaxis, oral lesion, and decreased acuity  PULM: denies, hemoptysis, pleurisy  CVS: denies angina, palpitations,+ orthopnea, no syncope, or heart murmur  GI: denies constipation, diarrhea, melena, abdominal pain, nausea.   : denies dysuria, frequency, discharge, incontinence, stones or macroscopic hematuria  MS: no arthralgias, no erythema or swelling, no myalgias, noedema, or lower back pain.   CNS: denies numbness, dizziness, seizure, or tremor  ENDO: denies heat/cold intolerance, polyuria, polydipsia, malaise.    HEME: denies bruising, bleeding, lymphadenopathy, anemia, and calf pain    Allergies  No Known Allergies    __________________________________________________  MEDS:  MEDICATIONS  (STANDING):  furosemide    Tablet 80 daily  guaiFENesin Oral Liquid (Sugar-Free) 100 every 6 hours PRN  heparin   Injectable 5000 every 8 hours  lactulose Syrup 10 two times a day  levothyroxine 100 daily  ondansetron Injectable 4 every 6 hours PRN  pantoprazole    Tablet 40 before breakfast  spironolactone 100 daily    _________________________________________________  ANTIMICOBIALS  ceFAZolin   IVPB 2000 every 8 hours  ciprofloxacin     Tablet 500 daily      GENERAL LABS              7.5                  132  | 25   | <4           22.13 >-----------< 143     ------------------------< 90                    22.2                 2.9  | 94   | 0.61                                         Ca 8.7   Mg 1.6   Ph 1.6        Urinalysis Basic - ( 22 May 2023 21:17 )    Color: Dark Yellow / Appearance: Slightly Turbid / S.025 / pH: x  Gluc: x / Ketone: Negative  / Bili: Large >10 / Urobili: Negative   Blood: x / Protein: 30 mg/dL / Nitrite: Negative   Leuk Esterase: Negative / RBC: 8 /hpf / WBC 2 /HPF   Sq Epi: x / Non Sq Epi: x / Bacteria: Negative        _________________________________________________  MICROBIOLOGY  -----------          CMVPCR Log: Det <1.54 Assay Dynamic Range: 34.5 to 1.0E+07 IU/mL (1.54 to 7.00 Log10 IU/mL)  Assay lower limit of quantification (LLOQ) is 34.5 IU/mL (1.54 Log10  IU/mL)  CMV DNA detected below the LLOQ will be reported as Detected < 34.5 IU/mL  (<1.54 Log10 IU/mL)  [facility] Cytomegalovirus (CMV) is an FDA-cleared quantitative test that  enables the detection and quantitation of CMV DNA in EDTA plasma of  infected transplant patients on the [facility] 8800 system. This test was  verified by [employer]. Results should be interpreted  with consideration of all clinical findings and laboratory findings and  should not form the sole basis for a diagnosis or treatment decision. Xhz00QI/mL ( @ 07:20)      Legionella Antigen, Urine: Negative (23 @ 23:20)      Fungitell:   _______________________________________________  PERTINENT IMAGING  _________________________________________________  Physical Exam:   T(C): 36.7 (23 @ 11:56), Max: 36.8 (23 @ 04:50)  HR: 100 (23 @ 11:56) (90 - 104)  BP: 100/63 (23 @ 11:56) (95/59 - 110/68)  RR: 18 (23 @ 11:56) (17 - 18)  SpO2: 95% (23 @ 11:56) (94% - 95%)    23 @ 07:01  -  23 @ 07:00  --------------------------------------------------------  IN: 720 mL / OUT: 0 mL / NET: 720 mL      · Constitutional	well-groomed; no distress  · Eyes	PERRL; EOMI; conjunctiva clear  · ENMT	no gross abnormalities  · Respiratory	clear to auscultation bilaterally; no wheezes; no rales; no rhonchi; no respiratory distress  · Cardiovascular	regular rate and rhythm; S1 S2 present; no gallops; no rub; no murmur; no JVD; normal PMI; no pedal edema  · Cardiovascular Comments	no S3/s4  · Gastrointestinal	soft; nontender; nondistended; normal active bowel sounds; no guarding; no organomegaly; no palpable julia  · Genitourinary Female	normal external genitalia  · Neurological	cranial nerves II-XII intact; sensation intact; responds to pain; responds to verbal commands; deep reflexes intact; no meningismus  · Mental Status	Alert and oriented x3, appropriate  · Skin	warm and dry; color normal; no rashes; no ulcers; no subcutaneous nodules; no induration  · Lymphatic	No lymphadedenopathy  · Musculoskeletal	normal; ROM intact; strength 5/5 bilateral upper extremities; strength 5/5 bilateral lower extremities; no joint swelling; no joint erythema; no joint warmth; no calf tenderness; no chest wall tenderness   Afebrile, Tmax 37.1, WBC 22 today from 17  BC  remain negative        ____________________________________________________  ROS  GENERAL: denies chills, , night sweats, weight loss.   PSYCH: denies depression, anxiety, suicidal ideation, hallucination, and delusions  SKIN: no rash or lesions; no color changes, no abnormal nevi,no  dryness, and nojaundice    EYES: denies visual changes, floaters, pain, inflammation, blurred vision, and discharge  ENT: denies tinnitus, vertigo, epistaxis, oral lesion, and decreased acuity  PULM: denies, hemoptysis, pleurisy  CVS: denies angina, palpitations,+ orthopnea, no syncope, or heart murmur  GI: denies constipation, diarrhea, melena, abdominal pain, nausea.   : denies dysuria, frequency, discharge, incontinence, stones or macroscopic hematuria  MS: no arthralgias, no erythema or swelling, no myalgias, noedema, or lower back pain.   CNS: denies numbness, dizziness, seizure, or tremor  ENDO: denies heat/cold intolerance, polyuria, polydipsia, malaise.    HEME: denies bruising, bleeding, lymphadenopathy, anemia, and calf pain    Allergies  No Known Allergies    __________________________________________________  MEDS:  MEDICATIONS  (STANDING):  furosemide    Tablet 80 daily  guaiFENesin Oral Liquid (Sugar-Free) 100 every 6 hours PRN  heparin   Injectable 5000 every 8 hours  lactulose Syrup 10 two times a day  levothyroxine 100 daily  ondansetron Injectable 4 every 6 hours PRN  pantoprazole    Tablet 40 before breakfast  spironolactone 100 daily    _________________________________________________  ANTIMICOBIALS  ceFAZolin   IVPB 2000 every 8 hours  ciprofloxacin     Tablet 500 daily      GENERAL LABS              7.5                  132  | 25   | <4           22.13 >-----------< 143     ------------------------< 90                    22.2                 2.9  | 94   | 0.61                                         Ca 8.7   Mg 1.6   Ph 1.6        Urinalysis Basic - ( 22 May 2023 21:17 )    Color: Dark Yellow / Appearance: Slightly Turbid / S.025 / pH: x  Gluc: x / Ketone: Negative  / Bili: Large >10 / Urobili: Negative   Blood: x / Protein: 30 mg/dL / Nitrite: Negative   Leuk Esterase: Negative / RBC: 8 /hpf / WBC 2 /HPF   Sq Epi: x / Non Sq Epi: x / Bacteria: Negative        _________________________________________________  MICROBIOLOGY  -----------          CMVPCR Log: Det <1.54 Assay Dynamic Range: 34.5 to 1.0E+07 IU/mL (1.54 to 7.00 Log10 IU/mL)  Assay lower limit of quantification (LLOQ) is 34.5 IU/mL (1.54 Log10  IU/mL)  CMV DNA detected below the LLOQ will be reported as Detected < 34.5 IU/mL  (<1.54 Log10 IU/mL)  [facility] Cytomegalovirus (CMV) is an FDA-cleared quantitative test that  enables the detection and quantitation of CMV DNA in EDTA plasma of  infected transplant patients on the [facility] 8800 system. This test was  verified by [employer]. Results should be interpreted  with consideration of all clinical findings and laboratory findings and  should not form the sole basis for a diagnosis or treatment decision. [id number]/mL ( @ 07:20)      Legionella Antigen, Urine: Negative (23 @ 23:20)      Fungitell:   _______________________________________________  PERTINENT IMAGING  _________________________________________________  Physical Exam:   T(C): 36.7 (23 @ 11:56), Max: 36.8 (23 @ 04:50)  HR: 100 (23 @ 11:56) (90 - 104)  BP: 100/63 (23 @ 11:56) (95/59 - 110/68)  RR: 18 (23 @ 11:56) (17 - 18)  SpO2: 95% (23 @ 11:56) (94% - 95%)    23 @ 07:01  -  23 @ 07:00  --------------------------------------------------------  IN: 720 mL / OUT: 0 mL / NET: 720 mL      · Constitutional	well-groomed; no distress  · Eyes	PERRL; EOMI; conjunctiva clear  · ENMT	no gross abnormalities  · Respiratory	clear to auscultation bilaterally; no wheezes; no rales; no rhonchi; no respiratory distress  · Cardiovascular	regular rate and rhythm; S1 S2 present; no gallops; no rub; no murmur; no JVD; normal PMI; no pedal edema  · Cardiovascular Comments	no S3/s4  · Gastrointestinal	soft; nontender; nondistended; normal active bowel sounds; no guarding; no organomegaly; no palpable julia  · Genitourinary Female	normal external genitalia  · Neurological	cranial nerves II-XII intact; sensation intact; responds to pain; responds to verbal commands; deep reflexes intact; no meningismus  · Mental Status	Alert and oriented x3, appropriate  · Skin	warm and dry; color normal; no rashes; no ulcers; no subcutaneous nodules; no induration  · Lymphatic	No lymphadedenopathy  · Musculoskeletal	normal; ROM intact; strength 5/5 bilateral upper extremities; strength 5/5 bilateral lower extremities; no joint swelling; no joint erythema; no joint warmth; no calf tenderness; no chest wall tenderness   no hearing difficulty/no throat pain/no tinnitus/no ear pain/no dysphagia/no vertigo/no sinus symptoms

## 2023-07-28 NOTE — H&P PST ADULT - FALL HARM RISK CONCLUSION
Universal Safety Interventions High Dose Vitamin A Counseling: Side effects reviewed, pt to contact office should one occur.

## 2024-05-14 NOTE — ED ADULT NURSE NOTE - CAS EDN DISCHARGE ASSESSMENT
I called patient and scheduled procedure with Dr. Brown for 6/20 @ 1:00 pm. Patient is to check in @ 11:00 am in the [facility] 1st floor.     Patient has been instructed nothing to eat or drink 8 hours prior and she will need a .       
Alert and oriented to person, place and time

## 2024-08-26 NOTE — PROVIDER CONTACT NOTE (OTHER) - SITUATION
Outreach attempt was made to schedule a Medicare Wellness Visit. This was the first attempt. Contact was made, no appointment scheduled. Patient states that she changed providers.     Pt temperature 100.5F

## 2024-09-04 NOTE — BRIEF OPERATIVE NOTE - SPECIMENS
Caller: Giovany Marni    Relationship: Self    Best call back number: [phone]    What is the best time to reach you: ANYTIME    Who are you requesting to speak with (clinical staff, provider,  specific staff member): CLINICAL    What was the call regarding: PATIENT CALLING AGAIN TO SCHEDULE F/U APPT - MSG SENT YESTERDAY BUT PATIENT DID NOT GET A CALLBACK YET. (SEE PREVIOUS MSG ABOUT HOSPITAL VISIT).     PLEASE ADVISE.     
urine culture

## 2024-10-16 NOTE — ASU PATIENT PROFILE, ADULT - ANESTHESIA, PREVIOUS REACTION, PROFILE
none
Initiate Treatment: Imiquimod 3.75% cream AAA QPM PRN
Detail Level: Zone
Initiate Treatment: Tretinoin 0.025% cream AAA QPM PRN
none

## 2024-11-04 NOTE — PROGRESS NOTE ADULT - SUBJECTIVE AND OBJECTIVE BOX
Noted    Patient is a 77yo female who presents with a chief complaint of Not feeling well (20 Sep 2018 17:37)     INTERVAL HPI/OVERNIGHT EVENTS:        REVIEW OF SYSTEMS:  Constitutional: Denies fever, weight loss, fatigue  Resp: Denies SOB, cough, hemoptysis  CV: Denies chest pain, palpitations, dizziness  GI: Denies abdominal pain, N/V/D/C, melena, hematochezia  : Denies dysuria, increased frequency, hematuria  Neuro: Denies HA, weakness, numbness  Skin: Denies rashes, lesions  Lymph Nodes: Denies enlarged glands  MSK: Denies joint pain, swelling    VITAL SIGNS:  T(C): 36.7 (09-21-18 @ 03:00), Max: 36.8 (09-20-18 @ 14:19)  HR: 77 (09-21-18 @ 03:00) (69 - 86)  BP: 151/84 (09-21-18 @ 03:00) (130/78 - 155/79)  RR: 18 (09-21-18 @ 03:00) (18 - 20)  SpO2: 99% (09-21-18 @ 03:00) (98% - 99%)    PHYSICAL EXAM:  General: NAD, well-groomed, well-developed  Eyes: Conjunctiva and sclera clear  ENMT: Moist mucous membranes  Neck: Supple  Chest: Clear to auscultation bilaterally; no rales, rhonchi, or wheezing  Heart: Regular rate and rhythm; no murmurs, rubs, or gallops  Abd: +BS, soft, nontender, nondistended  Nervous System: AAOX3  Psych: Appropriate affect and mood  Ext:  no LE edema    LABS:    21 Sep 2018 06:46    133    |  98     |  12     ----------------------------<  161    3.1     |  27     |  0.78     Ca    8.3        21 Sep 2018 06:46  Phos  3.0       21 Sep 2018 06:46  Mg     1.6       21 Sep 2018 06:46    TPro  4.6    /  Alb  2.5    /  TBili  0.2    /  DBili  x      /  AST  11     /  ALT  <5     /  AlkPhos  102    21 Sep 2018 06:46        MEDICATIONS  (STANDING):  atorvastatin 10 milliGRAM(s) Oral at bedtime  balsalazide 2250 milliGRAM(s) Oral three times a day  enoxaparin Injectable 40 milliGRAM(s) SubCutaneous every 24 hours  metoprolol tartrate 12.5 milliGRAM(s) Oral every 12 hours  pantoprazole    Tablet 40 milliGRAM(s) Oral before breakfast  sertraline 25 milliGRAM(s) Oral two times a day  traMADol 50 milliGRAM(s) Oral every 12 hours  vancomycin    Solution 500 milliGRAM(s) Oral every 6 hours    MEDICATIONS  (PRN):  acetaminophen    Suspension .. 650 milliGRAM(s) Oral every 6 hours PRN Mild Pain (1 - 3), Moderate Pain (4 - 6)  acetaminophen   Tablet .. 650 milliGRAM(s) Oral every 6 hours PRN Temp greater or equal to 38C (100.4F)  acetaminophen   Tablet .. 650 milliGRAM(s) Oral every 6 hours PRN Moderate Pain (4 - 6)  traMADol 25 milliGRAM(s) Oral every 6 hours PRN breakthru pain Patient is a 79yo female who presents with a chief complaint of Not feeling well (20 Sep 2018 17:37)     INTERVAL HPI/OVERNIGHT EVENTS:  Yesterday, patient had a couple episodes of diarrhea. But this morning, patient was eager to report that she had a well-formed BM. Her RLQ/R thigh pain is about a 3 in severity this morning, but she does see improvement on the pain medication. Patient endorses a dry, scratchy throat this morning. She is tolerating her diet well, and she denies any CP, SOB, n/v, fever/chills, or headache.    REVIEW OF SYSTEMS:  Constitutional: Denies fever, weight loss, fatigue  Resp: Denies SOB, cough, hemoptysis  CV: Denies chest pain, palpitations, dizziness  GI: minimal abdominal pain, denies N/V, melena, hematochezia  : Denies dysuria, increased frequency, hematuria  Neuro: Denies HA, weakness, numbness  Skin: Denies rashes, lesions  Lymph Nodes: Denies enlarged glands  MSK: chronic RLE lymphedema    VITAL SIGNS:  T(C): 36.7 (09-21-18 @ 03:00), Max: 36.8 (09-20-18 @ 14:19)  HR: 77 (09-21-18 @ 03:00) (69 - 86)  BP: 151/84 (09-21-18 @ 03:00) (130/78 - 155/79)  RR: 18 (09-21-18 @ 03:00) (18 - 20)  SpO2: 99% (09-21-18 @ 03:00) (98% - 99%)    PHYSICAL EXAM:  General: NAD, well-groomed, well-developed  HEENT: Conjunctiva and sclera clear, moist mucous membranes  Neck: Supple  Chest: Clear to auscultation bilaterally; no rales, rhonchi, or wheezing  Heart: Regular rate and rhythm; no murmurs, rubs, or gallops  Abd: +BS, soft, nontender, nondistended  Nervous System: AAOX3  Psych: Appropriate affect and mood  Ext:  chronic RLE lymphedema    LABS:                        10.4   22.07 )-----------( 390      ( 21 Sep 2018 07:53 )             33.1     21 Sep 2018 06:46    133    |  98     |  12     ----------------------------<  161    3.1     |  27     |  0.78     Ca    8.3        21 Sep 2018 06:46  Phos  3.0       21 Sep 2018 06:46  Mg     1.6       21 Sep 2018 06:46    TPro  4.6    /  Alb  2.5    /  TBili  0.2    /  DBili  x      /  AST  11     /  ALT  <5     /  AlkPhos  102    21 Sep 2018 06:46      MEDICATIONS  (STANDING):  atorvastatin 10 milliGRAM(s) Oral at bedtime  balsalazide 2250 milliGRAM(s) Oral three times a day  enoxaparin Injectable 40 milliGRAM(s) SubCutaneous every 24 hours  metoprolol tartrate 12.5 milliGRAM(s) Oral every 12 hours  pantoprazole    Tablet 40 milliGRAM(s) Oral before breakfast  sertraline 25 milliGRAM(s) Oral two times a day  traMADol 50 milliGRAM(s) Oral every 12 hours  vancomycin    Solution 500 milliGRAM(s) Oral every 6 hours    MEDICATIONS  (PRN):  acetaminophen    Suspension .. 650 milliGRAM(s) Oral every 6 hours PRN Mild Pain (1 - 3), Moderate Pain (4 - 6)  acetaminophen   Tablet .. 650 milliGRAM(s) Oral every 6 hours PRN Temp greater or equal to 38C (100.4F)  acetaminophen   Tablet .. 650 milliGRAM(s) Oral every 6 hours PRN Moderate Pain (4 - 6)  traMADol 25 milliGRAM(s) Oral every 6 hours PRN breakthru pain

## 2025-01-13 NOTE — PROGRESS NOTE ADULT - SUBJECTIVE AND OBJECTIVE BOX
No VM set up, unable to leave message   FRIDA ROMANO  78y  Female      Patient is a 78y old  Female who presents with a chief complaint of Not feeling well (25 Sep 2018 08:34)      INTERVAL HPI/OVERNIGHT EVENTS:    Pt reports feeling severe pain in her LLE and LLQ last night that woke her up and kept her from sleeping. She says that the pain was a 10/10 and seemed to travel down her leg. The pain did not improve after taking oxycodone. When seen in the morning, her pain level was still 8/10 in the LLQ and LLE. Pt also continued to have episodes of diarrhea yesterday, but says BMs seemed to be less watery as the day went on. She denies nausea, shortness of breath, chest pain.    Pt also says that she would like to go to rehab after the hospital. Says that she is following with Dr. Dumont at [facility] for her cancer, reports speaking to her oncologist last week and her oncologist recommends stopping chemotherapy and potentially reconsidering different treatments after she leaves the hospital.     REVIEW OF SYSTEMS:  CONSTITUTIONAL: No fever, weight loss, or fatigue  EYES: No eye pain, visual disturbances  ENMT: No sinus or throat pain  NECK: No pain or stiffness  RESPIRATORY: No cough, wheezing; No shortness of breath  CARDIOVASCULAR: No chest pain, palpitations, dizziness, or leg swelling  GASTROINTESTINAL: +LLQ pain, +diarrhea. No nausea, vomiting  GENITOURINARY: No dysuria, frequency, hematuria, or incontinence  NEUROLOGICAL: No headaches, memory loss, loss of strength, numbness, or tremors  SKIN: No itching, burning, rashes, or lesions   MUSCULOSKELETAL: No joint pain or swelling; extremity pain  PSYCHIATRIC: No depression, anxiety. +difficulty sleeping 2/2 pain  ALLERY AND IMMUNOLOGIC: No hives or eczema    T(C): 36.4 (09-25-18 @ 05:12), Max: 36.8 (09-24-18 @ 20:40)  HR: 80 (09-25-18 @ 05:12) (80 - 89)  BP: 156/66 (09-25-18 @ 05:12) (122/72 - 156/66)  RR: 20 (09-25-18 @ 05:12) (18 - 20)  SpO2: 95% (09-25-18 @ 05:12) (95% - 98%)  Wt(kg): --Vital Signs Last 24 Hrs  T(C): 36.4 (25 Sep 2018 05:12), Max: 36.8 (24 Sep 2018 20:40)  T(F): 97.5 (25 Sep 2018 05:12), Max: 98.3 (24 Sep 2018 20:40)  HR: 80 (25 Sep 2018 05:12) (80 - 89)  BP: 156/66 (25 Sep 2018 05:12) (122/72 - 156/66)  BP(mean): --  RR: 20 (25 Sep 2018 05:12) (18 - 20)  SpO2: 95% (25 Sep 2018 05:12) (95% - 98%)    PHYSICAL EXAM:  GENERAL: NAD, well-groomed, well-developed  HEAD:  Atraumatic, Normocephalic  EYES: EOMI, conjunctiva and sclera clear  ENMT: Moist mucous membranes, Good dentition, No lesions  NECK: Supple, No JVD  NERVOUS SYSTEM:  Alert & Oriented X3, Good concentration; motor and sensation grossly intact bilaterally   CHEST/LUNG: Clear to percussion bilaterally; No rales, rhonchi, wheezing, or rubs  HEART: Regular rate and rhythm; No murmurs, rubs, or gallops  ABDOMEN: Soft, Nondistended; pain in LLQ with palpation; Bowel sounds present  EXTREMITIES: Pain with palpation and edema of upper LLE; No clubbing, cyanosis, or edema  SKIN: No rashes or lesions    Consultant(s) Notes Reviewed:  [x ] YES  [ ] NO  Care Discussed with Consultants/Other Providers [ x] YES  [ ] NO    LABS:                        8.7    15.35 )-----------( 412      ( 24 Sep 2018 09:21 )             29.3     09-24    136  |  98  |  10  ----------------------------<  104<H>  4.1   |  31  |  0.78    Ca    8.6      24 Sep 2018 07:13  Phos  3.6     09-24  Mg     1.8     09-24    TPro  4.9<L>  /  Alb  2.6<L>  /  TBili  0.2  /  DBili  x   /  AST  26  /  ALT  7<L>  /  AlkPhos  91  09-24        RADIOLOGY & ADDITIONAL TESTS:    Imaging Personally Reviewed:  [X] YES  [ ] NO FRIDA ROMANO  78y  Female      Patient is a 78y old  Female who presents with a chief complaint of Not feeling well (25 Sep 2018 08:34)      INTERVAL HPI/OVERNIGHT EVENTS:    Pt reports feeling severe pain in her RLE and RLQ last night that woke her up and kept her from sleeping. She says that the pain was a 10/10 and seemed to travel down her leg. The pain did not improve after taking oxycodone. When seen in the morning, her pain level was still 8/10 in the RLQ and RLE. Pt also continued to have episodes of diarrhea yesterday, but says BMs seemed to be less watery as the day went on. She denies nausea, shortness of breath, chest pain.    Pt also says that she would like to go to rehab after the hospital. Says that she is following with Dr. Dumont at [facility] for her cancer, reports speaking to her oncologist last week and her oncologist recommends stopping chemotherapy and potentially reconsidering different treatments after she leaves the hospital.     REVIEW OF SYSTEMS:  CONSTITUTIONAL: No fever, weight loss, or fatigue  EYES: No eye pain, visual disturbances  ENMT: No sinus or throat pain  NECK: No pain or stiffness  RESPIRATORY: No cough, wheezing; No shortness of breath  CARDIOVASCULAR: No chest pain, palpitations, dizziness, or leg swelling  GASTROINTESTINAL: +RLQ pain, +diarrhea. No nausea, vomiting  GENITOURINARY: No dysuria, frequency, hematuria, or incontinence  NEUROLOGICAL: No headaches, memory loss, loss of strength, numbness, or tremors  SKIN: No itching, burning, rashes, or lesions   MUSCULOSKELETAL: No joint pain or swelling; extremity pain  PSYCHIATRIC: No depression, anxiety. +difficulty sleeping 2/2 pain  ALLERY AND IMMUNOLOGIC: No hives or eczema    T(C): 36.4 (09-25-18 @ 05:12), Max: 36.8 (09-24-18 @ 20:40)  HR: 80 (09-25-18 @ 05:12) (80 - 89)  BP: 156/66 (09-25-18 @ 05:12) (122/72 - 156/66)  RR: 20 (09-25-18 @ 05:12) (18 - 20)  SpO2: 95% (09-25-18 @ 05:12) (95% - 98%)  Wt(kg): --Vital Signs Last 24 Hrs  T(C): 36.4 (25 Sep 2018 05:12), Max: 36.8 (24 Sep 2018 20:40)  T(F): 97.5 (25 Sep 2018 05:12), Max: 98.3 (24 Sep 2018 20:40)  HR: 80 (25 Sep 2018 05:12) (80 - 89)  BP: 156/66 (25 Sep 2018 05:12) (122/72 - 156/66)  BP(mean): --  RR: 20 (25 Sep 2018 05:12) (18 - 20)  SpO2: 95% (25 Sep 2018 05:12) (95% - 98%)    PHYSICAL EXAM:  GENERAL: NAD, well-groomed, well-developed  HEAD:  Atraumatic, Normocephalic  EYES: EOMI, conjunctiva and sclera clear  ENMT: Moist mucous membranes, Good dentition, No lesions  NECK: Supple, No JVD  NERVOUS SYSTEM:  Alert & Oriented X3, Good concentration; motor and sensation grossly intact bilaterally   CHEST/LUNG: Clear to percussion bilaterally; No rales, rhonchi, wheezing, or rubs  HEART: Regular rate and rhythm; No murmurs, rubs, or gallops  ABDOMEN: Soft, Nondistended; pain in RLQ with palpation; Bowel sounds present  EXTREMITIES: Pain with palpation and edema of upper RLE; No clubbing, cyanosis, or edema  SKIN: No rashes or lesions    Consultant(s) Notes Reviewed:  [x ] YES  [ ] NO  Care Discussed with Consultants/Other Providers [ x] YES  [ ] NO    LABS:                        8.7    15.35 )-----------( 412      ( 24 Sep 2018 09:21 )             29.3     09-24    136  |  98  |  10  ----------------------------<  104<H>  4.1   |  31  |  0.78    Ca    8.6      24 Sep 2018 07:13  Phos  3.6     09-24  Mg     1.8     09-24    TPro  4.9<L>  /  Alb  2.6<L>  /  TBili  0.2  /  DBili  x   /  AST  26  /  ALT  7<L>  /  AlkPhos  91  09-24        RADIOLOGY & ADDITIONAL TESTS:    Imaging Personally Reviewed:  [X] YES  [ ] NO FRIDA ROMANO  78y  Female      Patient is a 78y old  Female who presents with a chief complaint of Not feeling well (25 Sep 2018 08:34)      INTERVAL HPI/OVERNIGHT EVENTS:    Pt reports feeling severe pain in her RLE and RLQ last night that woke her up and kept her from sleeping. She says that the pain was a 10/10 and seemed to travel down her leg. The pain did not improve much after taking oxycodone. When seen in the morning, her pain level was still 8/10 in the RLQ and RLE. Pt also continued to have episodes of diarrhea yesterday, but says BMs seemed to be less watery as the day went on. She denies nausea, shortness of breath, chest pain.    Pt also says that she would like to go to rehab after the hospital. Says that she is following with Dr. Dumont at [facility] for her cancer, reports speaking to her oncologist last week and her oncologist recommends stopping chemotherapy and potentially reconsidering different treatments after she leaves the hospital.     REVIEW OF SYSTEMS:  CONSTITUTIONAL: No fever, weight loss, or fatigue  EYES: No eye pain, visual disturbances  ENMT: No sinus or throat pain  NECK: No pain or stiffness  RESPIRATORY: No cough, wheezing; No shortness of breath  CARDIOVASCULAR: No chest pain, palpitations, dizziness, or leg swelling  GASTROINTESTINAL: +RLQ pain, +diarrhea. No nausea, vomiting  GENITOURINARY: No dysuria, frequency, hematuria, or incontinence  NEUROLOGICAL: No headaches, memory loss, loss of strength, numbness, or tremors  SKIN: No itching, burning, rashes, or lesions   MUSCULOSKELETAL: No joint pain or swelling; extremity pain  PSYCHIATRIC: No depression, anxiety. +difficulty sleeping 2/2 pain  ALLERY AND IMMUNOLOGIC: No hives or eczema    T(C): 36.4 (09-25-18 @ 05:12), Max: 36.8 (09-24-18 @ 20:40)  HR: 80 (09-25-18 @ 05:12) (80 - 89)  BP: 156/66 (09-25-18 @ 05:12) (122/72 - 156/66)  RR: 20 (09-25-18 @ 05:12) (18 - 20)  SpO2: 95% (09-25-18 @ 05:12) (95% - 98%)  Wt(kg): --Vital Signs Last 24 Hrs  T(C): 36.4 (25 Sep 2018 05:12), Max: 36.8 (24 Sep 2018 20:40)  T(F): 97.5 (25 Sep 2018 05:12), Max: 98.3 (24 Sep 2018 20:40)  HR: 80 (25 Sep 2018 05:12) (80 - 89)  BP: 156/66 (25 Sep 2018 05:12) (122/72 - 156/66)  BP(mean): --  RR: 20 (25 Sep 2018 05:12) (18 - 20)  SpO2: 95% (25 Sep 2018 05:12) (95% - 98%)    PHYSICAL EXAM:  GENERAL: NAD, well-groomed, well-developed  HEAD:  Atraumatic, Normocephalic  EYES: EOMI, conjunctiva and sclera clear  ENMT: Moist mucous membranes, Good dentition, No lesions  NECK: Supple, No JVD  NERVOUS SYSTEM:  Alert & Oriented X3, Good concentration; motor and sensation grossly intact bilaterally   CHEST/LUNG: Clear to percussion bilaterally; No rales, rhonchi, wheezing, or rubs  HEART: Regular rate and rhythm; No murmurs, rubs, or gallops  ABDOMEN: Soft, Nondistended; pain in RLQ with palpation; Bowel sounds present  EXTREMITIES: Pain with palpation and edema of upper RLE; No clubbing, cyanosis, or edema  SKIN: No rashes or lesions    Consultant(s) Notes Reviewed:  [x ] YES  [ ] NO  Care Discussed with Consultants/Other Providers [ x] YES  [ ] NO    LABS:                        8.7    15.35 )-----------( 412      ( 24 Sep 2018 09:21 )             29.3     09-24    136  |  98  |  10  ----------------------------<  104<H>  4.1   |  31  |  0.78    Ca    8.6      24 Sep 2018 07:13  Phos  3.6     09-24  Mg     1.8     09-24    TPro  4.9<L>  /  Alb  2.6<L>  /  TBili  0.2  /  DBili  x   /  AST  26  /  ALT  7<L>  /  AlkPhos  91  09-24        RADIOLOGY & ADDITIONAL TESTS:    Imaging Personally Reviewed:  [X] YES  [ ] NO FRIDA ROMANO  78y  Female      Patient is a 78y old  Female who presents with a chief complaint of Not feeling well (25 Sep 2018 08:34)      INTERVAL HPI/OVERNIGHT EVENTS:    Pt reports feeling severe pain in her RLE and RLQ last night that woke her up and kept her from sleeping. She says that the pain was a 10/10 and seemed to travel down her leg. The pain did not improve much after taking oxycodone. When seen in the morning, her pain level was still 8/10 in the RLQ and RLE. Pt also continued to have episodes of diarrhea yesterday, but says BMs seemed to be less watery as the day went on. She denies nausea, shortness of breath, chest pain.    Pt also says that she would like to go to rehab after the hospital. Says that she is following with [place]. Dumont [place] [facility] [place], reports speaking to her oncologist last week and her oncologist recommends stopping chemotherapy and potentially reconsidering different treatments after she leaves the hospital.    Called Dr. Dumont ([phone]) and he stated that her oncology plans are fluid and feels that she can resume medical therapy after she improves her functional status; medical planning would be based on further scanning and evaluation of her condition. He feels that subacute rehab followed by a visit to him right afterwards is the best option.    REVIEW OF SYSTEMS:  CONSTITUTIONAL: No fever, weight loss, or fatigue  EYES: No eye pain, visual disturbances  ENMT: No sinus or throat pain  NECK: No pain or stiffness  RESPIRATORY: No cough, wheezing; No shortness of breath  CARDIOVASCULAR: No chest pain, palpitations, dizziness, or leg swelling  GASTROINTESTINAL: +RLQ pain, +diarrhea. No nausea, vomiting  GENITOURINARY: No dysuria, frequency, hematuria, or incontinence  NEUROLOGICAL: No headaches, memory loss, loss of strength, numbness, or tremors  SKIN: No itching, burning, rashes, or lesions   MUSCULOSKELETAL: No joint pain or swelling; extremity pain  PSYCHIATRIC: No depression, anxiety. +difficulty sleeping 2/2 pain  ALLERY AND IMMUNOLOGIC: No hives or eczema    T(C): 36.4 (09-25-18 @ 05:12), Max: 36.8 (09-24-18 @ 20:40)  HR: 80 (09-25-18 @ 05:12) (80 - 89)  BP: 156/66 (09-25-18 @ 05:12) (122/72 - 156/66)  RR: 20 (09-25-18 @ 05:12) (18 - 20)  SpO2: 95% (09-25-18 @ 05:12) (95% - 98%)  Wt(kg): --Vital Signs Last 24 Hrs  T(C): 36.4 (25 Sep 2018 05:12), Max: 36.8 (24 Sep 2018 20:40)  T(F): 97.5 (25 Sep 2018 05:12), Max: 98.3 (24 Sep 2018 20:40)  HR: 80 (25 Sep 2018 05:12) (80 - 89)  BP: 156/66 (25 Sep 2018 05:12) (122/72 - 156/66)  BP(mean): --  RR: 20 (25 Sep 2018 05:12) (18 - 20)  SpO2: 95% (25 Sep 2018 05:12) (95% - 98%)    PHYSICAL EXAM:  GENERAL: NAD, well-groomed, well-developed  HEAD:  Atraumatic, Normocephalic  EYES: EOMI, conjunctiva and sclera clear  ENMT: Moist mucous membranes, Good dentition, No lesions  NECK: Supple, No JVD  NERVOUS SYSTEM:  Alert & Oriented X3, Good concentration; motor and sensation grossly intact bilaterally   CHEST/LUNG: Clear to percussion bilaterally; No rales, rhonchi, wheezing, or rubs  HEART: Regular rate and rhythm; No murmurs, rubs, or gallops  ABDOMEN: Soft, Nondistended; pain in RLQ with palpation; Bowel sounds present  EXTREMITIES: Pain with palpation and edema of upper RLE; No clubbing, cyanosis, or edema  SKIN: No rashes or lesions    Consultant(s) Notes Reviewed:  [x ] YES  [ ] NO  Care Discussed with Consultants/Other Providers [ x] YES  [ ] NO    LABS:                        8.7    15.35 )-----------( 412      ( 24 Sep 2018 09:21 )             29.3     09-24    136  |  98  |  10  ----------------------------<  104<H>  4.1   |  31  |  0.78    Ca    8.6      24 Sep 2018 07:13  Phos  3.6     09-24  Mg     1.8     09-24    TPro  4.9<L>  /  Alb  2.6<L>  /  TBili  0.2  /  DBili  x   /  AST  26  /  ALT  7<L>  /  AlkPhos  91  09-24        RADIOLOGY & ADDITIONAL TESTS:    Imaging Personally Reviewed:  [X] YES  [ ] NO

## 2025-03-10 NOTE — H&P PST ADULT - HOW PATIENT ADDRESSED, PROFILE
Detail Level: Zone
Detail Level: Generalized
Sunscreen Recommendations: Patient is to use sunscreen daily on the affected areas
Emilee

## 2025-03-24 NOTE — H&P PST ADULT - MAMMOGRAM, RESULTS OF LAST, PROFILE
Be Leblanc was seen for allied health care provider visit for quick follow up visit in conjunction with ENT clinic visit. He reports he is doing well since second surgery. Swallowing is going ok but he still has trouble with his lower left lip getting in the way when he tries to take a bite of sandwich. Formal video swallow study evaluation indicated after he decides whether he will undergo further treatment. All questions answered within scope of practice for pt. Encouraged pt to reach out with any questions or concerns.        Karlie Cruz MS, CCC-SLP  Speech-Language Pathology  [facility]  Department of Otolaryngology/D&T - 4th floor  Phone: [phone]  Email: [email or web address]    
WNL

## 2025-05-10 NOTE — H&P ADULT - PROBLEM/PLAN-8
Bedside and Verbal shift change report given to ROSEMARY Fuentes RN (oncoming nurse) by PAUL Singh RN (offgoing nurse). Report included the following information Nurse Handoff Report.      DISPLAY PLAN FREE TEXT

## 2025-06-30 NOTE — PHYSICAL THERAPY INITIAL EVALUATION ADULT - PHYSICAL ASSIST/NONPHYSICAL ASSIST: GAIT, REHAB EVAL
FEMALE PHYSICAL EXAM      HISTORY    Raeann Medina is a(n) 62 year old female here for annual exam, discussion of the following:     Patient would like to review her routine health maintenance recommendations and ensure she is up to date. Acute concerns per below.     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 Single, no children, 3 dogs (Cocker spaniels); sister in the area. Working out at home regularly (xaitment). Former smoker, quit .     MEDICAL HISTORY    Past Medical History:   Diagnosis Date    Anxiety     Asthma (CMD)     COPD (chronic obstructive pulmonary disease)  (CMD)     Depression     Essential (primary) hypertension     Gastroesophageal reflux disease without esophagitis 2022    High cholesterol     Kidney stones        SURGICAL HISTORY    Past Surgical History:   Procedure Laterality Date    Biopsy of breast, needle core      Breast biopsy Right     benign    Breast biopsy Right     benign    Breast biopsy Right 11/15/2024    Cardiac catherization  2021    Stent x1 to RCA    Colonoscopy      2020    Colonoscopy w/ polypectomy  2024    Coronary stent placement  2022    RCA    Hand finger surgery unlisted Left     L thumb tendon repair    Iliac vein angioplasty / stenting  2021    [facility]       SOCIAL HISTORY    Social History     Tobacco Use    Smoking status: Former     Current packs/day: 0.00     Average packs/day: 0.5 packs/day for 30.0 years (15.0 ttl pk-yrs)     Types: Cigarettes     Start date: 1971     Quit date: 2001     Years since quittin.8     Passive exposure: Past    Smokeless tobacco: Never   Vaping Use    Vaping status: never used   Substance Use Topics    Alcohol use: No     Alcohol/week: 0.0 standard drinks of alcohol    Drug use: Never       FAMILY HISTORY    Family History   Problem Relation Age of Onset    Hypertension Father     Cancer Maternal Grandmother         skin        MEDICATIONS    Current Outpatient Medications   Medication Sig    ALPRAZolam (XANAX) 0.5 MG tablet TAKE A HALF TABLET BY MOUTH DAILY AS NEEDED FOR ANXIETY    traZODone (DESYREL) 100 MG tablet Take 1.5 tablets by mouth nightly.    fluticasone-umeclidin-vilanterol (Trelegy Ellipta) 200-62.5-25 MCG/ACT inhaler Inhale 1 puff into the lungs daily. Rinse mouth and throat after each use    gabapentin (NEURONTIN) 300 MG capsule Take 1 tablet by mouth in the morning, one tablet by mouth in the afternoon and two tablets by mouth before bed.    montelukast (SINGULAIR) 10 MG tablet Take 1 tablet by mouth nightly.    carvedilol (COREG) 12.5 MG tablet TAKE 1 TABLET BY MOUTH TWICE A DAY IN THE MORNING AND EVENING WITH MEALS    spironolactone (ALDACTONE) 25 MG tablet Take 0.5 tablets by mouth daily.    methenamine (HIPREX) 1 g tablet Take 1 g by mouth in the morning and 1 g in the evening. Take with meals.    rosuvastatin (CRESTOR) 10 MG tablet TAKE 1 TABLET DAILY    albuterol 108 (90 Base) MCG/ACT inhaler Inhale 2-4 puffs into the lungs every 4 hours as needed for Wheezing or Shortness of Breath.    furosemide (LASIX) 20 MG tablet Take 1 tablet by mouth daily. As needed for weight gain/swelling.    Cranberry (Cranberry Concentrate) 500 MG Cap Take 500 mg by mouth in the morning and 500 mg in the evening.    Omega-3 Fatty Acids (Fish Oil) 1200 MG capsule Take 1,200 mg by mouth in the morning and 1,200 mg in the evening.    aspirin 81 MG EC tablet Take 1 tablet by mouth daily. Do not start before October 2, 2021.     No current facility-administered medications for this visit.       ALLERGIES    ALLERGIES:   Allergen Reactions    Metronidazole Nausea & Vomiting       REVIEW OF SYSTEMS    Eye Problem(s): negative  ENT Problem(s): negative  Cardiovascular problem(s): negative  Respiratory problem(s): COURTNEY (progressive)  Gastro-intestinal problem(s): negative  Genito-urinary problem(s): negative  Musculoskeletal problem(s):  negative  Integumentary problem(s): negative  Neurological problem(s): negative  Psychiatric problem(s): negative  Endocrine problem(s): negative  Hematologic and/or Lymphatic problem(s): negative    PHYSICAL EXAM    Vital Signs:    Vitals:    06/30/25 0835   BP: 112/60   Pulse: 78   Resp: 15   Temp: 98.2 °F (36.8 °C)   TempSrc: Oral   Weight: 49.9 kg (110 lb)   Height: 5' (1.524 m)       General:  Well developed, well nourished. In no acute distress.    Eyes:  Conjunctivae pink, sclerae anicteric. PERRL, EOMI.   HENT:  Normocephalic. Normal b/l external ears and nose. Normal b/l external auditory canal and TMs. Normal lips, gums, and teeth. Oropharynx is clear, tonsils 1+ b/l.   Neck:  Trachea is midline, no masses. Normal thyroid without enlargement, non-tender.   Respiratory:  Normal WOB and chest expansion. Lungs CTAB with slight decreased breath sounds throughout.   Cardiovascular:  RRR, no murmurs, rubs, or gallops. 2+ pedal pulses b/l, no pedal edema.  Gastrointestinal:  Soft, NTND. No abdominal masses.   Breasts:  Deferred.  Genitourinary:  Deferred.  Musculoskeletal:  No nail clubbing or cyanosis. Full ROM of head/neck and all 4 extremities. Normal strength in all 4 extremities. Normal gait and station.   Neurologic: Cranial nerves II-XII intact, no facial asymmetry/weakness.   Integumentary:  Warm and dry, normal turgor to palpation. No rashes, lesions, or wounds.    Psychiatric: Oriented to person, place, and time. Appropriate mood and affect. Normal judgment, insight, and memory.     ASSESSMENT AND PLAN   Raeann Medina is a 62 year old female who presents for annual exam, discussion of the following:    - Breast CA Screening: diagnostic mammo 11/2024 (after screening) BIRADS-3 and benign bx; she has had multiple other benign breast bx historically and strongly prefers to defer short interval f/u though agreeable to screening mammo 2 years from prior after review of risks of delayed screening; no  fhx breast/ovarian CA  - Cervical CA Screening: PAP 8/19/21 NILM/HPV neg; discussed repeat once more then likely d/c'ing  - Colon CA Screening: c-scope 5/2024, recall 3 years (pt strong preference for 5 year recall d/t transport challenges despite risk discussion); no fhx CRC/advanced polyps  - Depression Screening: stable anxiety/insomnia per below  - Menstrual Cycle: N/A (post-menopausal), cont vaginal estrogen for urinary sx w/ good effect +  - Birth Control: N/A  - STI Screening: declines  - Vaccines: Prevnar 20 today  - HTN Screening: /60, stable per below; paternal HTN  - DM/HLD Screening: last 1/2024 stable, trending   - Diet/Exercise: BMI 21.48; discussed metabolic health, wt stable vs prior; regularly walks dogs, no longer going to [facility] d/t cost    Assessment & Plan  Encounter for routine adult health examination with abnormal findings  Orders:    Comprehensive Metabolic Panel; Future    Lipid Panel With Reflex; Future    CBC No Differential; Future    Generalized anxiety disorder  C/b insomnia, stable sleep though mild increase work-related anxiety on gabapentin 300mg BID/600mg nightly, trazodone 150mg nightly, alprazolam 0.25mg PRN (x20 tabs for 1 month). Agreed to alprazolam increase to 0.5mg before work shifts, though can also minimize dosing by doing 1/2 tab on days she feels less necessary for higher dose. Otherwise continue care as written. Consider resuming maintenance med should issues progress. CSA signed today, UDS ordered per annual [place] policy for controlled substances.   Orders:    ALPRAZolam (XANAX) 0.5 MG tablet; TAKE A HALF TABLET BY MOUTH DAILY AS NEEDED FOR ANXIETY    gabapentin (NEURONTIN) 300 MG capsule; Take 1 tablet by mouth in the morning, one tablet by mouth in the afternoon and two tablets by mouth before bed.    Drug Abuse Screen, Urine; Future    Primary insomnia  Per above.   Orders:    traZODone (DESYREL) 100 MG tablet; Take 1.5 tablets by mouth nightly.    Controlled  substance agreement signed  Per above.   Orders:    Drug Abuse Screen, Urine; Future    Asthma with chronic obstructive pulmonary disease (COPD)  (CMD)  C/b chronic COURTNEY, stable now on Trelegy inhaler 1 puff daily (and some cost savings), Singulair 10mg nightly, albuterol very PRN. Continue care, notably no indication to repeat CT Lung scan last 9/2024 though consider repeat PFTs should respiratory concerns heighten, and would then likely reestablish w/ Pulm.   Orders:    fluticasone-umeclidin-vilanterol (Trelegy Ellipta) 200-62.5-25 MCG/ACT inhaler; Inhale 1 puff into the lungs daily. Rinse mouth and throat after each use    montelukast (SINGULAIR) 10 MG tablet; Take 1 tablet by mouth nightly.    COURTNEY (dyspnea on exertion)  Per above.   Orders:    fluticasone-umeclidin-vilanterol (Trelegy Ellipta) 200-62.5-25 MCG/ACT inhaler; Inhale 1 puff into the lungs daily. Rinse mouth and throat after each use    Essential hypertension  Known advanced atherosclerosis c/b NSTEMI in setting of urosepsis/respiratory distress s/p RCA PCI 9/2022, transient stress-induced cardiomyopathy, HTN stable on ASA 81mg daily, carvedilol 12.5mg BID, spironolactone 25mg daily, rosuva 10mg daily. Follows [facility] ([place]), deferring care. Low threshold for stress testing for CP or progressive COURTNEY       Coronary artery disease involving native coronary artery of native heart without angina pectoris  Per above.        Peripheral vascular disease (CMD)  C/b CAD, s/p endovascular b/l common iliac artery ISL lithotripsy angioplasty and [place] VBX stent rebuilding aortic bifurcation 12/16/21, stable on on rosuva 10mg daily, ASA 81mg daily (previously also Plavix). Formerly followed [facility] ([place], [place]), now overseen by [facility]; deferring care.        Recurrent UTI (urinary tract infection)  H/o recurrent UTI though only 1 episode since on methenamine 1g BID, continues daily cranberry supplement and pocket Bactrim for any breakthrough issues,  vaginal estrogen. Follows [facility] ([place]), deferring care.       Need for vaccination  Orders:    PNEUMOCOCCAL 20 ([id number])      Return in about 6 months (around 12/30/2025) for f/u anxiety, controlled substances.      Luh Gore MD     1 person assist